# Patient Record
Sex: FEMALE | Race: WHITE | NOT HISPANIC OR LATINO | Employment: OTHER | URBAN - METROPOLITAN AREA
[De-identification: names, ages, dates, MRNs, and addresses within clinical notes are randomized per-mention and may not be internally consistent; named-entity substitution may affect disease eponyms.]

---

## 2017-01-06 ENCOUNTER — ANTI-COAG VISIT (OUTPATIENT)
Dept: CARDIOLOGY | Facility: CLINIC | Age: 82
End: 2017-01-06
Payer: MEDICARE

## 2017-01-06 DIAGNOSIS — Z79.01 LONG TERM CURRENT USE OF ANTICOAGULANT THERAPY: Primary | ICD-10-CM

## 2017-01-06 LAB
CTP QC/QA: NORMAL
INR PPP: 2.7 (ref 2–3)

## 2017-01-06 PROCEDURE — 85610 PROTHROMBIN TIME: CPT | Mod: QW,S$GLB,, | Performed by: PHARMACIST

## 2017-01-26 ENCOUNTER — LAB VISIT (OUTPATIENT)
Dept: LAB | Facility: HOSPITAL | Age: 82
End: 2017-01-26
Attending: INTERNAL MEDICINE
Payer: MEDICARE

## 2017-01-26 ENCOUNTER — OFFICE VISIT (OUTPATIENT)
Dept: INTERNAL MEDICINE | Facility: CLINIC | Age: 82
End: 2017-01-26
Payer: MEDICARE

## 2017-01-26 VITALS
BODY MASS INDEX: 20.72 KG/M2 | DIASTOLIC BLOOD PRESSURE: 78 MMHG | SYSTOLIC BLOOD PRESSURE: 130 MMHG | HEIGHT: 59 IN | WEIGHT: 102.75 LBS | HEART RATE: 73 BPM

## 2017-01-26 DIAGNOSIS — Z23 FLU VACCINE NEED: ICD-10-CM

## 2017-01-26 DIAGNOSIS — Z00.00 PREVENTATIVE HEALTH CARE: ICD-10-CM

## 2017-01-26 DIAGNOSIS — I48.91 ATRIAL FIBRILLATION, UNSPECIFIED TYPE: ICD-10-CM

## 2017-01-26 DIAGNOSIS — Z23 NEEDS FLU SHOT: ICD-10-CM

## 2017-01-26 DIAGNOSIS — R79.9 ABNORMAL FINDING OF BLOOD CHEMISTRY: ICD-10-CM

## 2017-01-26 DIAGNOSIS — E03.9 HYPOTHYROIDISM, UNSPECIFIED TYPE: ICD-10-CM

## 2017-01-26 DIAGNOSIS — I10 ESSENTIAL HYPERTENSION: Primary | ICD-10-CM

## 2017-01-26 DIAGNOSIS — I10 ESSENTIAL HYPERTENSION: ICD-10-CM

## 2017-01-26 LAB
ALBUMIN SERPL BCP-MCNC: 4.5 G/DL
ALP SERPL-CCNC: 92 U/L
ALT SERPL W/O P-5'-P-CCNC: 13 U/L
ANION GAP SERPL CALC-SCNC: 11 MMOL/L
AST SERPL-CCNC: 28 U/L
BASOPHILS # BLD AUTO: 0.02 K/UL
BASOPHILS NFR BLD: 0.2 %
BILIRUB SERPL-MCNC: 0.9 MG/DL
BUN SERPL-MCNC: 14 MG/DL
CALCIUM SERPL-MCNC: 10.2 MG/DL
CHLORIDE SERPL-SCNC: 99 MMOL/L
CHOLEST/HDLC SERPL: 2.8 {RATIO}
CO2 SERPL-SCNC: 28 MMOL/L
CREAT SERPL-MCNC: 0.9 MG/DL
DIFFERENTIAL METHOD: ABNORMAL
EOSINOPHIL # BLD AUTO: 0 K/UL
EOSINOPHIL NFR BLD: 0.5 %
ERYTHROCYTE [DISTWIDTH] IN BLOOD BY AUTOMATED COUNT: 13 %
EST. GFR  (AFRICAN AMERICAN): >60 ML/MIN/1.73 M^2
EST. GFR  (NON AFRICAN AMERICAN): 58.1 ML/MIN/1.73 M^2
GLUCOSE SERPL-MCNC: 117 MG/DL
HCT VFR BLD AUTO: 45.2 %
HDL/CHOLESTEROL RATIO: 35.9 %
HDLC SERPL-MCNC: 209 MG/DL
HDLC SERPL-MCNC: 75 MG/DL
HGB BLD-MCNC: 15.5 G/DL
LDLC SERPL CALC-MCNC: 116.8 MG/DL
LYMPHOCYTES # BLD AUTO: 1.5 K/UL
LYMPHOCYTES NFR BLD: 17.8 %
MCH RBC QN AUTO: 31.9 PG
MCHC RBC AUTO-ENTMCNC: 34.3 %
MCV RBC AUTO: 93 FL
MONOCYTES # BLD AUTO: 0.5 K/UL
MONOCYTES NFR BLD: 5.8 %
NEUTROPHILS # BLD AUTO: 6.5 K/UL
NEUTROPHILS NFR BLD: 75.6 %
NONHDLC SERPL-MCNC: 134 MG/DL
PLATELET # BLD AUTO: 234 K/UL
PMV BLD AUTO: 11.3 FL
POTASSIUM SERPL-SCNC: 3.9 MMOL/L
PROT SERPL-MCNC: 8.2 G/DL
RBC # BLD AUTO: 4.86 M/UL
SODIUM SERPL-SCNC: 138 MMOL/L
TRIGL SERPL-MCNC: 86 MG/DL
TSH SERPL DL<=0.005 MIU/L-ACNC: 1.2 UIU/ML
WBC # BLD AUTO: 8.56 K/UL

## 2017-01-26 PROCEDURE — 85025 COMPLETE CBC W/AUTO DIFF WBC: CPT

## 2017-01-26 PROCEDURE — 36415 COLL VENOUS BLD VENIPUNCTURE: CPT | Mod: PO

## 2017-01-26 PROCEDURE — G0008 ADMIN INFLUENZA VIRUS VAC: HCPCS | Mod: S$GLB,,, | Performed by: INTERNAL MEDICINE

## 2017-01-26 PROCEDURE — 84443 ASSAY THYROID STIM HORMONE: CPT

## 2017-01-26 PROCEDURE — 99999 PR PBB SHADOW E&M-EST. PATIENT-LVL III: CPT | Mod: PBBFAC,,, | Performed by: INTERNAL MEDICINE

## 2017-01-26 PROCEDURE — 80061 LIPID PANEL: CPT

## 2017-01-26 PROCEDURE — 99397 PER PM REEVAL EST PAT 65+ YR: CPT | Mod: 25,S$GLB,, | Performed by: INTERNAL MEDICINE

## 2017-01-26 PROCEDURE — 80053 COMPREHEN METABOLIC PANEL: CPT

## 2017-01-26 PROCEDURE — 90662 IIV NO PRSV INCREASED AG IM: CPT | Mod: S$GLB,,, | Performed by: INTERNAL MEDICINE

## 2017-01-26 PROCEDURE — 99499 UNLISTED E&M SERVICE: CPT | Mod: S$GLB,,, | Performed by: INTERNAL MEDICINE

## 2017-01-26 NOTE — PROGRESS NOTES
Subjective:       Patient ID: Monique Lew is a 86 y.o. female.    Chief Complaint: Follow-up (6 mths follow-up) and Flu Vaccine    HPI Pt. Here for f/u for HTN; Pt. Friend Opal is with the pt.; she is compliant with meds; she would like flu shot and states she had pneumonia vaccine; coumadin clinic is monitoring INR for AF; based on age she does not want mammo and colonoscopy  Review of Systems   Constitutional: Negative for chills, fatigue and fever.   HENT: Negative for congestion, rhinorrhea and sore throat.    Respiratory: Negative for cough, shortness of breath and wheezing.    Cardiovascular: Negative for chest pain.   Gastrointestinal: Negative for abdominal pain, nausea and vomiting.   Genitourinary: Negative for dysuria.   Musculoskeletal: Negative for arthralgias.   Skin: Negative for rash.   Neurological: Negative for dizziness and headaches.   Psychiatric/Behavioral: Negative for sleep disturbance. The patient is not nervous/anxious.        Objective:      Physical Exam   Constitutional: She is oriented to person, place, and time.   Frail     Eyes: EOM are normal.   Neck: Normal range of motion.   Cardiovascular: Normal rate, regular rhythm and normal heart sounds.    Pulmonary/Chest: Effort normal and breath sounds normal.   Abdominal: Soft. There is no tenderness. There is no rebound and no guarding.   Musculoskeletal: Normal range of motion.   Neurological: She is alert and oriented to person, place, and time.   Skin: No rash noted.       Assessment:       1. Essential hypertension Well controlled   2. Hypothyroidism, unspecified type Active   3. Atrial fibrillation, unspecified type Active   4. Needs flu shot Active   5. Preventative health care Active   6. Abnormal finding of blood chemistry  Active       Plan:         Essential hypertension  Comments:  continue current regimen and encouraged low Na diet  Orders:  -     CBC auto differential; Future; Expected date: 1/26/17  -      Comprehensive metabolic panel; Future; Expected date: 1/26/17  -     Urinalysis; Future; Expected date: 1/26/17    Hypothyroidism, unspecified type  Comments:  continue current regimen and repeat TSH  Orders:  -     TSH; Future; Expected date: 1/26/17    Atrial fibrillation, unspecified type  Comments:  continue current regimen and f/u cardiology; coumadin clinic to monitor INR    Needs flu shot  -     Influenza - High Dose (65+) (PF) (IM)    Preventative health care  -     CBC auto differential; Future; Expected date: 1/26/17  -     Comprehensive metabolic panel; Future; Expected date: 1/26/17  -     Lipid panel; Future; Expected date: 1/26/17  -     TSH; Future; Expected date: 1/26/17  -     Urinalysis; Future; Expected date: 1/26/17    Abnormal finding of blood chemistry   -     Lipid panel; Future; Expected date: 1/26/17

## 2017-01-26 NOTE — MR AVS SNAPSHOT
[facility] Internal Medicine   [facility] [place] [address]  Phone: [phone]  Fax: [phone]                  Monique Lew   2017 8:20 AM   Office Visit    Description:  Female : 1930   Provider:  Aguila Hsieh MD   Department:  [facility] Internal Medicine           Reason for Visit     Follow-up     Flu Vaccine           Diagnoses this Visit        Comments    Essential hypertension    -  Primary continue current regimen and encouraged low Na diet    Hypothyroidism, unspecified type     continue current regimen and repeat TSH    Atrial fibrillation, unspecified type     continue current regimen and f/u cardiology; coumadin clinic to monitor INR    Needs flu shot         Preventative health care         Abnormal finding of blood chemistry                To Do List           Future Appointments        Provider Department Dept Phone    2017 9:00 AM LAB, [place] [facility] [phone]    2017 9:30 AM SPECIMEN, [place] [facility] [phone]    2/3/2017 8:15 AM Toby RiveraD Jf [facility] Coumadin [phone]    2017 8:20 AM MD Jf [facility] - Dermatology [phone]    2017 8:45 AM PERIMETRYDANAE [facility] Ophthalmology [phone]      Goals (5 Years of Data)     None      [facility]     [facility] Care Line - 24/7 Assistance  Registered nurses in the [facility] Center provide clinical advisement, health education, appointment booking, and other advisory services.  Call for this free service at [phone].             Medications           Message regarding Medications     Verify the changes and/or additions to your medication regime listed below are the same as discussed with your clinician today.  If any of these changes or additions are incorrect, please notify your healthcare provider.             Verify that the below list of medications is an accurate  "representation of the medications you are currently taking.  If none reported, the list may be blank. If incorrect, please contact your healthcare provider. Carry this list with you in case of emergency.           Current Medications     amlodipine (NORVASC) 10 MG tablet Take 1 tablet (10 mg total) by mouth every morning.    aspirin (ECOTRIN) 81 MG EC tablet Take 81 mg by mouth. 1 Tablet, Delayed Release (E.C.) Oral Every day    metoprolol succinate (TOPROL-XL) 25 MG 24 hr tablet Take 1 tablet (25 mg total) by mouth 2 (two) times daily.    SYNTHROID 50 mcg tablet TAKE 1 TABLET BY MOUTH DAILY EVERY MORNING    timolol maleate 0.5% (TIMOPTIC) 0.5 % Drop Place 1 drop into both eyes 2 (two) times daily.    warfarin (COUMADIN) 1 MG tablet Take 2-3 tablets (2-3 mg total) by mouth Daily.           Clinical Reference Information           Vital Signs - Last Recorded  Most recent update: 1/26/2017  8:19 AM by Kaley Mathis MA    BP Pulse Ht Wt BMI    130/78 (BP Location: Right arm, Patient Position: Sitting, BP Method: Manual) 73 4' 11" (1.499 m) 46.6 kg (102 lb 11.8 oz) 20.75 kg/m2      Blood Pressure          Most Recent Value    BP  130/78      Allergies as of 1/26/2017     Cosopt [Dorzolamide-timolol]    Mevacor [Lovastatin]    Prednisone    Vasotec [Enalapril Maleate]    Zetia [Ezetimibe]    Furosemide      Immunizations Administered on Date of Encounter - 1/26/2017     Name Date Dose VIS Date Route    Influenza - High Dose  Incomplete 0.5 mL 8/7/2015 Intramuscular      Orders Placed During Today's Visit      Normal Orders This Visit    Influenza - High Dose (65+) (PF) (IM)     Future Labs/Procedures Expected by Expires    CBC auto differential  1/26/2017 9/26/2017    Comprehensive metabolic panel  1/26/2017 9/26/2017    Lipid panel  1/26/2017 9/26/2017    TSH  1/26/2017 9/26/2017    Urinalysis  1/26/2017 9/26/2017      [email or web address] Sign-Up     Activating your [facility] account is as easy as 1-2-3!     1) Visit " [email or web address], select Sign Up Now, enter this activation code and your date of birth, then select Next.  [email or web address]  Expires: 3/12/2017  8:46 AM      2) Create a username and password to use when you visit [facility] in the future and select a security question in case you lose your password and select Next.    3) Enter your e-mail address and click Sign Up!    Additional Information  If you have questions, please e-mail [email or web address] or call [phone] to talk to our [facility] staff. Remember, [facility] is NOT to be used for urgent needs. For medical emergencies, dial 911.

## 2017-02-03 ENCOUNTER — ANTI-COAG VISIT (OUTPATIENT)
Dept: CARDIOLOGY | Facility: CLINIC | Age: 82
End: 2017-02-03
Payer: MEDICARE

## 2017-02-03 DIAGNOSIS — Z79.01 LONG TERM CURRENT USE OF ANTICOAGULANT THERAPY: Primary | ICD-10-CM

## 2017-02-03 LAB — INR PPP: 3.4 (ref 2–3)

## 2017-02-03 PROCEDURE — 85610 PROTHROMBIN TIME: CPT | Mod: QW,S$GLB,, | Performed by: PHARMACIST

## 2017-02-03 PROCEDURE — 99211 OFF/OP EST MAY X REQ PHY/QHP: CPT | Mod: 25,S$GLB,, | Performed by: PHARMACIST

## 2017-02-03 NOTE — PROGRESS NOTES
Patient denies any changes in diet, medications, or health that would effect warfarin therapy.  Patient elderly with increased INR trend over the last few appointments. I will lower her weekly dose of coumadin

## 2017-02-16 ENCOUNTER — TELEPHONE (OUTPATIENT)
Dept: INTERNAL MEDICINE | Facility: CLINIC | Age: 82
End: 2017-02-16

## 2017-02-16 DIAGNOSIS — R73.9 HYPERGLYCEMIA: Primary | ICD-10-CM

## 2017-02-16 NOTE — TELEPHONE ENCOUNTER
Informed pt that she meets the criteria for diabetes screening. Get HgbA1c and 2 GTT. Lower sugar in diet. Pt would like to only have the HgbA1c and requested [facility] on tomorrow since she has an appt there already.

## 2017-02-17 ENCOUNTER — LAB VISIT (OUTPATIENT)
Dept: LAB | Facility: HOSPITAL | Age: 82
End: 2017-02-17
Attending: INTERNAL MEDICINE
Payer: MEDICARE

## 2017-02-17 ENCOUNTER — ANTI-COAG VISIT (OUTPATIENT)
Dept: CARDIOLOGY | Facility: CLINIC | Age: 82
End: 2017-02-17
Payer: MEDICARE

## 2017-02-17 DIAGNOSIS — R73.9 HYPERGLYCEMIA: ICD-10-CM

## 2017-02-17 DIAGNOSIS — Z79.01 LONG TERM CURRENT USE OF ANTICOAGULANT THERAPY: ICD-10-CM

## 2017-02-17 LAB
ESTIMATED AVG GLUCOSE: 114 MG/DL
HBA1C MFR BLD HPLC: 5.6 %
INR PPP: 2.8 (ref 2–3)

## 2017-02-17 PROCEDURE — 85610 PROTHROMBIN TIME: CPT | Mod: QW,S$GLB,,

## 2017-02-17 PROCEDURE — 83036 HEMOGLOBIN GLYCOSYLATED A1C: CPT

## 2017-02-17 PROCEDURE — 36415 COLL VENOUS BLD VENIPUNCTURE: CPT

## 2017-02-17 NOTE — PROGRESS NOTES
Pt confirms dose. She denies any extensive bruising at this time. No new medications. Will maintain dose. I have reviewed the student's initial findings and agree with their assessment. I have personally spoken with and assessed the patient in clinic to devise care plan.

## 2017-02-21 DIAGNOSIS — I48.91 ATRIAL FIBRILLATION: ICD-10-CM

## 2017-02-21 RX ORDER — AMLODIPINE BESYLATE 10 MG/1
TABLET ORAL
Qty: 90 TABLET | Refills: 3 | Status: SHIPPED | OUTPATIENT
Start: 2017-02-21 | End: 2017-05-31 | Stop reason: SDUPTHER

## 2017-02-21 RX ORDER — LEVOTHYROXINE SODIUM 50 UG/1
TABLET ORAL
Qty: 90 TABLET | Refills: 1 | Status: SHIPPED | OUTPATIENT
Start: 2017-02-21 | End: 2017-11-03 | Stop reason: SDUPTHER

## 2017-02-22 RX ORDER — WARFARIN 1 MG/1
TABLET ORAL
Qty: 192 TABLET | Refills: 7 | Status: ON HOLD | OUTPATIENT
Start: 2017-02-22 | End: 2017-11-29 | Stop reason: HOSPADM

## 2017-02-23 ENCOUNTER — OFFICE VISIT (OUTPATIENT)
Dept: DERMATOLOGY | Facility: CLINIC | Age: 82
End: 2017-02-23
Payer: MEDICARE

## 2017-02-23 DIAGNOSIS — L82.1 SK (SEBORRHEIC KERATOSIS): ICD-10-CM

## 2017-02-23 DIAGNOSIS — D22.9 BENIGN NEVUS: ICD-10-CM

## 2017-02-23 DIAGNOSIS — L57.0 AK (ACTINIC KERATOSIS): Primary | ICD-10-CM

## 2017-02-23 DIAGNOSIS — L81.4 LENTIGO: ICD-10-CM

## 2017-02-23 DIAGNOSIS — Z12.83 SCREENING EXAM FOR SKIN CANCER: ICD-10-CM

## 2017-02-23 DIAGNOSIS — L72.11 PILAR CYST: ICD-10-CM

## 2017-02-23 PROCEDURE — 99999 PR PBB SHADOW E&M-EST. PATIENT-LVL II: CPT | Mod: PBBFAC,,, | Performed by: DERMATOLOGY

## 2017-02-23 PROCEDURE — 17000 DESTRUCT PREMALG LESION: CPT | Mod: S$GLB,,, | Performed by: DERMATOLOGY

## 2017-02-23 PROCEDURE — 1157F ADVNC CARE PLAN IN RCRD: CPT | Mod: S$GLB,,, | Performed by: DERMATOLOGY

## 2017-02-23 PROCEDURE — 1159F MED LIST DOCD IN RCRD: CPT | Mod: S$GLB,,, | Performed by: DERMATOLOGY

## 2017-02-23 PROCEDURE — 99213 OFFICE O/P EST LOW 20 MIN: CPT | Mod: 25,S$GLB,, | Performed by: DERMATOLOGY

## 2017-02-23 PROCEDURE — 1160F RVW MEDS BY RX/DR IN RCRD: CPT | Mod: S$GLB,,, | Performed by: DERMATOLOGY

## 2017-02-23 NOTE — PROGRESS NOTES
Subjective:       Patient ID:  Monique Lew is a 86 y.o. female who presents for   Chief Complaint   Patient presents with    Spot     right cheek x few weeks, tender no prior tx     Skin Check     UBSE     HPI Comments: History of Present Illness: The patient presents for follow up of skin check.    The patient was last seen on: 2/8/16 for cryosurgery to actinic keratoses which have resolved.     Other skin complaints: tender lesion right cheek x 3 weeks. No prior treatment    No h/o nmsc      Spot         Review of Systems   Skin: Negative for daily sunscreen use, activity-related sunscreen use and recent sunburn.   Hematologic/Lymphatic: Bruises/bleeds easily (on coumaden and asa).        Objective:    Physical Exam   Constitutional: She appears well-developed and well-nourished. No distress.   Neurological: She is alert and oriented to person, place, and time. She is not disoriented.   Psychiatric: She has a normal mood and affect.   Skin:   Areas Examined (abnormalities noted in diagram):   Scalp / Hair Palpated and Inspected  Head / Face Inspection Performed  Neck Inspection Performed  Chest / Axilla Inspection Performed  Back Inspection Performed  RUE Inspected  LUE Inspection Performed  Nails and Digits Inspection Performed                   Diagram Legend     Erythematous scaling macule/papule c/w actinic keratosis       Vascular papule c/w angioma      Pigmented verrucoid papule/plaque c/w seborrheic keratosis      Yellow umbilicated papule c/w sebaceous hyperplasia      Irregularly shaped tan macule c/w lentigo     1-2 mm smooth white papules consistent with Milia      Movable subcutaneous cyst with punctum c/w epidermal inclusion cyst      Subcutaneous movable cyst c/w pilar cyst      Firm pink to brown papule c/w dermatofibroma      Pedunculated fleshy papule(s) c/w skin tag(s)      Evenly pigmented macule c/w junctional nevus     Mildly variegated pigmented, slightly irregular-bordered macule  c/w mildly atypical nevus      Flesh colored to evenly pigmented papule c/w intradermal nevus       Pink pearly papule/plaque c/w basal cell carcinoma      Erythematous hyperkeratotic cursted plaque c/w SCC      Surgical scar with no sign of skin cancer recurrence      Open and closed comedones      Inflammatory papules and pustules      Verrucoid papule consistent consistent with wart     Erythematous eczematous patches and plaques     Dystrophic onycholytic nail with subungual debris c/w onychomycosis     Umbilicated papule    Erythematous-base heme-crusted tan verrucoid plaque consistent with inflamed seborrheic keratosis     Erythematous Silvery Scaling Plaque c/w Psoriasis     See annotation      Assessment / Plan:        AK (actinic keratosis)  Cryosurgery Procedure Note    Verbal consent from the patient is obtained and the patient is aware of the precancerous quality and need for treatment of these lesions. Liquid nitrogen cryosurgery is applied to the 1 actinic keratoses, as detailed in the physical exam, to produce a freeze injury. The patient is aware that blisters may form and is instructed on wound care with gentle cleansing and use of vaseline ointment to keep moist until healed. The patient is supplied a handout on cryosurgery and is instructed to call if lesions do not completely resolve.      SK (seborrheic keratosis)   - stable and chronic      Benign nevus   - stable and chronic      Pilar cyst   - stable and chronic      Lentigo  This is a benign hyperpigmented sun induced lesion. Daily sun protection will reduce the number of new lesions. Treatment of these benign lesions are considered cosmetic.      Screening exam for skin cancer    Upper body skin examination performed today including at least 6 points as noted in physical examination. No lesions suspicious for malignancy noted.             Return in about 1 year (around 2/23/2018).

## 2017-02-23 NOTE — PATIENT INSTRUCTIONS

## 2017-03-10 ENCOUNTER — ANTI-COAG VISIT (OUTPATIENT)
Dept: CARDIOLOGY | Facility: CLINIC | Age: 82
End: 2017-03-10
Payer: MEDICARE

## 2017-03-10 DIAGNOSIS — Z79.01 LONG TERM CURRENT USE OF ANTICOAGULANT THERAPY: ICD-10-CM

## 2017-03-10 LAB — INR PPP: 2.4 (ref 2–3)

## 2017-03-10 PROCEDURE — 85610 PROTHROMBIN TIME: CPT | Mod: QW,S$GLB,,

## 2017-04-21 ENCOUNTER — CLINICAL SUPPORT (OUTPATIENT)
Dept: OPHTHALMOLOGY | Facility: CLINIC | Age: 82
End: 2017-04-21
Payer: MEDICARE

## 2017-04-21 ENCOUNTER — ANTI-COAG VISIT (OUTPATIENT)
Dept: CARDIOLOGY | Facility: CLINIC | Age: 82
End: 2017-04-21
Payer: MEDICARE

## 2017-04-21 ENCOUNTER — OFFICE VISIT (OUTPATIENT)
Dept: OPHTHALMOLOGY | Facility: CLINIC | Age: 82
End: 2017-04-21
Payer: MEDICARE

## 2017-04-21 DIAGNOSIS — H40.10X3 OPEN-ANGLE GLAUCOMA OF BOTH EYES, SEVERE STAGE, UNSPECIFIED OPEN-ANGLE GLAUCOMA TYPE: ICD-10-CM

## 2017-04-21 DIAGNOSIS — H44.522 PHTHISIS BULBI OF LEFT EYE: ICD-10-CM

## 2017-04-21 DIAGNOSIS — T85.22XA DISLOCATED IOL (INTRAOCULAR LENS), POSTERIOR, RIGHT: ICD-10-CM

## 2017-04-21 DIAGNOSIS — H40.1493 PSEUDOEXFOLIATION GLAUCOMA, SEVERE STAGE: ICD-10-CM

## 2017-04-21 DIAGNOSIS — H53.16 CHARLES BONNET SYNDROME: ICD-10-CM

## 2017-04-21 DIAGNOSIS — H35.341 MACULAR HOLE OF RIGHT EYE: ICD-10-CM

## 2017-04-21 DIAGNOSIS — H17.12 CENTRAL CORNEAL OPACITY OF LEFT EYE: ICD-10-CM

## 2017-04-21 DIAGNOSIS — Z96.1 PSEUDOPHAKIA OF BOTH EYES: ICD-10-CM

## 2017-04-21 DIAGNOSIS — Z79.01 LONG TERM CURRENT USE OF ANTICOAGULANT THERAPY: ICD-10-CM

## 2017-04-21 DIAGNOSIS — H40.1433 PSEUDOEXFOLIATIVE GLAUCOMA, BOTH EYES, SEVERE STAGE: Primary | ICD-10-CM

## 2017-04-21 DIAGNOSIS — H18.892 CORNEAL THINNING, LEFT: ICD-10-CM

## 2017-04-21 LAB — INR PPP: 2 (ref 2–3)

## 2017-04-21 PROCEDURE — 99999 PR PBB SHADOW E&M-EST. PATIENT-LVL II: CPT | Mod: PBBFAC,,, | Performed by: OPHTHALMOLOGY

## 2017-04-21 PROCEDURE — 92133 CPTRZD OPH DX IMG PST SGM ON: CPT | Mod: S$GLB,,, | Performed by: OPHTHALMOLOGY

## 2017-04-21 PROCEDURE — 85610 PROTHROMBIN TIME: CPT | Mod: QW,S$GLB,,

## 2017-04-21 PROCEDURE — 99499 UNLISTED E&M SERVICE: CPT | Mod: S$GLB,,, | Performed by: OPHTHALMOLOGY

## 2017-04-21 PROCEDURE — 92083 EXTENDED VISUAL FIELD XM: CPT | Mod: S$GLB,,, | Performed by: OPHTHALMOLOGY

## 2017-04-21 PROCEDURE — 92014 COMPRE OPH EXAM EST PT 1/>: CPT | Mod: S$GLB,,, | Performed by: OPHTHALMOLOGY

## 2017-04-21 NOTE — PROGRESS NOTES
"HPI     Glaucoma    Additional comments: HVF and OCT review today           Decreased Visual Acuity    Additional comments: Pt feels vision is getting worse and dimmer           Comments   DLS: 12/2/16    1) PSEx  2) Dislocated IOL OD  3) Mac Hole OD  4) Phthisis OS  5) PCIOL OU  6) Yag Cap OD  7) Floater OD    MEDS:   Timolol BID OU       Last edited by Diane Carlisle MA on 4/21/2017  9:27 AM. (History)            Assessment /Plan     For exam results, see Encounter Report.    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 Pseudoexfoliation Glaucoma OD severe stage OD /Absolute Glaucoma OS   S/p SLT OS (10/13/05)   S/p Trab OS (1/11/06)   S/p SLT OD (1/24/08)   Ant Vit w/ IOL exchange with sutured PCIOL (Deisi) 4/9/09 OS   DSAEK 4/30/09 OS   Conj flap for dellen OS (5/28/09)   S/p Phaco Trab with mini shunt OD 1/26/11 w/ hx of siobhan po coarse w/ hypotony s/p AC reformation and then elevated IOP with all sutures cut and then encapsulation   Dislocated PC IOL OD 10/2012 -Evaluated by Raya for possible combined. Pt not interested.    Family history none   Glaucoma meds T1/2 BID OU .  H/O adverse rxn to glaucoma drops    -  D500 intolerant     -  intolerant to cosopt / azopt - swollen eye lids;    Xalatan "blurrs vision", travatan Z cuellar;    did not tolerate Jose 2/2 photophobia?,    " "timoptic causes bradycardia - tolerating ok 3/2015 //   - brimonidine - " loss of color perception - could no longer see the colors of the caps  LASERS SLT OU (10/13/05 OD and 1/24/08 OS)   GLAUCOMA SURGERIES    -Phaco/Trab 1/26/11 OD     -S/p Trab OS (1/11/06)    - BV tube shunt OD 10/30/2013  OTHER EYE SURGERIES    -PCIOL OS dislocated (elsewhere)    -Ant Vit w/ IOL exchange with sutured PCIOL (Deisi) 4/9/09 OS    -DSEAK 4/30/09 OS    -Conj flap for dellen OS (5/28/09)   CDR 0.8/0.9   Tbase 32-40 OD pre Trab   Tmax 40/-   Ttarget 16/pain free   HVF 19 VF '96 - 04/2017 OD: SAD/IAD w/ split fix (stable) ; OS: NA  Gonio +4/-   /585   OCT 8 test 2004 to 04/2017 -RNFL - OD:dec. I // OS:xx  HRT 11 test 2005 to 2016 -MR -  Off-center // xx os  Disc photos 6812-3194 - mult slides //   2011, 2014, 2016 - OIS     Ttoday  14 / 17  Test done today -HVf/DFE/OCT     3. PCIOL OU - both have dislocated OS years ago, OD recently 10/1/2012    4. H/O PCO OD - S/P yag cap od - IOL dislocated shortly there after 2/2 weak zonules assoc with PsEx    5. Hx Mac Hole OD   - S/P sx (partial PPV) with Arend years ago, did extremely well    6. Pre phthisis OS,   -in past was blind hypertenssive but now with nl IOP off gtts ,   -absent AC, and vascularized opacified cornea,   -S/P DESEK, and corneal melt    7. Large floater  OD 1/2/2014 - saw Arend 1/16/2014 - stable    8. Ulices Bonnet Syndrome   Sees green foliage with roses   Sees groups of people out side    9. Bruise around Right eye  9/17/2012, and Hx of Hyphema OD (4/12) - resolved   -associated with coumadin use  -bruising resolved   -Pt is on coumadin - for atrial fib    10. H/O CVA - 2008// atrial fib // ? Mult mini strokes    On coumadin           Plan  Monocular PsEx glaucoma w/ dislocated IOL OD  - had increased IOP OD with cont progression of VF defect OD  - has had a trab with express mini shunt OD 2011 as well as Baerveldt 10/30/2013  Pt does not tolerate, travatahelen ragland, " "jazzmine, or diamox  Tolerates timolol - but ? If affecting her hert rate - bradycardia     - Pt happy w/ aphakic Glasses only. Does not wish to have PPV w/ Scleral Fixated IOL. Does not use CL.  - states her vision is clearer with her dislocated PC IOL and aphakic glasses then before the dislocation - most likely because now there is no PCO  she is looking through    Glaucoma - IOP better with addition of timolol ou   Cont  t 1/2 ou - tolerating ok - ? H/o bradycardia in past   Patient discontinued alphagan tid od b/c experiencing "color blindness" - lost ability to see the colors of the caps   Intolerant to ALL other gtts     HVF progression OD  Patient states she has been having multiple "ministrokes"    IOP doing well today on timolol  HVF appears stable compared to prior   Cont with vf loss 2/2 mult mini strokes - despite good IOP's - pt is on coumadin     Cont to F/U with arend q 6 months (DLS 08/2016)    Glasses - Miguel -happy with new glasses - 12/2016     rtc -- 4 months with IOP check -- in future arevalo 24-2 stim V od               "

## 2017-05-01 PROBLEM — Z00.00 PREVENTATIVE HEALTH CARE: Status: RESOLVED | Noted: 2017-01-26 | Resolved: 2017-05-01

## 2017-05-26 ENCOUNTER — TELEPHONE (OUTPATIENT)
Dept: ELECTROPHYSIOLOGY | Facility: CLINIC | Age: 82
End: 2017-05-26

## 2017-05-26 DIAGNOSIS — I48.91 ATRIAL FIBRILLATION, UNSPECIFIED TYPE: Primary | ICD-10-CM

## 2017-05-30 ENCOUNTER — TELEPHONE (OUTPATIENT)
Dept: ELECTROPHYSIOLOGY | Facility: CLINIC | Age: 82
End: 2017-05-30

## 2017-05-31 ENCOUNTER — HOSPITAL ENCOUNTER (OUTPATIENT)
Dept: CARDIOLOGY | Facility: CLINIC | Age: 82
Discharge: HOME | End: 2017-05-31
Payer: MEDICARE

## 2017-05-31 ENCOUNTER — OFFICE VISIT (OUTPATIENT)
Dept: ELECTROPHYSIOLOGY | Facility: CLINIC | Age: 82
End: 2017-05-31
Payer: MEDICARE

## 2017-05-31 ENCOUNTER — ANTI-COAG VISIT (OUTPATIENT)
Dept: CARDIOLOGY | Facility: CLINIC | Age: 82
End: 2017-05-31
Payer: MEDICARE

## 2017-05-31 VITALS
HEIGHT: 59 IN | DIASTOLIC BLOOD PRESSURE: 92 MMHG | BODY MASS INDEX: 21.16 KG/M2 | HEART RATE: 77 BPM | SYSTOLIC BLOOD PRESSURE: 138 MMHG | WEIGHT: 104.94 LBS

## 2017-05-31 DIAGNOSIS — I48.91 ATRIAL FIBRILLATION, UNSPECIFIED TYPE: ICD-10-CM

## 2017-05-31 DIAGNOSIS — Z86.73 HISTORY OF CVA (CEREBROVASCULAR ACCIDENT): ICD-10-CM

## 2017-05-31 DIAGNOSIS — I65.29 STENOSIS OF CAROTID ARTERY, UNSPECIFIED LATERALITY: ICD-10-CM

## 2017-05-31 DIAGNOSIS — Z79.01 CURRENT USE OF LONG TERM ANTICOAGULATION: ICD-10-CM

## 2017-05-31 DIAGNOSIS — Z79.01 CURRENT USE OF LONG TERM ANTICOAGULATION: Primary | ICD-10-CM

## 2017-05-31 DIAGNOSIS — I10 ESSENTIAL HYPERTENSION: ICD-10-CM

## 2017-05-31 DIAGNOSIS — I48.91 ATRIAL FIBRILLATION, UNSPECIFIED TYPE: Primary | ICD-10-CM

## 2017-05-31 DIAGNOSIS — E78.00 HYPERCHOLESTEREMIA: ICD-10-CM

## 2017-05-31 DIAGNOSIS — E03.9 HYPOTHYROIDISM, UNSPECIFIED TYPE: ICD-10-CM

## 2017-05-31 DIAGNOSIS — Z85.3 HISTORY OF BREAST CANCER: ICD-10-CM

## 2017-05-31 LAB
CTP QC/QA: YES
INR PPP: 2 (ref 2–3)
PROTHROMBIN TIME, POC: NORMAL (ref 2–3)

## 2017-05-31 PROCEDURE — 99499 UNLISTED E&M SERVICE: CPT | Mod: S$GLB,,, | Performed by: NURSE PRACTITIONER

## 2017-05-31 PROCEDURE — 93000 ELECTROCARDIOGRAM COMPLETE: CPT | Mod: S$GLB,,, | Performed by: INTERNAL MEDICINE

## 2017-05-31 PROCEDURE — 1159F MED LIST DOCD IN RCRD: CPT | Mod: S$GLB,,, | Performed by: NURSE PRACTITIONER

## 2017-05-31 PROCEDURE — 99999 PR PBB SHADOW E&M-EST. PATIENT-LVL III: CPT | Mod: PBBFAC,,, | Performed by: NURSE PRACTITIONER

## 2017-05-31 PROCEDURE — 1126F AMNT PAIN NOTED NONE PRSNT: CPT | Mod: S$GLB,,, | Performed by: NURSE PRACTITIONER

## 2017-05-31 PROCEDURE — 99214 OFFICE O/P EST MOD 30 MIN: CPT | Mod: S$GLB,,, | Performed by: NURSE PRACTITIONER

## 2017-05-31 PROCEDURE — 85610 PROTHROMBIN TIME: CPT | Mod: QW,S$GLB,,

## 2017-05-31 RX ORDER — AMLODIPINE BESYLATE 10 MG/1
10 TABLET ORAL EVERY MORNING
Qty: 90 TABLET | Refills: 3 | Status: SHIPPED | OUTPATIENT
Start: 2017-05-31 | End: 2017-07-26 | Stop reason: SDUPTHER

## 2017-05-31 RX ORDER — METOPROLOL SUCCINATE 25 MG/1
25 TABLET, EXTENDED RELEASE ORAL 2 TIMES DAILY
Qty: 180 TABLET | Refills: 1 | Status: SHIPPED | OUTPATIENT
Start: 2017-05-31 | End: 2018-01-23 | Stop reason: SDUPTHER

## 2017-05-31 NOTE — PROGRESS NOTES
"Subjective:    Patient ID:  Monique Lew is a 86 y.o. female who presents for follow-up of Atrial Fibrillation    Monique Lew is a patient of Dr. Mills.    HPI     Ms. Lew is a 86 y.o. female with AF, carotid artery stenosis, hypercholesteremia, HTN, hx of embolic CVA, hypothyroidism, and a hx of breast cancer. Ms. Lew has a hx of multiple DCCV's, has been on Flecainide in the past >> dizziness; last DCCV 04/2011.   At her last office visit, Ms. Lew reported doing well overall with occasional baseline fatigue and occasional "chest heaviness" with walking over the 6 months leading up to that office visit; she reported experiencing occasional associated SOB when lying flat.     Since her last office visit, Ms. Lew reports feeling well overall with occasional baseline fatigue; she denies chest pain, SOB/BARROS, dizziness, palpitations, or syncope.     Recent cardiac studies:  Echo (05/22/15) revealed an EF of 55-60%, mildly depressed right ventricular systolic function, severe biatrial enlargement (ANDREZ measuring 51.98 cc/m2), and moderate MR and TR.   NM Stress Test (05/31/16): The perfusion scan was free of evidence for myocardial ischemia or injury; study revealed normal LV function.     I reviewed today's ECG which demonstrated AF at 77   bpm; QRS 84, and QTc 423.    Review of Systems   Constitution: Positive for malaise/fatigue. Negative for diaphoresis.   HENT: Negative for headaches and nosebleeds.    Eyes: Negative for double vision.   Cardiovascular: Negative for chest pain, dyspnea on exertion, irregular heartbeat, near-syncope, palpitations and syncope.   Respiratory: Negative for shortness of breath.    Skin: Negative.    Musculoskeletal: Positive for stiffness.   Gastrointestinal: Negative for abdominal pain, hematemesis and hematochezia.   Genitourinary: Negative for hematuria.   Neurological: Negative for dizziness and light-headedness.   Psychiatric/Behavioral: Negative for " altered mental status.        Objective:    Physical Exam   Constitutional: She is oriented to person, place, and time. She appears well-developed and well-nourished.   HENT:   Head: Normocephalic and atraumatic.   Eyes: Pupils are equal, round, and reactive to light.   Neck: Normal range of motion.   Cardiovascular: Normal rate, regular rhythm, normal heart sounds and intact distal pulses.    Pulmonary/Chest: Effort normal and breath sounds normal.   Abdominal: Soft.   Neurological: She is alert and oriented to person, place, and time.   Skin: She is not diaphoretic.   Vitals reviewed.        Assessment:       1. Atrial fibrillation, unspecified type    2. Current use of long term anticoagulation    3. Stenosis of carotid artery, unspecified laterality    4. Hypercholesteremia    5. Essential hypertension    6. History of CVA (cerebrovascular accident)    7. Hypothyroidism, unspecified type    8. History of breast cancer         Plan:       In summary, Ms. Lew is a 86 y.o. female with AF, carotid artery stenosis, hypercholesteremia, HTN, hx of CVA, hypothyroidism, and a hx of breast cancer. Ms. Lew is doing well from a rhythm perspective with asymptomatic AF; rate-controlled on Toprol-XL and anticoagulated on warfarin.    AF and its basic pathophysiology was discussed in detail with Moniqueanabel Lew. Specifically,the need for CVA prophylaxis was addressed. We discussed the pros and cons of the anticoagulants, including warfarin, and NOACs (limitation is non reversibility with the exception of Pradaxa) and she chose to remain on warfarin.     Continue current medication regimen.   Follow up in EP clinic in 1 year, sooner as needed.     Jessica Sanders, MN, APRN, FNP-C

## 2017-05-31 NOTE — PROGRESS NOTES
Patient is stable on this dose.She denies changes to diet, medications, or health that would affect INR.  She will continue weekly warfarin regimen at this time.  Patient advised to contact clinic with any changes, questions, or concerns.

## 2017-06-30 ENCOUNTER — ANTI-COAG VISIT (OUTPATIENT)
Dept: CARDIOLOGY | Facility: CLINIC | Age: 82
End: 2017-06-30
Payer: MEDICARE

## 2017-06-30 DIAGNOSIS — Z79.01 CURRENT USE OF LONG TERM ANTICOAGULATION: ICD-10-CM

## 2017-06-30 LAB — INR PPP: 2.4 (ref 2–3)

## 2017-06-30 PROCEDURE — 85610 PROTHROMBIN TIME: CPT | Mod: QW,S$GLB,,

## 2017-06-30 NOTE — PROGRESS NOTES
Patient reports no changes to alter INR. Will maintain dose.  Advised to call Coumadin Clinic with any issues.

## 2017-07-11 NOTE — PROGRESS NOTES
Patient ([phone]) called 7/11/17 to in form that she will have (2) teeth extractions on 7/20/17. She want to know when to hold her coumadin. Please advise.

## 2017-07-11 NOTE — PROGRESS NOTES
The pt will be scheduling the extraction of two teeth in the near future and needs clearance for holding her coumadin.  She is CHADs = 4.0 (age, HTN, CVA) and we have recommended holding with Lovenox in the past.  As we do not always recommend holding coumadin for the extraction of 1-2 teeth and she will require lovenox, I am recommending that we only hold coumadin for the evening prior to her extractions, with prompt resumption the evening of the procedure.  (This should avoid the need for a lovenox bridge.)  I spoke with her dentist today and he is fine with this plan.  I e-mailed over a clearance to: [email or web address] and have asked to pt to notify us once a date for this procedure is set.

## 2017-07-25 ENCOUNTER — OFFICE VISIT (OUTPATIENT)
Dept: UROLOGY | Facility: CLINIC | Age: 82
End: 2017-07-25
Payer: MEDICARE

## 2017-07-25 VITALS
HEIGHT: 59 IN | BODY MASS INDEX: 20.66 KG/M2 | WEIGHT: 102.5 LBS | SYSTOLIC BLOOD PRESSURE: 190 MMHG | DIASTOLIC BLOOD PRESSURE: 124 MMHG | HEART RATE: 89 BPM

## 2017-07-25 DIAGNOSIS — Z86.73 HISTORY OF CVA (CEREBROVASCULAR ACCIDENT): ICD-10-CM

## 2017-07-25 DIAGNOSIS — I10 ESSENTIAL HYPERTENSION: ICD-10-CM

## 2017-07-25 DIAGNOSIS — R31.9 HEMATURIA: Primary | ICD-10-CM

## 2017-07-25 DIAGNOSIS — I48.91 ATRIAL FIBRILLATION, UNSPECIFIED TYPE: ICD-10-CM

## 2017-07-25 DIAGNOSIS — R31.29 HEMATURIA, MICROSCOPIC: ICD-10-CM

## 2017-07-25 DIAGNOSIS — C50.919 MALIGNANT NEOPLASM OF FEMALE BREAST, UNSPECIFIED ESTROGEN RECEPTOR STATUS, UNSPECIFIED LATERALITY, UNSPECIFIED SITE OF BREAST: ICD-10-CM

## 2017-07-25 LAB
BILIRUB SERPL-MCNC: NORMAL MG/DL
BLOOD URINE, POC: 50
COLOR, POC UA: NORMAL
GLUCOSE UR QL STRIP: NORMAL
KETONES UR QL STRIP: NORMAL
LEUKOCYTE ESTERASE URINE, POC: NORMAL
NITRITE, POC UA: NORMAL
PH, POC UA: 8
PROTEIN, POC: 30
SPECIFIC GRAVITY, POC UA: 1.01
UROBILINOGEN, POC UA: NORMAL

## 2017-07-25 PROCEDURE — 99499 UNLISTED E&M SERVICE: CPT | Mod: S$GLB,,, | Performed by: UROLOGY

## 2017-07-25 PROCEDURE — 1159F MED LIST DOCD IN RCRD: CPT | Mod: S$GLB,,, | Performed by: UROLOGY

## 2017-07-25 PROCEDURE — 99213 OFFICE O/P EST LOW 20 MIN: CPT | Mod: 25,S$GLB,, | Performed by: UROLOGY

## 2017-07-25 PROCEDURE — 81001 URINALYSIS AUTO W/SCOPE: CPT | Mod: S$GLB,,, | Performed by: UROLOGY

## 2017-07-25 PROCEDURE — 1126F AMNT PAIN NOTED NONE PRSNT: CPT | Mod: S$GLB,,, | Performed by: UROLOGY

## 2017-07-25 PROCEDURE — 99999 PR PBB SHADOW E&M-EST. PATIENT-LVL III: CPT | Mod: PBBFAC,,, | Performed by: UROLOGY

## 2017-07-25 NOTE — PROGRESS NOTES
Subjective:       Patient ID: Monique Lew is a 86 y.o. female.    Chief Complaint: Follow-up    Monique Lew is a 86 y.o. female here for follow up.   He has a 20 pack year history, quit 30 years ago.  History of UTI, but not recurrent.   9/14 E. Coli. No UTI since.  Cysto 10/14.  CT 9/14.  No LUTS. No gross hematuria (she can see enough to see this).   No dysuria.     Patient reports BP at home 130/80 today. She is a little nervous.     Doing ok. Some eye problems and heart issues. Having mini strokes.     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 hines)  No date: BREAST LUMPECTOMY  No date: BUNIONECTOMY  01/26/2011: CATARACT EXTRACTION W/  INTRAOCULAR LENS IMPLA* Right      Comment: WITH TRAB ()  n/a: CATARACT EXTRACTION W/  INTRAOCULAR LENS IMPLA* Left  01/26/2011: CATARACT EXTRACTION W/ INTRAOCULAR LENS IMPLAN* Right      Comment:   No date: COLON SURGERY      Comment: volvulus  05/28/2009: CONJUNCTIVAL FLAP  Left      Comment: DR. JARAMILLO  2009: conjuunctival flap       Comment: OS w/ dr. 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 emanuel  01/11/2006: TRABECULECTOMY  Left      Comment:   2010: transcleral cyclophotocoagulation       Comment: OS w/ dr. hines    Review of patient's family history indicates:    Emphysema                      Mother                    Hypertension                   Mother                    Hyperlipidemia                 Mother                    Hypertension                   Father                    Hyperlipidemia                 Father                    No Known Problems              Sister                    No Known Problems              Brother                   No Known Problems              Maternal Aunt             No Known Problems              Maternal Uncle            No Known Problems              Paternal Aunt             No Known Problems              Paternal Uncle            No Known Problems              Maternal Grandmother      No Known Problems              Maternal Grandfather      No Known Problems              Paternal Grandmother      No Known Problems              Paternal Grandfather      Melanoma                       Neg Hx                    Psoriasis                      Neg Hx                    Lupus                          Neg Hx                    Eczema                         Neg Hx                    Acne                           Neg Hx                    Amblyopia                      Neg Hx                    Blindness                      Neg Hx                    Cancer                         Neg Hx                    Cataracts                      Neg Hx                    Diabetes                       Neg Hx                    Glaucoma                       Neg Hx                    Macular degeneration           Neg Hx                    Retinal detachment             Neg Hx                    Strabismus                     Neg Hx                    Stroke                         Neg Hx                    Thyroid disease                Neg Hx                      Social History     Marital status: Single              Spouse name:                       Years of education:                 Number of children:               Occupational History  Occupation          Employer            Comment               Retired                                     Social History Main Topics    Smoking status: Former Smoker                                                                Packs/day: 0.00      Years: 0.00           Quit date: 9/25/1982       Smokeless tobacco: Not on file                       Alcohol use: No              Drug use: No              Sexual activity: No                   Other Topics            Concern  Are you pregnant or th* No  Breast-feeding          No    Social History Narrative    None on file        Allergies:  Cosopt (dorzolamide-timolol); Mevacor (lovastatin); Prednisone; Vasotec (enalapril maleate); Zetia (ezetimibe); and Furosemide    Medications:  Current Outpatient Prescriptions:   amlodipine (NORVASC) 10 MG tablet, Take 1 tablet (10 mg total) by mouth every morning., Disp: 90 tablet, Rfl: 3  aspirin (ECOTRIN) 81 MG EC tablet, Take 81 mg by mouth. 1 Tablet, Delayed Release (E.C.) Oral Every day, Disp: , Rfl:   metoprolol succinate (TOPROL-XL) 25 MG 24 hr tablet, Take 1 tablet (25 mg total) by mouth 2 (two) times daily., Disp: 180 tablet, Rfl: 1  SYNTHROID 50 mcg tablet, TAKE 1 TABLET BY MOUTH DAILY EVERY MORNING, Disp: 90 tablet, Rfl: 1  timolol maleate 0.5% (TIMOPTIC) 0.5 % Drop, Place 1 drop into both eyes 2 (two) times daily., Disp: 10 mL, Rfl: 12  warfarin (COUMADIN) 1 MG tablet, Take 2-3 tablets by mouth Daily., Disp: 192 tablet, Rfl: 7        Hematuria   Irritative symptoms do not include frequency or urgency. Pertinent negatives include no chills, dysuria or fever.     Review of Systems   Constitutional: Negative for chills, fever and unexpected weight change.   HENT: Negative for nosebleeds.    Eyes: Positive for visual disturbance.   Respiratory: Negative  for chest tightness.    Cardiovascular: Negative for chest pain.   Gastrointestinal: Negative for diarrhea.   Genitourinary: Negative for dysuria, frequency, hematuria and urgency.   Musculoskeletal: Negative for myalgias.   Skin: Negative for rash.   Neurological: Negative for seizures.        Having mini stroke.   Hematological: Bruises/bleeds easily.   Psychiatric/Behavioral: Negative for behavioral problems.       Objective:      Physical Exam   Constitutional: She is oriented to person, place, and time. She appears well-developed and well-nourished.   HENT:   Head: Normocephalic and atraumatic.   Eyes: No scleral icterus.   Cardiovascular: Normal rate and regular rhythm.    Pulmonary/Chest: Effort normal. No respiratory distress.   Abdominal: Soft. She exhibits no distension and no mass.   Musculoskeletal: She exhibits no edema or tenderness.   Lymphadenopathy:     She has no cervical adenopathy.   Neurological: She is alert and oriented to person, place, and time.   Skin: Skin is warm and dry. No rash noted.     Psychiatric: She has a normal mood and affect.     urine dip 30 protein, 30 blood, pH 8  ua 1/17 showed 5 RBC  Assessment:       1. Hematuria    2. Hematuria, microscopic    3. Malignant neoplasm of female breast, unspecified estrogen receptor status, unspecified laterality, unspecified site of breast    4. Atrial fibrillation, unspecified type    5. History of CVA (cerebrovascular accident)    6. Essential hypertension        Plan:   F/u 1 year urinalysis and access symptoms for microhematuria and strong smoking history.   Negative workup in 2014.

## 2017-07-26 ENCOUNTER — OFFICE VISIT (OUTPATIENT)
Dept: INTERNAL MEDICINE | Facility: CLINIC | Age: 82
End: 2017-07-26
Payer: MEDICARE

## 2017-07-26 ENCOUNTER — LAB VISIT (OUTPATIENT)
Dept: LAB | Facility: HOSPITAL | Age: 82
End: 2017-07-26
Attending: INTERNAL MEDICINE
Payer: MEDICARE

## 2017-07-26 VITALS
DIASTOLIC BLOOD PRESSURE: 96 MMHG | HEIGHT: 59 IN | HEART RATE: 66 BPM | WEIGHT: 101.63 LBS | SYSTOLIC BLOOD PRESSURE: 162 MMHG | BODY MASS INDEX: 20.49 KG/M2 | OXYGEN SATURATION: 99 %

## 2017-07-26 DIAGNOSIS — I10 ESSENTIAL HYPERTENSION: Primary | ICD-10-CM

## 2017-07-26 DIAGNOSIS — Z23 NEED FOR PROPHYLACTIC VACCINATION WITH STREPTOCOCCUS PNEUMONIAE (PNEUMOCOCCUS) AND INFLUENZA VACCINES: ICD-10-CM

## 2017-07-26 DIAGNOSIS — G47.00 INSOMNIA, UNSPECIFIED TYPE: ICD-10-CM

## 2017-07-26 DIAGNOSIS — E03.9 HYPOTHYROIDISM, UNSPECIFIED TYPE: ICD-10-CM

## 2017-07-26 DIAGNOSIS — Z00.00 PREVENTATIVE HEALTH CARE: ICD-10-CM

## 2017-07-26 DIAGNOSIS — R31.29 HEMATURIA, MICROSCOPIC: ICD-10-CM

## 2017-07-26 DIAGNOSIS — I10 ESSENTIAL HYPERTENSION: ICD-10-CM

## 2017-07-26 DIAGNOSIS — H40.9 GLAUCOMA, UNSPECIFIED GLAUCOMA TYPE, UNSPECIFIED LATERALITY: ICD-10-CM

## 2017-07-26 DIAGNOSIS — I48.91 ATRIAL FIBRILLATION, UNSPECIFIED TYPE: ICD-10-CM

## 2017-07-26 DIAGNOSIS — E78.00 HYPERCHOLESTEREMIA: ICD-10-CM

## 2017-07-26 LAB
ALBUMIN SERPL BCP-MCNC: 4.5 G/DL
ALP SERPL-CCNC: 92 U/L
ALT SERPL W/O P-5'-P-CCNC: 14 U/L
ANION GAP SERPL CALC-SCNC: 10 MMOL/L
AST SERPL-CCNC: 33 U/L
BASOPHILS # BLD AUTO: 0.02 K/UL
BASOPHILS NFR BLD: 0.3 %
BILIRUB SERPL-MCNC: 0.9 MG/DL
BUN SERPL-MCNC: 16 MG/DL
CALCIUM SERPL-MCNC: 10.2 MG/DL
CHLORIDE SERPL-SCNC: 97 MMOL/L
CO2 SERPL-SCNC: 27 MMOL/L
CREAT SERPL-MCNC: 1.1 MG/DL
DIFFERENTIAL METHOD: ABNORMAL
EOSINOPHIL # BLD AUTO: 0.1 K/UL
EOSINOPHIL NFR BLD: 1.2 %
ERYTHROCYTE [DISTWIDTH] IN BLOOD BY AUTOMATED COUNT: 13.1 %
EST. GFR  (AFRICAN AMERICAN): 52.5 ML/MIN/1.73 M^2
EST. GFR  (NON AFRICAN AMERICAN): 45.6 ML/MIN/1.73 M^2
GLUCOSE SERPL-MCNC: 103 MG/DL
HCT VFR BLD AUTO: 45.6 %
HGB BLD-MCNC: 15.1 G/DL
LYMPHOCYTES # BLD AUTO: 1.4 K/UL
LYMPHOCYTES NFR BLD: 18.5 %
MCH RBC QN AUTO: 31.7 PG
MCHC RBC AUTO-ENTMCNC: 33.1 G/DL
MCV RBC AUTO: 96 FL
MONOCYTES # BLD AUTO: 0.5 K/UL
MONOCYTES NFR BLD: 7.3 %
NEUTROPHILS # BLD AUTO: 5.3 K/UL
NEUTROPHILS NFR BLD: 72.6 %
PLATELET # BLD AUTO: 214 K/UL
PMV BLD AUTO: 11.2 FL
POTASSIUM SERPL-SCNC: 4.2 MMOL/L
PROT SERPL-MCNC: 8.2 G/DL
RBC # BLD AUTO: 4.76 M/UL
SODIUM SERPL-SCNC: 134 MMOL/L
TSH SERPL DL<=0.005 MIU/L-ACNC: 1.46 UIU/ML
WBC # BLD AUTO: 7.36 K/UL

## 2017-07-26 PROCEDURE — 1126F AMNT PAIN NOTED NONE PRSNT: CPT | Mod: S$GLB,,, | Performed by: INTERNAL MEDICINE

## 2017-07-26 PROCEDURE — 90670 PCV13 VACCINE IM: CPT | Mod: S$GLB,,, | Performed by: INTERNAL MEDICINE

## 2017-07-26 PROCEDURE — 80053 COMPREHEN METABOLIC PANEL: CPT

## 2017-07-26 PROCEDURE — 99999 PR PBB SHADOW E&M-EST. PATIENT-LVL III: CPT | Mod: PBBFAC,,, | Performed by: INTERNAL MEDICINE

## 2017-07-26 PROCEDURE — G0009 ADMIN PNEUMOCOCCAL VACCINE: HCPCS | Mod: S$GLB,,, | Performed by: INTERNAL MEDICINE

## 2017-07-26 PROCEDURE — 85025 COMPLETE CBC W/AUTO DIFF WBC: CPT

## 2017-07-26 PROCEDURE — 84443 ASSAY THYROID STIM HORMONE: CPT

## 2017-07-26 PROCEDURE — 36415 COLL VENOUS BLD VENIPUNCTURE: CPT | Mod: PO

## 2017-07-26 PROCEDURE — 99214 OFFICE O/P EST MOD 30 MIN: CPT | Mod: S$GLB,,, | Performed by: INTERNAL MEDICINE

## 2017-07-26 PROCEDURE — 99499 UNLISTED E&M SERVICE: CPT | Mod: S$GLB,,, | Performed by: INTERNAL MEDICINE

## 2017-07-26 PROCEDURE — 1159F MED LIST DOCD IN RCRD: CPT | Mod: S$GLB,,, | Performed by: INTERNAL MEDICINE

## 2017-07-26 RX ORDER — AMLODIPINE BESYLATE 5 MG/1
5 TABLET ORAL EVERY MORNING
Qty: 30 TABLET | Refills: 1 | Status: SHIPPED | OUTPATIENT
Start: 2017-07-26 | End: 2017-08-18

## 2017-07-26 NOTE — PROGRESS NOTES
Subjective:       Patient ID: Monique Lew is a 86 y.o. female.    Chief Complaint: Hypertension (follow up)    HPI Pt. Here for f/u for HTN and hypothyroidism; pt. Friend, Opal, is with the pt.; I reviewed labs dated 1/26/17; pt. Has trace blood in urine; she states she has had negative workup with urology in the past and f/u urology recently;  TSH is WNL; cholesterol is mildly elevated; HGBA1C was 5.6; of note, pt. Has f/u cardiology for A-fib and carotid stenosis; coumadin clinic is managing coumadin; pt. Was asked to continue current meds; she states she stopped norvasc because she felt it was affecting her eyesight; neither cardiology or opthalmology asked her to stop and I advised her not to adjust meds unless she discusses with M.D. prior; she states she will only restart 5 mg QD; she is taking toprol XL 25 mg BID; she will get tetanus at pharmacy at later date; pt. Reports insomnia and would like something for it; she is seeing opthalmology for glaucoma  Review of Systems   Constitutional: Negative for chills, fatigue and fever.   HENT: Negative for congestion, rhinorrhea and sore throat.    Respiratory: Negative for cough, shortness of breath and wheezing.    Cardiovascular: Negative for chest pain.   Gastrointestinal: Negative for abdominal pain, nausea and vomiting.   Genitourinary: Negative for dysuria.   Musculoskeletal: Negative for arthralgias.   Skin: Negative for rash.   Neurological: Negative for dizziness and headaches.   Psychiatric/Behavioral: Negative for sleep disturbance. The patient is not nervous/anxious.        Objective:      Physical Exam   Constitutional: She is oriented to person, place, and time.   frail   Eyes: EOM are normal.   Neck: Normal range of motion.   Cardiovascular: Normal rate, regular rhythm and normal heart sounds.    Pulmonary/Chest: Effort normal and breath sounds normal.   Abdominal: Soft. There is no tenderness. There is no rebound and no guarding.    Musculoskeletal: Normal range of motion.   Neurological: She is alert and oriented to person, place, and time.   Skin: No rash noted.       Assessment:       1. Essential hypertension Sub-optimally controlled   2. Hypercholesteremia Sub-optimally controlled   3. Hematuria, microscopic Well controlled   4. Hypothyroidism, unspecified type Well controlled   5. Atrial fibrillation, unspecified type Well controlled   6. Glaucoma, unspecified glaucoma type, unspecified laterality Active   7. Insomnia, unspecified type Active   8. Preventative health care Active   9. Need for prophylactic vaccination with Streptococcus pneumoniae (Pneumococcus) and Influenza vaccines Active       Plan:         Essential hypertension  Comments:  restart norvasc and pt. only agrees to restart norvasc at 5 mg QAM; encouraged low Na diet; repeat BP on f/u in 1 month   Orders:  -     CBC auto differential; Future; Expected date: 07/26/2017  -     Comprehensive metabolic panel; Future; Expected date: 07/26/2017  -     amlodipine (NORVASC) 5 MG Tab; Take 1 tablet (5 mg total) by mouth every morning.  Dispense: 30 tablet; Refill: 1    Hypercholesteremia  Comments:  encouraged low cholesterol diet; based on age; will maintain less strngent goals    Hematuria, microscopic  Comments:  f/u urology; workup has been negative in the past     Hypothyroidism, unspecified type  Comments:  continue current regimen and repeat TSH  Orders:  -     TSH; Future; Expected date: 07/26/2017    Atrial fibrillation, unspecified type  Comments:  continue current regimen and f/u cardiology who is managing; coumadin clinic to manage INR    Glaucoma, unspecified glaucoma type, unspecified laterality  Comments:  continue current regimen and f/u opthamology who is managing    Insomnia, unspecified type  Comments:  start OTC benadryl prn and asked pt. to get approval from opthamology prior to starting     Preventative health care  -     CBC auto differential; Future;  Expected date: 07/26/2017  -     Comprehensive metabolic panel; Future; Expected date: 07/26/2017  -     TSH; Future; Expected date: 07/26/2017    Need for prophylactic vaccination with Streptococcus pneumoniae (Pneumococcus) and Influenza vaccines  -     (In Office Administered) Pneumococcal Conjugate Vaccine (13 Valent) (IM)

## 2017-07-26 NOTE — PROGRESS NOTES
Immunization given as ordered to RD; Pt tolerated well. Bandaid applied to site. No other complaints voiced at present time

## 2017-08-01 ENCOUNTER — TELEPHONE (OUTPATIENT)
Dept: INTERNAL MEDICINE | Facility: CLINIC | Age: 82
End: 2017-08-01

## 2017-08-01 NOTE — TELEPHONE ENCOUNTER
----- Message from Paula Muhammad sent at 8/1/2017 10:07 AM CDT -----  Contact: [phone]/self  Pt would like to speak with the nurse concerning medication.  Please advise

## 2017-08-01 NOTE — TELEPHONE ENCOUNTER
Have pt. Continue to hold norvasc to see if photophobia recurs; if it recurs off the med than it is likely not related to norvasc; monitor BP; since pt. Has advanced visual impairment, she should contact her opthalmologist regarding the photophobia for further management instructions

## 2017-08-01 NOTE — TELEPHONE ENCOUNTER
Spoke with patient and she reports she had photophobia and could not go outside during the day. She reports her Amlodipine was decreased from 10 mg tablet to 5 mg tablets to see if this made a difference. Patient reports  she tried Amlodipine 5 mg tablet daily for 5 days  and stopped the med for 2 days now and her photophobia has completely improved. Informed patient will send information  to Dr. Hsieh and will call back with recommendations. Patient reports b/p today 110 /80. Patient voices understanding.

## 2017-08-18 ENCOUNTER — OFFICE VISIT (OUTPATIENT)
Dept: OPHTHALMOLOGY | Facility: CLINIC | Age: 82
End: 2017-08-18
Payer: MEDICARE

## 2017-08-18 ENCOUNTER — ANTI-COAG VISIT (OUTPATIENT)
Dept: CARDIOLOGY | Facility: CLINIC | Age: 82
End: 2017-08-18
Payer: MEDICARE

## 2017-08-18 DIAGNOSIS — Z79.01 CURRENT USE OF LONG TERM ANTICOAGULATION: ICD-10-CM

## 2017-08-18 DIAGNOSIS — Z96.1 PSEUDOPHAKIA OF BOTH EYES: ICD-10-CM

## 2017-08-18 DIAGNOSIS — H17.12 CENTRAL CORNEAL OPACITY OF LEFT EYE: ICD-10-CM

## 2017-08-18 DIAGNOSIS — T85.22XA DISLOCATED IOL (INTRAOCULAR LENS), POSTERIOR, RIGHT: ICD-10-CM

## 2017-08-18 DIAGNOSIS — H44.522 PHTHISIS BULBI OF LEFT EYE: ICD-10-CM

## 2017-08-18 DIAGNOSIS — H35.341 MACULAR HOLE OF RIGHT EYE: ICD-10-CM

## 2017-08-18 DIAGNOSIS — H40.1433 PSEUDOEXFOLIATIVE GLAUCOMA, BOTH EYES, SEVERE STAGE: Primary | ICD-10-CM

## 2017-08-18 DIAGNOSIS — H18.892 CORNEAL THINNING, LEFT: ICD-10-CM

## 2017-08-18 LAB — INR PPP: 1.7 (ref 2–3)

## 2017-08-18 PROCEDURE — 99999 PR PBB SHADOW E&M-EST. PATIENT-LVL II: CPT | Mod: PBBFAC,,, | Performed by: OPHTHALMOLOGY

## 2017-08-18 PROCEDURE — 85610 PROTHROMBIN TIME: CPT | Mod: QW,S$GLB,,

## 2017-08-18 PROCEDURE — 99211 OFF/OP EST MAY X REQ PHY/QHP: CPT | Mod: 25,S$GLB,,

## 2017-08-18 PROCEDURE — 92012 INTRM OPH EXAM EST PATIENT: CPT | Mod: S$GLB,,, | Performed by: OPHTHALMOLOGY

## 2017-08-18 PROCEDURE — 99499 UNLISTED E&M SERVICE: CPT | Mod: S$GLB,,, | Performed by: OPHTHALMOLOGY

## 2017-08-18 NOTE — PROGRESS NOTES
Patient confirms dose. Recently held for dental extraction. States taking 3mg the next day as advised. No further issues to alter INR reported. Will boost again and resume.

## 2017-08-18 NOTE — PROGRESS NOTES
"HPI     Glaucoma    Additional comments: IOP ck today           Decreased Visual Acuity    Additional comments: Pt states her vision is getting much darker            Comments   DLS: 4/21/17  **Pt states that her vision is getting much darker centrally and   peripherally.    1) PSEx  2) Dislocated IOL OD  3) Mac Hole OD  4) Phthisis OS  5) PCIOL OU  6) Yag Cap OD  7) Floater OD    MEDS:   Timolol BID OU       Last edited by Diane Carlisle MA on 8/18/2017  8:42 AM. (History)            Assessment /Plan     For exam results, see Encounter Report.    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 Pseudoexfoliation Glaucoma OD severe stage OD /Absolute Glaucoma OS   S/p SLT OS (10/13/05)   S/p Trab OS (1/11/06)   S/p SLT OD (1/24/08)   Ant Vit w/ IOL exchange with sutured PCIOL (Deisi) 4/9/09 OS   DSAEK 4/30/09 OS   Conj flap for dellen OS (5/28/09)   S/p Phaco Trab with mini shunt OD 1/26/11 w/ hx of siobhan po coarse w/ hypotony s/p AC reformation and then elevated IOP with all sutures cut and then encapsulation   Dislocated PC IOL OD 10/2012 -Evaluated by Raya for possible combined. Pt not interested.    Family history none   Glaucoma meds T1/2 BID OU .  H/O adverse rxn to glaucoma drops    -  D500 intolerant     -  intolerant to cosopt / azopt - swollen eye lids;    Xalatan "blurrs vision", travatan Z cuellar;    did not tolerate Jose 2/2 photophobia?,    timoptic causes bradycardia - tolerating ok 3/2015 //   - brimonidine - " loss of color perception " - could no longer see the colors of the caps  LASERS SLT OU (10/13/05 OD and 1/24/08 OS)   GLAUCOMA SURGERIES    -Phaco/Trab 1/26/11 OD     -S/p Trab OS (1/11/06)    - BV tube shunt OD 10/30/2013  OTHER EYE SURGERIES    -PCIOL OS dislocated (elsewhere)    -Ant Vit w/ IOL exchange with sutured PCIOL ([place]) 4/9/09 OS    -DSEAK 4/30/09 OS    -Conj flap for dellen OS (5/28/09)   CDR 0.8/0.9   Tbase 32-40 OD pre Trab   Tmax 40/-   Ttarget 16/pain free   HVF 19 VF '96 - 04/2017 OD: SAD/IAD w/ split fix (stable) ; OS: NA  Gonio +4/-   /585   OCT 8 test 2004 to 04/2017 -RNFL - OD:dec. I // OS:xx  HRT 11 test 2005 to 2016 -MR -  Off-center // xx os  Disc photos [id number] - mult slides //   2011, 2014, 2016 - OIS     Ttoday  12/14  Test done today - iop check    3. PCIOL OU - both have dislocated OS years ago, OD recently 10/1/2012    4. H/O PCO OD - S/P yag cap od - IOL dislocated shortly there after 2/2 weak zonules assoc with PsEx    5. Hx Mac Hole OD   - S/P sx (partial PPV) with Arend years ago, did extremely well    6. Pre phthisis OS,   -in past was blind hypertenssive but now with nl IOP off gtts ,   -absent AC, and vascularized opacified cornea,   -S/P DESEK, and corneal melt    7. Large floater  OD 1/2/2014 - saw Arend 1/16/2014 - stable    8. Ulices Bonnet Syndrome   Sees green foliage with roses   Sees groups of people out side    9. Bruise around Right eye  9/17/2012, and Hx of Hyphema OD (4/12) - resolved   -associated with coumadin use  -bruising resolved   -Pt is on coumadin - for atrial fib    10. H/O CVA - 2008// atrial fib // ? Mult mini strokes    On coumadin           Plan  Monocular PsEx glaucoma w/ dislocated IOL OD  - had increased IOP OD with cont progression of VF defect OD  - has had a trab with express mini shunt OD 2011 as well as Baerveldt 10/30/2013  Pt does not tolerate, travatan z, jazzmine, or diamox  Tolerates timolol - but ? If affecting her hert rate - bradycardia     - Pt happy w/  "aphakic Glasses only. Does not wish to have PPV w/ Scleral Fixated IOL. Does not use CL.  - states her vision is clearer with her dislocated PC IOL and aphakic glasses then before the dislocation - most likely because now there is no PCO  she is looking through    Glaucoma - IOP better with addition of timolol ou   Cont  t 1/2 ou - tolerating ok - ? H/o bradycardia in past   Patient discontinued alphagan tid od b/c experiencing "color blindness" - lost ability to see the colors of the caps   Intolerant to ALL other gtts     HVF progression OD  Patient states she has been having multiple "ministrokes"    IOP doing well today on timolol  HVF appears stable compared to prior   Cont with vf loss 2/2 mult mini strokes - despite good IOP's - pt is on coumadin     Cont follow up with retina    Glasses - Miguel -happy with new glasses - 12/2016     rtc -- 4 months w/ gonio  -- in future do a  24-2 stim V od                  "

## 2017-08-28 ENCOUNTER — OUTPATIENT CASE MANAGEMENT (OUTPATIENT)
Dept: ADMINISTRATIVE | Facility: OTHER | Age: 82
End: 2017-08-28

## 2017-08-28 ENCOUNTER — OFFICE VISIT (OUTPATIENT)
Dept: INTERNAL MEDICINE | Facility: CLINIC | Age: 82
End: 2017-08-28
Payer: MEDICARE

## 2017-08-28 VITALS
DIASTOLIC BLOOD PRESSURE: 116 MMHG | WEIGHT: 100.31 LBS | BODY MASS INDEX: 20.22 KG/M2 | HEART RATE: 74 BPM | SYSTOLIC BLOOD PRESSURE: 158 MMHG | HEIGHT: 59 IN | OXYGEN SATURATION: 95 %

## 2017-08-28 DIAGNOSIS — F41.9 ANXIETY: ICD-10-CM

## 2017-08-28 DIAGNOSIS — I10 ESSENTIAL HYPERTENSION: Primary | ICD-10-CM

## 2017-08-28 DIAGNOSIS — H54.7 BLINDNESS/LOW VISION: ICD-10-CM

## 2017-08-28 DIAGNOSIS — F32.A DEPRESSION, UNSPECIFIED DEPRESSION TYPE: ICD-10-CM

## 2017-08-28 DIAGNOSIS — I48.91 ATRIAL FIBRILLATION, UNSPECIFIED TYPE: ICD-10-CM

## 2017-08-28 DIAGNOSIS — H40.9 GLAUCOMA, UNSPECIFIED GLAUCOMA TYPE, UNSPECIFIED LATERALITY: ICD-10-CM

## 2017-08-28 PROCEDURE — 1159F MED LIST DOCD IN RCRD: CPT | Mod: S$GLB,,, | Performed by: INTERNAL MEDICINE

## 2017-08-28 PROCEDURE — 99999 PR PBB SHADOW E&M-EST. PATIENT-LVL III: CPT | Mod: PBBFAC,,, | Performed by: INTERNAL MEDICINE

## 2017-08-28 PROCEDURE — 1126F AMNT PAIN NOTED NONE PRSNT: CPT | Mod: S$GLB,,, | Performed by: INTERNAL MEDICINE

## 2017-08-28 PROCEDURE — 99214 OFFICE O/P EST MOD 30 MIN: CPT | Mod: S$GLB,,, | Performed by: INTERNAL MEDICINE

## 2017-08-28 PROCEDURE — 99499 UNLISTED E&M SERVICE: CPT | Mod: S$GLB,,, | Performed by: INTERNAL MEDICINE

## 2017-08-28 RX ORDER — SERTRALINE HYDROCHLORIDE 50 MG/1
50 TABLET, FILM COATED ORAL DAILY
Qty: 30 TABLET | Refills: 1 | Status: SHIPPED | OUTPATIENT
Start: 2017-08-28 | End: 2017-08-30 | Stop reason: ALTCHOICE

## 2017-08-28 NOTE — PROGRESS NOTES
Pt. ID: Monique Lew is a 87 y.o. female      Chief complaint:   Chief Complaint   Patient presents with    Hypertension     follow up       HPI: Pt. Here for f/u for HTN; Pt. Friend, Opal, is with the pt.; pt. Has held norvasc due to photophobia; she has f/u opthamology; glasses were changed and she states she was told she is nearing blindness; she does not want to restart norvasc; BP is elevated and she refuses any further adjustment of BP; I explained risks of non-compliance; she is compliant with meds; coumadin clinic is managing INR for A-fib; pt. States she would like assistance with her home activities due to her near blindness which limits her daily activities; she agrees to outpt. Case management; pt. Reports anxiety and depression without suicidal ideation and would like treatment      Review of Systems   Constitutional: Negative for chills and fever.   Eyes:        Near blindness   Respiratory: Negative for cough and shortness of breath.    Cardiovascular: Negative for chest pain.   Gastrointestinal: Negative for abdominal pain, nausea and vomiting.   Psychiatric/Behavioral: Positive for depression. The patient is nervous/anxious.          Objective:    Physical Exam   Constitutional: She is oriented to person, place, and time.   frail   Eyes: EOM are normal.   Neck: Normal range of motion.   Cardiovascular: Normal rate, regular rhythm and normal heart sounds.    Pulmonary/Chest: Effort normal and breath sounds normal.   Abdominal: Soft. There is no tenderness. There is no rebound and no guarding.   Musculoskeletal: Normal range of motion.   Neurological: She is alert and oriented to person, place, and time.   Skin: No rash noted.         Health Maintenance   Topic Date Due    Influenza Vaccine  08/01/2017    Pneumococcal (65+) (2 of 2 - PPSV23) 07/26/2018    Lipid Panel  01/26/2022    TETANUS VACCINE  07/26/2027    Zoster Vaccine  Addressed         Assessment:     1. Essential hypertension  Sub-optimally controlled   2. Blindness/low vision Active   3. Depression, unspecified depression type Active   4. Anxiety Active   5. Atrial fibrillation, unspecified type Well controlled   6. Glaucoma, unspecified glaucoma type, unspecified laterality Active         Plan: Monique was seen today for hypertension.    Diagnoses and all orders for this visit:    Essential hypertension  Comments:  continue current regimen and encouraged low Na diet; pt. does not want any further adjustment in BP med; I explained risks of non-compliance    Blindness/low vision  Comments:  f/u opthamology and refer to case managament for further assistance   Orders:  -     Ambulatory referral to Outpatient Case Management    Depression, unspecified depression type  Comments:  start zoloft and re-evaluate in 1 month   Orders:  -     sertraline (ZOLOFT) 50 MG tablet; Take 1 tablet (50 mg total) by mouth once daily.    Anxiety  Comments:  start zoloft and re-evaluate in 1 month   Orders:  -     sertraline (ZOLOFT) 50 MG tablet; Take 1 tablet (50 mg total) by mouth once daily.    Atrial fibrillation, unspecified type  Comments:  continue current regimen and f/u cardiology who is managing     Glaucoma, unspecified glaucoma type, unspecified laterality  Comments:  f/u opthamology who is managing         Problem List Items Addressed This Visit        Psychiatric    Depression    Relevant Medications    sertraline (ZOLOFT) 50 MG tablet    Anxiety    Relevant Medications    sertraline (ZOLOFT) 50 MG tablet       Ophtho    Glaucoma    Blindness/low vision    Relevant Orders    Ambulatory referral to Outpatient Case Management       Cardiac/Vascular    Atrial fibrillation    Hypertension - Primary      Other Visit Diagnoses    None.

## 2017-08-28 NOTE — PROGRESS NOTES
Thank you for the referral.  Patient has been assigned to [facility] Rollins  for low risk screening for Outpatient Case Management.     Reason for referral: Low risk    Blindness/low vision   f/u opthamology and refer to case managament for further assistance     Thank you,    Selina Cervantes, SSC

## 2017-08-29 ENCOUNTER — TELEPHONE (OUTPATIENT)
Dept: INTERNAL MEDICINE | Facility: CLINIC | Age: 82
End: 2017-08-29

## 2017-08-29 ENCOUNTER — TELEPHONE (OUTPATIENT)
Dept: FAMILY MEDICINE | Facility: CLINIC | Age: 82
End: 2017-08-29

## 2017-08-29 ENCOUNTER — OUTPATIENT CASE MANAGEMENT (OUTPATIENT)
Dept: ADMINISTRATIVE | Facility: OTHER | Age: 82
End: 2017-08-29

## 2017-08-29 DIAGNOSIS — F32.A DEPRESSION, UNSPECIFIED DEPRESSION TYPE: Primary | ICD-10-CM

## 2017-08-29 NOTE — PROGRESS NOTES
This CSW received a referral on the above patient.   Reason for referral:Community Resources (  Services)  Name of the community resource that was provided:Senior Resource Guide, [facility] on Aging [employer], Private Hired Caregivers   Resource given to: Patient via US Mail and Telephone    This CSW completed assessment with patient. Patient reports she lives alone. CSW provided information for emergency alert system. Patient sister, friends and neighbors checks on her daily. Patient is seeking additional service to assist with  services. CSW provided information on  services with the [place] on Aging , H[employer] Caregivers, Senior Resource guide to assist.CSW reports she is blind in one eye and has low vision in the other.  CSW also provided resources for the [facility] to assist with additional needs. Patient denied information to local senior centers. [place] reports she has anxiety and was prescribed medication, however meds is making her dizzy. CSW will notify PCP Patient reports  CSW provided her contact information for any additional needs.

## 2017-08-29 NOTE — TELEPHONE ENCOUNTER
Patient reports she took the Zoloft yesterday and about 2 hours later had c/o dizzy and nauseated. Patient reports she did not take the med today and feels much better. Informed patient will give information to Dr. Hsieh and will call back with recommendations. Patient voices understanding.

## 2017-08-29 NOTE — TELEPHONE ENCOUNTER
Spoke with patient and instructed to stop the Zoloft. Patient reports she will be willing to try another alternative. Informed patient will call back with recommendations from Dr. Hsieh. Patient voices understanding.

## 2017-08-29 NOTE — TELEPHONE ENCOUNTER
----- Message from KUN Hodges sent at 8/29/2017  9:47 AM CDT -----  This CSW received a referral on the above patient. This CSW provided patient with the following resource: Senior Resource Guide, [facility] on Aging [employer], Private Hired Caregivers . The resource can be located within [facility] under the Care category.     Dr. Hsieh Patient reports her anxiety medication is making her dizzy (zoloft). Patient reports she will take a half of pill today. Please contact patient to offer advice.     Thank you for the referral,    KUN Rollins

## 2017-08-30 RX ORDER — VENLAFAXINE HYDROCHLORIDE 37.5 MG/1
37.5 CAPSULE, EXTENDED RELEASE ORAL DAILY
Qty: 30 CAPSULE | Refills: 1 | Status: ON HOLD | OUTPATIENT
Start: 2017-08-30 | End: 2017-11-12

## 2017-09-03 ENCOUNTER — HOSPITAL ENCOUNTER (EMERGENCY)
Facility: HOSPITAL | Age: 82
Discharge: HOME | End: 2017-09-03
Attending: EMERGENCY MEDICINE
Payer: MEDICARE

## 2017-09-03 VITALS
BODY MASS INDEX: 20.16 KG/M2 | DIASTOLIC BLOOD PRESSURE: 80 MMHG | HEART RATE: 65 BPM | HEIGHT: 59 IN | WEIGHT: 100 LBS | OXYGEN SATURATION: 98 % | TEMPERATURE: 98 F | RESPIRATION RATE: 16 BRPM | SYSTOLIC BLOOD PRESSURE: 147 MMHG

## 2017-09-03 DIAGNOSIS — T85.22XA: Primary | ICD-10-CM

## 2017-09-03 LAB
ALBUMIN SERPL BCP-MCNC: 4.2 G/DL
ALP SERPL-CCNC: 82 U/L
ALT SERPL W/O P-5'-P-CCNC: 16 U/L
ANION GAP SERPL CALC-SCNC: 13 MMOL/L
APTT BLDCRRT: 29.2 SEC
AST SERPL-CCNC: 42 U/L
BASOPHILS # BLD AUTO: 0.01 K/UL
BASOPHILS NFR BLD: 0.1 %
BILIRUB SERPL-MCNC: 1.2 MG/DL
BUN SERPL-MCNC: 21 MG/DL
CALCIUM SERPL-MCNC: 9.7 MG/DL
CHLORIDE SERPL-SCNC: 93 MMOL/L
CO2 SERPL-SCNC: 23 MMOL/L
CREAT SERPL-MCNC: 1.1 MG/DL
DIFFERENTIAL METHOD: ABNORMAL
EOSINOPHIL # BLD AUTO: 0 K/UL
EOSINOPHIL NFR BLD: 0.3 %
ERYTHROCYTE [DISTWIDTH] IN BLOOD BY AUTOMATED COUNT: 12.9 %
EST. GFR  (AFRICAN AMERICAN): 52.2 ML/MIN/1.73 M^2
EST. GFR  (NON AFRICAN AMERICAN): 45.3 ML/MIN/1.73 M^2
GLUCOSE SERPL-MCNC: 110 MG/DL
HCT VFR BLD AUTO: 42 %
HGB BLD-MCNC: 14.6 G/DL
INR PPP: 2.2
LYMPHOCYTES # BLD AUTO: 0.9 K/UL
LYMPHOCYTES NFR BLD: 9.2 %
MCH RBC QN AUTO: 31.6 PG
MCHC RBC AUTO-ENTMCNC: 34.8 G/DL
MCV RBC AUTO: 91 FL
MONOCYTES # BLD AUTO: 1 K/UL
MONOCYTES NFR BLD: 9.9 %
NEUTROPHILS # BLD AUTO: 8.2 K/UL
NEUTROPHILS NFR BLD: 80.2 %
PLATELET # BLD AUTO: 188 K/UL
PMV BLD AUTO: 10.2 FL
POTASSIUM SERPL-SCNC: 4.5 MMOL/L
PROT SERPL-MCNC: 8 G/DL
PROTHROMBIN TIME: 22.5 SEC
RBC # BLD AUTO: 4.62 M/UL
SODIUM SERPL-SCNC: 129 MMOL/L
WBC # BLD AUTO: 10.16 K/UL

## 2017-09-03 PROCEDURE — 80053 COMPREHEN METABOLIC PANEL: CPT

## 2017-09-03 PROCEDURE — 99284 EMERGENCY DEPT VISIT MOD MDM: CPT

## 2017-09-03 PROCEDURE — 85730 THROMBOPLASTIN TIME PARTIAL: CPT

## 2017-09-03 PROCEDURE — 99283 EMERGENCY DEPT VISIT LOW MDM: CPT | Mod: ,,, | Performed by: EMERGENCY MEDICINE

## 2017-09-03 PROCEDURE — 85025 COMPLETE CBC W/AUTO DIFF WBC: CPT

## 2017-09-03 PROCEDURE — 85610 PROTHROMBIN TIME: CPT

## 2017-09-03 PROCEDURE — 25000003 PHARM REV CODE 250: Performed by: EMERGENCY MEDICINE

## 2017-09-03 RX ORDER — METOPROLOL TARTRATE 25 MG/1
25 TABLET, FILM COATED ORAL
Status: COMPLETED | OUTPATIENT
Start: 2017-09-03 | End: 2017-09-03

## 2017-09-03 RX ORDER — PROPARACAINE HYDROCHLORIDE 5 MG/ML
1 SOLUTION/ DROPS OPHTHALMIC
Status: COMPLETED | OUTPATIENT
Start: 2017-09-03 | End: 2017-09-03

## 2017-09-03 RX ORDER — PHENYLEPHRINE HYDROCHLORIDE 100 MG/ML
1 SOLUTION/ DROPS OPHTHALMIC ONCE
Status: COMPLETED | OUTPATIENT
Start: 2017-09-03 | End: 2017-09-03

## 2017-09-03 RX ADMIN — METOPROLOL TARTRATE 25 MG: 25 TABLET ORAL at 05:09

## 2017-09-03 RX ADMIN — PHENYLEPHRINE HYDROCHLORIDE 1 DROP: 100 SOLUTION/ DROPS OPHTHALMIC at 08:09

## 2017-09-03 RX ADMIN — PROPARACAINE HYDROCHLORIDE 1 DROP: 5 SOLUTION/ DROPS OPHTHALMIC at 03:09

## 2017-09-03 NOTE — ED NOTES
HEENT:  Patient is blind in left eye.  Possible retinal detachment to right eye.   LOC: The patient is awake, alert and aware of environment with an appropriate affect, the patient is oriented x 4 and speaking appropriately.  APPEARANCE: Patient resting comfortably and in no acute distress, patient is clean and well groomed, patient's clothing is properly fastened.  SKIN: The skin is warm and dry, color consistent with ethnicity, patient has normal skin turgor and moist mucus membranes, skin intact, no breakdown or bruising noted.  MUSCULOSKELETAL: Patient moving all extremities well, no obvious swelling or deformities noted.  RESPIRATORY: Airway is open and patent, respirations are spontaneous, patient has a normal effort and rate, no accessory muscle use noted.  CARDIAC: Patient has a normal rate and rhythm, no periphreal edema noted, capillary refill < 3 seconds.  ABDOMEN: Soft and non tender to palpation, no distention noted.  NEUROLOGIC: eyes open spontaneously, behavior appropriate to situation, follows commands, facial expression symmetrical, bilateral hand grasp equal and even, purposeful motor response noted, normal sensation in all extremities when touched with a finger.

## 2017-09-03 NOTE — CONSULTS
[facility]  Ophthalmology  Consult Note    Patient Name: Monique Lew  MRN: [id number]  Admission Date: 9/3/2017  Hospital Length of Stay: 0 days  Attending Provider: No att. providers found   Primary Care Physician: Aguila Hsieh MD  Principal Problem:<principal problem not specified>    Inpatient consult to Ophthalmology  Consult performed by: JOSE VIDALES  Consult ordered by: ARELI LADD        Subjective:     Chief Complaint: r/o RD, vision loss OD    HPI: 87 year old female transferred for evaluation of acute on chronic R sided visual loss after a fall last night. CT head at OSH concerning for retinal detachment. Here for ophtho evaluation.    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  Left eye phthisical, not painful at this time, no changes in LP vision.  Pt states that the vision in her right eye has gradually gotten dimmer over the past few weeks.  Even dimmer after the fall yesterday.  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  Pt also has hx of macular hole repaired in 2001.    Pt not experiencing any neuro symptoms and underwent imaging with CT scan at OSH with no intracranial abnormalities noted.    Current Facility-Administered Medications on File Prior to Encounter   Medication    [COMPLETED] neomycin-bacitracnZn-polymyxnB packet 1 each     Current Outpatient Prescriptions on File Prior to Encounter   Medication Sig    aspirin (ECOTRIN) 81 MG EC tablet Take 81 mg by mouth. 1 Tablet, Delayed Release (E.C.) Oral Every day    metoprolol succinate (TOPROL-XL) 25 MG 24 hr tablet Take 1 tablet (25 mg total) by mouth 2 (two) times daily.    SYNTHROID 50 mcg tablet TAKE 1 TABLET BY MOUTH DAILY EVERY MORNING    warfarin (COUMADIN) 1 MG tablet Take 2-3 tablets by mouth Daily.    timolol maleate 0.5% (TIMOPTIC) 0.5 % Drop Place 1 drop  into both eyes 2 (two) times daily.    venlafaxine (EFFEXOR-XR) 37.5 MG 24 hr capsule Take 1 capsule (37.5 mg total) by mouth once daily.       Past Medical History:   Diagnosis Date    *Atrial fibrillation     Adrenal adenoma     Atrial fibrillation     Blood clotting tendency     Breast cancer     CVA (cerebral infarction)     DVT (deep venous thrombosis)     Glaucoma     Pseudoexfoliation, sever    Goiter     Heart attack     Hypertension     Hypothyroidism     Long-term (current) use of anticoagulants     Macular hole of right eye     Multinodular goiter     Other hyperparathyroidism     Phthisis bulbi of left eye     Stroke        Past Surgical History:   Procedure Laterality Date    BAERVELDT GLAUCOMA SHUNT Right 10/30/2013    OD (dr. hines)    BREAST LUMPECTOMY      BUNIONECTOMY      CATARACT EXTRACTION W/  INTRAOCULAR LENS IMPLANT Right 01/26/2011    WITH TRAB ()    CATARACT EXTRACTION W/  INTRAOCULAR LENS IMPLANT Left 04/09/2009    ANT. VIITRECTOMY WITH SUTURED PCIOL (DR. RYAN)    CATARACT EXTRACTION W/ INTRAOCULAR LENS IMPLANTW/ TRABECULECTOMY Right 01/26/2011        COLON SURGERY      volvulus    CONJUNCTIVAL FLAP  Left 05/28/2009    DR. JARAMILLO    conjuunctival flap   2009    OS w/ dr. 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 emanuel    TRABECULECTOMY Left 01/11/2006        transcleral cyclophotocoagulation   2010    OS w/ dr. hines       Review of patient's allergies indicates:   Allergen Reactions    Cosopt [dorzolamide-timolol] Swelling    Mevacor [lovastatin] Nausea And Vomiting    Prednisone Nausea And Vomiting    Vasotec [enalapril maleate] Nausea And Vomiting    Zetia [ezetimibe] Nausea And Vomiting    Furosemide Palpitations     Pt states her heart skips  "beats when she takes this medication.       Family History     Problem Relation (Age of Onset)    Emphysema Mother    Hyperlipidemia Mother, Father    Hypertension Mother, Father    No Known Problems Sister, Brother, Maternal Aunt, Maternal Uncle, Paternal Aunt, Paternal Uncle, Maternal Grandmother, Maternal Grandfather, Paternal Grandmother, Paternal Grandfather        Social History Main Topics    Smoking status: Former Smoker     Quit date: 9/25/1982    Smokeless tobacco: Never Used    Alcohol use No    Drug use: No    Sexual activity: No     Review of Systems  Objective:     Vital Signs (Most Recent):  Temp: 97.7 °F (36.5 °C) (09/03/17 1330)  Pulse: 65 (09/03/17 1954)  Resp: 16 (09/03/17 1330)  BP: (!) 147/80 (09/03/17 2049)  SpO2: 98 % (09/03/17 1954)  O2 Device (Oxygen Therapy): room air (09/03/17 1330) Vital Signs (24h Range):  Temp:  [97.5 °F (36.4 °C)-97.7 °F (36.5 °C)] 97.7 °F (36.5 °C)  Pulse:  [62-79] 65  Resp:  [16] 16  SpO2:  [97 %-100 %] 98 %  BP: (147-209)/() 147/80     Weight: 45.4 kg (100 lb) (09/03/17 1330)  Body mass index is 20.2 kg/m².    Base Eye Exam     Visual Acuity (Snellen - Linear)       Right Left    Dist cc CF @ 3" LP          Tonometry (Applanation, 6:19 PM)       Right Left    Pressure 10 12          Max Pressure       Right Left    Max 40           Pupils       Dark Light React    Right 3 2 Brisk    Left             Extraocular Movement       Right Left     Full Full          Dilation     Both eyes:  2.5% Phenylephrine @ 10:33 PM    Pt poor dilator, unable to get full dilation            Slit Lamp and Fundus Exam     Slit Lamp Exam       Right Left    Lids/Lashes Blepharitis Blepharitis    Conjunctiva/Sclera Bleb Bleb, Trace Injection    Cornea mod-severe SPK, confluent centrally opaque, neovascularization    Anterior Chamber Deep and quiet, Mini Shunt and Baerveldt tubes in place no view    Iris round, reactive, iridodonesis no view    Lens Aphakia no view    Vitreous  " no view          Fundus Exam       Right Left    Disc Thin rim, Peripapillary atrophy no view    C/D Ratio 0.9 no view    Macula Macular hole repaired no view    Vessels Normal no view    Periphery displaced lens seen inferiorly no view    Pt unable to dilate fully for view with BIO and 20D.  PP and peripheral fundus examined at slit lamp with 90D.  Poor view in left eye                Significant Imaging:  CT OSH reviewed.  There is no acute intracranial abnormality.  The possible retinal detachment in right eye on report from  radiologist at OSH consistent with displaced intra-ocular lens rather than RD.    Assessment/Plan:     88 yo F with complex medical and ocular history transferred from OSH ([facility]) s/p fall from standing w/o LOC or intracranial injury.  There was concern for a possible retinal detachment as there was an intraocular irregularity noted on CT scan obtained today - This irregularity is the patient's displaced IOL.  No RD, holes, tears or breaks appreciated on exam today.      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  Recommended that patient use lubricating ointment OD QHS, continue ATs, and do WC/LS/LH.  - Pt may be having progression of vision loss from glaucoma despite well controlled IOP  - Pt's mini strokes may also contribute to decreasing VA.    Will have patient follow up in clinic earlier than previously scheduled for re-evaluation and assessment of VA for any improvement in corneal surface.    Pt also needs to resume follow-up with Retina service.  Will arrange.  Pt given return precautions for RD.  Fall precautions also discussed with patient.    Thank you for your consult. I will follow-up with patient. Please contact us if you have any additional questions.    Memo Nicole,  MD  Ophthalmology  [facility]    I agree with the above exam & plan.

## 2017-09-03 NOTE — ED TRIAGE NOTES
Patient is here for opthalmology consult.  Patient fell yesterday and hit her head and the CT showed possible retinal detachment.  Patient is blind in left eye.

## 2017-09-05 ENCOUNTER — TELEPHONE (OUTPATIENT)
Dept: FAMILY MEDICINE | Facility: CLINIC | Age: 82
End: 2017-09-05

## 2017-09-05 NOTE — TELEPHONE ENCOUNTER
----- Message from Lelo Luke sent at 9/5/2017  9:41 AM CDT -----  Contact: [phone]/self  Patient called in requesting to speak with you. Patient prefers to speak with a nurse. Please advise.

## 2017-09-05 NOTE — TELEPHONE ENCOUNTER
----- Message from Lelo Luke sent at 9/5/2017  9:41 AM CDT -----  Contact: [phone]/self  Patient called in requesting to speak with you. Patient prefers to speak with a nurse. Please advise.

## 2017-09-05 NOTE — TELEPHONE ENCOUNTER
Patient was calling to let us know that she went to ED Sunday Sept 3 states she fell tripping over her foot and was sent home same day, pt c/o left chest when taking a deep breath, she was discharge the same day and was told to f/u with her PCP. Informed patient that we all the information from ED. booked appt for f/u pt verbalized understanding.

## 2017-09-07 ENCOUNTER — OFFICE VISIT (OUTPATIENT)
Dept: INTERNAL MEDICINE | Facility: CLINIC | Age: 82
End: 2017-09-07
Payer: MEDICARE

## 2017-09-07 VITALS
WEIGHT: 101 LBS | OXYGEN SATURATION: 97 % | SYSTOLIC BLOOD PRESSURE: 156 MMHG | DIASTOLIC BLOOD PRESSURE: 104 MMHG | BODY MASS INDEX: 20.36 KG/M2 | HEIGHT: 59 IN | HEART RATE: 65 BPM

## 2017-09-07 DIAGNOSIS — M25.571 ACUTE RIGHT ANKLE PAIN: Primary | ICD-10-CM

## 2017-09-07 DIAGNOSIS — I10 ESSENTIAL HYPERTENSION: ICD-10-CM

## 2017-09-07 DIAGNOSIS — J34.9 SINUS DISEASE: ICD-10-CM

## 2017-09-07 DIAGNOSIS — F32.A DEPRESSION, UNSPECIFIED DEPRESSION TYPE: ICD-10-CM

## 2017-09-07 DIAGNOSIS — R07.81 RIB PAIN ON LEFT SIDE: ICD-10-CM

## 2017-09-07 PROCEDURE — 99214 OFFICE O/P EST MOD 30 MIN: CPT | Mod: S$GLB,,, | Performed by: INTERNAL MEDICINE

## 2017-09-07 PROCEDURE — 99499 UNLISTED E&M SERVICE: CPT | Mod: S$GLB,,, | Performed by: INTERNAL MEDICINE

## 2017-09-07 PROCEDURE — 3008F BODY MASS INDEX DOCD: CPT | Mod: S$GLB,,, | Performed by: INTERNAL MEDICINE

## 2017-09-07 PROCEDURE — 1125F AMNT PAIN NOTED PAIN PRSNT: CPT | Mod: S$GLB,,, | Performed by: INTERNAL MEDICINE

## 2017-09-07 PROCEDURE — 99999 PR PBB SHADOW E&M-EST. PATIENT-LVL III: CPT | Mod: PBBFAC,,, | Performed by: INTERNAL MEDICINE

## 2017-09-07 PROCEDURE — 1159F MED LIST DOCD IN RCRD: CPT | Mod: S$GLB,,, | Performed by: INTERNAL MEDICINE

## 2017-09-07 NOTE — PROGRESS NOTES
Pt. ID: Monique Lew is a 87 y.o. female      Chief complaint:   Chief Complaint   Patient presents with    Hospital Follow Up     fell       HPI: Pt. Here for f/u for fall, depression and f/u for HTN; pt. Friend, Opal, is with the pt.; pt. Had accidental fall and injured her R ankle and L chest wall; CXR showed indeterminate age fractures from 8 th to 10 th ribs; she denies R chest discomfort; R ankle xray showed soft tissue swelling and degenerative change; no fracture was noted; she only has L rib pain with deep breaths or cough; she takes tyelenol prn for both L rib pain and R ankle pain which helps; her R ankle swelling/pain is also improving; she does not want muscle relaxer; of note, she could not tolerate zoloft and was changed to effexor which helps; pt. BP is elevated and pt. Does not want me to make any further adjustments in BP med; CT head was also done; no hemmorhage was noted; chronic inflammatory changes were noted to para nasal/maxillary sinuses were noted; ENT referral was recommended and she would like referral; she denies headaches and is compliant with meds      Review of Systems   Constitutional: Negative for chills and fever.   Respiratory: Negative for cough and shortness of breath.    Cardiovascular: Negative for chest pain.   Gastrointestinal: Negative for abdominal pain, nausea and vomiting.   Musculoskeletal: Positive for joint pain and myalgias.        L upper lateral rib pain especially with deep breaths and coughing; mild R ankle pain with swelling which are improving   Neurological: Negative for dizziness and headaches.   Psychiatric/Behavioral: Positive for depression.         Objective:    Physical Exam   Constitutional: She is oriented to person, place, and time.   frail   Eyes: EOM are normal.   Neck: Normal range of motion.   Cardiovascular: Normal rate, regular rhythm and normal heart sounds.    Pulmonary/Chest: Effort normal and breath sounds normal.   Abdominal: Soft.  There is no tenderness. There is no rebound and no guarding.   Musculoskeletal: Normal range of motion. She exhibits tenderness.   L upper rib tenderness; R ankle swelling which has improved; minimal pain with ROM with good ROM; no R ankle tenderness noted   Neurological: She is alert and oriented to person, place, and time.   Skin: No rash noted.         Health Maintenance   Topic Date Due    Influenza Vaccine  08/01/2017    Pneumococcal (65+) (2 of 2 - PPSV23) 07/26/2018    Lipid Panel  01/26/2022    TETANUS VACCINE  07/26/2027    Zoster Vaccine  Addressed         Assessment:     1. Acute right ankle pain Active   2. Rib pain on left side Active   3. Essential hypertension Sub-optimally controlled   4. Depression, unspecified depression type Well controlled   5. Sinus disease Active         Plan: Acute right ankle pain  Comments:  improving; continue tyelenol prn     Rib pain on left side  Comments:  continue tyelenol prn and pt. does not want muscle relaxer; re-evaluate in 1 month     Essential hypertension  Comments:  pt. has requested that I not make adjusments in BP med and would prefer cardiology manage; encouraged low Na diet     Depression, unspecified depression type  Comments:  continue effexor    Sinus disease  Comments:  refer to ENT for further evaluation   Orders:  -     Ambulatory consult to ENT        Problem List Items Addressed This Visit        Psychiatric    Depression       ENT    Sinus disease    Relevant Orders    Ambulatory consult to ENT       Pulmonary    Rib pain on left side       Cardiac/Vascular    Essential hypertension       Orthopedic    Acute right ankle pain - Primary      Other Visit Diagnoses    None.

## 2017-09-11 ENCOUNTER — OFFICE VISIT (OUTPATIENT)
Dept: OPHTHALMOLOGY | Facility: CLINIC | Age: 82
End: 2017-09-11
Payer: MEDICARE

## 2017-09-11 DIAGNOSIS — T85.22XA DISLOCATED IOL (INTRAOCULAR LENS), POSTERIOR, RIGHT: ICD-10-CM

## 2017-09-11 DIAGNOSIS — H40.1413 CAPSULAR GLAUCOMA OF RIGHT EYE WITH PSEUDOEXFOLIATION OF LENS, SEVERE STAGE: ICD-10-CM

## 2017-09-11 DIAGNOSIS — H17.12 CENTRAL CORNEAL OPACITY OF LEFT EYE: ICD-10-CM

## 2017-09-11 DIAGNOSIS — H40.9 GLAUCOMA, UNSPECIFIED GLAUCOMA TYPE, UNSPECIFIED LATERALITY: ICD-10-CM

## 2017-09-11 DIAGNOSIS — H27.131 POSTERIOR DISLOCATION OF RIGHT LENS: ICD-10-CM

## 2017-09-11 DIAGNOSIS — Z94.7 HISTORY OF CORNEA TRANSPLANT: ICD-10-CM

## 2017-09-11 DIAGNOSIS — H40.1493 PSEUDOEXFOLIATION GLAUCOMA, SEVERE STAGE: Primary | ICD-10-CM

## 2017-09-11 DIAGNOSIS — H44.522 PHTHISIS BULBI OF LEFT EYE: ICD-10-CM

## 2017-09-11 DIAGNOSIS — H54.7 BLINDNESS/LOW VISION: ICD-10-CM

## 2017-09-11 PROCEDURE — 92012 INTRM OPH EXAM EST PATIENT: CPT | Mod: S$GLB,,, | Performed by: OPHTHALMOLOGY

## 2017-09-11 PROCEDURE — 99999 PR PBB SHADOW E&M-EST. PATIENT-LVL II: CPT | Mod: PBBFAC,,, | Performed by: OPHTHALMOLOGY

## 2017-09-11 PROCEDURE — 99499 UNLISTED E&M SERVICE: CPT | Mod: S$GLB,,, | Performed by: OPHTHALMOLOGY

## 2017-09-11 NOTE — PROGRESS NOTES
HPI     Eye Problem    Additional comments: ER follow up           Comments   Pt here today for ED follow up. Pt had a fall on 9/2/17 but denies hitting   her head at all. Pt states that it was mainly her chest that was hurting,   however, the doctors were concerned about her vision loss OD. Pt states   that she told them about her visual problems, but they insisted on getting   an ophthalmology consult.    MEDS:  Timolol BID OU       Last edited by Diane Carlisle MA on 9/11/2017  2:47 PM. (History)        Assessment /Plan     For exam results, see Encounter Report.    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 Pseudoexfoliation Glaucoma OD severe stage OD /Absolute Glaucoma OS   S/p SLT OS (10/13/05)   S/p Trab OS (1/11/06)   S/p SLT OD (1/24/08)   Ant Vit w/ IOL exchange with sutured PCIOL (Deisi) 4/9/09 OS   DSAEK 4/30/09 OS   Conj flap for dellen OS (5/28/09)   S/p Phaco Trab with mini shunt OD 1/26/11 w/ hx of siobhan po coarse w/ hypotony s/p AC reformation and then elevated IOP with all sutures cut and then encapsulation   Dislocated PC IOL OD 10/2012 -Evaluated by Deisi and Mal for possible combined. Pt not interested.    Family history none   Glaucoma meds T1/2 BID OU .  H/O adverse rxn to glaucoma drops    -  D500 intolerant     -  intolerant to cosopt / azopt -  "swollen eye lids;    Xalatan "blurrs vision", travatan Z cuellar;    did not tolerate Jose 2/2 photophobia?,    timoptic causes bradycardia - tolerating ok 3/2015 //   - brimonidine - " loss of color perception - could no longer see the colors of the caps  LASERS SLT OU (10/13/05 OD and 1/24/08 OS)   GLAUCOMA SURGERIES    -Phaco/Trab 1/26/11 OD     -S/p Trab OS (1/11/06)    - BV tube shunt OD 10/30/2013  OTHER EYE SURGERIES    -PCIOL OS dislocated (elsewhere)    -Ant Vit w/ IOL exchange with sutured PCIOL (Deisi) 4/9/09 OS    -DSEAK 4/30/09 OS    -Conj flap for dellen OS (5/28/09)   CDR 0.8/0.9   Tbase 32-40 OD pre Trab   Tmax 40/-   Ttarget 16/pain free   HVF 19 VF '96 - 04/2017 OD: SAD/IAD w/ split fix (stable) ; OS: NA  Gonio +4/-   /585   OCT 8 test 2004 to 04/2017 -RNFL - OD:dec. I // OS:xx  HRT 11 test 2005 to 2016 -MR -  Off-center // xx os  Disc photos [id number] - mult slides //   2011, 2014, 2016 - OIS     Ttoday  12/14  Test done today - iop check    3. PCIOL OU - both have dislocated OS years ago, OD recently 10/1/2012    4. H/O PCO OD - S/P yag cap od - IOL dislocated shortly there after 2/2 weak zonules assoc with PsEx    5. Hx Mac Hole OD   - S/P sx (partial PPV) with Arend years ago, did extremely well    6. Pre phthisis OS,   -in past was blind hypertenssive but now with nl IOP on timolol ,   -absent AC, and vascularized opacified cornea,   -S/P DESEK, and corneal melt    7. Large floater  OD 1/2/2014 - saw Arend 1/16/2014 - stable    8. Ulices Bonnet Syndrome   Sees green foliage with roses   Sees groups of people out side    9. Bruise around Right eye  9/17/2012, and Hx of Hyphema OD (4/12) - resolved   -associated with coumadin use  -bruising resolved   -Pt is on coumadin - for atrial fib    10. H/O CVA - 2008// atrial fib // ? Mult mini strokes    On coumadin           Plan  Monocular PsEx glaucoma w/ dislocated IOL OD  - had increased IOP OD with cont progression of VF defect OD  - has had " "a trab with express mini shunt OD 2011 as well as Baerveldt 10/30/2013  Pt does not tolerate, travatan z, jazzmine, or diamox  Tolerates timolol - but If affecting her hert rate - bradycardia     - Pt happy w/ aphakic Glasses only. Does not wish to have PPV w/ Scleral Fixated IOL. Does not use CL.  - states her vision is clearer with her dislocated PC IOL and aphakic glasses then before the dislocation - most likely because now there is no PCO she is looking through.  - Also states that she is seeing better now after getting off of her Norvasc.    Glaucoma - IOP better with addition of timolol ou. IOP higher OS today.   Cont  t 1/2 ou - tolerating ok - ? H/o bradycardia in past   Intolerant to ALL other gtts     HVF progression OD  Patient states she has been having multiple "ministrokes"    IOP doing well today on timolol  Cont with vf loss 2/2 mult mini strokes - despite good IOP's - pt is on coumadin     Cont follow up with retina    Glasses - Miguel -happy with new glasses - 12/2016     [facility] - 9/11/2017 -  F/U FALL - SEEN IN ED - DID NOT HURT HER EYES - C/O CHEST PAIN   Eye exam unchanged     rtc as scheduled   -- in future change to the HVF -  24-2 stim V od program                    "

## 2017-09-25 DIAGNOSIS — F41.9 ANXIETY: ICD-10-CM

## 2017-09-25 DIAGNOSIS — F32.A DEPRESSION, UNSPECIFIED DEPRESSION TYPE: ICD-10-CM

## 2017-09-25 RX ORDER — SERTRALINE HYDROCHLORIDE 50 MG/1
TABLET, FILM COATED ORAL
Qty: 30 TABLET | Refills: 2 | OUTPATIENT
Start: 2017-09-25

## 2017-09-26 ENCOUNTER — ANTI-COAG VISIT (OUTPATIENT)
Dept: CARDIOLOGY | Facility: CLINIC | Age: 82
End: 2017-09-26

## 2017-09-26 ENCOUNTER — LAB VISIT (OUTPATIENT)
Dept: LAB | Facility: HOSPITAL | Age: 82
End: 2017-09-26
Attending: INTERNAL MEDICINE
Payer: MEDICARE

## 2017-09-26 DIAGNOSIS — I48.91 ATRIAL FIBRILLATION, UNSPECIFIED TYPE: ICD-10-CM

## 2017-09-26 DIAGNOSIS — Z79.01 CURRENT USE OF LONG TERM ANTICOAGULATION: ICD-10-CM

## 2017-09-26 LAB
INR PPP: 1.9
PROTHROMBIN TIME: 18.6 SEC

## 2017-09-26 PROCEDURE — 36415 COLL VENOUS BLD VENIPUNCTURE: CPT | Mod: PO

## 2017-09-26 PROCEDURE — 85610 PROTHROMBIN TIME: CPT

## 2017-09-26 NOTE — TELEPHONE ENCOUNTER
----- Message from Dee Dee Charles sent at 9/26/2017  2:53 PM CDT -----  Contact: [phone]  Patient called in returning your call. Please advise

## 2017-09-26 NOTE — TELEPHONE ENCOUNTER
----- Message from Mona Consuelo sent at 9/26/2017  9:56 AM CDT -----  Contact: self, [phone]  Patient called in requesting to speak with you regarding her 9/28 appt.   Please advise.

## 2017-09-28 ENCOUNTER — ANTI-COAG VISIT (OUTPATIENT)
Dept: CARDIOLOGY | Facility: CLINIC | Age: 82
End: 2017-09-28

## 2017-09-28 DIAGNOSIS — Z79.01 CURRENT USE OF LONG TERM ANTICOAGULATION: ICD-10-CM

## 2017-10-09 ENCOUNTER — ANTI-COAG VISIT (OUTPATIENT)
Dept: CARDIOLOGY | Facility: CLINIC | Age: 82
End: 2017-10-09
Payer: MEDICARE

## 2017-10-09 DIAGNOSIS — Z79.01 CURRENT USE OF LONG TERM ANTICOAGULATION: ICD-10-CM

## 2017-10-09 LAB — INR PPP: 2.1 (ref 2–3)

## 2017-10-09 PROCEDURE — 85610 PROTHROMBIN TIME: CPT | Mod: QW,S$GLB,,

## 2017-10-25 DIAGNOSIS — F41.9 ANXIETY: ICD-10-CM

## 2017-10-25 DIAGNOSIS — F32.A DEPRESSION, UNSPECIFIED DEPRESSION TYPE: ICD-10-CM

## 2017-10-25 RX ORDER — SERTRALINE HYDROCHLORIDE 50 MG/1
TABLET, FILM COATED ORAL
Qty: 30 TABLET | Refills: 5 | OUTPATIENT
Start: 2017-10-25

## 2017-10-27 DIAGNOSIS — F32.A DEPRESSION, UNSPECIFIED DEPRESSION TYPE: ICD-10-CM

## 2017-10-27 DIAGNOSIS — F41.9 ANXIETY: ICD-10-CM

## 2017-10-27 RX ORDER — SERTRALINE HYDROCHLORIDE 50 MG/1
TABLET, FILM COATED ORAL
Qty: 30 TABLET | OUTPATIENT
Start: 2017-10-27

## 2017-11-01 ENCOUNTER — HOSPITAL ENCOUNTER (OUTPATIENT)
Dept: RADIATION THERAPY | Facility: HOSPITAL | Age: 82
Discharge: HOME | End: 2017-11-01
Attending: RADIOLOGY
Payer: MEDICARE

## 2017-11-03 RX ORDER — LEVOTHYROXINE SODIUM 50 UG/1
TABLET ORAL
Qty: 90 TABLET | Refills: 0 | Status: SHIPPED | OUTPATIENT
Start: 2017-11-03 | End: 2018-01-22 | Stop reason: SDUPTHER

## 2017-11-11 ENCOUNTER — HOSPITAL ENCOUNTER (INPATIENT)
Facility: HOSPITAL | Age: 82
LOS: 17 days | Discharge: HOME HEALTH | DRG: 669 | End: 2017-11-29
Attending: EMERGENCY MEDICINE | Admitting: INTERNAL MEDICINE
Payer: MEDICARE

## 2017-11-11 DIAGNOSIS — C67.9 MALIGNANT NEOPLASM OF URINARY BLADDER, UNSPECIFIED SITE: ICD-10-CM

## 2017-11-11 DIAGNOSIS — I48.20 CHRONIC ATRIAL FIBRILLATION: ICD-10-CM

## 2017-11-11 DIAGNOSIS — E03.9 ACQUIRED HYPOTHYROIDISM: ICD-10-CM

## 2017-11-11 DIAGNOSIS — E87.1 CHRONIC HYPONATREMIA: ICD-10-CM

## 2017-11-11 DIAGNOSIS — Z79.01 CURRENT USE OF LONG TERM ANTICOAGULATION: ICD-10-CM

## 2017-11-11 DIAGNOSIS — R31.0 GROSS HEMATURIA: ICD-10-CM

## 2017-11-11 DIAGNOSIS — I10 ESSENTIAL HYPERTENSION: ICD-10-CM

## 2017-11-11 DIAGNOSIS — R31.9 HEMATURIA, UNSPECIFIED TYPE: ICD-10-CM

## 2017-11-11 DIAGNOSIS — N13.30 HYDRONEPHROSIS OF LEFT KIDNEY: ICD-10-CM

## 2017-11-11 DIAGNOSIS — N32.89 BLADDER MASS: Primary | ICD-10-CM

## 2017-11-11 LAB
ABO + RH BLD: NORMAL
ALBUMIN SERPL BCP-MCNC: 3.8 G/DL
ALP SERPL-CCNC: 99 U/L
ALT SERPL W/O P-5'-P-CCNC: 10 U/L
ANION GAP SERPL CALC-SCNC: 11 MMOL/L
AST SERPL-CCNC: 27 U/L
BACTERIA #/AREA URNS AUTO: ABNORMAL /HPF
BASOPHILS # BLD AUTO: 0.04 K/UL
BASOPHILS NFR BLD: 0.3 %
BILIRUB SERPL-MCNC: 0.9 MG/DL
BILIRUB UR QL STRIP: NEGATIVE
BLD GP AB SCN CELLS X3 SERPL QL: NORMAL
BUN SERPL-MCNC: 21 MG/DL
CALCIUM SERPL-MCNC: 9.7 MG/DL
CHLORIDE SERPL-SCNC: 92 MMOL/L
CLARITY UR REFRACT.AUTO: ABNORMAL
CO2 SERPL-SCNC: 22 MMOL/L
COLOR UR AUTO: ABNORMAL
CREAT SERPL-MCNC: 0.9 MG/DL
DIFFERENTIAL METHOD: ABNORMAL
EOSINOPHIL # BLD AUTO: 0 K/UL
EOSINOPHIL NFR BLD: 0.2 %
ERYTHROCYTE [DISTWIDTH] IN BLOOD BY AUTOMATED COUNT: 12.6 %
EST. GFR  (AFRICAN AMERICAN): >60 ML/MIN/1.73 M^2
EST. GFR  (NON AFRICAN AMERICAN): 57.7 ML/MIN/1.73 M^2
GLUCOSE SERPL-MCNC: 133 MG/DL
GLUCOSE UR QL STRIP: ABNORMAL
HCT VFR BLD AUTO: 33.5 %
HGB BLD-MCNC: 11.5 G/DL
HGB UR QL STRIP: ABNORMAL
HYALINE CASTS UR QL AUTO: 0 /LPF
IMM GRANULOCYTES # BLD AUTO: 0.06 K/UL
IMM GRANULOCYTES NFR BLD AUTO: 0.5 %
INR PPP: 2.7
KETONES UR QL STRIP: NEGATIVE
LEUKOCYTE ESTERASE UR QL STRIP: NEGATIVE
LYMPHOCYTES # BLD AUTO: 1 K/UL
LYMPHOCYTES NFR BLD: 7.6 %
MCH RBC QN AUTO: 31 PG
MCHC RBC AUTO-ENTMCNC: 34.3 G/DL
MCV RBC AUTO: 90 FL
MICROSCOPIC COMMENT: ABNORMAL
MONOCYTES # BLD AUTO: 0.6 K/UL
MONOCYTES NFR BLD: 4.9 %
NEUTROPHILS # BLD AUTO: 10.8 K/UL
NEUTROPHILS NFR BLD: 86.5 %
NITRITE UR QL STRIP: NEGATIVE
NRBC BLD-RTO: 0 /100 WBC
PH UR STRIP: 8 [PH] (ref 5–8)
PLATELET # BLD AUTO: 261 K/UL
PMV BLD AUTO: 9.8 FL
POTASSIUM SERPL-SCNC: 4.2 MMOL/L
PROT SERPL-MCNC: 7.3 G/DL
PROT UR QL STRIP: ABNORMAL
PROTHROMBIN TIME: 27.2 SEC
RBC # BLD AUTO: 3.71 M/UL
RBC #/AREA URNS AUTO: >100 /HPF (ref 0–4)
SODIUM SERPL-SCNC: 125 MMOL/L
SP GR UR STRIP: 1.01 (ref 1–1.03)
URN SPEC COLLECT METH UR: ABNORMAL
UROBILINOGEN UR STRIP-ACNC: NEGATIVE EU/DL
WBC # BLD AUTO: 12.53 K/UL
WBC #/AREA URNS AUTO: 0 /HPF (ref 0–5)

## 2017-11-11 PROCEDURE — 99285 EMERGENCY DEPT VISIT HI MDM: CPT

## 2017-11-11 PROCEDURE — 81001 URINALYSIS AUTO W/SCOPE: CPT

## 2017-11-11 PROCEDURE — 93005 ELECTROCARDIOGRAM TRACING: CPT

## 2017-11-11 PROCEDURE — 86850 RBC ANTIBODY SCREEN: CPT

## 2017-11-11 PROCEDURE — 51702 INSERT TEMP BLADDER CATH: CPT

## 2017-11-11 PROCEDURE — 80053 COMPREHEN METABOLIC PANEL: CPT

## 2017-11-11 PROCEDURE — 20600001 HC STEP DOWN PRIVATE ROOM

## 2017-11-11 PROCEDURE — 86900 BLOOD TYPING SEROLOGIC ABO: CPT

## 2017-11-11 PROCEDURE — 85025 COMPLETE CBC W/AUTO DIFF WBC: CPT

## 2017-11-11 PROCEDURE — 96360 HYDRATION IV INFUSION INIT: CPT

## 2017-11-11 PROCEDURE — 25000003 PHARM REV CODE 250: Performed by: STUDENT IN AN ORGANIZED HEALTH CARE EDUCATION/TRAINING PROGRAM

## 2017-11-11 PROCEDURE — 85610 PROTHROMBIN TIME: CPT

## 2017-11-11 PROCEDURE — 87086 URINE CULTURE/COLONY COUNT: CPT

## 2017-11-11 PROCEDURE — 93010 ELECTROCARDIOGRAM REPORT: CPT | Mod: ,,, | Performed by: INTERNAL MEDICINE

## 2017-11-11 PROCEDURE — 99285 EMERGENCY DEPT VISIT HI MDM: CPT | Mod: ,,, | Performed by: EMERGENCY MEDICINE

## 2017-11-11 RX ADMIN — IOHEXOL 125 ML: 350 INJECTION, SOLUTION INTRAVENOUS at 11:11

## 2017-11-11 RX ADMIN — SODIUM CHLORIDE 1000 ML: 0.9 INJECTION, SOLUTION INTRAVENOUS at 07:11

## 2017-11-12 ENCOUNTER — ANESTHESIA EVENT (OUTPATIENT)
Dept: SURGERY | Facility: HOSPITAL | Age: 82
DRG: 669 | End: 2017-11-12
Payer: MEDICARE

## 2017-11-12 PROBLEM — Z23 NEED FOR PROPHYLACTIC VACCINATION WITH STREPTOCOCCUS PNEUMONIAE (PNEUMOCOCCUS) AND INFLUENZA VACCINES: Status: RESOLVED | Noted: 2017-07-26 | Resolved: 2017-11-12

## 2017-11-12 PROBLEM — F41.9 ANXIETY: Status: RESOLVED | Noted: 2017-08-28 | Resolved: 2017-11-12

## 2017-11-12 PROBLEM — R07.81 RIB PAIN ON LEFT SIDE: Status: RESOLVED | Noted: 2017-09-07 | Resolved: 2017-11-12

## 2017-11-12 PROBLEM — R31.0 GROSS HEMATURIA: Status: ACTIVE | Noted: 2017-11-12

## 2017-11-12 PROBLEM — M25.571 ACUTE RIGHT ANKLE PAIN: Status: RESOLVED | Noted: 2017-09-07 | Resolved: 2017-11-12

## 2017-11-12 PROBLEM — N13.30 HYDRONEPHROSIS OF LEFT KIDNEY: Status: ACTIVE | Noted: 2017-11-12

## 2017-11-12 PROBLEM — J34.9 SINUS DISEASE: Status: RESOLVED | Noted: 2017-09-07 | Resolved: 2017-11-12

## 2017-11-12 PROBLEM — Z23 NEEDS FLU SHOT: Status: RESOLVED | Noted: 2017-01-26 | Resolved: 2017-11-12

## 2017-11-12 PROBLEM — R31.9 HEMATURIA: Status: ACTIVE | Noted: 2017-11-12

## 2017-11-12 PROBLEM — N32.89 BLADDER MASS: Status: ACTIVE | Noted: 2017-11-12

## 2017-11-12 LAB
ANION GAP SERPL CALC-SCNC: 8 MMOL/L
BASOPHILS # BLD AUTO: 0.04 K/UL
BASOPHILS # BLD AUTO: 0.04 K/UL
BASOPHILS # BLD AUTO: 0.05 K/UL
BASOPHILS NFR BLD: 0.4 %
BUN SERPL-MCNC: 17 MG/DL
CALCIUM SERPL-MCNC: 9.6 MG/DL
CHLORIDE SERPL-SCNC: 94 MMOL/L
CO2 SERPL-SCNC: 27 MMOL/L
CREAT SERPL-MCNC: 1.1 MG/DL
DIFFERENTIAL METHOD: ABNORMAL
EOSINOPHIL # BLD AUTO: 0 K/UL
EOSINOPHIL # BLD AUTO: 0 K/UL
EOSINOPHIL # BLD AUTO: 0.1 K/UL
EOSINOPHIL NFR BLD: 0.2 %
EOSINOPHIL NFR BLD: 0.2 %
EOSINOPHIL NFR BLD: 0.6 %
ERYTHROCYTE [DISTWIDTH] IN BLOOD BY AUTOMATED COUNT: 12.6 %
ERYTHROCYTE [DISTWIDTH] IN BLOOD BY AUTOMATED COUNT: 12.7 %
ERYTHROCYTE [DISTWIDTH] IN BLOOD BY AUTOMATED COUNT: 12.8 %
EST. GFR  (AFRICAN AMERICAN): 52.2 ML/MIN/1.73 M^2
EST. GFR  (NON AFRICAN AMERICAN): 45.3 ML/MIN/1.73 M^2
GLUCOSE SERPL-MCNC: 104 MG/DL
HCT VFR BLD AUTO: 28 %
HCT VFR BLD AUTO: 30.3 %
HCT VFR BLD AUTO: 31.5 %
HGB BLD-MCNC: 10.4 G/DL
HGB BLD-MCNC: 10.8 G/DL
HGB BLD-MCNC: 9.5 G/DL
IMM GRANULOCYTES # BLD AUTO: 0.03 K/UL
IMM GRANULOCYTES # BLD AUTO: 0.04 K/UL
IMM GRANULOCYTES # BLD AUTO: 0.05 K/UL
IMM GRANULOCYTES NFR BLD AUTO: 0.3 %
IMM GRANULOCYTES NFR BLD AUTO: 0.4 %
IMM GRANULOCYTES NFR BLD AUTO: 0.4 %
INR PPP: 1.6
LYMPHOCYTES # BLD AUTO: 0.8 K/UL
LYMPHOCYTES # BLD AUTO: 0.9 K/UL
LYMPHOCYTES # BLD AUTO: 1.1 K/UL
LYMPHOCYTES NFR BLD: 11.2 %
LYMPHOCYTES NFR BLD: 7.2 %
LYMPHOCYTES NFR BLD: 7.4 %
MCH RBC QN AUTO: 30.5 PG
MCH RBC QN AUTO: 30.8 PG
MCH RBC QN AUTO: 31 PG
MCHC RBC AUTO-ENTMCNC: 33.9 G/DL
MCHC RBC AUTO-ENTMCNC: 34.3 G/DL
MCHC RBC AUTO-ENTMCNC: 34.3 G/DL
MCV RBC AUTO: 89 FL
MCV RBC AUTO: 90 FL
MCV RBC AUTO: 91 FL
MONOCYTES # BLD AUTO: 0.8 K/UL
MONOCYTES # BLD AUTO: 0.8 K/UL
MONOCYTES # BLD AUTO: 1.1 K/UL
MONOCYTES NFR BLD: 7.5 %
MONOCYTES NFR BLD: 8.7 %
MONOCYTES NFR BLD: 9.4 %
NEUTROPHILS # BLD AUTO: 7.5 K/UL
NEUTROPHILS # BLD AUTO: 8.6 K/UL
NEUTROPHILS # BLD AUTO: 9.8 K/UL
NEUTROPHILS NFR BLD: 79.2 %
NEUTROPHILS NFR BLD: 82 %
NEUTROPHILS NFR BLD: 84.1 %
NRBC BLD-RTO: 0 /100 WBC
OSMOLALITY SERPL: 274 MOSM/KG
PLATELET # BLD AUTO: 221 K/UL
PLATELET # BLD AUTO: 224 K/UL
PLATELET # BLD AUTO: 231 K/UL
PMV BLD AUTO: 10 FL
PMV BLD AUTO: 9.6 FL
PMV BLD AUTO: 9.7 FL
POTASSIUM SERPL-SCNC: 4.2 MMOL/L
PROTHROMBIN TIME: 16.4 SEC
RBC # BLD AUTO: 3.08 M/UL
RBC # BLD AUTO: 3.36 M/UL
RBC # BLD AUTO: 3.54 M/UL
SODIUM SERPL-SCNC: 129 MMOL/L
TSH SERPL DL<=0.005 MIU/L-ACNC: 2.79 UIU/ML
WBC # BLD AUTO: 10.22 K/UL
WBC # BLD AUTO: 11.94 K/UL
WBC # BLD AUTO: 9.46 K/UL

## 2017-11-12 PROCEDURE — 88112 CYTOPATH CELL ENHANCE TECH: CPT | Performed by: PATHOLOGY

## 2017-11-12 PROCEDURE — 85610 PROTHROMBIN TIME: CPT

## 2017-11-12 PROCEDURE — 20600001 HC STEP DOWN PRIVATE ROOM

## 2017-11-12 PROCEDURE — 80048 BASIC METABOLIC PNL TOTAL CA: CPT

## 2017-11-12 PROCEDURE — 85025 COMPLETE CBC W/AUTO DIFF WBC: CPT | Mod: 91

## 2017-11-12 PROCEDURE — 25000003 PHARM REV CODE 250

## 2017-11-12 PROCEDURE — 99223 1ST HOSP IP/OBS HIGH 75: CPT | Mod: AI,GC,, | Performed by: HOSPITALIST

## 2017-11-12 PROCEDURE — 84443 ASSAY THYROID STIM HORMONE: CPT

## 2017-11-12 PROCEDURE — 88112 CYTOPATH CELL ENHANCE TECH: CPT | Mod: 26,,, | Performed by: PATHOLOGY

## 2017-11-12 PROCEDURE — 25500020 PHARM REV CODE 255: Performed by: EMERGENCY MEDICINE

## 2017-11-12 PROCEDURE — 36415 COLL VENOUS BLD VENIPUNCTURE: CPT

## 2017-11-12 PROCEDURE — 83930 ASSAY OF BLOOD OSMOLALITY: CPT

## 2017-11-12 RX ORDER — SODIUM CHLORIDE 0.9 % (FLUSH) 0.9 %
3 SYRINGE (ML) INJECTION
Status: DISCONTINUED | OUTPATIENT
Start: 2017-11-12 | End: 2017-11-29 | Stop reason: HOSPADM

## 2017-11-12 RX ORDER — HEPARIN SODIUM 5000 [USP'U]/ML
5000 INJECTION, SOLUTION INTRAVENOUS; SUBCUTANEOUS EVERY 12 HOURS
Status: DISCONTINUED | OUTPATIENT
Start: 2017-11-12 | End: 2017-11-12

## 2017-11-12 RX ORDER — SODIUM CHLORIDE 9 MG/ML
INJECTION, SOLUTION INTRAVENOUS CONTINUOUS
Status: ACTIVE | OUTPATIENT
Start: 2017-11-12 | End: 2017-11-12

## 2017-11-12 RX ORDER — VENLAFAXINE HYDROCHLORIDE 37.5 MG/1
37.5 CAPSULE, EXTENDED RELEASE ORAL DAILY
Status: DISCONTINUED | OUTPATIENT
Start: 2017-11-12 | End: 2017-11-13

## 2017-11-12 RX ORDER — METOPROLOL SUCCINATE 25 MG/1
25 TABLET, EXTENDED RELEASE ORAL 2 TIMES DAILY
Status: DISCONTINUED | OUTPATIENT
Start: 2017-11-12 | End: 2017-11-14

## 2017-11-12 RX ORDER — LEVOTHYROXINE SODIUM 50 UG/1
50 TABLET ORAL
Status: DISCONTINUED | OUTPATIENT
Start: 2017-11-12 | End: 2017-11-29 | Stop reason: HOSPADM

## 2017-11-12 RX ORDER — TIMOLOL MALEATE 5 MG/ML
1 SOLUTION/ DROPS OPHTHALMIC 2 TIMES DAILY
Status: DISCONTINUED | OUTPATIENT
Start: 2017-11-12 | End: 2017-11-29 | Stop reason: HOSPADM

## 2017-11-12 RX ORDER — SERTRALINE HYDROCHLORIDE 50 MG/1
50 TABLET, FILM COATED ORAL DAILY
Status: ON HOLD | COMMUNITY
End: 2017-11-29 | Stop reason: HOSPADM

## 2017-11-12 RX ORDER — PHYTONADIONE 5 MG/1
5 TABLET ORAL DAILY
Status: DISCONTINUED | OUTPATIENT
Start: 2017-11-12 | End: 2017-11-13

## 2017-11-12 RX ADMIN — PHYTONADIONE 5 MG: 5 TABLET ORAL at 09:11

## 2017-11-12 RX ADMIN — LEVOTHYROXINE SODIUM 50 MCG: 50 TABLET ORAL at 05:11

## 2017-11-12 RX ADMIN — METOPROLOL SUCCINATE 25 MG: 25 TABLET, EXTENDED RELEASE ORAL at 09:11

## 2017-11-12 RX ADMIN — METOPROLOL SUCCINATE 25 MG: 25 TABLET, EXTENDED RELEASE ORAL at 08:11

## 2017-11-12 RX ADMIN — TIMOLOL MALEATE 1 DROP: 5 SOLUTION OPHTHALMIC at 08:11

## 2017-11-12 RX ADMIN — TIMOLOL MALEATE 1 DROP: 5 SOLUTION OPHTHALMIC at 09:11

## 2017-11-12 RX ADMIN — SODIUM CHLORIDE: 0.9 INJECTION, SOLUTION INTRAVENOUS at 10:11

## 2017-11-12 RX ADMIN — VENLAFAXINE HYDROCHLORIDE 37.5 MG: 37.5 CAPSULE, EXTENDED RELEASE ORAL at 09:11

## 2017-11-12 NOTE — ASSESSMENT & PLAN NOTE
CT urogram with evidence of large, obstructing mass and hemorrhage in the bladder.  - Home coumadin and ASA held, per urology recs subq heparin for DVT prophylaxis  - Hg baseline 14, 11 at presentation, trending downward  - Type and screen ordered  - CBC q6h, will transfuse for Hg <7  - Evalutated by urology, catheter irrigated with large amount of clot removed, possible procedure Monday, no CBI at this time

## 2017-11-12 NOTE — SUBJECTIVE & OBJECTIVE
Past Medical History:   Diagnosis Date    *Atrial fibrillation     Adrenal adenoma     Atrial fibrillation     Blood clotting tendency     Breast cancer     CVA (cerebral infarction)     DVT (deep venous thrombosis)     Glaucoma     Pseudoexfoliation, sever    Goiter     Heart attack     Hypertension     Hypothyroidism     Long-term (current) use of anticoagulants     Macular hole of right eye     Multinodular goiter     Other hyperparathyroidism     Phthisis bulbi of left eye     Stroke        Past Surgical History:   Procedure Laterality Date    BAERVELDT GLAUCOMA SHUNT Right 10/30/2013    OD (dr. hines)    BREAST LUMPECTOMY      BUNIONECTOMY      CATARACT EXTRACTION W/  INTRAOCULAR LENS IMPLANT Right 01/26/2011    WITH TRAB ()    CATARACT EXTRACTION W/  INTRAOCULAR LENS IMPLANT Left 04/09/2009    ANT. VIITRECTOMY WITH SUTURED PCIOL (DR. RYAN)    CATARACT EXTRACTION W/ INTRAOCULAR LENS IMPLANTW/ TRABECULECTOMY Right 01/26/2011        COLON SURGERY      volvulus    CONJUNCTIVAL FLAP  Left 05/28/2009    DR. JARAMILLO    conjuunctival flap   2009    OS w/ dr. 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 emanuel    TRABECULECTOMY Left 01/11/2006        transcleral cyclophotocoagulation   2010    OS w/ dr. hines       Review of patient's allergies indicates:   Allergen Reactions    Cosopt [dorzolamide-timolol] Swelling     Swelling of eyelids    Mevacor [lovastatin] Nausea And Vomiting    Prednisone Nausea And Vomiting    Vasotec [enalapril maleate] Nausea And Vomiting    Zetia [ezetimibe] Nausea And Vomiting    Furosemide Palpitations     Pt states her heart skips beats when she takes this medication.       No current facility-administered medications on file prior to encounter.       Current Outpatient Prescriptions on File Prior to Encounter   Medication Sig    aspirin (ECOTRIN) 81 MG EC tablet Take 81 mg by mouth. 1 Tablet, Delayed Release (E.C.) Oral Every day    metoprolol succinate (TOPROL-XL) 25 MG 24 hr tablet Take 1 tablet (25 mg total) by mouth 2 (two) times daily.    SYNTHROID 50 mcg tablet TAKE 1 TABLET BY MOUTH DAILY EVERY MORNING    timolol maleate 0.5% (TIMOPTIC) 0.5 % Drop Place 1 drop into both eyes 2 (two) times daily.    venlafaxine (EFFEXOR-XR) 37.5 MG 24 hr capsule Take 1 capsule (37.5 mg total) by mouth once daily.    warfarin (COUMADIN) 1 MG tablet Take 2-3 tablets by mouth Daily.     Family History     Problem Relation (Age of Onset)    Emphysema Mother    Hyperlipidemia Mother, Father    Hypertension Mother, Father    No Known Problems Sister, Brother, Maternal Aunt, Maternal Uncle, Paternal Aunt, Paternal Uncle, Maternal Grandmother, Maternal Grandfather, Paternal Grandmother, Paternal Grandfather        Social History Main Topics    Smoking status: Former Smoker     Quit date: 9/25/1982    Smokeless tobacco: Never Used    Alcohol use No    Drug use: No    Sexual activity: No     Review of Systems   Constitutional: Positive for activity change, appetite change and diaphoresis. Negative for chills, fatigue, fever and unexpected weight change.   HENT: Negative for sore throat and trouble swallowing.    Respiratory: Negative for cough and shortness of breath.    Cardiovascular: Negative for chest pain, palpitations and leg swelling.   Gastrointestinal: Negative for abdominal distention, abdominal pain, blood in stool, constipation, diarrhea, nausea and vomiting.   Genitourinary: Positive for frequency, hematuria and urgency. Negative for difficulty urinating, dysuria and flank pain.   Musculoskeletal: Negative for arthralgias and back pain.   Skin: Positive for pallor. Negative for color change.   Neurological: Negative for dizziness and light-headedness.    Psychiatric/Behavioral: Negative for agitation and confusion.     Objective:     Vital Signs (Most Recent):  Temp: 97.7 °F (36.5 °C) (11/11/17 1705)  Pulse: 72 (11/12/17 0116)  Resp: (!) 22 (11/12/17 0116)  BP: (!) 152/99 (11/12/17 0116)  SpO2: 100 % (11/12/17 0116) Vital Signs (24h Range):  Temp:  [97.7 °F (36.5 °C)] 97.7 °F (36.5 °C)  Pulse:  [] 72  Resp:  [13-22] 22  SpO2:  [88 %-100 %] 100 %  BP: (106-196)/() 152/99     Weight: 45.4 kg (100 lb)  Body mass index is 20.2 kg/m².    Physical Exam   Constitutional: She is oriented to person, place, and time.   Thin, frail appearing elderly lady.   HENT:   Head: Normocephalic and atraumatic.   Mouth/Throat: Oropharynx is clear and moist. No oropharyngeal exudate.   Eyes: No scleral icterus.   Pale conjunctivae.   Neck: Normal range of motion. Neck supple.   Cardiovascular: Normal rate, regular rhythm, normal heart sounds and intact distal pulses.    Pulmonary/Chest: Effort normal and breath sounds normal.   Abdominal: Soft. Bowel sounds are normal. She exhibits no distension and no mass. There is no tenderness. There is no guarding.   Neurological: She is alert and oriented to person, place, and time.   Skin: Skin is warm and dry. There is pallor.   Psychiatric: She has a normal mood and affect. Her behavior is normal.        Significant Labs:   CBC:   Recent Labs  Lab 11/11/17 1846 11/12/17  0006   WBC 12.53 9.46   HGB 11.5* 10.8*   HCT 33.5* 31.5*    221     CMP:   Recent Labs  Lab 11/11/17 1846   *   K 4.2   CL 92*   CO2 22*   *   BUN 21   CREATININE 0.9   CALCIUM 9.7   PROT 7.3   ALBUMIN 3.8   BILITOT 0.9   ALKPHOS 99   AST 27   ALT 10   ANIONGAP 11   EGFRNONAA 57.7*     Coagulation:   Recent Labs  Lab 11/11/17  1846   INR 2.7*       Significant Imaging:     Imaging Results          CT Urogram Abd Pelvis W WO (Final result)  Result time 11/12/17 00:11:51   Procedure changed from CT Abdomen Pelvis With Contrast     Final result  by Jaylon Vázquez MD (11/12/17 00:11:51)                 Impression:       6.3 x 4.3 x 4.0 cm left sided enhancing bladder mass.    Severe left hydroureteronephrosis. Delayed left nephrogram.    Contrast layering about hyperintense regions within the bladder similar to the noncontrast portion of the exam, likely hemorrhage within the bladder.    Bladder not included on arterial phase imaging, and active arterial extravasation cannot be definitively identified. Forrest catheter and small volume of air within the bladder.    Other findings including groundglass opacification the lungs, multiple hepatic hypodensities, surgical changes of bowel resection and anastomosis, osseous degenerative changes, and  moderate atherosclerosis    Preliminary report discussed with Dr Herrera by Dr. Cisneros on behalf of Dr. Vázquez at 23:54:02 on Saturday, November 11, 2017.    ______________________________________     Electronically signed by resident: REX CISNEROS  Date:     11/11/17  Time:    23:54            As the supervising and teaching physician, I personally reviewed the images and resident's interpretation and I agree with the findings.          Electronically signed by: JAYLON VÁZQUEZ MD  Date:     11/12/17  Time:    00:11              Narrative:    CTA abdomen and pelvis with contrast and without contrast.    5-mm axial images of the abdomen and pelvis were obtained prior to the administration of intravenous contrast material.  Subsequent axial images of the abdomen and pelvis were obtained following administration of 125 cc Omni 350 intravenous contrast material in the arterial and venous phase.  Axial, coronal and sagittal reformats were also reviewed.    HISTORY:  87 year old F with Gross hematuria    COMPARISON: CT urogram 9/26/2014     FINDINGS:  Heart: Normal in size. No pericardial effusion.There is severe coronary artery atherosclerosis    Lung Bases: Motion artifact appears parenchymal detail. There are groundglass  opacification changes in the lung bases.    Liver: Normal in size. There are multiple hepatic hypodensities smaller in comparison to the prior exam which appear consistent with cysts or biliary hamartomas.      Gallbladder: No calcified gallstones.    Bile Ducts: No evidence of dilated ducts.    Pancreas: No mass or peripancreatic fat stranding.     Spleen: Normal.    Adrenals: Normal.    GI Tract/Mesentery: There are surgical changes and any colonic anastomosis in the right lower quadrant. No evidence of bowel obstruction.      Kidneys/Ureters: There is severe left-sided hydronephrosis. There is delayed left nephrogram.No evidence of ureterolithiasis.There is severe left hydroureter.    Bladder: There is a large 6.3 x 4.3 x 4.0 cm left sided enhancing bladder mass.There is layering of contrast about hyperintense regions within the bladder similar to the noncontrast portion of the exam likely hemorrhage within the bladder. Active arterial extravasation cannot be definitively identified as a bladder was not included on arterial phase imaging. Forrest catheter and small volume of air within the bladder are also noted.    Reproductive organs: Multiple calcified and noncalcified uterine masses possibly fibroids.     Retroperitoneum:  No significant adenopathy.      Peritoneal Space: No ascites. No free air.    Abdominal wall:  Normal.     Vasculature: No significant aneurysm. Moderate atherosclerosis.    Bones: No acute fracture. Multilevel listhesis most prominent at L5 on S1 with grade 2 anterolisthesis. Multilevel disc loss and degenerative change.

## 2017-11-12 NOTE — H&P
[facility] Hospital Medicine  History & Physical    Patient Name: Monique Lew  MRN: [id number]  Admission Date: 11/11/2017  Attending Physician:  Sundeep Gibbs MD  Primary Care Provider: Aguila Hiseh MD    [facility] Medicine Team: Networked reference to record PCT  Matt Cabrera MD     Patient information was obtained from patient, caregiver / friend, past medical records and ER records.     Subjective:     Principal Problem:Hematuria    Chief Complaint:   Chief Complaint   Patient presents with    Hematuria     pressure in pelvic region since yesterday am blood since yesterday        HPI: Ms. 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 States she first noticed blood in her urine 2 days before presentation. This temporarily resolved, with hematuria occuring again the day before presentation. Hematuria is associated with increased urge to urinate and incontinence. Prior to this episode, patient states last episode of gross hematuria was in 2012, which was found to be 2/2 cystitis. Patient has had multiple UTIs and microhematuria on urinalysis since that time. She follows with a urologist, Dr. Enriquez at [facility], and has had 2 cystoscopies - most recently in 2014 which no evidence of tumor. Denies fever/chills/nausea/vomiting. Does states she has had decreased appetite, increased fatigue, and night sweats which cause her to soak through her clothes.     In the ED, was found to have Hg of 11 which trended down to 10 in 6 hours (baseline 14). Type and screen obtained. CT urogram performed showed 6.3x4.3x4.0 cm mass in bladder causing severe left hydronephrosis and hydroureter with evidence of hemorrhage in bladder. Also evaluated by urology in ED, who began CBI and recommended she be NPO for possible procedure.     Past Medical History:   Diagnosis Date    *Atrial fibrillation      Adrenal adenoma     Atrial fibrillation     Blood clotting tendency     Breast cancer     CVA (cerebral infarction)     DVT (deep venous thrombosis)     Glaucoma     Pseudoexfoliation, sever    Goiter     Heart attack     Hypertension     Hypothyroidism     Long-term (current) use of anticoagulants     Macular hole of right eye     Multinodular goiter     Other hyperparathyroidism     Phthisis bulbi of left eye     Stroke        Past Surgical History:   Procedure Laterality Date    BAERVELDT GLAUCOMA SHUNT Right 10/30/2013    OD (dr. hines)    BREAST LUMPECTOMY      BUNIONECTOMY      CATARACT EXTRACTION W/  INTRAOCULAR LENS IMPLANT Right 01/26/2011    WITH TRAB ()    CATARACT EXTRACTION W/  INTRAOCULAR LENS IMPLANT Left 04/09/2009    ANT. VIITRECTOMY WITH SUTURED PCIOL (DR. RYAN)    CATARACT EXTRACTION W/ INTRAOCULAR LENS IMPLANTW/ TRABECULECTOMY Right 01/26/2011        COLON SURGERY      volvulus    CONJUNCTIVAL FLAP  Left 05/28/2009    DR. JARAMILLO    conjuunctival flap   2009    OS w/ dr. 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 emanuel    TRABECULECTOMY Left 01/11/2006        transcleral cyclophotocoagulation   2010    OS w/ dr. hines       Review of patient's allergies indicates:   Allergen Reactions    Cosopt [dorzolamide-timolol] Swelling     Swelling of eyelids    Mevacor [lovastatin] Nausea And Vomiting    Prednisone Nausea And Vomiting    Vasotec [enalapril maleate] Nausea And Vomiting    Zetia [ezetimibe] Nausea And Vomiting    Furosemide Palpitations     Pt states her heart skips beats when she takes this medication.       No current facility-administered medications on file prior to encounter.      Current Outpatient Prescriptions on File Prior to Encounter    Medication Sig    aspirin (ECOTRIN) 81 MG EC tablet Take 81 mg by mouth. 1 Tablet, Delayed Release (E.C.) Oral Every day    metoprolol succinate (TOPROL-XL) 25 MG 24 hr tablet Take 1 tablet (25 mg total) by mouth 2 (two) times daily.    SYNTHROID 50 mcg tablet TAKE 1 TABLET BY MOUTH DAILY EVERY MORNING    timolol maleate 0.5% (TIMOPTIC) 0.5 % Drop Place 1 drop into both eyes 2 (two) times daily.    venlafaxine (EFFEXOR-XR) 37.5 MG 24 hr capsule Take 1 capsule (37.5 mg total) by mouth once daily.    warfarin (COUMADIN) 1 MG tablet Take 2-3 tablets by mouth Daily.     Family History     Problem Relation (Age of Onset)    Emphysema Mother    Hyperlipidemia Mother, Father    Hypertension Mother, Father    No Known Problems Sister, Brother, Maternal Aunt, Maternal Uncle, Paternal Aunt, Paternal Uncle, Maternal Grandmother, Maternal Grandfather, Paternal Grandmother, Paternal Grandfather        Social History Main Topics    Smoking status: Former Smoker     Quit date: 9/25/1982    Smokeless tobacco: Never Used    Alcohol use No    Drug use: No    Sexual activity: No     Review of Systems   Constitutional: Positive for activity change, appetite change and diaphoresis. Negative for chills, fatigue, fever and unexpected weight change.   HENT: Negative for sore throat and trouble swallowing.    Respiratory: Negative for cough and shortness of breath.    Cardiovascular: Negative for chest pain, palpitations and leg swelling.   Gastrointestinal: Negative for abdominal distention, abdominal pain, blood in stool, constipation, diarrhea, nausea and vomiting.   Genitourinary: Positive for frequency, hematuria and urgency. Negative for difficulty urinating, dysuria and flank pain.   Musculoskeletal: Negative for arthralgias and back pain.   Skin: Positive for pallor. Negative for color change.   Neurological: Negative for dizziness and light-headedness.   Psychiatric/Behavioral: Negative for agitation and confusion.      Objective:     Vital Signs (Most Recent):  Temp: 97.7 °F (36.5 °C) (11/11/17 1705)  Pulse: 72 (11/12/17 0116)  Resp: (!) 22 (11/12/17 0116)  BP: (!) 152/99 (11/12/17 0116)  SpO2: 100 % (11/12/17 0116) Vital Signs (24h Range):  Temp:  [97.7 °F (36.5 °C)] 97.7 °F (36.5 °C)  Pulse:  [] 72  Resp:  [13-22] 22  SpO2:  [88 %-100 %] 100 %  BP: (106-196)/() 152/99     Weight: 45.4 kg (100 lb)  Body mass index is 20.2 kg/m².    Physical Exam   Constitutional: She is oriented to person, place, and time.   Thin, frail appearing elderly lady.   HENT:   Head: Normocephalic and atraumatic.   Mouth/Throat: Oropharynx is clear and moist. No oropharyngeal exudate.   Eyes: No scleral icterus.   Pale conjunctivae.   Neck: Normal range of motion. Neck supple.   Cardiovascular: Normal rate, regular rhythm, normal heart sounds and intact distal pulses.    Pulmonary/Chest: Effort normal and breath sounds normal.   Abdominal: Soft. Bowel sounds are normal. She exhibits no distension and no mass. There is no tenderness. There is no guarding.   Neurological: She is alert and oriented to person, place, and time.   Skin: Skin is warm and dry. There is pallor.   Psychiatric: She has a normal mood and affect. Her behavior is normal.        Significant Labs:   CBC:   Recent Labs  Lab 11/11/17 1846 11/12/17  0006   WBC 12.53 9.46   HGB 11.5* 10.8*   HCT 33.5* 31.5*    221     CMP:   Recent Labs  Lab 11/11/17 1846   *   K 4.2   CL 92*   CO2 22*   *   BUN 21   CREATININE 0.9   CALCIUM 9.7   PROT 7.3   ALBUMIN 3.8   BILITOT 0.9   ALKPHOS 99   AST 27   ALT 10   ANIONGAP 11   EGFRNONAA 57.7*     Coagulation:   Recent Labs  Lab 11/11/17 1846   INR 2.7*       Significant Imaging:     Imaging Results          CT Urogram Abd Pelvis W WO (Final result)  Result time 11/12/17 00:11:51   Procedure changed from CT Abdomen Pelvis With Contrast     Final result by Jaylon Valdez MD (11/12/17 00:11:51)                  Impression:       6.3 x 4.3 x 4.0 cm left sided enhancing bladder mass.    Severe left hydroureteronephrosis. Delayed left nephrogram.    Contrast layering about hyperintense regions within the bladder similar to the noncontrast portion of the exam, likely hemorrhage within the bladder.    Bladder not included on arterial phase imaging, and active arterial extravasation cannot be definitively identified. Forrest catheter and small volume of air within the bladder.    Other findings including groundglass opacification the lungs, multiple hepatic hypodensities, surgical changes of bowel resection and anastomosis, osseous degenerative changes, and  moderate atherosclerosis    Preliminary report discussed with Dr Herrera by Dr. Cisneros on behalf of Dr. Vázquez at 23:54:02 on Saturday, November 11, 2017.    ______________________________________     Electronically signed by resident: REX CISNEROS  Date:     11/11/17  Time:    23:54            As the supervising and teaching physician, I personally reviewed the images and resident's interpretation and I agree with the findings.          Electronically signed by: BERRY VÁZUQEZ MD  Date:     11/12/17  Time:    00:11              Narrative:    CTA abdomen and pelvis with contrast and without contrast.    5-mm axial images of the abdomen and pelvis were obtained prior to the administration of intravenous contrast material.  Subsequent axial images of the abdomen and pelvis were obtained following administration of 125 cc Omni 350 intravenous contrast material in the arterial and venous phase.  Axial, coronal and sagittal reformats were also reviewed.    HISTORY:  87 year old F with Gross hematuria    COMPARISON: CT urogram 9/26/2014     FINDINGS:  Heart: Normal in size. No pericardial effusion.There is severe coronary artery atherosclerosis    Lung Bases: Motion artifact appears parenchymal detail. There are groundglass opacification changes in the lung bases.    Liver: Normal in  size. There are multiple hepatic hypodensities smaller in comparison to the prior exam which appear consistent with cysts or biliary hamartomas.      Gallbladder: No calcified gallstones.    Bile Ducts: No evidence of dilated ducts.    Pancreas: No mass or peripancreatic fat stranding.     Spleen: Normal.    Adrenals: Normal.    GI Tract/Mesentery: There are surgical changes and any colonic anastomosis in the right lower quadrant. No evidence of bowel obstruction.      Kidneys/Ureters: There is severe left-sided hydronephrosis. There is delayed left nephrogram.No evidence of ureterolithiasis.There is severe left hydroureter.    Bladder: There is a large 6.3 x 4.3 x 4.0 cm left sided enhancing bladder mass.There is layering of contrast about hyperintense regions within the bladder similar to the noncontrast portion of the exam likely hemorrhage within the bladder. Active arterial extravasation cannot be definitively identified as a bladder was not included on arterial phase imaging. Forrest catheter and small volume of air within the bladder are also noted.    Reproductive organs: Multiple calcified and noncalcified uterine masses possibly fibroids.     Retroperitoneum:  No significant adenopathy.      Peritoneal Space: No ascites. No free air.    Abdominal wall:  Normal.     Vasculature: No significant aneurysm. Moderate atherosclerosis.    Bones: No acute fracture. Multilevel listhesis most prominent at L5 on S1 with grade 2 anterolisthesis. Multilevel disc loss and degenerative change.                                Assessment/Plan:     * Hematuria    CT urogram with evidence of large, obstructing mass and hemorrhage in the bladder.  - Home coumadin and ASA held, per urology recs subq heparin for DVT prophylaxis  - Hg baseline 14, 11 at presentation, trending downward  - Type and screen ordered  - CBC q6h, will transfuse for Hg <7  - Evalutated by urology, catheter irrigated with large amount of clot removed,  possible procedure Monday, no CBI at this time        Current use of long term anticoagulation    - INR 2.7 at presentation  - Coumadin held in the setting of hemorrhage          Depression    - Continue home venlafaxine          Hyponatremia    Sodium 125 at admission, had been normal in January 2017  - F/u serum osm          Hypothyroidism    - Continue synthroid 50 mcg  - TSH ordered for am           History of CVA (cerebrovascular accident)    Follows with neurology. 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  - Coumadin held          Pseudoexfoliation glaucoma, severe stage - Both Eyes    - Continue home timolol          Atrial fibrillation    - Regular rate and rhythm on exam at presentation  - Coumadin held in setting of hemorrhage            VTE Risk Mitigation         Ordered     heparin (porcine) injection 5,000 Units  Every 12 hours     Route:  Subcutaneous        11/12/17 0149           Matt Cabrera MD   Internal Medicine, PGY-1    Department of Hospital Medicine   [facility]

## 2017-11-12 NOTE — PROGRESS NOTES
STAFF PHYSICIAN NOTE                                   Attending Attestation for Rounds with Resident  I have reviewed and concur with the resident's history, physical, assessment, and plan.  I have personally interviewed and examined the patient at bedside.  See below for my evaluation and additional findings.                                   ________________________________________                                                   Admitted earlier with hematuria due to bladder mass.  Will undergo surgery Monday.  Reverse warfarin.    Current Problems List:  Active Hospital Problems    Diagnosis  POA    *Bladder mass [N32.89]  Yes     Priority: 2      6.3 x 4.3 x 4.0 cm left sided enhancing bladder mass.      Gross hematuria [R31.0]  Yes     Priority: 1 - High    Hydronephrosis of left kidney [N13.30]  Yes     Priority: 2     Chronic hyponatremia [E87.1]  Yes     Priority: 3     Current use of long term anticoagulation [Z79.01]  Not Applicable     Priority: 3     Chronic atrial fibrillation [I48.2]  Yes     Priority: 3     Depression (emotion) [F32.9]  Yes     Priority: 10     Essential hypertension [I10]  Yes     Priority: 12     Acquired hypothyroidism [E03.9]  Yes     Priority: 16       Resolved Hospital Problems    Diagnosis Date Resolved POA    Pseudoexfoliation glaucoma, severe stage - Both Eyes [H40.9983] 11/12/2017 Yes     Priority: 7        Signing Physician:  Sundeep Gibbs MD

## 2017-11-12 NOTE — ASSESSMENT & PLAN NOTE
- Regular rate and rhythm on exam at presentation  - Coumadin held in setting of hemorrhage and surgery today.

## 2017-11-12 NOTE — PROGRESS NOTES
[facility]  Urology  Progress Note    Patient Name: Monique Lew  MRN: [id number]  Admission Date: 11/11/2017  Hospital Length of Stay: 0 days  Code Status: Full Code   Attending Provider: Tiffanie Enriquez MD  Primary Care Physician: Aguila Hsieh MD    Subjective:     HPI:  Monique Lew is a 87 y.o. female with hx of HTN, HLD, breast cancer, stoke, DVT, and afib who presented to the ED with complaints of gross hematuria x 1 day. She has a hx of microscopic hematuria and is s/p negative workup in 2014 for an episode of gross hematuria.    She states along with the hematuria she has had frequency, urgency, and urge incontinence. Denies straining or clots. She has had weight loss recently associated with a decreased appetite. Denies flank pain or bone pain.     CT Urogram obtained shows a 6 cm enhancing left sided bladder mass with severe left hydronephrosis and no contrast excretion from left kidney. No lymphadenopathy. Creatinine is normal. H/H is slightly lower than baseline. Vitals have been stable.    She does have a 20 pack/year smoking history, quit 30 years ago. Hx of breast cancer in 1992 treated with radiation.     Interval History:   No acute events overnight  Vitals remain stable  No issues with catheter following arrival to floor  Denies pain or nausea    Review of Systems  Objective:     Temp:  [97.7 °F (36.5 °C)-97.9 °F (36.6 °C)] 97.9 °F (36.6 °C)  Pulse:  [] 72  Resp:  [13-22] 18  SpO2:  [88 %-100 %] 96 %  BP: (106-196)/() 144/88     Body mass index is 19.53 kg/m².            Drains          No matching active lines, drains, or airways          Physical Exam   Constitutional: She is oriented to person, place, and time. She appears cachectic. She appears ill. No distress.   HENT:   Head: Normocephalic and atraumatic.   Eyes: No scleral icterus.   Neck: No JVD present.   Cardiovascular: Normal rate.  An irregularly irregular rhythm present.   Pulmonary/Chest:  Effort normal. No respiratory distress.   Abdominal: Soft. She exhibits no distension. There is no tenderness. There is no rebound and no guarding.   Well healed midline scar   Genitourinary:   Genitourinary Comments: 24 Fr wilson draining light pink.   Bimanual exam reveals a firm fixed mass on the left side of the bladder.    Neurological: She is alert and oriented to person, place, and time.   Skin: She is not diaphoretic. There is pallor.     Psychiatric: She has a normal mood and affect. Her behavior is normal.       Significant Labs:    BMP:    Recent Labs  Lab 11/11/17 1846 11/12/17  0538   * 129*   K 4.2 4.2   CL 92* 94*   CO2 22* 27   BUN 21 17   CREATININE 0.9 1.1   CALCIUM 9.7 9.6       CBC:     Recent Labs  Lab 11/11/17 1846 11/12/17  0006   WBC 12.53 9.46   HGB 11.5* 10.8*   HCT 33.5* 31.5*    221       All pertinent labs results from the past 24 hours have been reviewed.    Significant Imaging:                    Assessment/Plan:     Gross hematuria    Monique Lew is a 87 y.o. female with gross hematuria and left sided bladder mass with severe left hydronephrosis    - Wilson draining well, irrigate PRN  - No need for CBI at this time  - Hold coumadin   - Trend INR  - NPO at midnight  - Will plan for cystoscopy, TURBT, left retrograde pyelogram and stent placement on Monday  - Continue to monitor H/H, no need for transfusion at this time            VTE Risk Mitigation     None          Candy Jay MD  Urology  [facility]

## 2017-11-12 NOTE — SUBJECTIVE & OBJECTIVE
Past Medical History:   Diagnosis Date    *Atrial fibrillation     Adrenal adenoma     Atrial fibrillation     Blood clotting tendency     Breast cancer     CVA (cerebral infarction)     DVT (deep venous thrombosis)     Glaucoma     Pseudoexfoliation, sever    Goiter     Heart attack     Hypertension     Hypothyroidism     Long-term (current) use of anticoagulants     Macular hole of right eye     Multinodular goiter     Other hyperparathyroidism     Phthisis bulbi of left eye     Stroke        Past Surgical History:   Procedure Laterality Date    BAERVELDT GLAUCOMA SHUNT Right 10/30/2013    OD (dr. hines)    BREAST LUMPECTOMY      BUNIONECTOMY      CATARACT EXTRACTION W/  INTRAOCULAR LENS IMPLANT Right 01/26/2011    WITH TRAB ()    CATARACT EXTRACTION W/  INTRAOCULAR LENS IMPLANT Left 04/09/2009    ANT. VIITRECTOMY WITH SUTURED PCIOL (DR. RYAN)    CATARACT EXTRACTION W/ INTRAOCULAR LENS IMPLANTW/ TRABECULECTOMY Right 01/26/2011        COLON SURGERY      volvulus    CONJUNCTIVAL FLAP  Left 05/28/2009    DR. JARAMILLO    conjuunctival flap   2009    OS w/ dr. 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 emanuel    TRABECULECTOMY Left 01/11/2006        transcleral cyclophotocoagulation   2010    OS w/ dr. hines       Review of patient's allergies indicates:   Allergen Reactions    Cosopt [dorzolamide-timolol] Swelling     Swelling of eyelids    Mevacor [lovastatin] Nausea And Vomiting    Prednisone Nausea And Vomiting    Vasotec [enalapril maleate] Nausea And Vomiting    Zetia [ezetimibe] Nausea And Vomiting    Furosemide Palpitations     Pt states her heart skips beats when she takes this medication.       Family History     Problem Relation (Age of Onset)    Emphysema Mother     Hyperlipidemia Mother, Father    Hypertension Mother, Father    No Known Problems Sister, Brother, Maternal Aunt, Maternal Uncle, Paternal Aunt, Paternal Uncle, Maternal Grandmother, Maternal Grandfather, Paternal Grandmother, Paternal Grandfather          Social History Main Topics    Smoking status: Former Smoker     Quit date: 9/25/1982    Smokeless tobacco: Never Used    Alcohol use No    Drug use: No    Sexual activity: No       Review of Systems   Constitutional: Positive for appetite change. Negative for chills and fever.   HENT: Negative for trouble swallowing.    Eyes: Positive for visual disturbance.   Respiratory: Negative for cough and shortness of breath.    Cardiovascular: Negative for chest pain and palpitations.   Gastrointestinal: Negative for constipation, nausea and vomiting.   Genitourinary: Positive for difficulty urinating, frequency, hematuria and urgency. Negative for flank pain.   Neurological: Positive for weakness.       Objective:     Temp:  [97.7 °F (36.5 °C)] 97.7 °F (36.5 °C)  Pulse:  [] 72  Resp:  [13-22] 22  SpO2:  [88 %-100 %] 100 %  BP: (106-196)/() 152/99     Body mass index is 20.2 kg/m².            Drains          No matching active lines, drains, or airways          Physical Exam   Constitutional: She is oriented to person, place, and time. She appears cachectic. She appears ill. No distress.   HENT:   Head: Normocephalic and atraumatic.   Eyes: No scleral icterus.   Neck: No JVD present.   Cardiovascular: Normal rate.  An irregularly irregular rhythm present.   Pulmonary/Chest: Effort normal. No respiratory distress.   Abdominal: Soft. She exhibits no distension. There is no tenderness. There is no rebound and no guarding.   Well healed midline scar   Genitourinary:   Genitourinary Comments: 16 Fr wilson draining dark red.   Bimanual exam reveals a firm fixed mass on the left side of the bladder.    Neurological: She is alert and oriented to person, place, and  time.   Skin: She is not diaphoretic. There is pallor.     Psychiatric: She has a normal mood and affect. Her behavior is normal.       Significant Labs:    BMP:    Recent Labs  Lab 11/11/17 1846   *   K 4.2   CL 92*   CO2 22*   BUN 21   CREATININE 0.9   CALCIUM 9.7       CBC:    Recent Labs  Lab 11/11/17 1846 11/12/17  0006   WBC 12.53 9.46   HGB 11.5* 10.8*   HCT 33.5* 31.5*    221       Urine Studies:   Recent Labs  Lab 11/11/17 1848   COLORU Uma   APPEARANCEUA Cloudy*   PHUR 8.0   SPECGRAV 1.010   PROTEINUA 2+*   GLUCUA 1+*   KETONESU Negative   BILIRUBINUA Negative   OCCULTUA 3+*   NITRITE Negative   UROBILINOGEN Negative   LEUKOCYTESUR Negative   RBCUA >100*   WBCUA 0   BACTERIA Occasional   HYALINECASTS 0       Significant Imaging:    CT Urogram 11/11/17:  Kidneys/Ureters: There is severe left-sided hydronephrosis. There is delayed left nephrogram.No evidence of ureterolithiasis.There is severe left hydroureter.    Bladder: There is a large 6.3 x 4.3 x 4.0 cm left sided enhancing bladder mass.There is layering of contrast about hyperintense regions within the bladder similar to the noncontrast portion of the exam likely hemorrhage within the bladder. Forrest catheter and small volume of air within the bladder are also noted.    Retroperitoneum:  No significant adenopathy.

## 2017-11-12 NOTE — ANESTHESIA PREPROCEDURE EVALUATION
11/12/2017  Monique Lew is a 87 y.o., female with hx of HTN, HLD, breast cancer, ckd 3, stoke, DVT, and afib on coumadin admitted for hematuria.    INR 1.6,   Pre-operative evaluation for EXCISION-BLADDER TUMOR-TRANSURETHRAL (TURBT) (N/A), CYSTOSCOPY/ RETROGRADE PYELOGRAM/ STENT PLACEMENT/STENT EXCHANGE (Left)      LDA: none    Previous Airway: none on file    Drips:      Past Surgical History:   Procedure Laterality Date    BAERVELDT GLAUCOMA SHUNT Right 10/30/2013    OD (dr. hines)    BREAST LUMPECTOMY      BUNIONECTOMY      CATARACT EXTRACTION W/  INTRAOCULAR LENS IMPLANT Right 01/26/2011    WITH TRAB ()    CATARACT EXTRACTION W/  INTRAOCULAR LENS IMPLANT Left 04/09/2009    ANT. VIITRECTOMY WITH SUTURED PCIOL (DR. RYAN)    CATARACT EXTRACTION W/ INTRAOCULAR LENS IMPLANTW/ TRABECULECTOMY Right 01/26/2011        COLON SURGERY      volvulus    CONJUNCTIVAL FLAP  Left 05/28/2009    DR. JARAMILLO    conjuunctival flap   2009    OS w/ dr. 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 emanuel    TRABECULECTOMY Left 01/11/2006        transcleral cyclophotocoagulation   2010    OS w/ dr. hines         Vital Signs Range (Last 24H):  Temp:  [36.5 °C (97.7 °F)-36.6 °C (97.9 °F)]   Pulse:  []   Resp:  [13-22]   BP: (106-196)/()   SpO2:  [88 %-100 %]       CBC:     Recent Labs  Lab 11/11/17  1846 11/12/17  0006 11/12/17  0826   WBC 12.53 9.46 10.22   RBC 3.71* 3.54* 3.36*   HGB 11.5* 10.8* 10.4*   HCT 33.5* 31.5* 30.3*    221 224   MCV 90 89 90   MCH 31.0 30.5 31.0   MCHC 34.3 34.3 34.3       CMP:   Recent Labs  Lab 11/11/17  1846 11/12/17  0538   * 129*   K 4.2 4.2   CL 92* 94*    CO2 22* 27   BUN 21 17   CREATININE 0.9 1.1   * 104   CALCIUM 9.7 9.6   ALBUMIN 3.8  --    PROT 7.3  --    ALKPHOS 99  --    ALT 10  --    AST 27  --    BILITOT 0.9  --        INR:    Recent Labs  Lab 11/11/17  1846 11/12/17  0538   INR 2.7* 1.6*         Diagnostic Studies:  Comparison 2011.  Heart size is mildly enlarged.  No increase in pulmonary vascularity is noted.  There is no pleural effusion.  Osseous structures demonstrate demineralization.  There are fractures of the right eighth through 10th ribs, indeterminate age.  Scoliosis is noted.  There are surgical clips in the left axilla.  Lung fields are clear.  Calcification is present along the wall of the aorta.   Impression      Lung fields are clear.    Indeterminate age fractures of the eighth through 10th right ribs.         EKG:  Vent. rate 94 BPM  IA interval * ms  QRS duration 80 ms  QT/QTc 348/435 ms  P-R-T axes * 48 -49  /100 mmHg    Atrial fibrillation  Septal infarct ,age undetermined  Abnormal ECG  When compared with ECG of 31-MAY-2017 07:24,  Septal infarct is now Present    2D Echo 2015:  CONCLUSIONS     1 - Concentric remodeling.     2 - Normal left ventricular systolic function (EF 55-60%).     3 - Mildly depressed right ventricular systolic function .     4 - Severe biatrial enlargement.     5 - Moderate mitral regurgitation.     6 - Moderate tricuspid regurgitation.     Pharm Stress Test 2016:  EKG Conclusions:  1. The EKG portion of this study is abnormal but non diagnostic for ischemia at a peak heart rate of 83 bpm (63% of predicted).   2. Specificity is reduced secondary to LVH.   3. Blood pressure remained stable throughout the protocol  (Presenting BP: 141/93 Peak BP: 152/99).   4. The following arrhythmias were present: rare PVCs.   5. The patient reported chest pain during the protocol which resolved in recovery.     Nuclear Quantitative Functional Analysis:   Quantitative measurements of LV function are over  estimated secondary to small ventricular volumes.   Visually estimated LV function is normal.     Impression: NORMAL MYOCARDIAL PERFUSION  1. The perfusion scan is free of evidence for myocardial ischemia or injury.   2. Resting wall motion is physiologic.   3. Visually estimated LV function is normal.   4. The ventricular volumes are normal at rest and stress.   5. The extracardiac distribution of radioactivity is normal.   6. There was no previous study available to compare.            Anesthesia Evaluation    I have reviewed the Patient Summary Reports.    I have reviewed the Nursing Notes.   I have reviewed the Medications.     Review of Systems  Anesthesia Hx:  History of prior surgery of interest to airway management or planning: Denies Family Hx of Anesthesia complications.   Denies Personal Hx of Anesthesia complications.   Social:  Former Smoker    Hematology/Oncology:         -- Cancer in past history: Breast   Cardiovascular:   Hypertension Past MI Dysrhythmias atrial fibrillation    Pulmonary:   Denies Asthma.    Renal/:   Chronic Renal Disease, CRI    Neurological:   CVA    Endocrine:   Hypothyroidism    Psych:   Psychiatric History depression          Physical Exam  General:  Well nourished    Airway/Jaw/Neck:  Airway Findings: Mouth Opening: Normal General Airway Assessment: Adult  Mallampati: II  TM Distance: Normal, at least 6 cm  Jaw/Neck Findings:  Neck ROM: Normal ROM      Dental:  Dental Findings: upper partial dentures, lower partial dentures   Chest/Lungs:  Chest/Lungs Findings: Normal Respiratory Rate     Heart/Vascular:  Heart Findings: Rate: Normal        Mental Status:  Mental Status Findings:  Cooperative, Alert and Oriented         Anesthesia Plan  Type of Anesthesia, risks & benefits discussed:  Anesthesia Type:  general  Patient's Preference:   Intra-op Monitoring Plan: standard ASA monitors  Intra-op Monitoring Plan Comments:   Post Op Pain Control Plan:   Post Op Pain Control Plan  Comments:   Induction:   IV  Beta Blocker:  Patient is on a Beta-Blocker and has received one dose within the past 24 hours (No further documentation required).       Informed Consent: Patient understands risks and agrees with Anesthesia plan.  Questions answered. Anesthesia consent signed with patient.  ASA Score: 3     Day of Surgery Review of History & Physical:    H&P update referred to the surgeon.     Anesthesia Plan Notes:   87F HTN, HLD, breast cancer, ckd 3, stoke, DVT, Afib, hematuria for TURBT under GA LMA        Ready For Surgery From Anesthesia Perspective.

## 2017-11-12 NOTE — HPI
Ms. 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 States she first noticed blood in her urine 2 days before presentation. This temporarily resolved, with hematuria occuring again the day before presentation. Hematuria is associated with increased urge to urinate and incontinence. Prior to this episode, patient states last episode of gross hematuria was in 2012, which was found to be 2/2 cystitis. Patient has had multiple UTIs and microhematuria on urinalysis since that time. She follows with a urologist, Dr. Enriquez at [facility], and has had 2 cystoscopies - most recently in 2014 which no evidence of tumor. Denies fever/chills/nausea/vomiting. Does states she has had decreased appetite, increased fatigue, and night sweats which cause her to soak through her clothes.     In the ED, was found to have Hg of 11 which trended down to 10 in 6 hours (baseline 14). Type and screen obtained. CT urogram performed showed 6.3x4.3x4.0 cm mass in bladder causing severe left hydronephrosis and hydroureter with evidence of hemorrhage in bladder. Also evaluated by urology in ED, who began CBI and recommended she be NPO for possible procedure.

## 2017-11-12 NOTE — ASSESSMENT & PLAN NOTE
Monique Lew is a 87 y.o. female with gross hematuria and left sided bladder mass with severe left hydronephrosis    - Forrest draining well, irrigate PRN  - No need for CBI at this time  - Hold coumadin   - Trend INR  - NPO at midnight  - Will plan for cystoscopy, TURBT, left retrograde pyelogram and stent placement on Monday  - Continue to monitor H/H, no need for transfusion at this time

## 2017-11-12 NOTE — ASSESSMENT & PLAN NOTE
Follows with neurology. 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  - Coumadin held

## 2017-11-12 NOTE — ED PROVIDER NOTES
Encounter Date: 11/11/2017    SCRIBE #1 NOTE: I, Desirae Jamison, am scribing for, and in the presence of,  Dr. Herrera. I have scribed the following portions of the note - the Resident attestation.       History     Chief Complaint   Patient presents with    Hematuria     pressure in pelvic region since yesterday am blood since yesterday     87 year old woman with atrial fibrillation, history of CVA, history of DVT, history of breast cancer, and history of glaucoma presenting for 1 day of gross hematuria. The patient and her friend reported that this developed suddenly yesterday. The patient stated that this has happened to her once before, but not to such a degree. On arrival to the hospital bed, a wilson catheter was placed and drained over 1 liter of grossly blood urine. The patient denied dizziness, lightheadedness, and shortness of breath while sitting upright. She reported mild suprapubic abdominal pain. She denied recent fevers/chills.          Review of patient's allergies indicates:   Allergen Reactions    Cosopt [dorzolamide-timolol] Swelling     Swelling of eyelids    Mevacor [lovastatin] Nausea And Vomiting    Prednisone Nausea And Vomiting    Vasotec [enalapril maleate] Nausea And Vomiting    Zetia [ezetimibe] Nausea And Vomiting    Furosemide Palpitations     Pt states her heart skips beats when she takes this medication.     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    CVA (cerebral infarction)     Dislocated IOL (intraocular lens), posterior - Right Eye 10/19/2012    DVT (deep venous thrombosis)     Glaucoma     Pseudoexfoliation, sever    Goiter     Heart attack     Hypertension     Hypothyroidism     Long-term  (current) use of anticoagulants     Macular hole of right eye     Multinodular goiter     Other hyperparathyroidism     Phthisis bulbi of left eye     Posterior dislocation of right lens 8/5/2015    Pseudoexfoliation glaucoma, 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 hines)    BREAST LUMPECTOMY      BUNIONECTOMY      CATARACT EXTRACTION W/  INTRAOCULAR LENS IMPLANT Right 01/26/2011    WITH TRAB ()    CATARACT EXTRACTION W/  INTRAOCULAR LENS IMPLANT Left 04/09/2009    ANT. VIITRECTOMY WITH SUTURED PCIOL (DR. RYAN)    CATARACT EXTRACTION W/ INTRAOCULAR LENS IMPLANTW/ TRABECULECTOMY Right 01/26/2011        COLON SURGERY      volvulus    CONJUNCTIVAL FLAP  Left 05/28/2009    DR. JARAMILLO    conjuunctival flap   2009    OS w/ dr. 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 emanuel    TRABECULECTOMY Left 01/11/2006        transcleral cyclophotocoagulation   2010    OS w/ dr. hines     Family History   Problem Relation Age of Onset    Emphysema Mother     Hypertension Mother     Hyperlipidemia Mother     Hypertension Father     Hyperlipidemia Father     No Known Problems Sister     No Known Problems Brother     No Known Problems Maternal Aunt     No Known Problems Maternal Uncle     No Known Problems Paternal Aunt     No Known Problems Paternal Uncle     No Known Problems Maternal Grandmother     No Known Problems Maternal Grandfather     No Known Problems Paternal Grandmother     No Known Problems Paternal Grandfather     Melanoma Neg Hx     Psoriasis Neg Hx     Lupus Neg Hx     Eczema Neg Hx     Acne  Neg Hx     Amblyopia Neg Hx     Blindness Neg Hx     Cancer Neg Hx     Cataracts Neg Hx     Diabetes Neg Hx     Glaucoma Neg Hx     Macular degeneration Neg Hx     Retinal detachment Neg Hx     Strabismus Neg Hx     Stroke Neg Hx     Thyroid disease Neg Hx      Social History   Substance Use Topics    Smoking status: Former Smoker     Quit date: 9/25/1982    Smokeless tobacco: Never Used    Alcohol use No     Review of Systems   Constitutional: Negative for chills and fever.   HENT: Negative for congestion.    Eyes: Negative for pain, discharge and itching.   Respiratory: Negative for chest tightness and shortness of breath.    Cardiovascular: Negative for chest pain and leg swelling.   Gastrointestinal: Negative for abdominal distention and abdominal pain.   Endocrine: Negative for polyuria.   Genitourinary: Positive for hematuria. Negative for difficulty urinating, dysuria, flank pain, frequency, urgency and vaginal discharge.   Musculoskeletal: Negative for arthralgias and back pain.   Skin: Negative for color change.   Neurological: Negative for dizziness, weakness and light-headedness.   Psychiatric/Behavioral: Negative for agitation and behavioral problems.       Physical Exam     Initial Vitals [11/11/17 1705]   BP Pulse Resp Temp SpO2   (!) 196/110 108 20 97.7 °F (36.5 °C) 100 %      MAP       138.67         Physical Exam    Nursing note and vitals reviewed.  Constitutional: She appears well-developed and well-nourished.   HENT:   Head: Normocephalic and atraumatic.   Eyes: Conjunctivae and EOM are normal. Pupils are equal, round, and reactive to light. Right eye exhibits no discharge. Left eye exhibits no discharge.   Neck: Normal range of motion. Neck supple.   Cardiovascular: Normal rate, regular rhythm, normal heart sounds and intact distal pulses.   Pulmonary/Chest: Breath sounds normal. No respiratory distress. She has no wheezes.   Abdominal: Soft. Bowel sounds are normal.   Mild  tenderness to palpation over suprapubic region.   Musculoskeletal: Normal range of motion. She exhibits no edema.   Neurological: She is alert and oriented to person, place, and time. No cranial nerve deficit.   Skin: Skin is warm and dry. No rash noted. No erythema. There is pallor.   Psychiatric: She has a normal mood and affect. Thought content normal.         ED Course   Procedures  Labs Reviewed   URINALYSIS, REFLEX TO URINE CULTURE - Abnormal; Notable for the following:        Result Value    Appearance, UA Cloudy (*)     Protein, UA 2+ (*)     Glucose, UA 1+ (*)     Occult Blood UA 3+ (*)     All other components within normal limits    Narrative:     Preferred Collection Type->Urine, Clean Catch   PROTIME-INR - Abnormal; Notable for the following:     Prothrombin Time 27.2 (*)     INR 2.7 (*)     All other components within normal limits   CBC W/ AUTO DIFFERENTIAL - Abnormal; Notable for the following:     RBC 3.71 (*)     Hemoglobin 11.5 (*)     Hematocrit 33.5 (*)     Gran # 10.8 (*)     Immature Grans (Abs) 0.06 (*)     Gran% 86.5 (*)     Lymph% 7.6 (*)     All other components within normal limits   COMPREHENSIVE METABOLIC PANEL - Abnormal; Notable for the following:     Sodium 125 (*)     Chloride 92 (*)     CO2 22 (*)     Glucose 133 (*)     eGFR if non  57.7 (*)     All other components within normal limits   URINALYSIS MICROSCOPIC - Abnormal; Notable for the following:     RBC, UA >100 (*)     All other components within normal limits    Narrative:     Preferred Collection Type->Urine, Clean Catch   CBC W/ AUTO DIFFERENTIAL - Abnormal; Notable for the following:     RBC 3.54 (*)     Hemoglobin 10.8 (*)     Hematocrit 31.5 (*)     Gran% 79.2 (*)     Lymph% 11.2 (*)     All other components within normal limits   CULTURE, URINE   CULTURE, URINE   TYPE & SCREEN             Medical Decision Making:   History:   Old Medical Records: I decided to obtain old medical records.  Initial  Assessment:   87 year old woman with multiple medical comorbidities presenting with gross hematuria x 1 day. Over 1 liter of grossly bloody urine removed via wilson catheter on arrival. Patient with decreased H/H relative to her baseline and pale-appearing. She is not symptomatic from blood loss at this time. Type and screen ordered. PT-INR and CMP pending. CT urogram pending.  Presentation concerning for bladder cancer vs. Acute kidney injury vs. Pyelonephritis vs. Other  mass. Vitals stable and within normal limits at this time.     Andrea Ramirez MD  [facility]  11/11/2017; 1915  Clinical Tests:   Lab Tests: Ordered and Reviewed  Radiological Study: Ordered and Reviewed  Medical Tests: Ordered and Reviewed  ED Management:  On further questioning by ED Staff, patient reported that she fell on her side 1 week ago and developed a short episode of hematuria at that time. FAST exam accordingly performed. RUQ normal appearing. LUQ showed multiple kidney cysts. Suprapubic view showed thickened bladder wall and heterogeneous material in bladder to the right of the wilson catheter. Liver window showed enlarged heart, but no effusion and adequate LV squeeze. Awaiting CT urogram. CMP demonstrated mild hyponatremia relative to patient's baseline. 1 liter bolus of IVF ordered. Awaiting CT results.    Andrea Ramirez MD  [facility]  11/11/2017; 1947    Still awaiting CT scan at this time. Patient is in stable condition. Patient handed off to night shift. Disposition pending result of CT scan. However, patient is likely to be admitted for drop in H/H, which is being attributed to new onset gross hematuria at this time.     Andrea Ramriez MD  [facility]  11/11/2017; 8755              Scribe Attestation:   Scribe #1: I performed the above scribed service and the documentation accurately describes the services I performed. I attest to the accuracy of the note.    Attending Attestation:   Physician Attestation Statement for  Resident:  As the supervising MD   Physician Attestation Statement: I have personally seen and examined this patient.   I agree with the above history. -: Patient is on coumadin for Afib. on exam having ese blood. Forrest catheter placed and is still draining blood, but not having any abdominal tenderness or distension. I will check labs and blood count. Will do ct imaging to evaluate for poss etiology of bleeding.   As the supervising MD I agree with the above PE.    As the supervising MD I agree with the above treatment, course, plan, and disposition.  I have reviewed and agree with the residents interpretation of the following: lab data, CT scans and EKG.  I have reviewed the following: old records at this facility.            Attending ED Notes:   CT concerning for mass with Left sided obstructive hydro. Forrest did stop draining briefly but resumed draining after irrigation. Remained hemodynamically stable without significant drop in H&H. Urology consulted. Patient admitted to medicine.           ED Course      Clinical Impression:   The encounter diagnosis was Hematuria, unspecified type.    Disposition:   Disposition: Admitted  Condition: Fair                        Andrea Ramirez MD  Resident  11/12/17 1100       Andrea Ramirez MD  Resident  11/12/17 1103       Hernan Herrera MD  11/12/17 1801

## 2017-11-12 NOTE — PHARMACY MED REC
"Admission Medication Reconciliation - Pharmacy Consult Note    The home medication history was taken by Carole Kelley, Pharmacy Technician. Based on information gathered and subsequent review by the clinical pharmacist, the items below may need attention.     You may go to "Admission" then "Reconcile Home Medications" tabs to review and/or act upon these items. Based on information gathered and subsequent review by the clinical pharmacist, the items below may need attention.    Potentially problematic discrepancies with current MAR  o Patient IS taking the following which was not ordered upon admit  o Propylene glycol//PF (SYSTANE) instill 1 drop in both eyes daily as needed  o Patient IS NOT taking the following which was ordered upon admit  o Venlafaxine (effexor-Xr) 37.5 mg PO daily   o Patient is taking a DIFFERENT DRUG than that ordered upon admit  o Sertraline (zoloft) 50 mg PO daily   - Venlafaxine (effexor-Xr) 37.5 mg PO daily was ordered     Please address this information as you see fit.  Feel free to contact us if you have any questions or require assistance.    Arnoldo Davis  [id number]     Patient's prior to admission medication regimen was as follows:    Prescriptions Prior to Admission   Medication Sig Dispense Refill Last Dose    aspirin (ECOTRIN) 81 MG EC tablet Take 81 mg by mouth once daily.    11/12/2017 at Unknown time    metoprolol succinate (TOPROL-XL) 25 MG 24 hr tablet Take 1 tablet (25 mg total) by mouth 2 (two) times daily. 180 tablet 1 11/12/2017 at Unknown time    PROPYLENE GLYCOL//PF (SYSTANE, PF, OPHT) Place 1 drop into both eyes daily as needed. for dry eyes       sertraline (ZOLOFT) 50 MG tablet Take 50 mg by mouth once daily.       SYNTHROID 50 mcg tablet TAKE 1 TABLET BY MOUTH DAILY EVERY MORNING 90 tablet 0 11/12/2017 at Unknown time    timolol maleate 0.5% (TIMOPTIC) 0.5 % Drop Place 1 drop into both eyes 2 (two) times daily. 10 mL 12 11/12/2017 at Unknown " time    warfarin (COUMADIN) 1 MG tablet Take 2-3 tablets by mouth Daily. 192 tablet 7 11/12/2017 at Unknown time     Please add appropriate    SmartPhrase below:

## 2017-11-12 NOTE — CONSULTS
[facility]  Urology  Consult Note    Patient Name: Monique Lew  MRN: [id number]  Admission Date: 11/11/2017  Hospital Length of Stay: 0   Code Status: Full Code   Attending Provider: Tiffanie Enriquez MD  Consulting Provider: Luisana Hebert MD  Primary Care Physician: Aguila Hsieh MD  Principal Problem:Hematuria    Inpatient consult to Urology  Consult performed by: LUISANA HEBERT  Consult ordered by: YUNG KNOTT          Subjective:     HPI:  Monique Lew is a 87 y.o. female with hx of HTN, HLD, breast cancer, stoke, DVT, and afib who presented to the ED with complaints of gross hematuria x 1 day. She has a hx of microscopic hematuria and is s/p negative workup in 2014 for an episode of gross hematuria.    She states along with the hematuria she has had frequency, urgency, and urge incontinence. Denies straining or clots. She has had weight loss recently associated with a decreased appetite. Denies flank pain or bone pain.     CT Urogram obtained shows a 6 cm enhancing left sided bladder mass with severe left hydronephrosis and no contrast excretion from left kidney. No lymphadenopathy. Creatinine is normal. H/H is slightly lower than baseline. Vitals have been stable.    She does have a 20 pack/year smoking history, quit 30 years ago. Hx of breast cancer in 1992 treated with radiation.     Past Medical History:   Diagnosis Date    *Atrial fibrillation     Adrenal adenoma     Atrial fibrillation     Blood clotting tendency     Breast cancer     CVA (cerebral infarction)     DVT (deep venous thrombosis)     Glaucoma     Pseudoexfoliation, sever    Goiter     Heart attack     Hypertension     Hypothyroidism     Long-term (current) use of anticoagulants     Macular hole of right eye     Multinodular goiter     Other hyperparathyroidism     Phthisis bulbi of left eye     Stroke        Past Surgical History:   Procedure Laterality Date    BAERVELDT GLAUCOMA  SHUNT Right 10/30/2013    OD (dr. hines)    BREAST LUMPECTOMY      BUNIONECTOMY      CATARACT EXTRACTION W/  INTRAOCULAR LENS IMPLANT Right 01/26/2011    WITH TRAB ()    CATARACT EXTRACTION W/  INTRAOCULAR LENS IMPLANT Left 04/09/2009    ANT. VIITRECTOMY WITH SUTURED PCIOL (DR. RYAN)    CATARACT EXTRACTION W/ INTRAOCULAR LENS IMPLANTW/ TRABECULECTOMY Right 01/26/2011        COLON SURGERY      volvulus    CONJUNCTIVAL FLAP  Left 05/28/2009    DR. JARAMILLO    conjuunctival flap   2009    OS w/ dr. 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 emanuel    TRABECULECTOMY Left 01/11/2006        transcleral cyclophotocoagulation   2010    OS w/ dr. hines       Review of patient's allergies indicates:   Allergen Reactions    Cosopt [dorzolamide-timolol] Swelling     Swelling of eyelids    Mevacor [lovastatin] Nausea And Vomiting    Prednisone Nausea And Vomiting    Vasotec [enalapril maleate] Nausea And Vomiting    Zetia [ezetimibe] Nausea And Vomiting    Furosemide Palpitations     Pt states her heart skips beats when she takes this medication.       Family History     Problem Relation (Age of Onset)    Emphysema Mother    Hyperlipidemia Mother, Father    Hypertension Mother, Father    No Known Problems Sister, Brother, Maternal Aunt, Maternal Uncle, Paternal Aunt, Paternal Uncle, Maternal Grandmother, Maternal Grandfather, Paternal Grandmother, Paternal Grandfather          Social History Main Topics    Smoking status: Former Smoker     Quit date: 9/25/1982    Smokeless tobacco: Never Used    Alcohol use No    Drug use: No    Sexual activity: No       Review of Systems   Constitutional: Positive for appetite change. Negative for chills and fever.   HENT: Negative for trouble swallowing.    Eyes: Positive  for visual disturbance.   Respiratory: Negative for cough and shortness of breath.    Cardiovascular: Negative for chest pain and palpitations.   Gastrointestinal: Negative for constipation, nausea and vomiting.   Genitourinary: Positive for difficulty urinating, frequency, hematuria and urgency. Negative for flank pain.   Neurological: Positive for weakness.       Objective:     Temp:  [97.7 °F (36.5 °C)] 97.7 °F (36.5 °C)  Pulse:  [] 72  Resp:  [13-22] 22  SpO2:  [88 %-100 %] 100 %  BP: (106-196)/() 152/99     Body mass index is 20.2 kg/m².            Drains          No matching active lines, drains, or airways          Physical Exam   Constitutional: She is oriented to person, place, and time. She appears cachectic. She appears ill. No distress.   HENT:   Head: Normocephalic and atraumatic.   Eyes: No scleral icterus.   Neck: No JVD present.   Cardiovascular: Normal rate.  An irregularly irregular rhythm present.   Pulmonary/Chest: Effort normal. No respiratory distress.   Abdominal: Soft. She exhibits no distension. There is no tenderness. There is no rebound and no guarding.   Well healed midline scar   Genitourinary:   Genitourinary Comments: 16 Fr wilson draining dark red.   Bimanual exam reveals a firm fixed mass on the left side of the bladder.    Neurological: She is alert and oriented to person, place, and time.   Skin: She is not diaphoretic. There is pallor.     Psychiatric: She has a normal mood and affect. Her behavior is normal.       Significant Labs:    BMP:    Recent Labs  Lab 11/11/17 1846   *   K 4.2   CL 92*   CO2 22*   BUN 21   CREATININE 0.9   CALCIUM 9.7       CBC:    Recent Labs  Lab 11/11/17 1846 11/12/17  0006   WBC 12.53 9.46   HGB 11.5* 10.8*   HCT 33.5* 31.5*    221       Urine Studies:   Recent Labs  Lab 11/11/17 1848   COLORU Uma   APPEARANCEUA Cloudy*   PHUR 8.0   SPECGRAV 1.010   PROTEINUA 2+*   GLUCUA 1+*   KETONESU Negative   BILIRUBINUA Negative    OCCULTUA 3+*   NITRITE Negative   UROBILINOGEN Negative   LEUKOCYTESUR Negative   RBCUA >100*   WBCUA 0   BACTERIA Occasional   HYALINECASTS 0       Significant Imaging:    CT Urogram 11/11/17:  Kidneys/Ureters: There is severe left-sided hydronephrosis. There is delayed left nephrogram.No evidence of ureterolithiasis.There is severe left hydroureter.    Bladder: There is a large 6.3 x 4.3 x 4.0 cm left sided enhancing bladder mass.There is layering of contrast about hyperintense regions within the bladder similar to the noncontrast portion of the exam likely hemorrhage within the bladder. Forrest catheter and small volume of air within the bladder are also noted.    Retroperitoneum:  No significant adenopathy.              Assessment and Plan:     * Hematuria    Monique Lew is a 87 y.o. female with gross hematuria and left sided bladder mass with severe left hydronephrosis    - Catheter exchanged to 24 3-way and irrigated to clear easily with 500 cc, large amount of clot removed  - No need for CBI at this time  - Hold coumadin and start subq heparin in preparation for procedure on Monday as INR is currently elevated at 2.7  - Ok for diet at this time  - Will plan for cystoscopy, TURBT, left retrograde pyelogram and stent placement on Monday  - Continue to monitor H/H, no need for transfusion at this time  - Urine sent for culture            VTE Risk Mitigation     None          Thank you for your consult. I will follow-up with patient. Please contact us if you have any additional questions.    Candy Jay MD  Urology  [facility]

## 2017-11-12 NOTE — ASSESSMENT & PLAN NOTE
CT urogram with evidence of large, obstructing mass and hemorrhage in the bladder.  - Home coumadin and ASA held, per urology recs subq heparin for DVT prophylaxis  - Hg baseline 14, 11 at presentation. H/H has been stable  - Type and screen ordered  - CBC q12h, will transfuse for Hg <7  - To OR today for cystoscopy, TURBT, and pyelogram with stent placement.   - Pending results of TURBT, patient will likely need Oncology consult. Bilateral nodules found on CT Chest have high likelihood of being metastases.

## 2017-11-12 NOTE — PROGRESS NOTES
Pt brought up from ED and placed in 854 without any complications. AAOx4. Oriented to nurse, room, and call light. Assessment performed. No skin breakdown noted. VSS. All questions answered at this time. Forrest draining red tinged urine. Will cont to coleen pt.

## 2017-11-12 NOTE — ASSESSMENT & PLAN NOTE
- INR 2.7 at presentation  - Coumadin held in the setting of hemorrhage and in anticipation of surgery today.

## 2017-11-12 NOTE — HPI
Monique Lew is a 87 y.o. female with hx of HTN, HLD, breast cancer, stoke, DVT, and afib who presented to the ED with complaints of gross hematuria x 1 day. She has a hx of microscopic hematuria and is s/p negative workup in 2014 for an episode of gross hematuria.    She states along with the hematuria she has had frequency, urgency, and urge incontinence. Denies straining or clots. She has had weight loss recently associated with a decreased appetite. Denies flank pain or bone pain.     CT Urogram obtained shows a 6 cm enhancing left sided bladder mass with severe left hydronephrosis and no contrast excretion from left kidney. No lymphadenopathy. Creatinine is normal. H/H is slightly lower than baseline. Vitals have been stable.    She does have a 20 pack/year smoking history, quit 30 years ago. Hx of breast cancer in 1992 treated with radiation.     S/p cystoscopy with TURBT on 11/13/2017-- unable to identify left ureteral orifice at that time, and thus unable to place left ureteral stent.  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

## 2017-11-12 NOTE — SUBJECTIVE & OBJECTIVE
Interval History:   No acute events overnight  Vitals remain stable  No issues with catheter following arrival to floor  Denies pain or nausea    Review of Systems  Objective:     Temp:  [97.7 °F (36.5 °C)-97.9 °F (36.6 °C)] 97.9 °F (36.6 °C)  Pulse:  [] 72  Resp:  [13-22] 18  SpO2:  [88 %-100 %] 96 %  BP: (106-196)/() 144/88     Body mass index is 19.53 kg/m².            Drains          No matching active lines, drains, or airways          Physical Exam   Constitutional: She is oriented to person, place, and time. She appears cachectic. She appears ill. No distress.   HENT:   Head: Normocephalic and atraumatic.   Eyes: No scleral icterus.   Neck: No JVD present.   Cardiovascular: Normal rate.  An irregularly irregular rhythm present.   Pulmonary/Chest: Effort normal. No respiratory distress.   Abdominal: Soft. She exhibits no distension. There is no tenderness. There is no rebound and no guarding.   Well healed midline scar   Genitourinary:   Genitourinary Comments: 24 Fr wilson draining light pink.   Bimanual exam reveals a firm fixed mass on the left side of the bladder.    Neurological: She is alert and oriented to person, place, and time.   Skin: She is not diaphoretic. There is pallor.     Psychiatric: She has a normal mood and affect. Her behavior is normal.       Significant Labs:    BMP:    Recent Labs  Lab 11/11/17 1846 11/12/17  0538   * 129*   K 4.2 4.2   CL 92* 94*   CO2 22* 27   BUN 21 17   CREATININE 0.9 1.1   CALCIUM 9.7 9.6       CBC:     Recent Labs  Lab 11/11/17 1846 11/12/17  0006   WBC 12.53 9.46   HGB 11.5* 10.8*   HCT 33.5* 31.5*    221       All pertinent labs results from the past 24 hours have been reviewed.    Significant Imaging:

## 2017-11-12 NOTE — ASSESSMENT & PLAN NOTE
Monique Lew is a 87 y.o. female with gross hematuria and left sided bladder mass with severe left hydronephrosis    - Catheter exchanged to 24 3-way and irrigated to clear easily with 500 cc, large amount of clot removed  - No need for CBI at this time  - Hold coumadin and start subq heparin in preparation for procedure on Monday as INR is currently elevated at 2.7  - Ok for diet at this time  - Will plan for cystoscopy, TURBT, left retrograde pyelogram and stent placement on Monday  - Continue to monitor H/H, no need for transfusion at this time

## 2017-11-12 NOTE — ED NOTES
Report received from cesar whitley rn . Pt care assumed. Pt resting comfortably and independently repositioned in stretcher with bed locked in lowest position for safety. NAD noted at this time. Respirations even and unlabored and visible chest rise noted.  Patient offered bathroom assistance and denies need at this time. Pt instructed to call if assistance is needed. Pt on continuous cardiac, BP, and O2 monitoring. Call light within reach.  No needs at this time. Will continue to monitor. - wilson catheter  Present with bright red bloody drainage noted at aprox 1 L

## 2017-11-12 NOTE — ASSESSMENT & PLAN NOTE
Sodium 125 at admission, had been normal in January 2017  - Trending up. Will hold SSRI if Na falls.

## 2017-11-13 ENCOUNTER — ANESTHESIA (OUTPATIENT)
Dept: SURGERY | Facility: HOSPITAL | Age: 82
DRG: 669 | End: 2017-11-13
Payer: MEDICARE

## 2017-11-13 LAB
ANION GAP SERPL CALC-SCNC: 7 MMOL/L
BACTERIA UR CULT: NO GROWTH
BASOPHILS # BLD AUTO: 0.03 K/UL
BASOPHILS NFR BLD: 0.3 %
BUN SERPL-MCNC: 27 MG/DL
CALCIUM SERPL-MCNC: 8.8 MG/DL
CHLORIDE SERPL-SCNC: 100 MMOL/L
CO2 SERPL-SCNC: 22 MMOL/L
CREAT SERPL-MCNC: 1.1 MG/DL
DIFFERENTIAL METHOD: ABNORMAL
EOSINOPHIL # BLD AUTO: 0.1 K/UL
EOSINOPHIL NFR BLD: 1.3 %
ERYTHROCYTE [DISTWIDTH] IN BLOOD BY AUTOMATED COUNT: 12.8 %
EST. GFR  (AFRICAN AMERICAN): 52.2 ML/MIN/1.73 M^2
EST. GFR  (NON AFRICAN AMERICAN): 45.3 ML/MIN/1.73 M^2
GLUCOSE SERPL-MCNC: 97 MG/DL
HCT VFR BLD AUTO: 27.2 %
HGB BLD-MCNC: 9.2 G/DL
IMM GRANULOCYTES # BLD AUTO: 0.03 K/UL
IMM GRANULOCYTES NFR BLD AUTO: 0.3 %
INR PPP: 1.1
LYMPHOCYTES # BLD AUTO: 0.8 K/UL
LYMPHOCYTES NFR BLD: 8.4 %
MCH RBC QN AUTO: 30.6 PG
MCHC RBC AUTO-ENTMCNC: 33.8 G/DL
MCV RBC AUTO: 90 FL
MONOCYTES # BLD AUTO: 0.8 K/UL
MONOCYTES NFR BLD: 8.9 %
NEUTROPHILS # BLD AUTO: 7.5 K/UL
NEUTROPHILS NFR BLD: 80.8 %
NRBC BLD-RTO: 0 /100 WBC
PLATELET # BLD AUTO: 195 K/UL
PMV BLD AUTO: 10 FL
POTASSIUM SERPL-SCNC: 3.9 MMOL/L
PROTHROMBIN TIME: 12 SEC
RBC # BLD AUTO: 3.01 M/UL
SODIUM SERPL-SCNC: 129 MMOL/L
WBC # BLD AUTO: 9.3 K/UL

## 2017-11-13 PROCEDURE — 37000008 HC ANESTHESIA 1ST 15 MINUTES: Performed by: UROLOGY

## 2017-11-13 PROCEDURE — 52235 CYSTOSCOPY AND TREATMENT: CPT | Mod: ,,, | Performed by: UROLOGY

## 2017-11-13 PROCEDURE — 36000707: Performed by: UROLOGY

## 2017-11-13 PROCEDURE — 76942 ECHO GUIDE FOR BIOPSY: CPT | Mod: 26,,, | Performed by: ANESTHESIOLOGY

## 2017-11-13 PROCEDURE — 85025 COMPLETE CBC W/AUTO DIFF WBC: CPT

## 2017-11-13 PROCEDURE — 88305 TISSUE EXAM BY PATHOLOGIST: CPT | Performed by: PATHOLOGY

## 2017-11-13 PROCEDURE — 11000001 HC ACUTE MED/SURG PRIVATE ROOM

## 2017-11-13 PROCEDURE — 25000003 PHARM REV CODE 250

## 2017-11-13 PROCEDURE — 0UJH8ZZ INSPECTION OF VAGINA AND CUL-DE-SAC, VIA NATURAL OR ARTIFICIAL OPENING ENDOSCOPIC: ICD-10-PCS | Performed by: UROLOGY

## 2017-11-13 PROCEDURE — 64450 NJX AA&/STRD OTHER PN/BRANCH: CPT | Performed by: ANESTHESIOLOGY

## 2017-11-13 PROCEDURE — 27201423 OPTIME MED/SURG SUP & DEVICES STERILE SUPPLY: Performed by: UROLOGY

## 2017-11-13 PROCEDURE — 71000044 HC DOSC ROUTINE RECOVERY FIRST HOUR: Performed by: UROLOGY

## 2017-11-13 PROCEDURE — 0T9130Z DRAINAGE OF LEFT KIDNEY WITH DRAINAGE DEVICE, PERCUTANEOUS APPROACH: ICD-10-PCS | Performed by: RADIOLOGY

## 2017-11-13 PROCEDURE — 37000009 HC ANESTHESIA EA ADD 15 MINS: Performed by: UROLOGY

## 2017-11-13 PROCEDURE — D9220A PRA ANESTHESIA: Mod: CRNA,,, | Performed by: NURSE ANESTHETIST, CERTIFIED REGISTERED

## 2017-11-13 PROCEDURE — 85610 PROTHROMBIN TIME: CPT

## 2017-11-13 PROCEDURE — 0TBB8ZX EXCISION OF BLADDER, VIA NATURAL OR ARTIFICIAL OPENING ENDOSCOPIC, DIAGNOSTIC: ICD-10-PCS | Performed by: UROLOGY

## 2017-11-13 PROCEDURE — 88305 TISSUE EXAM BY PATHOLOGIST: CPT | Mod: 26,,, | Performed by: PATHOLOGY

## 2017-11-13 PROCEDURE — 36415 COLL VENOUS BLD VENIPUNCTURE: CPT

## 2017-11-13 PROCEDURE — 25000003 PHARM REV CODE 250: Performed by: NURSE ANESTHETIST, CERTIFIED REGISTERED

## 2017-11-13 PROCEDURE — D9220A PRA ANESTHESIA: Mod: ANES,,, | Performed by: ANESTHESIOLOGY

## 2017-11-13 PROCEDURE — 71000015 HC POSTOP RECOV 1ST HR: Performed by: UROLOGY

## 2017-11-13 PROCEDURE — 36000706: Performed by: UROLOGY

## 2017-11-13 PROCEDURE — C1769 GUIDE WIRE: HCPCS | Performed by: UROLOGY

## 2017-11-13 PROCEDURE — C1758 CATHETER, URETERAL: HCPCS | Performed by: UROLOGY

## 2017-11-13 PROCEDURE — 63600175 PHARM REV CODE 636 W HCPCS: Performed by: NURSE ANESTHETIST, CERTIFIED REGISTERED

## 2017-11-13 PROCEDURE — 99233 SBSQ HOSP IP/OBS HIGH 50: CPT | Mod: GC,,, | Performed by: INTERNAL MEDICINE

## 2017-11-13 PROCEDURE — 80048 BASIC METABOLIC PNL TOTAL CA: CPT

## 2017-11-13 RX ORDER — ROCURONIUM BROMIDE 10 MG/ML
INJECTION, SOLUTION INTRAVENOUS
Status: DISCONTINUED | OUTPATIENT
Start: 2017-11-13 | End: 2017-11-13

## 2017-11-13 RX ORDER — CEFAZOLIN SODIUM 1 G/3ML
INJECTION, POWDER, FOR SOLUTION INTRAMUSCULAR; INTRAVENOUS
Status: DISCONTINUED | OUTPATIENT
Start: 2017-11-13 | End: 2017-11-13

## 2017-11-13 RX ORDER — PROPOFOL 10 MG/ML
VIAL (ML) INTRAVENOUS
Status: DISCONTINUED | OUTPATIENT
Start: 2017-11-13 | End: 2017-11-13

## 2017-11-13 RX ORDER — SODIUM CHLORIDE 0.9 % (FLUSH) 0.9 %
3 SYRINGE (ML) INJECTION
Status: DISCONTINUED | OUTPATIENT
Start: 2017-11-13 | End: 2017-11-29 | Stop reason: HOSPADM

## 2017-11-13 RX ORDER — FENTANYL CITRATE 50 UG/ML
25 INJECTION, SOLUTION INTRAMUSCULAR; INTRAVENOUS EVERY 5 MIN PRN
Status: DISCONTINUED | OUTPATIENT
Start: 2017-11-13 | End: 2017-11-13 | Stop reason: HOSPADM

## 2017-11-13 RX ORDER — LIDOCAINE HCL/PF 100 MG/5ML
SYRINGE (ML) INTRAVENOUS
Status: DISCONTINUED | OUTPATIENT
Start: 2017-11-13 | End: 2017-11-13

## 2017-11-13 RX ORDER — NEOSTIGMINE METHYLSULFATE 1 MG/ML
INJECTION, SOLUTION INTRAVENOUS
Status: DISCONTINUED | OUTPATIENT
Start: 2017-11-13 | End: 2017-11-13

## 2017-11-13 RX ORDER — SODIUM CHLORIDE 9 MG/ML
INJECTION, SOLUTION INTRAVENOUS CONTINUOUS PRN
Status: DISCONTINUED | OUTPATIENT
Start: 2017-11-13 | End: 2017-11-13

## 2017-11-13 RX ORDER — FENTANYL CITRATE 50 UG/ML
INJECTION, SOLUTION INTRAMUSCULAR; INTRAVENOUS
Status: DISCONTINUED | OUTPATIENT
Start: 2017-11-13 | End: 2017-11-13

## 2017-11-13 RX ORDER — GLYCOPYRROLATE 0.2 MG/ML
INJECTION INTRAMUSCULAR; INTRAVENOUS
Status: DISCONTINUED | OUTPATIENT
Start: 2017-11-13 | End: 2017-11-13

## 2017-11-13 RX ADMIN — GLYCOPYRROLATE 0.4 MG: 0.2 INJECTION, SOLUTION INTRAMUSCULAR; INTRAVENOUS at 04:11

## 2017-11-13 RX ADMIN — EPHEDRINE SULFATE 10 MG: 50 INJECTION, SOLUTION INTRAMUSCULAR; INTRAVENOUS; SUBCUTANEOUS at 03:11

## 2017-11-13 RX ADMIN — EPHEDRINE SULFATE 5 MG: 50 INJECTION, SOLUTION INTRAMUSCULAR; INTRAVENOUS; SUBCUTANEOUS at 03:11

## 2017-11-13 RX ADMIN — TIMOLOL MALEATE 1 DROP: 5 SOLUTION OPHTHALMIC at 08:11

## 2017-11-13 RX ADMIN — VENLAFAXINE HYDROCHLORIDE 37.5 MG: 37.5 CAPSULE, EXTENDED RELEASE ORAL at 08:11

## 2017-11-13 RX ADMIN — METOPROLOL SUCCINATE 25 MG: 25 TABLET, EXTENDED RELEASE ORAL at 08:11

## 2017-11-13 RX ADMIN — CEFAZOLIN 2 G: 1 INJECTION, POWDER, FOR SOLUTION INTRAVENOUS at 03:11

## 2017-11-13 RX ADMIN — NEOSTIGMINE METHYLSULFATE 5 MG: 1 INJECTION INTRAVENOUS at 04:11

## 2017-11-13 RX ADMIN — PROPOFOL 60 MG: 10 INJECTION, EMULSION INTRAVENOUS at 03:11

## 2017-11-13 RX ADMIN — EPHEDRINE SULFATE 25 MG: 50 INJECTION, SOLUTION INTRAMUSCULAR; INTRAVENOUS; SUBCUTANEOUS at 03:11

## 2017-11-13 RX ADMIN — TIMOLOL MALEATE 1 DROP: 5 SOLUTION OPHTHALMIC at 09:11

## 2017-11-13 RX ADMIN — LIDOCAINE HYDROCHLORIDE 75 MG: 20 INJECTION, SOLUTION INTRAVENOUS at 03:11

## 2017-11-13 RX ADMIN — SODIUM CHLORIDE: 0.9 INJECTION, SOLUTION INTRAVENOUS at 02:11

## 2017-11-13 RX ADMIN — FENTANYL CITRATE 25 MCG: 50 INJECTION, SOLUTION INTRAMUSCULAR; INTRAVENOUS at 03:11

## 2017-11-13 RX ADMIN — Medication 30 MG: at 04:11

## 2017-11-13 RX ADMIN — METOPROLOL SUCCINATE 25 MG: 25 TABLET, EXTENDED RELEASE ORAL at 09:11

## 2017-11-13 RX ADMIN — ROCURONIUM BROMIDE 35 MG: 10 INJECTION, SOLUTION INTRAVENOUS at 03:11

## 2017-11-13 NOTE — SUBJECTIVE & OBJECTIVE
Interval History:   No acute events overnight  Patient with no complaints this am  No issues with wislon    Review of Systems  Objective:     Temp:  [97.5 °F (36.4 °C)-98.1 °F (36.7 °C)] 97.5 °F (36.4 °C)  Pulse:  [63-78] 78  Resp:  [16-20] 20  SpO2:  [95 %-99 %] 99 %  BP: (101-143)/(59-85) 143/74     Body mass index is 19.53 kg/m².            Drains     Drain                 Urethral Catheter 11/11/17 2200 Straight-tip 1 day                Physical Exam   Constitutional: She is oriented to person, place, and time. She appears cachectic. She appears ill. No distress.   HENT:   Head: Normocephalic and atraumatic.   Eyes: No scleral icterus.   Neck: No JVD present.   Cardiovascular: Normal rate.  An irregularly irregular rhythm present.   Pulmonary/Chest: Effort normal. No respiratory distress.   Abdominal: Soft. She exhibits no distension. There is no tenderness. There is no rebound and no guarding.   Well healed midline scar   Genitourinary:   Genitourinary Comments: 24 Fr wilson draining light pink.   Bimanual exam reveals a firm fixed mass on the left side of the bladder.    Neurological: She is alert and oriented to person, place, and time.   Skin: She is not diaphoretic. There is pallor.     Psychiatric: She has a normal mood and affect. Her behavior is normal.       Significant Labs:    BMP:    Recent Labs  Lab 11/11/17  1846 11/12/17  0538 11/13/17  0524   * 129* 129*   K 4.2 4.2 3.9   CL 92* 94* 100   CO2 22* 27 22*   BUN 21 17 27*   CREATININE 0.9 1.1 1.1   CALCIUM 9.7 9.6 8.8       CBC:     Recent Labs  Lab 11/12/17  0006 11/12/17  0826 11/12/17  1941   WBC 9.46 10.22 11.94   HGB 10.8* 10.4* 9.5*   HCT 31.5* 30.3* 28.0*    224 231       Urine Culture:   Recent Labs  Lab 11/11/17  1848   LABURIN No growth     All pertinent labs results from the past 24 hours have been reviewed.    Significant Imaging:  All pertinent imaging results/findings from the past 24 hours have been reviewed.

## 2017-11-13 NOTE — PROGRESS NOTES
[facility] Hospital Medicine  Progress Note    Patient Name: Monique Lew  MRN: [id number]  Patient Class: IP- Inpatient   Admission Date: 11/11/2017  Length of Stay: 1 days  Attending Physician: Sundeep Gibbs MD  Primary Care Provider: Aguila Hsieh MD    [facility] Medicine Team: [facility] 5 Jay Juan MD    Subjective:     Principal Problem:Bladder mass    HPI:  Ms. 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 States she first noticed blood in her urine 2 days before presentation. This temporarily resolved, with hematuria occuring again the day before presentation. Hematuria is associated with increased urge to urinate and incontinence. Prior to this episode, patient states last episode of gross hematuria was in 2012, which was found to be 2/2 cystitis. Patient has had multiple UTIs and microhematuria on urinalysis since that time. She follows with a urologist, Dr. Enriquez at [facility], and has had 2 cystoscopies - most recently in 2014 which no evidence of tumor. Denies fever/chills/nausea/vomiting. Does states she has had decreased appetite, increased fatigue, and night sweats which cause her to soak through her clothes.     In the ED, was found to have Hg of 11 which trended down to 10 in 6 hours (baseline 14). Type and screen obtained. CT urogram performed showed 6.3x4.3x4.0 cm mass in bladder causing severe left hydronephrosis and hydroureter with evidence of hemorrhage in bladder. Also evaluated by urology in ED, who began CBI and recommended she be NPO for possible procedure.     Hospital Course:  Patient admitted to hospital medicine. Anticoagulation held in setting hemorrhage. CT Urogram on 11/11 demonstrated a large bladder mass. Urology consulted. 11/12 CT Chest also demonstrates bilateral pulmonary nodules that could be due to metastases. H/H stabilized.  Patient taken to OR 11/13 for cystoscopy, TURBT, pyelogram with stent placement.      Interval History: NAEON. Patient states that she feels well this morning and has no complaints. Continues to have blood tinged urine. To OR today for cystoscopy, TURBT, and pyelogram with stent placement.     Review of Systems   Constitutional: Positive for appetite change. Negative for chills, diaphoresis, fatigue and fever.   HENT: Negative for sore throat and trouble swallowing.    Respiratory: Negative for cough and shortness of breath.    Cardiovascular: Negative for chest pain, palpitations and leg swelling.   Gastrointestinal: Negative for abdominal distention, abdominal pain, constipation, diarrhea, nausea and vomiting.   Genitourinary: Positive for hematuria. Negative for difficulty urinating, dysuria, flank pain, frequency and urgency.   Musculoskeletal: Negative for arthralgias and back pain.   Skin: Positive for pallor. Negative for color change.   Neurological: Negative for dizziness and light-headedness.   Psychiatric/Behavioral: Negative for confusion.     Objective:     Vital Signs (Most Recent):  Temp: 97.9 °F (36.6 °C) (11/13/17 0707)  Pulse: 65 (11/13/17 0735)  Resp: 16 (11/13/17 0707)  BP: 109/68 (11/13/17 0707)  SpO2: 97 % (11/13/17 0707) Vital Signs (24h Range):  Temp:  [97.5 °F (36.4 °C)-98.1 °F (36.7 °C)] 97.9 °F (36.6 °C)  Pulse:  [63-78] 65  Resp:  [16-20] 16  SpO2:  [96 %-99 %] 97 %  BP: (101-143)/(59-82) 109/68     Weight: 45.4 kg (100 lb)  Body mass index is 19.53 kg/m².    Intake/Output Summary (Last 24 hours) at 11/13/17 0920  Last data filed at 11/13/17 0410   Gross per 24 hour   Intake          1148.33 ml   Output              750 ml   Net           398.33 ml      Physical Exam   Constitutional: She is oriented to person, place, and time. No distress.   Thin, frail appearing elderly lady.   HENT:   Head: Normocephalic and atraumatic.   Mouth/Throat: Oropharynx is clear and moist.   Eyes: No scleral  icterus.   Pale conjunctivae.   Neck: Normal range of motion. Neck supple.   Cardiovascular: Normal rate, regular rhythm, normal heart sounds and intact distal pulses.    Pulmonary/Chest: Effort normal and breath sounds normal.   Abdominal: Soft. Bowel sounds are normal. She exhibits no distension. There is no tenderness. There is no guarding.   Neurological: She is alert and oriented to person, place, and time.   Skin: Skin is warm and dry. She is not diaphoretic. There is pallor.   Psychiatric: She has a normal mood and affect. Her behavior is normal.   Vitals reviewed.      Significant Labs:   Recent Results (from the past 24 hour(s))   CBC auto differential    Collection Time: 11/12/17  7:41 PM   Result Value Ref Range    WBC 11.94 3.90 - 12.70 K/uL    RBC 3.08 (L) 4.00 - 5.40 M/uL    Hemoglobin 9.5 (L) 12.0 - 16.0 g/dL    Hematocrit 28.0 (L) 37.0 - 48.5 %    MCV 91 82 - 98 fL    MCH 30.8 27.0 - 31.0 pg    MCHC 33.9 32.0 - 36.0 g/dL    RDW 12.8 11.5 - 14.5 %    Platelets 231 150 - 350 K/uL    MPV 10.0 9.2 - 12.9 fL    Immature Granulocytes 0.4 0.0 - 0.5 %    Gran # 9.8 (H) 1.8 - 7.7 K/uL    Immature Grans (Abs) 0.05 (H) 0.00 - 0.04 K/uL    Lymph # 0.9 (L) 1.0 - 4.8 K/uL    Mono # 1.1 (H) 0.3 - 1.0 K/uL    Eos # 0.1 0.0 - 0.5 K/uL    Baso # 0.05 0.00 - 0.20 K/uL    nRBC 0 0 /100 WBC    Gran% 82.0 (H) 38.0 - 73.0 %    Lymph% 7.2 (L) 18.0 - 48.0 %    Mono% 9.4 4.0 - 15.0 %    Eosinophil% 0.6 0.0 - 8.0 %    Basophil% 0.4 0.0 - 1.9 %    Differential Method Automated    Protime-INR    Collection Time: 11/13/17  5:24 AM   Result Value Ref Range    Prothrombin Time 12.0 9.0 - 12.5 sec    INR 1.1 0.8 - 1.2   Basic metabolic panel    Collection Time: 11/13/17  5:24 AM   Result Value Ref Range    Sodium 129 (L) 136 - 145 mmol/L    Potassium 3.9 3.5 - 5.1 mmol/L    Chloride 100 95 - 110 mmol/L    CO2 22 (L) 23 - 29 mmol/L    Glucose 97 70 - 110 mg/dL    BUN, Bld 27 (H) 8 - 23 mg/dL    Creatinine 1.1 0.5 - 1.4 mg/dL     Calcium 8.8 8.7 - 10.5 mg/dL    Anion Gap 7 (L) 8 - 16 mmol/L    eGFR if African American 52.2 (A) >60 mL/min/1.73 m^2    eGFR if non  45.3 (A) >60 mL/min/1.73 m^2   CBC auto differential    Collection Time: 11/13/17  8:06 AM   Result Value Ref Range    WBC 9.30 3.90 - 12.70 K/uL    RBC 3.01 (L) 4.00 - 5.40 M/uL    Hemoglobin 9.2 (L) 12.0 - 16.0 g/dL    Hematocrit 27.2 (L) 37.0 - 48.5 %    MCV 90 82 - 98 fL    MCH 30.6 27.0 - 31.0 pg    MCHC 33.8 32.0 - 36.0 g/dL    RDW 12.8 11.5 - 14.5 %    Platelets 195 150 - 350 K/uL    MPV 10.0 9.2 - 12.9 fL    Immature Granulocytes 0.3 0.0 - 0.5 %    Gran # 7.5 1.8 - 7.7 K/uL    Immature Grans (Abs) 0.03 0.00 - 0.04 K/uL    Lymph # 0.8 (L) 1.0 - 4.8 K/uL    Mono # 0.8 0.3 - 1.0 K/uL    Eos # 0.1 0.0 - 0.5 K/uL    Baso # 0.03 0.00 - 0.20 K/uL    nRBC 0 0 /100 WBC    Gran% 80.8 (H) 38.0 - 73.0 %    Lymph% 8.4 (L) 18.0 - 48.0 %    Mono% 8.9 4.0 - 15.0 %    Eosinophil% 1.3 0.0 - 8.0 %    Basophil% 0.3 0.0 - 1.9 %    Differential Method Automated          Significant Imaging:       Assessment/Plan:      * Bladder mass    CT urogram with evidence of large, obstructing mass and hemorrhage in the bladder.  - Home coumadin and ASA held, per urology recs subq heparin for DVT prophylaxis  - Hg baseline 14, 11 at presentation. H/H has been stable  - Type and screen ordered  - CBC q12h, will transfuse for Hg <7  - To OR today for cystoscopy, TURBT, and pyelogram with stent placement.   - Pending results of TURBT, patient will likely need Oncology consult. Bilateral nodules found on CT Chest have high likelihood of being metastases.           Hydronephrosis of left kidney    -See bladder mass          Gross hematuria    -See Bladder Mass        Depression (emotion)    - Continue home venlafaxine\  - Will discontinue if sodium falls.          Chronic hyponatremia    Sodium 125 at admission, had been normal in January 2017  - Trending up. Will hold SSRI if Na falls.            Acquired hypothyroidism    - Continue synthroid 50 mcg  - TSH WNL          Essential hypertension              History of CVA (cerebrovascular accident)    Follows with neurology. 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  - Coumadin held          Chronic atrial fibrillation    - Regular rate and rhythm on exam at presentation  - Coumadin held in setting of hemorrhage and surgery today.          Current use of long term anticoagulation    - INR 2.7 at presentation  - Coumadin held in the setting of hemorrhage and in anticipation of surgery today.             VTE Risk Mitigation     None        Dispo: To OR today for TURBT.     Jay Juan MD  Department of Hospital Medicine   [facility]

## 2017-11-13 NOTE — HOSPITAL COURSE
Patient admitted to hospital medicine. Anticoagulation held in setting hemorrhage. CT Urogram on 11/11 demonstrated a large bladder mass. Urology consulted. 11/12 CT Chest also demonstrates bilateral pulmonary nodules that could be due to metastases. H/H stabilized. Patient taken to OR 11/13 for cystoscopy, TURBT, pyelogram; stent unable to be placed as urethral orifice could not be visualized. Path pending. 11/14: started hep gtt. Pt still thinking if she wants nephrostomy tube or not. Urology to keep wilson in until Friday 11/17. Patient began to produce significant hematuria requiring 1u pRBC on 11/16 and the decision was made to stop heparin ggt due to risk outweighing benefit. 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 Results discussed with family and the poor prognosis associated given nodules found in lungs on CT previously. Patient decided that she would like to speak with oncology over her treatment options before deciding whether to pursue cancer treatment versus palliative care. Onc and urology following. No surgeries planned by urology. Wilson was removed on 11/20 by urology. Patient had to be straight cathed once overnight after wilson was removed due to retention but was able to void spontaneously thereafter. Per oncology, patient is not a chemo candidate with active bleeding - also mentioned that patient will need a nephrostomy tube prior to hormonal/chemo therapy. Radiation oncology was consulted for palliative radiation to help with hematuria. Radiation was started on 11/24 which improved hematuria. Total of 14 doses of radiation were planned, of which patient received 5 doses while inpatient. Trial of anti-coaluation was attempted on 11/27 which caused hematuria to return, so all anticoagulation was stopped. Arrangements were initially made for patient to go to SNF (per PT recs), but patient would not be able to receive radiation therapy. PT re-evaluated  patient and cleared her to go home with home health with 24-hr supervision (patient's sister). She was discharged on 11/29 with follow-up with radiation oncology, urology, and hem-onc. She will follow-up with PCP or cardiologist in future about when to re-start anti-coagulation.

## 2017-11-13 NOTE — PLAN OF CARE
Problem: Patient Care Overview  Goal: Plan of Care Review  Outcome: Ongoing (interventions implemented as appropriate)  Afebrile. Free from falls or injury. No complaints of pain. NPO since midnight for surgery this morning. Ofrrest draining red urine. Chest CT done this shift. Bed locked in lowest position, non skid socks on, call light within reach. Pt instructed to call if any assistance is needed. Vitals stable.  Will cont to coleen pt.

## 2017-11-13 NOTE — ASSESSMENT & PLAN NOTE
Monique Lew is a 87 y.o. female with gross hematuria and left sided bladder mass with severe left hydronephrosis    - Forrest draining well, irrigate PRN  - No need for CBI at this time  - Hold coumadin   - Trend INR  - NPO  - To OR today for cystoscopy, TURBT, left retrograde pyelogram and stent placement  - Consent obtained  - Continue to monitor H/H, no need for transfusion at this time

## 2017-11-13 NOTE — CONSULTS
Radiology Consult    Monique Lew is a 87 y.o. female with a history of bladder mass and left sided hydroureteronephrosis.    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    CVA (cerebral infarction)     Dislocated IOL (intraocular lens), posterior - Right Eye 10/19/2012    DVT (deep venous thrombosis)     Glaucoma     Pseudoexfoliation, sever    Goiter     Heart attack     Hypertension     Hypothyroidism     Long-term (current) use of anticoagulants     Macular hole of right eye     Multinodular goiter     Other hyperparathyroidism     Phthisis bulbi of left eye     Posterior dislocation of right lens 8/5/2015    Pseudoexfoliation glaucoma, 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 hines)    BREAST LUMPECTOMY      BUNIONECTOMY      CATARACT EXTRACTION W/  INTRAOCULAR LENS IMPLANT Right 01/26/2011    WITH TRAB ()    CATARACT EXTRACTION W/  INTRAOCULAR LENS IMPLANT Left 04/09/2009    ANT. VIITRECTOMY WITH SUTURED PCIOL (DR. RYAN)    CATARACT EXTRACTION W/ INTRAOCULAR LENS IMPLANTW/ TRABECULECTOMY Right 01/26/2011        COLON SURGERY      volvulus    CONJUNCTIVAL FLAP  Left 05/28/2009    DR. JARAMILLO    conjuunctival flap   2009    OS w/ dr. 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 emanuel    TRABECULECTOMY Left 01/11/2006         transcleral cyclophotocoagulation   2010    OS w/ dr. hines       Discussed with primary team, Dr. Bella.    Imaging reviewed with Radiology staff, Dr. Petit.     Procedure: left nephrostomy tube placement     Scheduled Meds:    levothyroxine  50 mcg Oral Before breakfast    metoprolol succinate  25 mg Oral BID    timolol maleate 0.5%  1 drop Both Eyes BID    venlafaxine  37.5 mg Oral Daily     Continuous Infusions:    PRN Meds:sodium chloride 0.9%    Allergies:   Review of patient's allergies indicates:   Allergen Reactions    Cosopt [dorzolamide-timolol] Swelling     Swelling of eyelids    Mevacor [lovastatin] Nausea And Vomiting    Prednisone Nausea And Vomiting    Vasotec [enalapril maleate] Nausea And Vomiting    Zetia [ezetimibe] Nausea And Vomiting    Furosemide Palpitations     Pt states her heart skips beats when she takes this medication.       Labs:    Recent Labs  Lab 11/13/17 0524   INR 1.1       Recent Labs  Lab 11/13/17 0806   WBC 9.30   HGB 9.2*   HCT 27.2*   MCV 90         Recent Labs  Lab 11/11/17  1846  11/13/17 0524   *  < > 97   *  < > 129*   K 4.2  < > 3.9   CL 92*  < > 100   CO2 22*  < > 22*   BUN 21  < > 27*   CREATININE 0.9  < > 1.1   CALCIUM 9.7  < > 8.8   ALT 10  --   --    AST 27  --   --    ALBUMIN 3.8  --   --    BILITOT 0.9  --   --    < > = values in this interval not displayed.      Vitals (Most Recent):  Temp: 97.9 °F (36.6 °C) (11/13/17 0707)  Pulse: 65 (11/13/17 0735)  Resp: 16 (11/13/17 0707)  BP: 109/68 (11/13/17 0707)  SpO2: 97 % (11/13/17 0707)    Plan:   Urology plans to attempt stent placement today. If they are unable to place stent they will let IR know that nephrostomy tube is needed. As of now urology states the patient is stable, not septic.     Toni Haywood MD  Department of Radiology   PGY II  [phone]

## 2017-11-13 NOTE — NURSING TRANSFER
Nursing Transfer Note      11/13/2017     Transfer To:  [id number] from: [facility] 28    Transfer via bed    Transfer with cardiac monitoring    Transported by aman FATIMA    Medicines sent: no    Chart send with patient: Yes    Notified: sister    Patient reassessed at: 1211 11/13/17    Upon arrival to floor: Candis FATIMA 9th floor nurse

## 2017-11-13 NOTE — TRANSFER OF CARE
Anesthesia Transfer of Care Note    Patient: Monique Lew    Procedure(s) Performed: Procedure(s) (LRB):  EXCISION-BLADDER TUMOR-TRANSURETHRAL (TURBT) (N/A)  CYSTOSCOPY (N/A)  VAGINOSCOPY (N/A)    Patient location: [facility]    Anesthesia Type: general    Transport from OR: Transported from OR on 6-10 L/min O2 by face mask with adequate spontaneous ventilation    Post pain: adequate analgesia    Post assessment: no apparent anesthetic complications    Post vital signs: stable    Level of consciousness: awake    Nausea/Vomiting: no nausea/vomiting    Complications: none    Transfer of care protocol was followed      Last vitals:   Visit Vitals  BP (!) 167/99 (BP Location: Left arm, Patient Position: Lying)   Pulse 79   Temp 36.4 °C (97.5 °F) (Temporal)   Resp 16   Ht 5' (1.524 m)   Wt 45.4 kg (100 lb)   SpO2 97%   Breastfeeding? No   BMI 19.53 kg/m²

## 2017-11-13 NOTE — SUBJECTIVE & OBJECTIVE
Interval History: NAEON. Patient states that she feels well this morning and has no complaints. Continues to have blood tinged urine. To OR today for cystoscopy, TURBT, and pyelogram with stent placement.     Review of Systems   Constitutional: Positive for appetite change. Negative for chills, diaphoresis, fatigue and fever.   HENT: Negative for sore throat and trouble swallowing.    Respiratory: Negative for cough and shortness of breath.    Cardiovascular: Negative for chest pain, palpitations and leg swelling.   Gastrointestinal: Negative for abdominal distention, abdominal pain, constipation, diarrhea, nausea and vomiting.   Genitourinary: Positive for hematuria. Negative for difficulty urinating, dysuria, flank pain, frequency and urgency.   Musculoskeletal: Negative for arthralgias and back pain.   Skin: Positive for pallor. Negative for color change.   Neurological: Negative for dizziness and light-headedness.   Psychiatric/Behavioral: Negative for confusion.     Objective:     Vital Signs (Most Recent):  Temp: 97.9 °F (36.6 °C) (11/13/17 0707)  Pulse: 65 (11/13/17 0735)  Resp: 16 (11/13/17 0707)  BP: 109/68 (11/13/17 0707)  SpO2: 97 % (11/13/17 0707) Vital Signs (24h Range):  Temp:  [97.5 °F (36.4 °C)-98.1 °F (36.7 °C)] 97.9 °F (36.6 °C)  Pulse:  [63-78] 65  Resp:  [16-20] 16  SpO2:  [96 %-99 %] 97 %  BP: (101-143)/(59-82) 109/68     Weight: 45.4 kg (100 lb)  Body mass index is 19.53 kg/m².    Intake/Output Summary (Last 24 hours) at 11/13/17 0920  Last data filed at 11/13/17 0410   Gross per 24 hour   Intake          1148.33 ml   Output              750 ml   Net           398.33 ml      Physical Exam   Constitutional: She is oriented to person, place, and time. No distress.   Thin, frail appearing elderly lady.   HENT:   Head: Normocephalic and atraumatic.   Mouth/Throat: Oropharynx is clear and moist.   Eyes: No scleral icterus.   Pale conjunctivae.   Neck: Normal range of motion. Neck supple.    Cardiovascular: Normal rate, regular rhythm, normal heart sounds and intact distal pulses.    Pulmonary/Chest: Effort normal and breath sounds normal.   Abdominal: Soft. Bowel sounds are normal. She exhibits no distension. There is no tenderness. There is no guarding.   Neurological: She is alert and oriented to person, place, and time.   Skin: Skin is warm and dry. She is not diaphoretic. There is pallor.   Psychiatric: She has a normal mood and affect. Her behavior is normal.   Vitals reviewed.      Significant Labs:   Recent Results (from the past 24 hour(s))   CBC auto differential    Collection Time: 11/12/17  7:41 PM   Result Value Ref Range    WBC 11.94 3.90 - 12.70 K/uL    RBC 3.08 (L) 4.00 - 5.40 M/uL    Hemoglobin 9.5 (L) 12.0 - 16.0 g/dL    Hematocrit 28.0 (L) 37.0 - 48.5 %    MCV 91 82 - 98 fL    MCH 30.8 27.0 - 31.0 pg    MCHC 33.9 32.0 - 36.0 g/dL    RDW 12.8 11.5 - 14.5 %    Platelets 231 150 - 350 K/uL    MPV 10.0 9.2 - 12.9 fL    Immature Granulocytes 0.4 0.0 - 0.5 %    Gran # 9.8 (H) 1.8 - 7.7 K/uL    Immature Grans (Abs) 0.05 (H) 0.00 - 0.04 K/uL    Lymph # 0.9 (L) 1.0 - 4.8 K/uL    Mono # 1.1 (H) 0.3 - 1.0 K/uL    Eos # 0.1 0.0 - 0.5 K/uL    Baso # 0.05 0.00 - 0.20 K/uL    nRBC 0 0 /100 WBC    Gran% 82.0 (H) 38.0 - 73.0 %    Lymph% 7.2 (L) 18.0 - 48.0 %    Mono% 9.4 4.0 - 15.0 %    Eosinophil% 0.6 0.0 - 8.0 %    Basophil% 0.4 0.0 - 1.9 %    Differential Method Automated    Protime-INR    Collection Time: 11/13/17  5:24 AM   Result Value Ref Range    Prothrombin Time 12.0 9.0 - 12.5 sec    INR 1.1 0.8 - 1.2   Basic metabolic panel    Collection Time: 11/13/17  5:24 AM   Result Value Ref Range    Sodium 129 (L) 136 - 145 mmol/L    Potassium 3.9 3.5 - 5.1 mmol/L    Chloride 100 95 - 110 mmol/L    CO2 22 (L) 23 - 29 mmol/L    Glucose 97 70 - 110 mg/dL    BUN, Bld 27 (H) 8 - 23 mg/dL    Creatinine 1.1 0.5 - 1.4 mg/dL    Calcium 8.8 8.7 - 10.5 mg/dL    Anion Gap 7 (L) 8 - 16 mmol/L    eGFR if African  American 52.2 (A) >60 mL/min/1.73 m^2    eGFR if non  45.3 (A) >60 mL/min/1.73 m^2   CBC auto differential    Collection Time: 11/13/17  8:06 AM   Result Value Ref Range    WBC 9.30 3.90 - 12.70 K/uL    RBC 3.01 (L) 4.00 - 5.40 M/uL    Hemoglobin 9.2 (L) 12.0 - 16.0 g/dL    Hematocrit 27.2 (L) 37.0 - 48.5 %    MCV 90 82 - 98 fL    MCH 30.6 27.0 - 31.0 pg    MCHC 33.8 32.0 - 36.0 g/dL    RDW 12.8 11.5 - 14.5 %    Platelets 195 150 - 350 K/uL    MPV 10.0 9.2 - 12.9 fL    Immature Granulocytes 0.3 0.0 - 0.5 %    Gran # 7.5 1.8 - 7.7 K/uL    Immature Grans (Abs) 0.03 0.00 - 0.04 K/uL    Lymph # 0.8 (L) 1.0 - 4.8 K/uL    Mono # 0.8 0.3 - 1.0 K/uL    Eos # 0.1 0.0 - 0.5 K/uL    Baso # 0.03 0.00 - 0.20 K/uL    nRBC 0 0 /100 WBC    Gran% 80.8 (H) 38.0 - 73.0 %    Lymph% 8.4 (L) 18.0 - 48.0 %    Mono% 8.9 4.0 - 15.0 %    Eosinophil% 1.3 0.0 - 8.0 %    Basophil% 0.3 0.0 - 1.9 %    Differential Method Automated          Significant Imaging:

## 2017-11-13 NOTE — PROGRESS NOTES
[facility]  Urology  Progress Note    Patient Name: Monique Lew  MRN: [id number]  Admission Date: 11/11/2017  Hospital Length of Stay: 1 days  Code Status: Full Code   Attending Provider: Tiffanie Enriquez MD  Primary Care Physician: Aguila Hsieh MD    Subjective:     HPI:  Monique Lew is a 87 y.o. female with hx of HTN, HLD, breast cancer, stoke, DVT, and afib who presented to the ED with complaints of gross hematuria x 1 day. She has a hx of microscopic hematuria and is s/p negative workup in 2014 for an episode of gross hematuria.    She states along with the hematuria she has had frequency, urgency, and urge incontinence. Denies straining or clots. She has had weight loss recently associated with a decreased appetite. Denies flank pain or bone pain.     CT Urogram obtained shows a 6 cm enhancing left sided bladder mass with severe left hydronephrosis and no contrast excretion from left kidney. No lymphadenopathy. Creatinine is normal. H/H is slightly lower than baseline. Vitals have been stable.    She does have a 20 pack/year smoking history, quit 30 years ago. Hx of breast cancer in 1992 treated with radiation.     Interval History:   No acute events overnight  Patient with no complaints this am  No issues with wilson    Review of Systems  Objective:     Temp:  [97.5 °F (36.4 °C)-98.1 °F (36.7 °C)] 97.5 °F (36.4 °C)  Pulse:  [63-78] 78  Resp:  [16-20] 20  SpO2:  [95 %-99 %] 99 %  BP: (101-143)/(59-85) 143/74     Body mass index is 19.53 kg/m².            Drains     Drain                 Urethral Catheter 11/11/17 2200 Straight-tip 1 day                Physical Exam   Constitutional: She is oriented to person, place, and time. She appears cachectic. She appears ill. No distress.   HENT:   Head: Normocephalic and atraumatic.   Eyes: No scleral icterus.   Neck: No JVD present.   Cardiovascular: Normal rate.  An irregularly irregular rhythm present.   Pulmonary/Chest: Effort normal. No  respiratory distress.   Abdominal: Soft. She exhibits no distension. There is no tenderness. There is no rebound and no guarding.   Well healed midline scar   Genitourinary:   Genitourinary Comments: 24 Fr wilson draining light pink.   Bimanual exam reveals a firm fixed mass on the left side of the bladder.    Neurological: She is alert and oriented to person, place, and time.   Skin: She is not diaphoretic. There is pallor.     Psychiatric: She has a normal mood and affect. Her behavior is normal.       Significant Labs:    BMP:    Recent Labs  Lab 11/11/17  1846 11/12/17  0538 11/13/17  0524   * 129* 129*   K 4.2 4.2 3.9   CL 92* 94* 100   CO2 22* 27 22*   BUN 21 17 27*   CREATININE 0.9 1.1 1.1   CALCIUM 9.7 9.6 8.8       CBC:     Recent Labs  Lab 11/12/17  0006 11/12/17  0826 11/12/17  1941   WBC 9.46 10.22 11.94   HGB 10.8* 10.4* 9.5*   HCT 31.5* 30.3* 28.0*    224 231       Urine Culture:   Recent Labs  Lab 11/11/17  1848   LABURIN No growth     All pertinent labs results from the past 24 hours have been reviewed.    Significant Imaging:  All pertinent imaging results/findings from the past 24 hours have been reviewed.      Assessment/Plan:     Gross hematuria    Monique Lew is a 87 y.o. female with gross hematuria and left sided bladder mass with severe left hydronephrosis    - Wilson draining well, irrigate PRN  - No need for CBI at this time  - Hold coumadin   - Trend INR  - NPO  - To OR today for cystoscopy, TURBT, left retrograde pyelogram and stent placement  - Consent obtained  - Continue to monitor H/H, no need for transfusion at this time            VTE Risk Mitigation     None          Candy Jay MD  Urology  [facility]

## 2017-11-13 NOTE — PLAN OF CARE
Aguila Hsieh MD  [address] / [place] [address]      [facility] [place] - [address]  [address]  [place] [address]  Phone: [phone] Fax: [phone]    [facility] [place] - [address] [place]  [address] [place]  [place] [address]  Phone: [phone] Fax: [phone]      Payor: [employer] MANAGED MEDICARE / Plan: [employer] MEDICARE HMO / Product Type: Capitation /        11/13/17 0925   Discharge Assessment   Assessment Type Discharge Planning Assessment   Confirmed/corrected address and phone number on facesheet? Yes   Assessment information obtained from? Patient   Expected Length of Stay (days) 2   Communicated expected length of stay with patient/caregiver yes   Prior to hospitilization cognitive status: Alert/Oriented   Prior to hospitalization functional status: Independent   Current cognitive status: Alert/Oriented   Current Functional Status: Independent   Facility Arrived From: (home/selfcare)   Lives With alone   Able to Return to Prior Arrangements unable to determine at this time (comments)   Is patient able to care for self after discharge? Unable to determine at this time (comments)   Who are your caregiver(s) and their phone number(s)? (Opal Kwon, friend, [phone]; Aylin Aguilera, sister, [phone])   Patient's perception of discharge disposition home or selfcare   Readmission Within The Last 30 Days no previous admission in last 30 days   Patient currently being followed by outpatient case management? No   Patient currently receives any other outside agency services? No   Equipment Currently Used at Home none   Do you have any problems affording any of your prescribed medications? No   Is the patient taking medications as prescribed? yes   Does the patient have transportation home? Yes   Transportation Available family or friend will provide   Does the patient receive services at the  [facility]? Yes   Discharge Plan A Home;Home Health   Discharge Plan B Skilled Nursing Facility   Patient/Family In Agreement With Plan yes

## 2017-11-13 NOTE — OP NOTE
"[facility]  Operative Note    Date: 11/13/2017    Pre-Op Diagnosis: bladder tumor- cT4    Patient Active Problem List   Diagnosis    Current use of long term anticoagulation    Chronic atrial fibrillation    Macular hole of right eye - Right Eye    Pseudophakia of both eyes - Both Eyes    Corneal thinning - Left Eye    History of cornea transplant - Left Eye    Phthisis bulbi of left eye    Central corneal opacity of left eye    Thyroid nodule    Dislocated IOL (intraocular lens), posterior - Right Eye    History of CVA (cerebrovascular accident)    Vitreous floater - Right Eye    Essential hypertension    Acquired hypothyroidism    History of breast cancer    Chronic hyponatremia    Glaucoma    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  TURBT of bladder tumor greater than 3 cm  2.  Examination under anesthesia  3.  Vaginoscopy    Specimen(s):   Bladder tumor    Staff Surgeon: Tiffanie Enriquez MD    Assistant Surgeon: Shayne Fried MD, Raymon Tran    Anesthesia: General endotracheal anesthesia    Indications: Monique Lew is a 87 y.o. female with an extensive pmh including breast cancer, multiple comorbdities including a-fib on coumadin, history of "mini" strokes, that presented to the hospital with hematuria. She was previously found to have microscopic hematuria with a negative work-up in 2014. She was seen this year with stable microscopic hematuria, and no gross hematuria in 7/2017. She was admitted over the weekend with hematuria and CT c/a/p was significant for a large bladder mass that appeared to be invading lateral side wall, possibly the uterus. She also has concerning lesions on her lung for mets vs. Primary. We had a " "long discussion about her options in regards to this procedure and moving forward long term. The risks, benefits, alternatives to TURBT for diagnosis and hemostasis as well as attempt at stent placement were discussed. She consented to proceed with this portion of her care.     Findings:   1.  Very large left lateral bladder wall tumor, did not appear typical of a urothelial carcinoma. Multiple good resections were taken and sent for pathology. Most tumor was not resected. Good hemostasis was achieved.  2.  Right UO visualized, unable to visualize left UO. We attempted to cannulate what we thought was the UO but were unsuccessful.   3.  SHAYY- Fixed left sided mass that could be palpated on vaginal and rectal exam.   4. Vaginoscopy did not reveal any obvious lesions or ingrowth of the lesion.     Estimated Blood Loss: min    Drains: 24 Fr wilson catheter- three-way with 30 cc in the balloon.    Procedure in detail:  After the risks, benefits and possible complications of the procedure were explained, consents were obtained. The patient was taken to the operating room and placed under anesthesia. Pre-operative antibiotics were administered 30 minutes prior to expected start time. The patient was placed in the dorsal lithotomy position and prepped and draped in the normal and sterile fashion. Time out was performed.      A rigid cystoscope in a 22 Fr sheath was introduced into the bladder per urethra. This passed easily.  The entire urethra was visualized which showed no masses or strictures.  Cystoscopy was performed with a 30 degree lens. See "findings" above for details. We attempted placement of an angled glidewire into what we thought may have been the left UO that was bunched up with the rest of the mass, but were unsuccessful due to the angle of the UO as well as bleeding.    The resectoscope was then assembled with the visual obturator. This was placed into the bladder via the urethra and the visual obturator was " exchanged for the resecting mechanism.  The tumor was then resected, until adequate specimen was obtained. Hemostasis was achieved. All specimens were removed from the bladder and sent for pathology.   The bladder was drained and hemostasis was achieved.  The resectoscope was removed.  A 24 Fr three-way catheter was placed with 30 cc in the balloon.  The bladder was then irrigated to clear very easily.    Post-resection bimanual exam revealed fixed left sided mass that could be palpated on vaginal and rectal exam. Vaginoscopy was performed showing no obvious involvement.     The patient tolerated the procedure well and was transferred to recovery in stable condition.     Disposition:  We will keep the patient on CBI overnight. Please make sure the patient is again made NPO at midnight incase she decides to proceed with nephrostomy tube and/or lung nodule biopsy tomorrow.      MD Dr. Mina Dykes was present for the entire procedure. Patient does not want a nephrostomy tube. Patient did want to proceed with obtaining a diagnosis today.

## 2017-11-13 NOTE — ANESTHESIA PROCEDURE NOTES
Obturator Nerve Single Injection Block    Patient location during procedure: pre-op   Block not for primary anesthetic.  Reason for block: at surgeon's request and post-op pain management   Post-op Pain Location: left bladder tumor  Start time: 11/13/2017 3:00 PM  Timeout: 11/13/2017 2:59 PM   End time: 11/13/2017 3:09 PM  Staffing  Anesthesiologist: KOBI ENGLISH  Performed: anesthesiologist   Preanesthetic Checklist  Completed: patient identified, site marked, surgical consent, pre-op evaluation, timeout performed, IV checked, risks and benefits discussed and monitors and equipment checked  Peripheral Block  Patient position: supine  Prep: ChloraPrep  Patient monitoring: heart rate, cardiac monitor, continuous pulse ox, continuous capnometry and frequent blood pressure checks  Block type: obturator  Laterality: left  Injection technique: single shot  Needle  Needle type: Stimuplex   Needle gauge: 22 G  Needle length: 2 in  Needle localization: anatomical landmarks and ultrasound guidance   -ultrasound image captured on disc.  Assessment  Injection assessment: negative aspiration, negative parasthesia and local visualized surrounding nerve  Paresthesia pain: none  Heart rate change: no  Slow fractionated injection: yes  Medications:  Bolus administered: 25 mL of 1.5 mepivacaine  Epinephrine added: 3.75 mcg/mL (1/300,000)  Additional Notes  VSS.  D[facility] RN monitoring vitals throughout procedure.  Patient tolerated procedure well.

## 2017-11-13 NOTE — PLAN OF CARE
Problem: Patient Care Overview  Goal: Plan of Care Review  Outcome: Ongoing (interventions implemented as appropriate)  Plan of care reviewed with patient . Patient rested all shift , adminstered her own eye drops . NA Cl started at 100ml/hr to PIV  without issues. Patient aware that she will be NPO after midnight, Surgery tomorrow planned , consents signed .no questions  Patient is [place] and needs some assistance with meal ordering. Cytology urine sent. Forrest still draining hematuria , no large clots noted. VSS stable .

## 2017-11-14 LAB
ANION GAP SERPL CALC-SCNC: 10 MMOL/L
BASOPHILS # BLD AUTO: 0.03 K/UL
BASOPHILS # BLD AUTO: 0.05 K/UL
BASOPHILS NFR BLD: 0.3 %
BASOPHILS NFR BLD: 0.4 %
BUN SERPL-MCNC: 19 MG/DL
CALCIUM SERPL-MCNC: 8.7 MG/DL
CHLORIDE SERPL-SCNC: 99 MMOL/L
CO2 SERPL-SCNC: 22 MMOL/L
CREAT SERPL-MCNC: 0.9 MG/DL
DIFFERENTIAL METHOD: ABNORMAL
DIFFERENTIAL METHOD: ABNORMAL
EOSINOPHIL # BLD AUTO: 0 K/UL
EOSINOPHIL # BLD AUTO: 0.2 K/UL
EOSINOPHIL NFR BLD: 0.4 %
EOSINOPHIL NFR BLD: 1.5 %
ERYTHROCYTE [DISTWIDTH] IN BLOOD BY AUTOMATED COUNT: 12.8 %
ERYTHROCYTE [DISTWIDTH] IN BLOOD BY AUTOMATED COUNT: 13.1 %
EST. GFR  (AFRICAN AMERICAN): >60 ML/MIN/1.73 M^2
EST. GFR  (NON AFRICAN AMERICAN): 57.7 ML/MIN/1.73 M^2
FACT X PPP CHRO-ACNC: 0.29 IU/ML
FACT X PPP CHRO-ACNC: <0.1 IU/ML
GLUCOSE SERPL-MCNC: 86 MG/DL
HCT VFR BLD AUTO: 26.2 %
HCT VFR BLD AUTO: 28.6 %
HGB BLD-MCNC: 8.9 G/DL
HGB BLD-MCNC: 9.6 G/DL
IMM GRANULOCYTES # BLD AUTO: 0.04 K/UL
IMM GRANULOCYTES # BLD AUTO: 0.05 K/UL
IMM GRANULOCYTES NFR BLD AUTO: 0.4 %
IMM GRANULOCYTES NFR BLD AUTO: 0.4 %
LYMPHOCYTES # BLD AUTO: 0.9 K/UL
LYMPHOCYTES # BLD AUTO: 1 K/UL
LYMPHOCYTES NFR BLD: 7.8 %
LYMPHOCYTES NFR BLD: 8.5 %
MCH RBC QN AUTO: 30.5 PG
MCH RBC QN AUTO: 30.8 PG
MCHC RBC AUTO-ENTMCNC: 33.6 G/DL
MCHC RBC AUTO-ENTMCNC: 34 G/DL
MCV RBC AUTO: 91 FL
MCV RBC AUTO: 91 FL
MONOCYTES # BLD AUTO: 0.6 K/UL
MONOCYTES # BLD AUTO: 1.1 K/UL
MONOCYTES NFR BLD: 5.8 %
MONOCYTES NFR BLD: 9.4 %
NEUTROPHILS # BLD AUTO: 9.3 K/UL
NEUTROPHILS # BLD AUTO: 9.3 K/UL
NEUTROPHILS NFR BLD: 79.8 %
NEUTROPHILS NFR BLD: 85.3 %
NRBC BLD-RTO: 0 /100 WBC
NRBC BLD-RTO: 0 /100 WBC
PLATELET # BLD AUTO: 188 K/UL
PLATELET # BLD AUTO: 250 K/UL
PMV BLD AUTO: 10.3 FL
PMV BLD AUTO: 10.4 FL
POTASSIUM SERPL-SCNC: 3.8 MMOL/L
RBC # BLD AUTO: 2.89 M/UL
RBC # BLD AUTO: 3.15 M/UL
SODIUM SERPL-SCNC: 131 MMOL/L
WBC # BLD AUTO: 10.84 K/UL
WBC # BLD AUTO: 11.71 K/UL

## 2017-11-14 PROCEDURE — 25000003 PHARM REV CODE 250

## 2017-11-14 PROCEDURE — 99223 1ST HOSP IP/OBS HIGH 75: CPT | Mod: 25,,, | Performed by: UROLOGY

## 2017-11-14 PROCEDURE — 85520 HEPARIN ASSAY: CPT

## 2017-11-14 PROCEDURE — 80048 BASIC METABOLIC PNL TOTAL CA: CPT

## 2017-11-14 PROCEDURE — 99232 SBSQ HOSP IP/OBS MODERATE 35: CPT | Mod: GC,,, | Performed by: HOSPITALIST

## 2017-11-14 PROCEDURE — 11000001 HC ACUTE MED/SURG PRIVATE ROOM

## 2017-11-14 PROCEDURE — 36415 COLL VENOUS BLD VENIPUNCTURE: CPT

## 2017-11-14 PROCEDURE — 85520 HEPARIN ASSAY: CPT | Mod: 91

## 2017-11-14 PROCEDURE — 85025 COMPLETE CBC W/AUTO DIFF WBC: CPT | Mod: 91

## 2017-11-14 PROCEDURE — 63600175 PHARM REV CODE 636 W HCPCS: Performed by: STUDENT IN AN ORGANIZED HEALTH CARE EDUCATION/TRAINING PROGRAM

## 2017-11-14 RX ORDER — HEPARIN SODIUM,PORCINE/D5W 25000/250
17 INTRAVENOUS SOLUTION INTRAVENOUS CONTINUOUS
Status: DISCONTINUED | OUTPATIENT
Start: 2017-11-14 | End: 2017-11-16

## 2017-11-14 RX ORDER — METOPROLOL SUCCINATE 25 MG/1
25 TABLET, EXTENDED RELEASE ORAL DAILY
Status: DISCONTINUED | OUTPATIENT
Start: 2017-11-15 | End: 2017-11-29 | Stop reason: HOSPADM

## 2017-11-14 RX ADMIN — LEVOTHYROXINE SODIUM 50 MCG: 50 TABLET ORAL at 05:11

## 2017-11-14 RX ADMIN — TIMOLOL MALEATE 1 DROP: 5 SOLUTION OPHTHALMIC at 09:11

## 2017-11-14 RX ADMIN — TIMOLOL MALEATE 1 DROP: 5 SOLUTION OPHTHALMIC at 08:11

## 2017-11-14 RX ADMIN — METOPROLOL SUCCINATE 25 MG: 25 TABLET, EXTENDED RELEASE ORAL at 08:11

## 2017-11-14 RX ADMIN — HEPARIN SODIUM AND DEXTROSE 17 UNITS/KG/HR: 10000; 5 INJECTION INTRAVENOUS at 03:11

## 2017-11-14 NOTE — SUBJECTIVE & OBJECTIVE
Interval History:     SHAHZAD  Pain controlled  No issues with wilson catheter  Tolerating liquids, no N/V  Patient would like to await bladder pathology prior to deciding whether to proceed with left nephrostomy tube insertion    Review of Systems  Objective:     Temp:  [96.3 °F (35.7 °C)-98.2 °F (36.8 °C)] 97.6 °F (36.4 °C)  Pulse:  [55-94] 66  Resp:  [16-20] 16  SpO2:  [95 %-100 %] 96 %  BP: (109-169)/(68-99) 140/76     Body mass index is 19.53 kg/m².            Drains     Drain                 Urethral Catheter 11/13/17 1557 Triple-lumen 24 Fr. less than 1 day                Physical Exam   Constitutional: She is oriented to person, place, and time. She appears cachectic. She appears ill. No distress.   HENT:   Head: Normocephalic and atraumatic.   Eyes: No scleral icterus.   Neck: No JVD present.   Cardiovascular: Normal rate.  An irregularly irregular rhythm present.   Pulmonary/Chest: Effort normal. No respiratory distress.   Abdominal: Soft. She exhibits no distension. There is no tenderness. There is no rebound and no guarding.   Well healed midline scar   Genitourinary:   Genitourinary Comments: 24 Fr wilson draining clear very light pink on very slow drip CBI     Neurological: She is alert and oriented to person, place, and time.   Skin: She is not diaphoretic. There is pallor.     Psychiatric: She has a normal mood and affect. Her behavior is normal.       Significant Labs:    BMP:    Recent Labs  Lab 11/12/17  0538 11/13/17  0524 11/14/17  0331   * 129* 131*   K 4.2 3.9 3.8   CL 94* 100 99   CO2 27 22* 22*   BUN 17 27* 19   CREATININE 1.1 1.1 0.9   CALCIUM 9.6 8.8 8.7       CBC:     Recent Labs  Lab 11/12/17  0826 11/12/17  1941 11/13/17  0806   WBC 10.22 11.94 9.30   HGB 10.4* 9.5* 9.2*   HCT 30.3* 28.0* 27.2*    231 195       All pertinent labs results from the past 24 hours have been reviewed.    Significant Imaging:  All pertinent imaging results/findings from the past 24 hours have been  reviewed.

## 2017-11-14 NOTE — NURSING
Contacted IM-5.  Pt c/o throat discomfort, potentially r/t ET tube placement today.  Requesting throat spray for relief. Per charge RN Asa, throat spray OK to order per nursing.

## 2017-11-14 NOTE — ASSESSMENT & PLAN NOTE
Monique Lew is a 87 y.o. female who presented with gross hematuria secondary to left sided bladder mass with severe left hydronephrosis    S/p TURBT 11/13/2017, unable to place left ureteral stent as UO could not be identified     - CBI clamped on rounds   - maintain wilson until Friday 11/17  - okay for diet   - f/u bladder pathology

## 2017-11-14 NOTE — ANESTHESIA POSTPROCEDURE EVALUATION
Anesthesia Post Evaluation    Patient: Monique Lew    Procedure(s) Performed: Procedure(s) (LRB):  EXCISION-BLADDER TUMOR-TRANSURETHRAL (TURBT) (N/A)  CYSTOSCOPY (N/A)  VAGINOSCOPY (N/A)    Final Anesthesia Type: general  Patient location during evaluation: PACU  Patient participation: Yes- Able to Participate  Level of consciousness: awake and alert  Pain management: adequate  Airway patency: patent  PONV status at discharge: No PONV  Anesthetic complications: no      Cardiovascular status: blood pressure returned to baseline  Respiratory status: unassisted, spontaneous ventilation and room air  Hydration status: euvolemic  Follow-up not needed.        Visit Vitals  BP (!) 140/76 (BP Location: Right arm, Patient Position: Lying)   Pulse 66   Temp 36.4 °C (97.6 °F) (Oral)   Resp 16   Ht 5' (1.524 m)   Wt 45.4 kg (100 lb)   SpO2 96%   Breastfeeding? No   BMI 19.53 kg/m²       Pain/Felecia Score: Pain Assessment Performed: Yes (11/14/2017  6:28 AM)  Presence of Pain: complains of pain/discomfort (11/14/2017  6:28 AM)  Felecia Score: 10 (11/13/2017  5:35 PM)

## 2017-11-14 NOTE — PROGRESS NOTES
Spoke with IM 5 Dr. Benitez and notified her that pt's urine color is darker than this morning and pt has only 200 cc urine since the beginning of the shift . MD said it is ok just keep watching the pt , no more orders at this time . Will keep monitoring .

## 2017-11-14 NOTE — ASSESSMENT & PLAN NOTE
- Regular rate and rhythm on exam at presentation  - Coumadin held in setting of hemorrhage and surgery .  - 11/14 hep gtt started, will resume coumadin depending on if urology does any more interventions/nephrostomy tube

## 2017-11-14 NOTE — PLAN OF CARE
"Problem: Patient Care Overview  Goal: Plan of Care Review  Outcome: Ongoing (interventions implemented as appropriate)  Pt disoriented to situation & time.  VSS stable on room air.      CBI continued throughout shift.  Titrated to light pink urine.  No clots noted.  No bladder spasms.  Pt reported "full" feeling in bladder.  Bladder not firm on palpation.  Ins and outs in flowsheet.    Telemetry a-fib.  Maintained NPO since 02:00.  Neuro checks stable.  PRN throat spray at bedside for throat discomfort.  Will continue to monitor pt.      "

## 2017-11-14 NOTE — PROGRESS NOTES
Pt arrived to the floor from recovery room  On hospital bed with 2 RN nurses . Pt admitted to room 943 . Pt is awake ,alert and oriented x 3 . stable v/s. No C/O pain . Pt on tele . Pt on continuous bladder irrigation . Will keep monitoring .

## 2017-11-14 NOTE — SUBJECTIVE & OBJECTIVE
Interval History: NAEON. Patient states that she feels well this morning and has no complaints. Continues to have blood tinged urine. OR 11/13 for cystoscopy, TURBT, and pyelogram; stent could not be placed. Forrest to be kept in until Fri 11/17. Hep gtt started today. Can resume warfarin if pt decides no more intervention.      Review of Systems   Constitutional: Positive for appetite change. Negative for chills, diaphoresis, fatigue and fever.   HENT: Negative for sore throat and trouble swallowing.    Respiratory: Negative for cough and shortness of breath.    Cardiovascular: Negative for chest pain, palpitations and leg swelling.   Gastrointestinal: Negative for abdominal distention, abdominal pain, constipation, diarrhea, nausea and vomiting.   Genitourinary: Positive for hematuria. Negative for difficulty urinating, dysuria, flank pain, frequency and urgency.   Musculoskeletal: Negative for arthralgias and back pain.   Skin: Positive for pallor. Negative for color change.   Neurological: Negative for dizziness and light-headedness.   Psychiatric/Behavioral: Negative for confusion.     Objective:     Vital Signs (Most Recent):  Temp: 98.2 °F (36.8 °C) (11/14/17 1126)  Pulse: 77 (11/14/17 1456)  Resp: 20 (11/14/17 1126)  BP: (!) 97/57 (11/14/17 1126)  SpO2: (!) 94 % (11/14/17 1126) Vital Signs (24h Range):  Temp:  [96.3 °F (35.7 °C)-98.2 °F (36.8 °C)] 98.2 °F (36.8 °C)  Pulse:  [55-94] 77  Resp:  [16-20] 20  SpO2:  [94 %-100 %] 94 %  BP: ()/(57-99) 97/57     Weight: 45.4 kg (100 lb)  Body mass index is 19.53 kg/m².    Intake/Output Summary (Last 24 hours) at 11/14/17 1510  Last data filed at 11/14/17 1000   Gross per 24 hour   Intake             1420 ml   Output             2075 ml   Net             -655 ml      Physical Exam   Constitutional: She is oriented to person, place, and time. No distress.   Thin, frail appearing elderly lady.   HENT:   Head: Normocephalic and atraumatic.   Mouth/Throat: Oropharynx  is clear and moist.   Eyes: No scleral icterus.   Pale conjunctivae.   Neck: Normal range of motion. Neck supple.   Cardiovascular: Normal rate, regular rhythm, normal heart sounds and intact distal pulses.    Pulmonary/Chest: Effort normal and breath sounds normal.   Abdominal: Soft. Bowel sounds are normal. She exhibits no distension. There is no tenderness. There is no guarding.   Neurological: She is alert and oriented to person, place, and time.   Skin: Skin is warm and dry. She is not diaphoretic. There is pallor.   Psychiatric: She has a normal mood and affect. Her behavior is normal.   Vitals reviewed.      Significant Labs:   Recent Results (from the past 24 hour(s))   Basic metabolic panel    Collection Time: 11/14/17  3:31 AM   Result Value Ref Range    Sodium 131 (L) 136 - 145 mmol/L    Potassium 3.8 3.5 - 5.1 mmol/L    Chloride 99 95 - 110 mmol/L    CO2 22 (L) 23 - 29 mmol/L    Glucose 86 70 - 110 mg/dL    BUN, Bld 19 8 - 23 mg/dL    Creatinine 0.9 0.5 - 1.4 mg/dL    Calcium 8.7 8.7 - 10.5 mg/dL    Anion Gap 10 8 - 16 mmol/L    eGFR if African American >60.0 >60 mL/min/1.73 m^2    eGFR if non  57.7 (A) >60 mL/min/1.73 m^2   CBC auto differential    Collection Time: 11/14/17  7:45 AM   Result Value Ref Range    WBC 10.84 3.90 - 12.70 K/uL    RBC 2.89 (L) 4.00 - 5.40 M/uL    Hemoglobin 8.9 (L) 12.0 - 16.0 g/dL    Hematocrit 26.2 (L) 37.0 - 48.5 %    MCV 91 82 - 98 fL    MCH 30.8 27.0 - 31.0 pg    MCHC 34.0 32.0 - 36.0 g/dL    RDW 12.8 11.5 - 14.5 %    Platelets 188 150 - 350 K/uL    MPV 10.3 9.2 - 12.9 fL    Immature Granulocytes 0.4 0.0 - 0.5 %    Gran # 9.3 (H) 1.8 - 7.7 K/uL    Immature Grans (Abs) 0.04 0.00 - 0.04 K/uL    Lymph # 0.9 (L) 1.0 - 4.8 K/uL    Mono # 0.6 0.3 - 1.0 K/uL    Eos # 0.0 0.0 - 0.5 K/uL    Baso # 0.03 0.00 - 0.20 K/uL    nRBC 0 0 /100 WBC    Gran% 85.3 (H) 38.0 - 73.0 %    Lymph% 7.8 (L) 18.0 - 48.0 %    Mono% 5.8 4.0 - 15.0 %    Eosinophil% 0.4 0.0 - 8.0 %     Basophil% 0.3 0.0 - 1.9 %    Differential Method Automated    Anti-Xa Heparin Monitoring    Collection Time: 11/14/17  1:34 PM   Result Value Ref Range    Heparin Anti-Xa <0.10 (L) 0.30 - 0.70 IU/mL         Significant Imaging:

## 2017-11-14 NOTE — PLAN OF CARE
Problem: Patient Care Overview  Goal: Plan of Care Review  Outcome: Ongoing (interventions implemented as appropriate)  Plan of care reviewed with the pt and her daughter . Pt is awake ,alert and oriented x 3 . Stable v/s. Pt has low BP 90s/50s during the day  Pt is asymptomatic , MD notified and no new orders for that . Pt slept between care , pt has a good appetite during the day . Pt turn in bed independently , pt refused TEDs and SCDs . Pt is free of falls and injuries during the day , fall precautions maintained and family at bedside .

## 2017-11-14 NOTE — NURSING
Contacted [facility]. Pt currently NPO.  Bladder surgery performed today.  Requesting diet to maintain oral hydration.  Provider will order reg diet.    @0200 - Contacted IM-5.  Per note from urologist Dr. Fried requested moving diet back to NPO for potential procedures today.  Pt refused dinner, though drank about 480 mL water.  Will continue to monitor pt and maintain NPO status.

## 2017-11-14 NOTE — ASSESSMENT & PLAN NOTE
CT urogram with evidence of large, obstructing mass and hemorrhage in the bladder.  - Home coumadin and ASA held, per urology recs subq heparin for DVT prophylaxis  - Hg baseline 14, 11 at presentation. H/H has been stable  - Type and screen ordered  - CBC q12h, will transfuse for Hg <7  - 11/13 OR for cystoscopy, TURBT, and pyelogram; stent could not be placed as urethral orifice could not be visualized.   - Pending results of TURBT/path results, patient may need Oncology consult if she wants to purse treatment. Bilateral nodules found on CT Chest have high likelihood of being metastases. If pt does not want to pursue intervention/tx, will consult palliative care. Pt still deciding if she wants nephrostomy tube or not, depending on path

## 2017-11-14 NOTE — PROGRESS NOTES
Pt's BP is 97/57 , HR is 71. Pt is asymptomatic . MD at the bedside and she said that is fine just keep watching the pt , no more orders at this time . Will keep monitoring .

## 2017-11-14 NOTE — PROGRESS NOTES
[facility] Hospital Medicine  Progress Note    Patient Name: Monique Lew  MRN: [id number]  Patient Class: IP- Inpatient   Admission Date: 11/11/2017  Length of Stay: 2 days  Attending Physician: Summer Grewal*  Primary Care Provider: Aguila Hsieh MD    [facility] Medicine Team: [facility] 5 Natividad Mota MD    Subjective:     Principal Problem:Bladder mass    HPI:  Ms. 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 States she first noticed blood in her urine 2 days before presentation. This temporarily resolved, with hematuria occuring again the day before presentation. Hematuria is associated with increased urge to urinate and incontinence. Prior to this episode, patient states last episode of gross hematuria was in 2012, which was found to be 2/2 cystitis. Patient has had multiple UTIs and microhematuria on urinalysis since that time. She follows with a urologist, Dr. Enriquez at [facility], and has had 2 cystoscopies - most recently in 2014 which no evidence of tumor. Denies fever/chills/nausea/vomiting. Does states she has had decreased appetite, increased fatigue, and night sweats which cause her to soak through her clothes.     In the ED, was found to have Hg of 11 which trended down to 10 in 6 hours (baseline 14). Type and screen obtained. CT urogram performed showed 6.3x4.3x4.0 cm mass in bladder causing severe left hydronephrosis and hydroureter with evidence of hemorrhage in bladder. Also evaluated by urology in ED, who began CBI and recommended she be NPO for possible procedure.     Hospital Course:  Patient admitted to hospital medicine. Anticoagulation held in setting hemorrhage. CT Urogram on 11/11 demonstrated a large bladder mass. Urology consulted. 11/12 CT Chest also demonstrates bilateral pulmonary nodules that could be due to metastases. H/H  stabilized. Patient taken to OR 11/13 for cystoscopy, TURBT, pyelogram; stent unable to be placed as urethral orifice could not be visualized. Path pending. 11/14: started hep gtt. Pt still thinking if she wants nephrostomy tube or not. Urology to keep wilson in until Friday 11/17. If pt decides on no procedures, then will resume warfarin.     Interval History: NAEON. Patient states that she feels well this morning and has no complaints. Continues to have blood tinged urine. OR 11/13 for cystoscopy, TURBT, and pyelogram; stent could not be placed. Wilson to be kept in until Fri 11/17. Hep gtt started today. Can resume warfarin if pt decides no more intervention.      Review of Systems   Constitutional: Positive for appetite change. Negative for chills, diaphoresis, fatigue and fever.   HENT: Negative for sore throat and trouble swallowing.    Respiratory: Negative for cough and shortness of breath.    Cardiovascular: Negative for chest pain, palpitations and leg swelling.   Gastrointestinal: Negative for abdominal distention, abdominal pain, constipation, diarrhea, nausea and vomiting.   Genitourinary: Positive for hematuria. Negative for difficulty urinating, dysuria, flank pain, frequency and urgency.   Musculoskeletal: Negative for arthralgias and back pain.   Skin: Positive for pallor. Negative for color change.   Neurological: Negative for dizziness and light-headedness.   Psychiatric/Behavioral: Negative for confusion.     Objective:     Vital Signs (Most Recent):  Temp: 98.2 °F (36.8 °C) (11/14/17 1126)  Pulse: 77 (11/14/17 1456)  Resp: 20 (11/14/17 1126)  BP: (!) 97/57 (11/14/17 1126)  SpO2: (!) 94 % (11/14/17 1126) Vital Signs (24h Range):  Temp:  [96.3 °F (35.7 °C)-98.2 °F (36.8 °C)] 98.2 °F (36.8 °C)  Pulse:  [55-94] 77  Resp:  [16-20] 20  SpO2:  [94 %-100 %] 94 %  BP: ()/(57-99) 97/57     Weight: 45.4 kg (100 lb)  Body mass index is 19.53 kg/m².    Intake/Output Summary (Last 24 hours) at 11/14/17  1510  Last data filed at 11/14/17 1000   Gross per 24 hour   Intake             1420 ml   Output             2075 ml   Net             -655 ml      Physical Exam   Constitutional: She is oriented to person, place, and time. No distress.   Thin, frail appearing elderly lady.   HENT:   Head: Normocephalic and atraumatic.   Mouth/Throat: Oropharynx is clear and moist.   Eyes: No scleral icterus.   Pale conjunctivae.   Neck: Normal range of motion. Neck supple.   Cardiovascular: Normal rate, regular rhythm, normal heart sounds and intact distal pulses.    Pulmonary/Chest: Effort normal and breath sounds normal.   Abdominal: Soft. Bowel sounds are normal. She exhibits no distension. There is no tenderness. There is no guarding.   Neurological: She is alert and oriented to person, place, and time.   Skin: Skin is warm and dry. She is not diaphoretic. There is pallor.   Psychiatric: She has a normal mood and affect. Her behavior is normal.   Vitals reviewed.      Significant Labs:   Recent Results (from the past 24 hour(s))   Basic metabolic panel    Collection Time: 11/14/17  3:31 AM   Result Value Ref Range    Sodium 131 (L) 136 - 145 mmol/L    Potassium 3.8 3.5 - 5.1 mmol/L    Chloride 99 95 - 110 mmol/L    CO2 22 (L) 23 - 29 mmol/L    Glucose 86 70 - 110 mg/dL    BUN, Bld 19 8 - 23 mg/dL    Creatinine 0.9 0.5 - 1.4 mg/dL    Calcium 8.7 8.7 - 10.5 mg/dL    Anion Gap 10 8 - 16 mmol/L    eGFR if African American >60.0 >60 mL/min/1.73 m^2    eGFR if non  57.7 (A) >60 mL/min/1.73 m^2   CBC auto differential    Collection Time: 11/14/17  7:45 AM   Result Value Ref Range    WBC 10.84 3.90 - 12.70 K/uL    RBC 2.89 (L) 4.00 - 5.40 M/uL    Hemoglobin 8.9 (L) 12.0 - 16.0 g/dL    Hematocrit 26.2 (L) 37.0 - 48.5 %    MCV 91 82 - 98 fL    MCH 30.8 27.0 - 31.0 pg    MCHC 34.0 32.0 - 36.0 g/dL    RDW 12.8 11.5 - 14.5 %    Platelets 188 150 - 350 K/uL    MPV 10.3 9.2 - 12.9 fL    Immature Granulocytes 0.4 0.0 - 0.5 %     Gran # 9.3 (H) 1.8 - 7.7 K/uL    Immature Grans (Abs) 0.04 0.00 - 0.04 K/uL    Lymph # 0.9 (L) 1.0 - 4.8 K/uL    Mono # 0.6 0.3 - 1.0 K/uL    Eos # 0.0 0.0 - 0.5 K/uL    Baso # 0.03 0.00 - 0.20 K/uL    nRBC 0 0 /100 WBC    Gran% 85.3 (H) 38.0 - 73.0 %    Lymph% 7.8 (L) 18.0 - 48.0 %    Mono% 5.8 4.0 - 15.0 %    Eosinophil% 0.4 0.0 - 8.0 %    Basophil% 0.3 0.0 - 1.9 %    Differential Method Automated    Anti-Xa Heparin Monitoring    Collection Time: 11/14/17  1:34 PM   Result Value Ref Range    Heparin Anti-Xa <0.10 (L) 0.30 - 0.70 IU/mL         Significant Imaging:       Assessment/Plan:      * Bladder mass    CT urogram with evidence of large, obstructing mass and hemorrhage in the bladder.  - Home coumadin and ASA held, per urology recs subq heparin for DVT prophylaxis  - Hg baseline 14, 11 at presentation. H/H has been stable  - Type and screen ordered  - CBC q12h, will transfuse for Hg <7  - 11/13 OR for cystoscopy, TURBT, and pyelogram; stent could not be placed as urethral orifice could not be visualized.   - Pending results of TURBT/path results, patient may need Oncology consult if she wants to purse treatment. Bilateral nodules found on CT Chest have high likelihood of being metastases. If pt does not want to pursue intervention/tx, will consult palliative care. Pt still deciding if she wants nephrostomy tube or not, depending on path          Hydronephrosis of left kidney    -See bladder mass          Gross hematuria    -See Bladder Mass        Depression (emotion)    - d/c'ed venlafaxine as pt was not taking it at home            Chronic hyponatremia    Sodium 125 at admission, had been normal in January 2017  - Trending up. D/c'ed venlafaxine as she was not taking this at home          Acquired hypothyroidism    - Continue synthroid 50 mcg  - TSH WNL          Essential hypertension              History of CVA (cerebrovascular accident)    Follows with neurology. 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  - Coumadin held          Chronic atrial fibrillation    - Regular rate and rhythm on exam at presentation  - Coumadin held in setting of hemorrhage and surgery .  - 11/14 hep gtt started, will resume coumadin depending on if urology does any more interventions/nephrostomy tube          Current use of long term anticoagulation    - INR 2.7 at presentation  - Coumadin held in the setting of hemorrhage and in anticipation of surgery today.             VTE Risk Mitigation         Ordered     heparin 25,000 units in dextrose 5% 250 mL (100 units/mL) bolus from bag; INITIAL BOLUS DOSE  Once     Route:  Intravenous        11/14/17 1314     heparin 25,000 units in dextrose 5% 250 mL (100 units/mL) bolus from bag; ADDITIONAL PRN BOLUS  As needed (PRN)     Route:  Intravenous        11/14/17 1314     heparin 25,000 units in dextrose 5% 250 mL (100 units/mL) bolus from bag; ADDITIONAL PRN BOLUS  As needed (PRN)     Route:  Intravenous        11/14/17 1313     heparin 25,000 units in dextrose 5% 250 mL (100 units/mL) infusion  Continuous     Route:  Intravenous        11/14/17 1314              Natividad Mota MD  Department of Hospital Medicine   [facility]

## 2017-11-14 NOTE — PROGRESS NOTES
[facility]  Urology  Progress Note    Patient Name: Monique Lew  MRN: [id number]  Admission Date: 11/11/2017  Hospital Length of Stay: 2 days  Code Status: Full Code   Attending Provider: Tiffanie Enriquez MD  Primary Care Physician: Aguila Hsieh MD    Subjective:     HPI:  Monique Lew is a 87 y.o. female with hx of HTN, HLD, breast cancer, stoke, DVT, and afib who presented to the ED with complaints of gross hematuria x 1 day. She has a hx of microscopic hematuria and is s/p negative workup in 2014 for an episode of gross hematuria.    She states along with the hematuria she has had frequency, urgency, and urge incontinence. Denies straining or clots. She has had weight loss recently associated with a decreased appetite. Denies flank pain or bone pain.     CT Urogram obtained shows a 6 cm enhancing left sided bladder mass with severe left hydronephrosis and no contrast excretion from left kidney. No lymphadenopathy. Creatinine is normal. H/H is slightly lower than baseline. Vitals have been stable.    She does have a 20 pack/year smoking history, quit 30 years ago. Hx of breast cancer in 1992 treated with radiation.     S/p cystoscopy with TURBT on 11/13/2017-- unable to identify left ureteral orifice at that time, and thus unable to place left ureteral stent.     Interval History:     SHAHZAD  Pain controlled  No issues with wilson catheter  Tolerating liquids, no N/V  Patient would like to await bladder pathology prior to deciding whether to proceed with left nephrostomy tube insertion    Review of Systems  Objective:     Temp:  [96.3 °F (35.7 °C)-98.2 °F (36.8 °C)] 97.6 °F (36.4 °C)  Pulse:  [55-94] 66  Resp:  [16-20] 16  SpO2:  [95 %-100 %] 96 %  BP: (109-169)/(68-99) 140/76     Body mass index is 19.53 kg/m².            Drains     Drain                 Urethral Catheter 11/13/17 1557 Triple-lumen 24 Fr. less than 1 day                Physical Exam   Constitutional: She is oriented to  person, place, and time. She appears cachectic. She appears ill. No distress.   HENT:   Head: Normocephalic and atraumatic.   Eyes: No scleral icterus.   Neck: No JVD present.   Cardiovascular: Normal rate.  An irregularly irregular rhythm present.   Pulmonary/Chest: Effort normal. No respiratory distress.   Abdominal: Soft. She exhibits no distension. There is no tenderness. There is no rebound and no guarding.   Well healed midline scar   Genitourinary:   Genitourinary Comments: 24 Fr wilson draining clear very light pink on very slow drip CBI     Neurological: She is alert and oriented to person, place, and time.   Skin: She is not diaphoretic. There is pallor.     Psychiatric: She has a normal mood and affect. Her behavior is normal.       Significant Labs:    BMP:    Recent Labs  Lab 11/12/17  0538 11/13/17  0524 11/14/17  0331   * 129* 131*   K 4.2 3.9 3.8   CL 94* 100 99   CO2 27 22* 22*   BUN 17 27* 19   CREATININE 1.1 1.1 0.9   CALCIUM 9.6 8.8 8.7       CBC:     Recent Labs  Lab 11/12/17  0826 11/12/17  1941 11/13/17  0806   WBC 10.22 11.94 9.30   HGB 10.4* 9.5* 9.2*   HCT 30.3* 28.0* 27.2*    231 195       All pertinent labs results from the past 24 hours have been reviewed.    Significant Imaging:  All pertinent imaging results/findings from the past 24 hours have been reviewed.                  Assessment/Plan:               Gross hematuria    Monique Lew is a 87 y.o. female who presented with gross hematuria secondary to left sided bladder mass with severe left hydronephrosis    S/p TURBT 11/13/2017, unable to place left ureteral stent as UO could not be identified     - CBI clamped on rounds   - maintain wilson until Friday 11/17  - okay for diet   - f/u bladder pathology             Hydronephrosis of left kidney    - patient would like to wait until pathology returns from TURBT specimen prior to deciding on nephrostomy tube insertion-- no urgent indication for decompression of left  renal collecting system at this time          Raymon Tran MD  Urology  [facility]

## 2017-11-14 NOTE — ASSESSMENT & PLAN NOTE
- patient would like to wait until pathology returns from TURBT specimen prior to deciding on nephrostomy tube insertion-- no urgent indication for decompression of left renal collecting system at this time

## 2017-11-14 NOTE — ASSESSMENT & PLAN NOTE
Sodium 125 at admission, had been normal in January 2017  - Trending up. D/c'ed venlafaxine as she was not taking this at home

## 2017-11-15 LAB
ANION GAP SERPL CALC-SCNC: 10 MMOL/L
BASOPHILS # BLD AUTO: 0.03 K/UL
BASOPHILS # BLD AUTO: 0.03 K/UL
BASOPHILS # BLD AUTO: 0.04 K/UL
BASOPHILS NFR BLD: 0.3 %
BASOPHILS NFR BLD: 0.3 %
BASOPHILS NFR BLD: 0.4 %
BUN SERPL-MCNC: 21 MG/DL
CALCIUM SERPL-MCNC: 8.4 MG/DL
CHLORIDE SERPL-SCNC: 100 MMOL/L
CO2 SERPL-SCNC: 21 MMOL/L
CREAT SERPL-MCNC: 0.9 MG/DL
DIFFERENTIAL METHOD: ABNORMAL
EOSINOPHIL # BLD AUTO: 0.1 K/UL
EOSINOPHIL # BLD AUTO: 0.2 K/UL
EOSINOPHIL # BLD AUTO: 0.3 K/UL
EOSINOPHIL NFR BLD: 1.1 %
EOSINOPHIL NFR BLD: 1.6 %
EOSINOPHIL NFR BLD: 2.3 %
ERYTHROCYTE [DISTWIDTH] IN BLOOD BY AUTOMATED COUNT: 12.9 %
ERYTHROCYTE [DISTWIDTH] IN BLOOD BY AUTOMATED COUNT: 13 %
ERYTHROCYTE [DISTWIDTH] IN BLOOD BY AUTOMATED COUNT: 13.1 %
EST. GFR  (AFRICAN AMERICAN): >60 ML/MIN/1.73 M^2
EST. GFR  (NON AFRICAN AMERICAN): 57.7 ML/MIN/1.73 M^2
FACT X PPP CHRO-ACNC: 0.17 IU/ML
FACT X PPP CHRO-ACNC: 0.32 IU/ML
GLUCOSE SERPL-MCNC: 106 MG/DL
HCT VFR BLD AUTO: 22.2 %
HCT VFR BLD AUTO: 25 %
HCT VFR BLD AUTO: 25.3 %
HGB BLD-MCNC: 7.6 G/DL
HGB BLD-MCNC: 8.5 G/DL
HGB BLD-MCNC: 8.7 G/DL
IMM GRANULOCYTES # BLD AUTO: 0.04 K/UL
IMM GRANULOCYTES # BLD AUTO: 0.05 K/UL
IMM GRANULOCYTES # BLD AUTO: 0.06 K/UL
IMM GRANULOCYTES NFR BLD AUTO: 0.4 %
IMM GRANULOCYTES NFR BLD AUTO: 0.5 %
IMM GRANULOCYTES NFR BLD AUTO: 0.6 %
LYMPHOCYTES # BLD AUTO: 0.7 K/UL
LYMPHOCYTES # BLD AUTO: 1 K/UL
LYMPHOCYTES # BLD AUTO: 1.1 K/UL
LYMPHOCYTES NFR BLD: 10.6 %
LYMPHOCYTES NFR BLD: 6.6 %
LYMPHOCYTES NFR BLD: 8.7 %
MAGNESIUM SERPL-MCNC: 1.7 MG/DL
MCH RBC QN AUTO: 30.9 PG
MCH RBC QN AUTO: 31.2 PG
MCH RBC QN AUTO: 31.7 PG
MCHC RBC AUTO-ENTMCNC: 34 G/DL
MCHC RBC AUTO-ENTMCNC: 34.2 G/DL
MCHC RBC AUTO-ENTMCNC: 34.4 G/DL
MCV RBC AUTO: 91 FL
MCV RBC AUTO: 91 FL
MCV RBC AUTO: 93 FL
MONOCYTES # BLD AUTO: 1.2 K/UL
MONOCYTES # BLD AUTO: 1.2 K/UL
MONOCYTES # BLD AUTO: 1.3 K/UL
MONOCYTES NFR BLD: 10.5 %
MONOCYTES NFR BLD: 10.9 %
MONOCYTES NFR BLD: 11.7 %
NEUTROPHILS # BLD AUTO: 8.1 K/UL
NEUTROPHILS # BLD AUTO: 8.5 K/UL
NEUTROPHILS # BLD AUTO: 9 K/UL
NEUTROPHILS NFR BLD: 74.5 %
NEUTROPHILS NFR BLD: 78 %
NEUTROPHILS NFR BLD: 81 %
NRBC BLD-RTO: 0 /100 WBC
PLATELET # BLD AUTO: 199 K/UL
PLATELET # BLD AUTO: 222 K/UL
PLATELET # BLD AUTO: 226 K/UL
PMV BLD AUTO: 10.3 FL
PMV BLD AUTO: 10.5 FL
PMV BLD AUTO: 10.6 FL
POTASSIUM SERPL-SCNC: 3.5 MMOL/L
RBC # BLD AUTO: 2.4 M/UL
RBC # BLD AUTO: 2.75 M/UL
RBC # BLD AUTO: 2.79 M/UL
SODIUM SERPL-SCNC: 131 MMOL/L
VIT B12 SERPL-MCNC: 928 PG/ML
WBC # BLD AUTO: 10.8 K/UL
WBC # BLD AUTO: 10.86 K/UL
WBC # BLD AUTO: 11.06 K/UL

## 2017-11-15 PROCEDURE — 25000003 PHARM REV CODE 250: Performed by: STUDENT IN AN ORGANIZED HEALTH CARE EDUCATION/TRAINING PROGRAM

## 2017-11-15 PROCEDURE — 11000001 HC ACUTE MED/SURG PRIVATE ROOM

## 2017-11-15 PROCEDURE — 80048 BASIC METABOLIC PNL TOTAL CA: CPT

## 2017-11-15 PROCEDURE — 36415 COLL VENOUS BLD VENIPUNCTURE: CPT

## 2017-11-15 PROCEDURE — 83735 ASSAY OF MAGNESIUM: CPT

## 2017-11-15 PROCEDURE — 25000003 PHARM REV CODE 250: Performed by: HOSPITALIST

## 2017-11-15 PROCEDURE — 97530 THERAPEUTIC ACTIVITIES: CPT

## 2017-11-15 PROCEDURE — 85025 COMPLETE CBC W/AUTO DIFF WBC: CPT | Mod: 91

## 2017-11-15 PROCEDURE — 85520 HEPARIN ASSAY: CPT | Mod: 91

## 2017-11-15 PROCEDURE — 97165 OT EVAL LOW COMPLEX 30 MIN: CPT

## 2017-11-15 PROCEDURE — 63600175 PHARM REV CODE 636 W HCPCS: Performed by: STUDENT IN AN ORGANIZED HEALTH CARE EDUCATION/TRAINING PROGRAM

## 2017-11-15 PROCEDURE — 85520 HEPARIN ASSAY: CPT

## 2017-11-15 PROCEDURE — 99232 SBSQ HOSP IP/OBS MODERATE 35: CPT | Mod: GC,,, | Performed by: HOSPITALIST

## 2017-11-15 PROCEDURE — 97162 PT EVAL MOD COMPLEX 30 MIN: CPT

## 2017-11-15 PROCEDURE — 82607 VITAMIN B-12: CPT

## 2017-11-15 PROCEDURE — 25000003 PHARM REV CODE 250

## 2017-11-15 RX ORDER — ACETAMINOPHEN 325 MG/1
650 TABLET ORAL EVERY 6 HOURS PRN
Status: DISCONTINUED | OUTPATIENT
Start: 2017-11-15 | End: 2017-11-27

## 2017-11-15 RX ORDER — TRAMADOL HYDROCHLORIDE 50 MG/1
50 TABLET ORAL EVERY 6 HOURS PRN
Status: DISCONTINUED | OUTPATIENT
Start: 2017-11-15 | End: 2017-11-29 | Stop reason: HOSPADM

## 2017-11-15 RX ADMIN — HEPARIN SODIUM AND DEXTROSE 23 UNITS/KG/HR: 10000; 5 INJECTION INTRAVENOUS at 08:11

## 2017-11-15 RX ADMIN — TIMOLOL MALEATE 1 DROP: 5 SOLUTION OPHTHALMIC at 09:11

## 2017-11-15 RX ADMIN — LEVOTHYROXINE SODIUM 50 MCG: 50 TABLET ORAL at 05:11

## 2017-11-15 RX ADMIN — TIMOLOL MALEATE 1 DROP: 5 SOLUTION OPHTHALMIC at 08:11

## 2017-11-15 RX ADMIN — HEPARIN SODIUM AND DEXTROSE 23 UNITS/KG/HR: 10000; 5 INJECTION INTRAVENOUS at 01:11

## 2017-11-15 RX ADMIN — METOPROLOL SUCCINATE 25 MG: 25 TABLET, EXTENDED RELEASE ORAL at 09:11

## 2017-11-15 RX ADMIN — TRAMADOL HYDROCHLORIDE 50 MG: 50 TABLET, FILM COATED ORAL at 04:11

## 2017-11-15 RX ADMIN — ACETAMINOPHEN 650 MG: 325 TABLET ORAL at 12:11

## 2017-11-15 NOTE — PROGRESS NOTES
[facility] Hospital Medicine  Progress Note    Patient Name: Monique Lew  MRN: [id number]  Patient Class: IP- Inpatient   Admission Date: 11/11/2017  Length of Stay: 3 days  Attending Physician: Summer Grewal*  Primary Care Provider: Aguila Hsieh MD    Hospital Medicine Team: Networked reference to record PCT  Jay Juan MD    Subjective:     Principal Problem:Bladder mass    HPI:  Ms. 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 States she first noticed blood in her urine 2 days before presentation. This temporarily resolved, with hematuria occuring again the day before presentation. Hematuria is associated with increased urge to urinate and incontinence. Prior to this episode, patient states last episode of gross hematuria was in 2012, which was found to be 2/2 cystitis. Patient has had multiple UTIs and microhematuria on urinalysis since that time. She follows with a urologist, Dr. Enriquez at [facility], and has had 2 cystoscopies - most recently in 2014 which no evidence of tumor. Denies fever/chills/nausea/vomiting. Does states she has had decreased appetite, increased fatigue, and night sweats which cause her to soak through her clothes.     In the ED, was found to have Hg of 11 which trended down to 10 in 6 hours (baseline 14). Type and screen obtained. CT urogram performed showed 6.3x4.3x4.0 cm mass in bladder causing severe left hydronephrosis and hydroureter with evidence of hemorrhage in bladder. Also evaluated by urology in ED, who began CBI and recommended she be NPO for possible procedure.     Hospital Course:  Patient admitted to hospital medicine. Anticoagulation held in setting hemorrhage. CT Urogram on 11/11 demonstrated a large bladder mass. Urology consulted. 11/12 CT Chest also demonstrates bilateral pulmonary nodules that could be due to  metastases. H/H stabilized. Patient taken to OR 11/13 for cystoscopy, TURBT, pyelogram; stent unable to be placed as urethral orifice could not be visualized. Path pending. 11/14: started hep gtt. Pt still thinking if she wants nephrostomy tube or not. Urology to keep wilson in until Friday 11/17. If pt decides on no procedures, then will resume warfarin.     Interval History: NAEON. Patient continues to have gross hematuria s/p TURBT. H/H above transfusion threshold. Patient states that she had some pain from wilson site when she was moved in bed for bathing, and that the pain was controlled with Tylenol. No other complaints at this time.    Review of Systems   Constitutional: Positive for appetite change. Negative for chills, diaphoresis, fatigue and fever.   HENT: Negative for sore throat and trouble swallowing.    Respiratory: Negative for cough and shortness of breath.    Cardiovascular: Negative for chest pain, palpitations and leg swelling.   Gastrointestinal: Negative for abdominal distention, abdominal pain, constipation, diarrhea, nausea and vomiting.   Genitourinary: Positive for hematuria. Negative for difficulty urinating, dysuria, flank pain, frequency and urgency.   Musculoskeletal: Negative for arthralgias and back pain.   Skin: Positive for pallor. Negative for color change.   Neurological: Negative for dizziness and light-headedness.   Psychiatric/Behavioral: Negative for confusion.     Objective:     Vital Signs (Most Recent):  Temp: 96.7 °F (35.9 °C) (11/15/17 0818)  Pulse: 71 (11/15/17 0820)  Resp: 20 (11/15/17 0818)  BP: (!) 145/91 (11/15/17 0818)  SpO2: 98 % (11/15/17 0818) Vital Signs (24h Range):  Temp:  [96.7 °F (35.9 °C)-99.2 °F (37.3 °C)] 96.7 °F (35.9 °C)  Pulse:  [66-84] 71  Resp:  [16-20] 20  SpO2:  [94 %-98 %] 98 %  BP: ()/(57-91) 145/91     Weight: 45.4 kg (100 lb)  Body mass index is 19.53 kg/m².    Intake/Output Summary (Last 24 hours) at 11/15/17 8993  Last data filed at  11/15/17 0600   Gross per 24 hour   Intake          1012.65 ml   Output              900 ml   Net           112.65 ml      Physical Exam   Constitutional: She is oriented to person, place, and time. No distress.   Thin, frail appearing elderly lady.   HENT:   Head: Normocephalic and atraumatic.   Mouth/Throat: Oropharynx is clear and moist.   Eyes: No scleral icterus.   Pale conjunctivae.   Neck: Normal range of motion. Neck supple.   Cardiovascular: Normal rate, regular rhythm, normal heart sounds and intact distal pulses.    Pulmonary/Chest: Effort normal and breath sounds normal.   Abdominal: Soft. Bowel sounds are normal. She exhibits no distension. There is no tenderness. There is no guarding.   Genitourinary:   Genitourinary Comments: Forrest bag draining sanguinous urine.    Neurological: She is alert and oriented to person, place, and time.   Skin: Skin is warm and dry. She is not diaphoretic. There is pallor.   Psychiatric: She has a normal mood and affect. Her behavior is normal.   Vitals reviewed.      Significant Labs:   Recent Results (from the past 24 hour(s))   Anti-Xa Heparin Monitoring    Collection Time: 11/14/17  1:34 PM   Result Value Ref Range    Heparin Anti-Xa <0.10 (L) 0.30 - 0.70 IU/mL   CBC auto differential    Collection Time: 11/14/17  7:49 PM   Result Value Ref Range    WBC 11.71 3.90 - 12.70 K/uL    RBC 3.15 (L) 4.00 - 5.40 M/uL    Hemoglobin 9.6 (L) 12.0 - 16.0 g/dL    Hematocrit 28.6 (L) 37.0 - 48.5 %    MCV 91 82 - 98 fL    MCH 30.5 27.0 - 31.0 pg    MCHC 33.6 32.0 - 36.0 g/dL    RDW 13.1 11.5 - 14.5 %    Platelets 250 150 - 350 K/uL    MPV 10.4 9.2 - 12.9 fL    Immature Granulocytes 0.4 0.0 - 0.5 %    Gran # 9.3 (H) 1.8 - 7.7 K/uL    Immature Grans (Abs) 0.05 (H) 0.00 - 0.04 K/uL    Lymph # 1.0 1.0 - 4.8 K/uL    Mono # 1.1 (H) 0.3 - 1.0 K/uL    Eos # 0.2 0.0 - 0.5 K/uL    Baso # 0.05 0.00 - 0.20 K/uL    nRBC 0 0 /100 WBC    Gran% 79.8 (H) 38.0 - 73.0 %    Lymph% 8.5 (L) 18.0 - 48.0 %     Mono% 9.4 4.0 - 15.0 %    Eosinophil% 1.5 0.0 - 8.0 %    Basophil% 0.4 0.0 - 1.9 %    Differential Method Automated    Anti-Xa Heparin Monitoring    Collection Time: 11/14/17  7:49 PM   Result Value Ref Range    Heparin Anti-Xa 0.29 (L) 0.30 - 0.70 IU/mL   Basic metabolic panel    Collection Time: 11/15/17  5:52 AM   Result Value Ref Range    Sodium 131 (L) 136 - 145 mmol/L    Potassium 3.5 3.5 - 5.1 mmol/L    Chloride 100 95 - 110 mmol/L    CO2 21 (L) 23 - 29 mmol/L    Glucose 106 70 - 110 mg/dL    BUN, Bld 21 8 - 23 mg/dL    Creatinine 0.9 0.5 - 1.4 mg/dL    Calcium 8.4 (L) 8.7 - 10.5 mg/dL    Anion Gap 10 8 - 16 mmol/L    eGFR if African American >60.0 >60 mL/min/1.73 m^2    eGFR if non  57.7 (A) >60 mL/min/1.73 m^2   Anti-Xa Heparin Monitoring    Collection Time: 11/15/17  5:52 AM   Result Value Ref Range    Heparin Anti-Xa 0.17 (L) 0.30 - 0.70 IU/mL   CBC auto differential    Collection Time: 11/15/17  5:52 AM   Result Value Ref Range    WBC 10.86 3.90 - 12.70 K/uL    RBC 2.75 (L) 4.00 - 5.40 M/uL    Hemoglobin 8.5 (L) 12.0 - 16.0 g/dL    Hematocrit 25.0 (L) 37.0 - 48.5 %    MCV 91 82 - 98 fL    MCH 30.9 27.0 - 31.0 pg    MCHC 34.0 32.0 - 36.0 g/dL    RDW 12.9 11.5 - 14.5 %    Platelets 199 150 - 350 K/uL    MPV 10.6 9.2 - 12.9 fL    Immature Granulocytes 0.4 0.0 - 0.5 %    Gran # 8.5 (H) 1.8 - 7.7 K/uL    Immature Grans (Abs) 0.04 0.00 - 0.04 K/uL    Lymph # 1.0 1.0 - 4.8 K/uL    Mono # 1.2 (H) 0.3 - 1.0 K/uL    Eos # 0.2 0.0 - 0.5 K/uL    Baso # 0.04 0.00 - 0.20 K/uL    nRBC 0 0 /100 WBC    Gran% 78.0 (H) 38.0 - 73.0 %    Lymph% 8.7 (L) 18.0 - 48.0 %    Mono% 10.9 4.0 - 15.0 %    Eosinophil% 1.6 0.0 - 8.0 %    Basophil% 0.4 0.0 - 1.9 %    Differential Method Automated          Assessment/Plan:      * Bladder mass    CT urogram with evidence of large, obstructing mass and hemorrhage in the bladder.  - Home coumadin and ASA held, per urology recs subq heparin for DVT prophylaxis  - Hg  baseline 14, 11 at presentation. H/H has been stable  - Type and screen ordered  - CBC q12h, will transfuse for Hg <7  - 11/13 OR for cystoscopy, TURBT, and pyelogram; stent could not be placed as urethral orifice could not be visualized.   - Pending results of TURBT/path results, patient may need Oncology consult if she wants to purse treatment. Bilateral nodules found on CT Chest have high likelihood of being metastases. If pt does not want to pursue intervention/tx, will consult palliative care. Pt still deciding if she wants nephrostomy tube or not, depending on path.          Hydronephrosis of left kidney    -See bladder mass          Gross hematuria    -See Bladder Mass        Depression (emotion)    - d/c'ed venlafaxine as pt was not taking it at home            Chronic hyponatremia    Sodium 125 at admission, had been normal in January 2017  - Trending up. D/c'ed venlafaxine as she was not taking this at home          Acquired hypothyroidism    - Continue synthroid 50 mcg  - TSH WNL          Essential hypertension              History of CVA (cerebrovascular accident)    Follows with neurology. 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  - Coumadin held          Chronic atrial fibrillation    - Regular rate and rhythm on exam at presentation  - Coumadin held in setting of hemorrhage and surgery .  - 11/14 hep gtt started, will resume coumadin depending on if urology does any more interventions/nephrostomy tube          Current use of long term anticoagulation    - INR 2.7 at presentation  - Currently on heparin drip.             VTE Risk Mitigation         Ordered     heparin 25,000 units in dextrose 5% 250 mL (100 units/mL) bolus from bag; ADDITIONAL PRN BOLUS  As needed (PRN)     Route:  Intravenous        11/14/17 1314     heparin 25,000 units in dextrose 5% 250 mL (100 units/mL) bolus from bag; ADDITIONAL PRN BOLUS  As needed (PRN)     Route:  Intravenous         11/14/17 1313     heparin 25,000 units in dextrose 5% 250 mL (100 units/mL) infusion  Continuous     Route:  Intravenous        11/14/17 1314        Dispo: Pending pathology report.     Jay Juan MD  Department of Hospital Medicine   [facility]

## 2017-11-15 NOTE — PT/OT/SLP EVAL
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  Lives alone  Bed/bathroom on 2nd floor  Minimal assist for bed mobility  Minimal assist for transfers and gait  Weakness B LE    Diagnosis: Bladder mass  S/p sx 11/13/17 for TURBT of bladder tumor and vaginoscopy    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    CVA (cerebral infarction)     Dislocated IOL (intraocular lens), posterior - Right Eye 10/19/2012    DVT (deep venous thrombosis)     Glaucoma     Pseudoexfoliation, sever    Goiter     Heart attack     Hypertension     Hypothyroidism     Long-term (current) use of anticoagulants     Macular hole of right eye     Multinodular goiter     Other hyperparathyroidism     Phthisis bulbi of left eye     Posterior dislocation of right lens 8/5/2015    Pseudoexfoliation glaucoma, 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 hines)    BREAST LUMPECTOMY      BUNIONECTOMY      CATARACT EXTRACTION W/  INTRAOCULAR LENS IMPLANT Right 01/26/2011    WITH TRAB ()    CATARACT EXTRACTION W/  INTRAOCULAR LENS IMPLANT Left 04/09/2009    ANT. VIITRECTOMY WITH SUTURED PCIOL (DR. RYAN)    CATARACT EXTRACTION W/ INTRAOCULAR LENS IMPLANTW/ TRABECULECTOMY Right 01/26/2011         COLON SURGERY      volvulus    CONJUNCTIVAL FLAP  Left 05/28/2009    DR. JARAMILLO    conjuunctival flap   2009    OS w/ dr. 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 emanuel    TRABECULECTOMY Left 01/11/2006        transcleral cyclophotocoagulation   2010    OS w/ dr. hines   Referring physician: Svetlana  Date referred to PT: 11/14/17  General Precautions: Standard, fall, vision impaired (blind in L eye per pt and limited vision in R eye)  Orthopedic Precautions:     Braces: N/A       Do you have any cultural, spiritual, Moravian conflicts, given your current situation?: none noted    Patient History:  Lives With: alone  Living Arrangements: house  Home Accessibility: stairs within home  Home Layout: Bathroom on 2nd floor, Bedroom on 2nd floor  Number of Stairs Within Home: 14  Stair Railings at Home: inside, present on right side  Equipment Currently Used at Home: none    Previous Level of Function:  Ambulation Skills: independent  Transfer Skills: independent  ADL Skills: independent    Subjective:  Communicated with none prior to session.  Pt states she is waiting for her lunch and she is tired    Pain/Comfort  Pain Rating 1: 0/10 (pt states she is just tired)  Pain Rating Post-Intervention 1: 0/10    Objective:   Patient found with: peripheral IV, wilson catheter     Cognitive Exam:  Oriented to: Person, Place, Time and Situation    Follows Commands/attention: Follows two-step commands  Communication: clear/fluent  Safety awareness/insight to disability: intact    Physical Exam:  Postural examination/scapula alignment: Rounded shoulder, Head forward and Abnormal trunk flexion    Skin integrity: Thin and Dry    Sensation:   Intact  light/touch B LE    Lower Extremity Range of Motion:  Right Lower Extremity:  WFL  Left Lower Extremity: WFL    Lower Extremity Strength:  Right Lower Extremity: Deficits: hip flex 3-/5; knee flex/ext 4-/5; ankle DF 3+/5  Left Lower Extremity: Deficits: hip flex 3-/5; knee flex/ext 4-/5; ankle DF 3+/5     Functional Mobility:  Bed Mobility:  Supine to Sit: Contact Guard Assistance  Sit to Supine: Minimum Assistance, With leg lift  Pt sat on the EOB ~ 10 min with CGA prior to transfer due to c/o dizziness upon sitting  Transfers:  Sit <> Stand Assistance: Minimum Assistance (2 trials from bed)  Sit <> Stand Assistive Device: Rolling Walker    Gait:   Gait Distance: 30ft with RW with flexed trunk, posterior instability, decreased step length, and required manual cues to direct the walker. Pt with difficulty directing the walker due to weakness and limited vision  Assistance 1: Minimum assistance  Gait Assistive Device: Rolling walker  Gait Deviation(s): decreased ijeoma, decreased step length, backward lean, decreased toe-to-floor clearance    Balance:   Static Sit: FAIR-: Maintains without assist but inconsistent   Dynamic Sit: FAIR: Cannot move trunk without losing balance  Static Stand: POOR+: Needs MINIMAL assist to maintain  AM-PAC 6 CLICK MOBILITY  How much help from another person does this patient currently need?   1 = Unable, Total/Dependent Assistance  2 = A lot, Maximum/Moderate Assistance  3 = A little, Minimum/Contact Guard/Supervision  4 = None, Modified [place]/Independent    Turning over in bed (including adjusting bedclothes, sheets and blankets)?: 3  Sitting down on and standing up from a chair with arms (e.g., wheelchair, bedside commode, etc.): 3  Moving from lying on back to sitting on the side of the bed?: 3  Moving to and from a bed to a chair (including a wheelchair)?: 3  Need to walk in hospital room?: 3  Climbing 3-5 steps with a railing?: 3  Total Score: 18     AM-PAC Raw Score CMS G-Code Modifier Level of Impairment Assistance   6 % Total / Unable   7 -  9 CM 80 - 100% Maximal Assist   10 - 14 CL 60 - 80% Moderate Assist   15 - 19 CK 40 - 60% Moderate Assist   20 - 22 CJ 20 - 40% Minimal Assist   23 CI 1-20% SBA / CGA   24 CH 0% Independent/ Mod I     Patient left HOB elevated with all lines intact, call button in reach and nurse notified. Pt set up to eat her lunch , PCT notified to check on pt due to vision impairment.  Assessment:   Monique Lew is a 87 y.o. female with a medical diagnosis of Bladder mass and presents with difficulty with functional mobility due to weakness, instability, fatigue, and vision impairment. Pt can benefit from continued therapy to work towards improved mobility.    Rehab identified problem list/impairments: Rehab identified problem list/impairments: weakness, impaired endurance, gait instability, impaired functional mobilty, impaired balance, visual deficits, decreased safety awareness    Rehab potential is good.    Activity tolerance: Good    Discharge recommendations: Discharge Facility/Level Of Care Needs: nursing facility, skilled (short stay)     Barriers to discharge: Barriers to Discharge: Inaccessible home environment, Decreased caregiver support (lives alone with 14 steps to her bed/bathroom)    Equipment recommendations: Equipment Needed After Discharge: walker, rolling     GOALS:    Physical Therapy Goals        Problem: Physical Therapy Goal    Goal Priority Disciplines Outcome Goal Variances Interventions   Physical Therapy Goal     PT/OT, PT Ongoing (interventions implemented as appropriate)     Description:  PT goals until 11/22/17    1. Pt supine to sit with supervision-not met  2. Pt sit to supine with supervision-not met  3. Pt sit to stand with or without RW as needed for safety with SBA-not met  4. Pt to perform gait 150ft with or without RW as needed for safety with SBA.-not met  5. Pt to up/down 10 steps with R UE rail with CGA.-not met  6. Pt to perform B LE exs in sitting x 15 reps to increase B LE  strength to improve functional mobility.-not met                    PLAN:    Patient to be seen 4 x/week to address the above listed problems via gait training, therapeutic activities, therapeutic exercises, neuromuscular re-education  Plan of Care expires: 12/15/17  Plan of Care reviewed with: patient    Deana G Gilmer, PT  11/15/2017

## 2017-11-15 NOTE — PROGRESS NOTES
[facility]  Urology  Progress Note    Patient Name: Monique Lew  MRN: [id number]  Admission Date: 11/11/2017  Hospital Length of Stay: 3 days  Code Status: Full Code   Attending Provider: Tiffanie Enriquez MD   Primary Care Physician: Aguila Hsieh MD    Subjective:     HPI:  Monique Lew is a 87 y.o. female with hx of HTN, HLD, breast cancer, stoke, DVT, and afib who presented to the ED with complaints of gross hematuria x 1 day. She has a hx of microscopic hematuria and is s/p negative workup in 2014 for an episode of gross hematuria.    She states along with the hematuria she has had frequency, urgency, and urge incontinence. Denies straining or clots. She has had weight loss recently associated with a decreased appetite. Denies flank pain or bone pain.     CT Urogram obtained shows a 6 cm enhancing left sided bladder mass with severe left hydronephrosis and no contrast excretion from left kidney. No lymphadenopathy. Creatinine is normal. H/H is slightly lower than baseline. Vitals have been stable.    She does have a 20 pack/year smoking history, quit 30 years ago. Hx of breast cancer in 1992 treated with radiation.     S/p cystoscopy with TURBT on 11/13/2017-- unable to identify left ureteral orifice at that time, and thus unable to place left ureteral stent.     Interval History:     Pain controlled  Tolerating liquids  Forrest irrigated last night     Review of Systems  Objective:     Temp:  [96.7 °F (35.9 °C)-99.2 °F (37.3 °C)] 96.7 °F (35.9 °C)  Pulse:  [66-81] 71  Resp:  [16-20] 20  SpO2:  [94 %-98 %] 98 %  BP: ()/(57-91) 145/91     Body mass index is 19.53 kg/m².      Date 11/15/17 0700 - 11/16/17 0659   Shift 4876-9932 5706-1155 2590-9503 24 Hour Total   I  N  T  A  K  E   Shift Total  (mL/kg)       O  U  T  P  U  T   Urine  (mL/kg/hr) 950   950    Shift Total  (mL/kg) 950  (20.9)   950  (20.9)   Weight (kg) 45.4 45.4 45.4 45.4          Drains     Drain                  Urethral Catheter 11/13/17 1557 Triple-lumen 24 Fr. 1 day                Physical Exam   Constitutional: She is oriented to person, place, and time. She appears cachectic. She appears ill. No distress.   HENT:   Head: Normocephalic and atraumatic.   Eyes: No scleral icterus.   Neck: No JVD present.   Cardiovascular: Normal rate.  An irregularly irregular rhythm present.   Pulmonary/Chest: Effort normal. No respiratory distress.   Abdominal: Soft. She exhibits no distension. There is no tenderness. There is no rebound and no guarding.   Well healed midline scar   Genitourinary:   Genitourinary Comments: 24 Fr wilson draining red, irrigated with retrieval of large clots      Neurological: She is alert and oriented to person, place, and time.   Skin: She is not diaphoretic. There is pallor.     Psychiatric: She has a normal mood and affect. Her behavior is normal.       Significant Labs:    BMP:    Recent Labs  Lab 11/13/17  0524 11/14/17  0331 11/15/17  0552   * 131* 131*   K 3.9 3.8 3.5    99 100   CO2 22* 22* 21*   BUN 27* 19 21   CREATININE 1.1 0.9 0.9   CALCIUM 8.8 8.7 8.4*       CBC:     Recent Labs  Lab 11/14/17  1949 11/15/17  0552 11/15/17  0820   WBC 11.71 10.86 11.06   HGB 9.6* 8.5* 8.7*   HCT 28.6* 25.0* 25.3*    199 226       All pertinent labs results from the past 24 hours have been reviewed.    Significant Imaging:  All pertinent imaging results/findings from the past 24 hours have been reviewed.                  Assessment/Plan:     Hydronephrosis of left kidney    - patient would like to wait until pathology returns from TURBT specimen prior to deciding on nephrostomy tube insertion-- no urgent indication for decompression of left renal collecting system at this time         Gross hematuria    Monique Lew is a 87 y.o. female who presented with gross hematuria secondary to left sided bladder mass with severe left hydronephrosis    S/p TURBT 11/13/2017, unable to place left  ureteral stent as UO could not be identified     - CBI restarted after irrigation on rounds   - maintain wilson  - NPO for possible return to OR, will determine after rechecking urine   - f/u bladder pathology               VTE Risk Mitigation         Ordered     heparin 25,000 units in dextrose 5% 250 mL (100 units/mL) bolus from bag; ADDITIONAL PRN BOLUS  As needed (PRN)     Route:  Intravenous        11/14/17 1314     heparin 25,000 units in dextrose 5% 250 mL (100 units/mL) bolus from bag; ADDITIONAL PRN BOLUS  As needed (PRN)     Route:  Intravenous        11/14/17 1313     heparin 25,000 units in dextrose 5% 250 mL (100 units/mL) infusion  Continuous     Route:  Intravenous        11/14/17 1314          Raymon Tran MD  Urology  [facility]

## 2017-11-15 NOTE — PROGRESS NOTES
Patient complaining of numbness and loss of sensation to fingers. Patient's fingers are pale. Notified Dr. Jarrell. No new orders at this time. Will continue to monitor.

## 2017-11-15 NOTE — PLAN OF CARE
Problem: Occupational Therapy Goal  Goal: Occupational Therapy Goal  Goals to be met by: 11/30/17     Patient will increase functional independence with ADLs by performing:    UE Dressing with Set-up Assistance and Supervision.  LE Dressing with Set-up Assistance and Stand-by Assistance.  Grooming while standing at sink with Stand-by Assistance.  Toileting from toilet with Set-up Assistance and Supervision for hygiene and clothing management.   Supine to sit with Modified [place].  Stand pivot transfers with Stand-by Assistance.  Toilet transfer to toilet with Stand-by Assistance.    Outcome: Ongoing (interventions implemented as appropriate)  OT eval completed. The above goals are established to improve functional (I) and mobility.    ERASMO Castillo  11/15/2017  Pager: [phone]

## 2017-11-15 NOTE — PT/OT/SLP EVAL
Occupational Therapy  Evaluation/ Treatment    Monique Lew   MRN: [id number]   Admitting Diagnosis: Bladder mass    OT Date of Treatment: 11/15/17   OT Start Time: 1000  OT Stop Time: 1035  OT Total Time (min): 35 min    Billable Minutes:  Evaluation 15 minutes  Therapeutic Activity 8 minutes    Diagnosis: Bladder mass   Decreased strength, endurance, balance    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    CVA (cerebral infarction)     Dislocated IOL (intraocular lens), posterior - Right Eye 10/19/2012    DVT (deep venous thrombosis)     Glaucoma     Pseudoexfoliation, sever    Goiter     Heart attack     Hypertension     Hypothyroidism     Long-term (current) use of anticoagulants     Macular hole of right eye     Multinodular goiter     Other hyperparathyroidism     Phthisis bulbi of left eye     Posterior dislocation of right lens 8/5/2015    Pseudoexfoliation glaucoma, 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 hines)    BREAST LUMPECTOMY      BUNIONECTOMY      CATARACT EXTRACTION W/  INTRAOCULAR LENS IMPLANT Right 01/26/2011    WITH TRAB ()    CATARACT EXTRACTION W/  INTRAOCULAR LENS IMPLANT Left 04/09/2009    ANT. VIITRECTOMY WITH SUTURED PCIOL (DR. RYAN)    CATARACT EXTRACTION W/ INTRAOCULAR LENS IMPLANTW/ TRABECULECTOMY Right 01/26/2011        COLON SURGERY      volvulus    CONJUNCTIVAL FLAP  Left 05/28/2009    DR. JARAMILLO    conjuunctival flap   2009    OS w/ dr. 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 emanuel    TRABECULECTOMY Left 01/11/2006        transcleral cyclophotocoagulation   2010    OS w/ dr. hines       Referring physician: MASSIEL Grewal  Date referred to OT: 11/15/2017    General Precautions: Standard, fall    Do you have any cultural, spiritual, Methodist conflicts, given your current situation?: none stated     Patient History:  Living Environment  Lives With: alone  Living Arrangements: house  Home Accessibility: stairs within home  Home Layout: Bathroom on 2nd floor, Bedroom on 2nd floor  Stair Railings at Home: inside, present on right side  Transportation Available: family or friend will provide  Living Environment Comment: Pt lives alone in a 2SH townhouse with no SUSU, with bedroom/bathroom upstairs via 14 stairs and R rail. pt has no DME and was (I) PTA. pt has a tub/shower combo.  Equipment Currently Used at Home: none    Prior level of function:   Bed Mobility/Transfers: independent  Grooming: independent  Bathing: independent  Upper Body Dressing: independent  Lower Body Dressing: independent  Toileting: independent  Home Management Skills: independent  Homemaking Responsibilities: Yes  Mode of Transportation: Family     Dominant hand: right    Subjective:  Communicated with RN prior to session.  "I just want to keep my independence."  Chief Complaint: being stuck in bed  Patient/Family stated goals: get better    Pain/Comfort  Pain Rating 1: 0/10  Pain Rating Post-Intervention 1: 0/10    Objective:  Patient found with: peripheral IV, wilson catheter, pt found supine in bed and agreeable to therapy.    Cognitive Exam:  Oriented to: Person, Place, Time and Situation  Follows Commands/attention: Follows multistep commands  Communication: clear/fluent  Memory:  No Deficits noted  Safety awareness/insight to " disability: intact  Coping skills/emotional control: Appropriate to situation    Visual/perceptual:  Intact    Physical Exam:  Postural examination/scapula alignment: No postural abnormalities identified, posterior lean in stance  Skin integrity: Visible skin intact  Edema: None noted     Sensation:   Intact    Upper Extremity Range of Motion:  Right Upper Extremity: WNL  Left Upper Extremity: WNL    Upper Extremity Strength:  Right Upper Extremity: WFL  Left Upper Extremity: WFL   Strength: Good    Fine motor coordination:   Intact - but pt reports having difficulty with very fine motor tasks such as picking up small pieces of paper; she reports this has become increasingly difficult as her fingertips are starting to become more numb and her vision causes deficits as well.    Gross motor coordination: WFL    Functional Mobility:  Bed Mobility:  Supine to Sit: Stand by Assistance    Transfers:  Sit <> Stand Assistance: Contact Guard Assistance  Sit <> Stand Assistive Device: No Assistive Device  Bed <> Chair Technique: Stand Pivot  Bed <> Chair Transfer Assistance: Contact Guard Assistance, Minimum Assistance (CGA for most of the transfer; Min A for LOB posteriorly)  Bed <> Chair Assistive Device: No Assistive Device    Functional Ambulation: CGA/Min A with no AD, stand pivot t/f with no AD    Activities of Daily Living:    UE Dressing Level of Assistance: Minimum assistance (gown as robe 2/2 PIV; good ROM and UE strength to don without lines)  LE Dressing Level of Assistance: Stand by assistance (donned socks EOB; slight LOB posteriorly, but able to self-correct)    Balance:   Static Sit: NORMAL: No deviations seen in posture held statically  Dynamic Sit: GOOD: Maintains balance through MODERATE excursions of active trunk movement  Static Stand: FAIR+: Needs contact guard assist to maintain  Dynamic stand: POOR: N/A    Therapeutic Activities and Exercises:  Pt ed re OT role and POC. Pt performed supine to sit  "with SBA. Pt performed strength and ROM testing. Pt donned socks with SBA. Pt donned gown as robe with Min A. Pt performed sit to stand t/f with CGA and required Min A to correct posterior LOB and CGA/Min A to t/f to the chair. Pt performed sit to stand t/f 3x with CGA.     AM-PAC 6 CLICK ADL  How much help from another person does this patient currently need?  1 = Unable, Total/Dependent Assistance  2 = A lot, Maximum/Moderate Assistance  3 = A little, Minimum/Contact Guard/Supervision  4 = None, Modified [place]/Independent    Putting on and taking off regular lower body clothing? : 3  Bathing (including washing, rinsing, drying)?: 3  Toileting, which includes using toilet, bedpan, or urinal? : 3  Putting on and taking off regular upper body clothing?: 3  Taking care of personal grooming such as brushing teeth?: 4  Eating meals?: 4  Total Score: 20    AM-PAC Raw Score CMS "G-Code Modifier Level of Impairment Assistance   6 % Total / Unable   7 - 9 CM 80 - 100% Maximal Assist   10-14 CL 60 - 80% Moderate Assist   15 - 19 CK 40 - 60% Moderate Assist   20 - 22 CJ 20 - 40% Minimal Assist   23 CI 1-20% SBA / CGA   24 CH 0% Independent/ Mod I       Patient left up in chair with all lines intact, call button in reach and sister present    Assessment:  Monique Lew is a 87 y.o. female with a medical diagnosis of Bladder mass and presents with the impairments listed below. Pt is pleasant and participates well. Pt is motivated to keep her independence and would benefit from cont OT to improve transfer skills and safety with self care tasks.    Pt evaluation falls under low complexity for evaluation coding due to performance deficits noted in 1-3 areas as stated above and 0 co-morbities affecting current functional status. Data obtained from problem focused assessments. No modifications or assistance was required for completion of evaluation. Only brief occupational profile and history review completed. "       Rehab identified problem list/impairments: Rehab identified problem list/impairments: weakness, impaired endurance, impaired balance, visual deficits, impaired self care skills, impaired functional mobilty    Rehab potential is good.    Activity tolerance: Fair    Discharge recommendations: Discharge Facility/Level Of Care Needs: nursing facility, skilled (short stay)     Barriers to discharge: Barriers to Discharge: None    Equipment recommendations: walker, rolling     GOALS:    Occupational Therapy Goals        Problem: Occupational Therapy Goal    Goal Priority Disciplines Outcome Interventions   Occupational Therapy Goal     OT, PT/OT Ongoing (interventions implemented as appropriate)    Description:  Goals to be met by: 11/30/17     Patient will increase functional independence with ADLs by performing:    UE Dressing with Set-up Assistance and Supervision.  LE Dressing with Set-up Assistance and Stand-by Assistance.  Grooming while standing at sink with Stand-by Assistance.  Toileting from toilet with Set-up Assistance and Supervision for hygiene and clothing management.   Supine to sit with Modified [place].  Stand pivot transfers with Stand-by Assistance.  Toilet transfer to toilet with Stand-by Assistance.                      PLAN:  Patient to be seen 3 x/week to address the above listed problems via self-care/home management, therapeutic activities, therapeutic exercises, neuromuscular re-education  Plan of Care expires: 12/15/17  Plan of Care reviewed with: patient    ERASMO Castillo  11/15/2017  Pager: [phone]

## 2017-11-15 NOTE — SUBJECTIVE & OBJECTIVE
Interval History:     Pain controlled  Tolerating liquids  Wilson irrigated last night     Review of Systems  Objective:     Temp:  [96.7 °F (35.9 °C)-99.2 °F (37.3 °C)] 96.7 °F (35.9 °C)  Pulse:  [66-81] 71  Resp:  [16-20] 20  SpO2:  [94 %-98 %] 98 %  BP: ()/(57-91) 145/91     Body mass index is 19.53 kg/m².      Date 11/15/17 0700 - 11/16/17 0659   Shift 3163-1109 4260-6283 9736-6519 24 Hour Total   I  N  T  A  K  E   Shift Total  (mL/kg)       O  U  T  P  U  T   Urine  (mL/kg/hr) 950   950    Shift Total  (mL/kg) 950  (20.9)   950  (20.9)   Weight (kg) 45.4 45.4 45.4 45.4          Drains     Drain                 Urethral Catheter 11/13/17 1557 Triple-lumen 24 Fr. 1 day                Physical Exam   Constitutional: She is oriented to person, place, and time. She appears cachectic. She appears ill. No distress.   HENT:   Head: Normocephalic and atraumatic.   Eyes: No scleral icterus.   Neck: No JVD present.   Cardiovascular: Normal rate.  An irregularly irregular rhythm present.   Pulmonary/Chest: Effort normal. No respiratory distress.   Abdominal: Soft. She exhibits no distension. There is no tenderness. There is no rebound and no guarding.   Well healed midline scar   Genitourinary:   Genitourinary Comments: 24 Fr wilson draining red, irrigated with retrieval of large clots      Neurological: She is alert and oriented to person, place, and time.   Skin: She is not diaphoretic. There is pallor.     Psychiatric: She has a normal mood and affect. Her behavior is normal.       Significant Labs:    BMP:    Recent Labs  Lab 11/13/17  0524 11/14/17  0331 11/15/17  0552   * 131* 131*   K 3.9 3.8 3.5    99 100   CO2 22* 22* 21*   BUN 27* 19 21   CREATININE 1.1 0.9 0.9   CALCIUM 8.8 8.7 8.4*       CBC:     Recent Labs  Lab 11/14/17  1949 11/15/17  0552 11/15/17  0820   WBC 11.71 10.86 11.06   HGB 9.6* 8.5* 8.7*   HCT 28.6* 25.0* 25.3*    199 226       All pertinent labs results from the past 24  hours have been reviewed.    Significant Imaging:  All pertinent imaging results/findings from the past 24 hours have been reviewed.

## 2017-11-15 NOTE — PLAN OF CARE
CM in to see pt sitting in chair at bedside with family in room.  Pt AAO with wilson intact with continuous bladder irrigation in process blood tinged urine noted in wilson bag.  CM discussed with pt and family of wilson staying in until Friday per Urology request and possible nephrostomy tube placement.  Pt continued on heparin drip for anticoagulation with bridging needed s/p decision with procedure at the end of the week.  Pt and family verbalizes understanding and agreement.  CM will continue to follow pt with any needs.

## 2017-11-15 NOTE — SUBJECTIVE & OBJECTIVE
Interval History: NAEON. Patient continues to have gross hematuria s/p TURBT. H/H above transfusion threshold. Patient states that she had some pain from wilson site when she was moved in bed for bathing, and that the pain was controlled with Tylenol. No other complaints at this time.    Review of Systems   Constitutional: Positive for appetite change. Negative for chills, diaphoresis, fatigue and fever.   HENT: Negative for sore throat and trouble swallowing.    Respiratory: Negative for cough and shortness of breath.    Cardiovascular: Negative for chest pain, palpitations and leg swelling.   Gastrointestinal: Negative for abdominal distention, abdominal pain, constipation, diarrhea, nausea and vomiting.   Genitourinary: Positive for hematuria. Negative for difficulty urinating, dysuria, flank pain, frequency and urgency.   Musculoskeletal: Negative for arthralgias and back pain.   Skin: Positive for pallor. Negative for color change.   Neurological: Negative for dizziness and light-headedness.   Psychiatric/Behavioral: Negative for confusion.     Objective:     Vital Signs (Most Recent):  Temp: 96.7 °F (35.9 °C) (11/15/17 0818)  Pulse: 71 (11/15/17 0820)  Resp: 20 (11/15/17 0818)  BP: (!) 145/91 (11/15/17 0818)  SpO2: 98 % (11/15/17 0818) Vital Signs (24h Range):  Temp:  [96.7 °F (35.9 °C)-99.2 °F (37.3 °C)] 96.7 °F (35.9 °C)  Pulse:  [66-84] 71  Resp:  [16-20] 20  SpO2:  [94 %-98 %] 98 %  BP: ()/(57-91) 145/91     Weight: 45.4 kg (100 lb)  Body mass index is 19.53 kg/m².    Intake/Output Summary (Last 24 hours) at 11/15/17 0827  Last data filed at 11/15/17 0600   Gross per 24 hour   Intake          1012.65 ml   Output              900 ml   Net           112.65 ml      Physical Exam   Constitutional: She is oriented to person, place, and time. No distress.   Thin, frail appearing elderly lady.   HENT:   Head: Normocephalic and atraumatic.   Mouth/Throat: Oropharynx is clear and moist.   Eyes: No scleral  icterus.   Pale conjunctivae.   Neck: Normal range of motion. Neck supple.   Cardiovascular: Normal rate, regular rhythm, normal heart sounds and intact distal pulses.    Pulmonary/Chest: Effort normal and breath sounds normal.   Abdominal: Soft. Bowel sounds are normal. She exhibits no distension. There is no tenderness. There is no guarding.   Genitourinary:   Genitourinary Comments: Forrest bag draining sanguinous urine.    Neurological: She is alert and oriented to person, place, and time.   Skin: Skin is warm and dry. She is not diaphoretic. There is pallor.   Psychiatric: She has a normal mood and affect. Her behavior is normal.   Vitals reviewed.      Significant Labs:   Recent Results (from the past 24 hour(s))   Anti-Xa Heparin Monitoring    Collection Time: 11/14/17  1:34 PM   Result Value Ref Range    Heparin Anti-Xa <0.10 (L) 0.30 - 0.70 IU/mL   CBC auto differential    Collection Time: 11/14/17  7:49 PM   Result Value Ref Range    WBC 11.71 3.90 - 12.70 K/uL    RBC 3.15 (L) 4.00 - 5.40 M/uL    Hemoglobin 9.6 (L) 12.0 - 16.0 g/dL    Hematocrit 28.6 (L) 37.0 - 48.5 %    MCV 91 82 - 98 fL    MCH 30.5 27.0 - 31.0 pg    MCHC 33.6 32.0 - 36.0 g/dL    RDW 13.1 11.5 - 14.5 %    Platelets 250 150 - 350 K/uL    MPV 10.4 9.2 - 12.9 fL    Immature Granulocytes 0.4 0.0 - 0.5 %    Gran # 9.3 (H) 1.8 - 7.7 K/uL    Immature Grans (Abs) 0.05 (H) 0.00 - 0.04 K/uL    Lymph # 1.0 1.0 - 4.8 K/uL    Mono # 1.1 (H) 0.3 - 1.0 K/uL    Eos # 0.2 0.0 - 0.5 K/uL    Baso # 0.05 0.00 - 0.20 K/uL    nRBC 0 0 /100 WBC    Gran% 79.8 (H) 38.0 - 73.0 %    Lymph% 8.5 (L) 18.0 - 48.0 %    Mono% 9.4 4.0 - 15.0 %    Eosinophil% 1.5 0.0 - 8.0 %    Basophil% 0.4 0.0 - 1.9 %    Differential Method Automated    Anti-Xa Heparin Monitoring    Collection Time: 11/14/17  7:49 PM   Result Value Ref Range    Heparin Anti-Xa 0.29 (L) 0.30 - 0.70 IU/mL   Basic metabolic panel    Collection Time: 11/15/17  5:52 AM   Result Value Ref Range    Sodium 131 (L)  136 - 145 mmol/L    Potassium 3.5 3.5 - 5.1 mmol/L    Chloride 100 95 - 110 mmol/L    CO2 21 (L) 23 - 29 mmol/L    Glucose 106 70 - 110 mg/dL    BUN, Bld 21 8 - 23 mg/dL    Creatinine 0.9 0.5 - 1.4 mg/dL    Calcium 8.4 (L) 8.7 - 10.5 mg/dL    Anion Gap 10 8 - 16 mmol/L    eGFR if African American >60.0 >60 mL/min/1.73 m^2    eGFR if non  57.7 (A) >60 mL/min/1.73 m^2   Anti-Xa Heparin Monitoring    Collection Time: 11/15/17  5:52 AM   Result Value Ref Range    Heparin Anti-Xa 0.17 (L) 0.30 - 0.70 IU/mL   CBC auto differential    Collection Time: 11/15/17  5:52 AM   Result Value Ref Range    WBC 10.86 3.90 - 12.70 K/uL    RBC 2.75 (L) 4.00 - 5.40 M/uL    Hemoglobin 8.5 (L) 12.0 - 16.0 g/dL    Hematocrit 25.0 (L) 37.0 - 48.5 %    MCV 91 82 - 98 fL    MCH 30.9 27.0 - 31.0 pg    MCHC 34.0 32.0 - 36.0 g/dL    RDW 12.9 11.5 - 14.5 %    Platelets 199 150 - 350 K/uL    MPV 10.6 9.2 - 12.9 fL    Immature Granulocytes 0.4 0.0 - 0.5 %    Gran # 8.5 (H) 1.8 - 7.7 K/uL    Immature Grans (Abs) 0.04 0.00 - 0.04 K/uL    Lymph # 1.0 1.0 - 4.8 K/uL    Mono # 1.2 (H) 0.3 - 1.0 K/uL    Eos # 0.2 0.0 - 0.5 K/uL    Baso # 0.04 0.00 - 0.20 K/uL    nRBC 0 0 /100 WBC    Gran% 78.0 (H) 38.0 - 73.0 %    Lymph% 8.7 (L) 18.0 - 48.0 %    Mono% 10.9 4.0 - 15.0 %    Eosinophil% 1.6 0.0 - 8.0 %    Basophil% 0.4 0.0 - 1.9 %    Differential Method Automated

## 2017-11-15 NOTE — PLAN OF CARE
Problem: Patient Care Overview  Goal: Plan of Care Review  Outcome: Ongoing (interventions implemented as appropriate)  Pt disoriented to situation & time.  VSS stable on room air.  No complaints of pain.     Pt with dark, sanguinous urine output throughout shift, 500 mL.  No complaints of bladder pain, bladder soft, flat on palpation.  Urology restarted CBI.  Supplies in place, titrated to light pink urine.  No clots noted. Bedside shift report of CBI with oncoming nurse.    Heparin adjusted per nomogram, receiving continuously.     Telemetry a-fib.  Neuro checks stable.  Will continue to monitor pt.

## 2017-11-15 NOTE — NURSING
Contacted IM-5.   mL dark, sanguinous.  Pt denies pain to bladder, bladder flat from palpation, no firmness.  Alerting provider, requesting possible PRN flush order.  Did not receive call back.    @[id number] - Contacted IM-5.  Per provider, sanguinous output expected per urology.  Ordered CBC at 0400 with telephone read back to monitor CBC.

## 2017-11-15 NOTE — ASSESSMENT & PLAN NOTE
Monique Lew is a 87 y.o. female who presented with gross hematuria secondary to left sided bladder mass with severe left hydronephrosis    S/p TURBT 11/13/2017, unable to place left ureteral stent as UO could not be identified     - CBI restarted after irrigation on rounds   - maintain wilson  - NPO for possible return to OR, will determine after rechecking urine   - f/u bladder pathology

## 2017-11-15 NOTE — PLAN OF CARE
Problem: Physical Therapy Goal  Goal: Physical Therapy Goal  PT goals until 11/22/17    1. Pt supine to sit with supervision-not met  2. Pt sit to supine with supervision-not met  3. Pt sit to stand with or without RW as needed for safety with SBA-not met  4. Pt to perform gait 150ft with or without RW as needed for safety with SBA.-not met  5. Pt to up/down 10 steps with R UE rail with CGA.-not met  6. Pt to perform B LE exs in sitting x 15 reps to increase B LE strength to improve functional mobility.-not met    Outcome: Ongoing (interventions implemented as appropriate)  Pt's goals set and pt will benefit from skilled PT services to work towards improved functional mobility including: bed mobility, transfers, up/down steps, and gait.   Deana Scherer, PT  11/15/2017

## 2017-11-15 NOTE — ASSESSMENT & PLAN NOTE
CT urogram with evidence of large, obstructing mass and hemorrhage in the bladder.  - Home coumadin and ASA held, per urology recs subq heparin for DVT prophylaxis  - Hg baseline 14, 11 at presentation. H/H has been stable  - Type and screen ordered  - CBC q12h, will transfuse for Hg <7  - 11/13 OR for cystoscopy, TURBT, and pyelogram; stent could not be placed as urethral orifice could not be visualized.   - Pending results of TURBT/path results, patient may need Oncology consult if she wants to purse treatment. Bilateral nodules found on CT Chest have high likelihood of being metastases. If pt does not want to pursue intervention/tx, will consult palliative care. Pt still deciding if she wants nephrostomy tube or not, depending on path.

## 2017-11-16 LAB
ABO + RH BLD: NORMAL
ANION GAP SERPL CALC-SCNC: 7 MMOL/L
BASOPHILS # BLD AUTO: 0.02 K/UL
BASOPHILS # BLD AUTO: 0.03 K/UL
BASOPHILS NFR BLD: 0.2 %
BASOPHILS NFR BLD: 0.3 %
BLD GP AB SCN CELLS X3 SERPL QL: NORMAL
BLD PROD TYP BPU: NORMAL
BLOOD UNIT EXPIRATION DATE: NORMAL
BLOOD UNIT TYPE CODE: 2800
BLOOD UNIT TYPE: NORMAL
BUN SERPL-MCNC: 28 MG/DL
CALCIUM SERPL-MCNC: 8.3 MG/DL
CHLORIDE SERPL-SCNC: 100 MMOL/L
CO2 SERPL-SCNC: 22 MMOL/L
CODING SYSTEM: NORMAL
CREAT SERPL-MCNC: 1 MG/DL
DIFFERENTIAL METHOD: ABNORMAL
DIFFERENTIAL METHOD: ABNORMAL
DISPENSE STATUS: NORMAL
EOSINOPHIL # BLD AUTO: 0.2 K/UL
EOSINOPHIL # BLD AUTO: 0.3 K/UL
EOSINOPHIL NFR BLD: 2 %
EOSINOPHIL NFR BLD: 2.8 %
ERYTHROCYTE [DISTWIDTH] IN BLOOD BY AUTOMATED COUNT: 13.3 %
ERYTHROCYTE [DISTWIDTH] IN BLOOD BY AUTOMATED COUNT: 13.4 %
EST. GFR  (AFRICAN AMERICAN): 58.5 ML/MIN/1.73 M^2
EST. GFR  (NON AFRICAN AMERICAN): 50.8 ML/MIN/1.73 M^2
FACT X PPP CHRO-ACNC: 0.26 IU/ML
GLUCOSE SERPL-MCNC: 115 MG/DL
HCT VFR BLD AUTO: 19.2 %
HCT VFR BLD AUTO: 20.9 %
HGB BLD-MCNC: 6.8 G/DL
HGB BLD-MCNC: 7.1 G/DL
IMM GRANULOCYTES # BLD AUTO: 0.03 K/UL
IMM GRANULOCYTES # BLD AUTO: 0.06 K/UL
IMM GRANULOCYTES NFR BLD AUTO: 0.3 %
IMM GRANULOCYTES NFR BLD AUTO: 0.6 %
LYMPHOCYTES # BLD AUTO: 1.1 K/UL
LYMPHOCYTES # BLD AUTO: 1.2 K/UL
LYMPHOCYTES NFR BLD: 10.5 %
LYMPHOCYTES NFR BLD: 12.1 %
MCH RBC QN AUTO: 31.4 PG
MCH RBC QN AUTO: 32.7 PG
MCHC RBC AUTO-ENTMCNC: 34 G/DL
MCHC RBC AUTO-ENTMCNC: 35.4 G/DL
MCV RBC AUTO: 92 FL
MCV RBC AUTO: 93 FL
MONOCYTES # BLD AUTO: 0.6 K/UL
MONOCYTES # BLD AUTO: 1.1 K/UL
MONOCYTES NFR BLD: 10.5 %
MONOCYTES NFR BLD: 5.9 %
NEUTROPHILS # BLD AUTO: 7.4 K/UL
NEUTROPHILS # BLD AUTO: 8.3 K/UL
NEUTROPHILS NFR BLD: 74 %
NEUTROPHILS NFR BLD: 80.8 %
NRBC BLD-RTO: 0 /100 WBC
NRBC BLD-RTO: 0 /100 WBC
PLATELET # BLD AUTO: 203 K/UL
PLATELET # BLD AUTO: 229 K/UL
PMV BLD AUTO: 10.3 FL
PMV BLD AUTO: 10.3 FL
POTASSIUM SERPL-SCNC: 3.6 MMOL/L
RBC # BLD AUTO: 2.08 M/UL
RBC # BLD AUTO: 2.26 M/UL
SODIUM SERPL-SCNC: 129 MMOL/L
TRANS ERYTHROCYTES VOL PATIENT: NORMAL ML
WBC # BLD AUTO: 10.03 K/UL
WBC # BLD AUTO: 10.27 K/UL

## 2017-11-16 PROCEDURE — 11000001 HC ACUTE MED/SURG PRIVATE ROOM

## 2017-11-16 PROCEDURE — 85025 COMPLETE CBC W/AUTO DIFF WBC: CPT

## 2017-11-16 PROCEDURE — 25000003 PHARM REV CODE 250

## 2017-11-16 PROCEDURE — P9021 RED BLOOD CELLS UNIT: HCPCS

## 2017-11-16 PROCEDURE — 25000003 PHARM REV CODE 250: Performed by: HOSPITALIST

## 2017-11-16 PROCEDURE — 36415 COLL VENOUS BLD VENIPUNCTURE: CPT

## 2017-11-16 PROCEDURE — 36430 TRANSFUSION BLD/BLD COMPNT: CPT

## 2017-11-16 PROCEDURE — 25000003 PHARM REV CODE 250: Performed by: STUDENT IN AN ORGANIZED HEALTH CARE EDUCATION/TRAINING PROGRAM

## 2017-11-16 PROCEDURE — 86901 BLOOD TYPING SEROLOGIC RH(D): CPT

## 2017-11-16 PROCEDURE — 85520 HEPARIN ASSAY: CPT

## 2017-11-16 PROCEDURE — 86900 BLOOD TYPING SEROLOGIC ABO: CPT

## 2017-11-16 PROCEDURE — 86920 COMPATIBILITY TEST SPIN: CPT

## 2017-11-16 PROCEDURE — 80048 BASIC METABOLIC PNL TOTAL CA: CPT

## 2017-11-16 PROCEDURE — 97116 GAIT TRAINING THERAPY: CPT

## 2017-11-16 PROCEDURE — 97530 THERAPEUTIC ACTIVITIES: CPT

## 2017-11-16 PROCEDURE — 99232 SBSQ HOSP IP/OBS MODERATE 35: CPT | Mod: GC,,, | Performed by: HOSPITALIST

## 2017-11-16 RX ORDER — ADHESIVE BANDAGE
30 BANDAGE TOPICAL DAILY PRN
Status: DISCONTINUED | OUTPATIENT
Start: 2017-11-16 | End: 2017-11-29 | Stop reason: HOSPADM

## 2017-11-16 RX ORDER — HYDROCODONE BITARTRATE AND ACETAMINOPHEN 500; 5 MG/1; MG/1
TABLET ORAL
Status: DISCONTINUED | OUTPATIENT
Start: 2017-11-16 | End: 2017-11-20

## 2017-11-16 RX ADMIN — MAGNESIUM HYDROXIDE 2400 MG: 400 SUSPENSION ORAL at 04:11

## 2017-11-16 RX ADMIN — TIMOLOL MALEATE 1 DROP: 5 SOLUTION OPHTHALMIC at 08:11

## 2017-11-16 RX ADMIN — METOPROLOL SUCCINATE 25 MG: 25 TABLET, EXTENDED RELEASE ORAL at 09:11

## 2017-11-16 RX ADMIN — TIMOLOL MALEATE 1 DROP: 5 SOLUTION OPHTHALMIC at 09:11

## 2017-11-16 RX ADMIN — LEVOTHYROXINE SODIUM 50 MCG: 50 TABLET ORAL at 06:11

## 2017-11-16 RX ADMIN — TRAMADOL HYDROCHLORIDE 50 MG: 50 TABLET, FILM COATED ORAL at 01:11

## 2017-11-16 NOTE — PHYSICIAN QUERY
PT Name: Monique Lew  MR #: [id number]    Physician Query Form - Nutrition Clarification     CDS: Yue Byrnes RN     Contact information:  [email or web address]    Phone: [phone]    This form is a permanent document in the medical record.     Query Date: November 16, 2017    By submitting this query, we are merely seeking further clarification of documentation.. Please utilize your independent clinical judgment when addressing the question(s) below.    The Medical record contains the following:   Indicators  Supporting Clinical Findings Location in Medical Record    % of Estimated Energy Intake over a time frame from p.o., TF, or TPN      X Weight Status over a time frame She has had weight loss recently associated with a decreased appetite   PN 11/14/17    X Subcutaneous Fat and/or Muscle Loss  Thin, frail appearing elderly lady.    She appears cachectic. She appears ill  H&P 11/12/17   Urology Consult 11/12/17    Fluid Accumulation or Edema      Reduced  Strength      X Wt / BMI / Usual Body Weight  Wt= 45.4 kg (100lb)    BMI= 19.6  Anthropometric flow sheet    Delayed Wound Healing / Failure to Thrive      X Acute or Chronic Illness found to have bladder mass on CT urogram as well as severe L hydronephrosis and now s/p cystoscopy and TURBT. Concerning for malignancy     bilateral pulmonary nodules that could be due to metastases.   PN  11/14/17           PN 11/13/17    Medication      Treatment      X Other Does states she has had decreased appetite, increased fatigue, and night sweats  H&P 11/12/17     AND / ASPEN Clinical Characteristics (October 2011)  A minimum of two characteristics is recommended for diagnosing either moderate or severe malnutrition   Mild Malnutrition Moderate Malnutrition Severe Malnutrition   Energy Intake from p.o., TF or TPN. < 75% intake of estimated energy needs for less than 7 days < 75% intake of estimated energy needs for greater than 7 days < 50% intake of  estimated energy needs for > 5 days   Weight Loss 1-2% in 1 month  5% in 3 months  7.5% in 6 months  10% in 1 year 1-2 % in 1 week  5% in 1 month  7.5% in 3 months  10% in 6 months  20% in 1 year > 2% in 1 week  > 5% in 1 month  > 7.5% in 3 months  > 10% in 6 months  > 20% in 1 year   Physical Findings     None *Mild subcutaneous fat and/or muscle loss  *Mild fluid accumulation  *Stage II decubitus  *Surgical wound or non-healing wound *Mod/severe subcutaneous fat and/or muscle loss  *Mod/severe fluid accumulation  *Stage III or IV decubitus  *Non-healing surgical wound     Provider, please specify diagnosis or diagnoses associated with above clinical findings.    [ ] Mild Protein-Calorie Malnutrition  [ ] Moderate Protein-Calorie Malnutrition  [ ] Severe Protein-Calorie Malnutrition  x ] Cachexia  [ ] Anorexia  [ ] Abnormal Weight Loss  [ ] Underweight  [ ] Other Nutritional Diagnosis (please specify): ____________________________________  [ ] Other: ________________________________  [ ] Clinically Undetermined    Please document in your progress notes daily for the duration of treatment until resolved and include in your discharge summary.

## 2017-11-16 NOTE — PT/OT/SLP PROGRESS
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  Pt agreeable to PT session    Pain/Comfort  Pain Rating 1: 0/10  Pain Rating Post-Intervention 1: 0/10    Objective:   Patient found with: wilson catheter, peripheral IV, telemetry (bladder irrigation.  Pt found sup in bed with friend present in the room)      FUNCTIONAL MOBILITY    Bed Mobility (with vc's for sequencing and safe technique of functional mobility):        Sup > sit at the EOB with SBA exiting on the R side        Sit > sup with SBA       Scooting hips to the EOB upon sitting with SBA x2 scoots           Transfers  (vc's for hand placement and safety of task)       Sit > stand from EOB with RW requiring min A for safety       Stand > sit to EOB requiring min A for controlled descent         BS chair > EOB SPT to the L with R HHA requiring mod/min A     Gait        Distance: 95ft        Assistive Device: initially with min/CGA with RW then with mod/min A with R HHA       Assistance Required: Assistance for balance       Verbal Cues required: for directional guidance, appropriate heel strike,                                       B step length, ijeoma and safety       Gait Deviations: Pt demonstrates increased ijeoma, decreased B step length,                               decreased stride length, narrow base of support,                  "             decreased toe-to-floor clearance, decreased weight-shifting ability and                              Some lateral instability      THERAPEUTIC ACTIVITIES     SITTING        Pt tolerates sitting at the EOB with SBA for balance and B UE support     STANDING       Pt tolerates standing with RW/R HHA requiring CGA/min A for balance with vc's.        ELEVATIONS    Stairs       NP 2* pt with limited endurance      Education:  Education provided to pt regarding: importance and benefits of performing OOB mobility.    Whiteboard updated with correct mobility information. RN/PCT notified.  Pt safe to transfer OOB with RN/PCT: Use no AD & 1 person assist.      AM-PAC 6 CLICK MOBILITY  How much help from another person does this patient currently need?   1 = Unable, Total/Dependent Assistance  2 = A lot, Maximum/Moderate Assistance  3 = A little, Minimum/Contact Guard/Supervision  4 = None, Modified [place]/Independent    Turning over in bed (including adjusting bedclothes, sheets and blankets)?: 3  Sitting down on and standing up from a chair with arms (e.g., wheelchair, bedside commode, etc.): 3  Moving from lying on back to sitting on the side of the bed?: 3  Moving to and from a bed to a chair (including a wheelchair)?: 3  Need to walk in hospital room?: 3  Climbing 3-5 steps with a railing?: 3  Total Score: 18    AM-PAC Raw Score CMS G-Code Modifier Level of Impairment Assistance   6 % Total / Unable   7 - 9 CM 80 - 100% Maximal Assist   10 - 14 CL 60 - 80% Moderate Assist   15 - 19 CK 40 - 60% Moderate Assist   20 - 22 CJ 20 - 40% Minimal Assist   23 CI 1-20% SBA / CGA   24 CH 0% Independent/ Mod I     Patient left supine with all lines intact, call button in reach, bed alarm on, RN notified and friend present.    Assessment:  Monique Lew is a 87 y.o. female with a medical diagnosis of Bladder mass and presents with impaired balance and generalized weakness requiring assistance to prevent  falls during OOB mobility. Pt remains motivated to participate in PT session and will cont to benefit from skilled PT intervention.      Rehab identified problem list/impairments: Rehab identified problem list/impairments: weakness, impaired endurance, impaired self care skills, impaired functional mobilty, gait instability, impaired balance, visual deficits, decreased safety awareness    Rehab potential is good.    Activity tolerance: Good    Discharge recommendations: Discharge Facility/Level Of Care Needs: nursing facility, skilled (short stay)     Barriers to discharge: Barriers to Discharge: Inaccessible home environment, Decreased caregiver support (14 steps to her bedroom and lives alone)    Equipment recommendations: Equipment Needed After Discharge: walker, rolling     GOALS:    Physical Therapy Goals        Problem: Physical Therapy Goal    Goal Priority Disciplines Outcome Goal Variances Interventions   Physical Therapy Goal     PT/OT, PT Ongoing (interventions implemented as appropriate)     Description:  PT goals until 11/22/17    1. Pt supine to sit with supervision-not met  2. Pt sit to supine with supervision-not met  3. Pt sit to stand with or without RW as needed for safety with SBA-not met  4. Pt to perform gait 150ft with or without RW as needed for safety with SBA.-not met  5. Pt to up/down 10 steps with R UE rail with CGA.-not met  6. Pt to perform B LE exs in sitting x 15 reps to increase B LE strength to improve functional mobility.-not met                      PLAN:    Patient to be seen 4 x/week  to address the above listed problems via gait training, therapeutic activities, therapeutic exercises, neuromuscular re-education  Plan of Care expires: 12/15/17  Plan of Care reviewed with: patient         Bonny Kearns, PTA  11/16/2017

## 2017-11-16 NOTE — PLAN OF CARE
Problem: Patient Care Overview  Goal: Plan of Care Review  Outcome: Ongoing (interventions implemented as appropriate)  A/o v/s stable see flow sheet ,heparin gtt stopped per MD telemetry monitor remains afib ranges 60-84 denies any discomfort ,sob CBI irrigated manually x 2 c/o bladder full obtained with few string blood clots urine from dark cranberry colored to light pinkish ,urologist slow down the rate ,no injury free of falls mom given due c/o constipation encouraged fluid intake plan of care reviewed with pt.

## 2017-11-16 NOTE — ASSESSMENT & PLAN NOTE
- Regular rate and rhythm on exam at presentation  - Coumadin held in setting of hemorrhage and surgery .  - 11/14 hep gtt started, will resume coumadin depending on if urology does any more interventions/nephrostomy tube  - 11/15 hep ggt stopped per urology due to excessive hematuria. Although patient is at risk for stroke, we agree that heparin can be stopped temporarily due to excessive blood loss through hematuria. Patient will be continued on CBI today. If hematuria fails to improve off anticoagulation, Urology may take patient to OR again for fulguration. Will resume anticoagulation when benefit outweighs risk.

## 2017-11-16 NOTE — PLAN OF CARE
11/16/17 1130   Right Care Assessment   Can the patient answer the patient profile reliably? Yes, cognitively intact   How often would a person be available to care for the patient? Often   Describe the patient's ability to walk at the present time. Minor restrictions or changes   How does the patient rate their overall health at the present time? Poor   Number of comorbid conditions (as recorded on the chart) Four   During the past month, has the patient often been bothered by feeling down, depressed or hopeless? No   During the past month, has the patient often been bothered by little interest or pleasure in doing things? No     Plan A: Home with   Plan B: SNF

## 2017-11-16 NOTE — PLAN OF CARE
Problem: Physical Therapy Goal  Goal: Physical Therapy Goal  PT goals until 11/22/17    1. Pt supine to sit with supervision-not met  2. Pt sit to supine with supervision-not met  3. Pt sit to stand with or without RW as needed for safety with SBA-not met  4. Pt to perform gait 150ft with or without RW as needed for safety with SBA.-not met  5. Pt to up/down 10 steps with R UE rail with CGA.-not met  6. Pt to perform B LE exs in sitting x 15 reps to increase B LE strength to improve functional mobility.-not met           Goals remain appropriate.     Bonny Kearns, PTA.  11/16/2017

## 2017-11-16 NOTE — SUBJECTIVE & OBJECTIVE
Interval History:     SHAHZAD  CBI overnight, was not irrigated     Review of Systems  Objective:     Temp:  [96.7 °F (35.9 °C)-98.3 °F (36.8 °C)] 98.2 °F (36.8 °C)  Pulse:  [58-80] 74  Resp:  [16-20] 18  SpO2:  [85 %-99 %] 97 %  BP: ()/(48-91) 112/64     Body mass index is 19.53 kg/m².            Drains     Drain                 Urethral Catheter 11/13/17 1557 Triple-lumen 24 Fr. 2 days                Physical Exam   Constitutional: She is oriented to person, place, and time. She appears cachectic. She appears ill. No distress.   HENT:   Head: Normocephalic and atraumatic.   Eyes: No scleral icterus.   Neck: No JVD present.   Cardiovascular: Normal rate.  An irregularly irregular rhythm present.   Pulmonary/Chest: Effort normal. No respiratory distress.   Abdominal: Soft. She exhibits no distension. There is no tenderness. There is no rebound and no guarding.   Well healed midline scar   Genitourinary:   Genitourinary Comments: 24 Fr wilson draining clear red on slow drip CBI, irrigated with retrieval of large clots, CBI restarted      Neurological: She is alert and oriented to person, place, and time.   Skin: She is not diaphoretic. There is pallor.     Psychiatric: She has a normal mood and affect. Her behavior is normal.       Significant Labs:    BMP:    Recent Labs  Lab 11/13/17  0524 11/14/17  0331 11/15/17  0552   * 131* 131*   K 3.9 3.8 3.5    99 100   CO2 22* 22* 21*   BUN 27* 19 21   CREATININE 1.1 0.9 0.9   CALCIUM 8.8 8.7 8.4*       CBC:     Recent Labs  Lab 11/15/17  0552 11/15/17  0820 11/15/17  1952   WBC 10.86 11.06 10.80   HGB 8.5* 8.7* 7.6*   HCT 25.0* 25.3* 22.2*    226 222       All pertinent labs results from the past 24 hours have been reviewed.    Significant Imaging:  All pertinent imaging results/findings from the past 24 hours have been reviewed.

## 2017-11-16 NOTE — SUBJECTIVE & OBJECTIVE
Interval History: NAEON. Patient complains of a non-productive cough that has occurred for past few weeks, worse with lying down or after eating spicy foods. No other complaints at this time including dizziness, SOB, chest pain, palpitations, headache.     Review of Systems   Constitutional: Positive for appetite change. Negative for chills, diaphoresis, fatigue and fever.   HENT: Negative for sore throat and trouble swallowing.    Respiratory: Negative for cough and shortness of breath.    Cardiovascular: Negative for chest pain, palpitations and leg swelling.   Gastrointestinal: Negative for abdominal distention, abdominal pain, constipation, diarrhea, nausea and vomiting.   Genitourinary: Positive for hematuria. Negative for difficulty urinating, dysuria, flank pain, frequency and urgency.   Musculoskeletal: Negative for arthralgias and back pain.   Skin: Positive for pallor. Negative for color change.   Neurological: Negative for dizziness and light-headedness.   Psychiatric/Behavioral: Negative for confusion.     Objective:     Vital Signs (Most Recent):  Temp: 98.2 °F (36.8 °C) (11/16/17 0428)  Pulse: 74 (11/16/17 0428)  Resp: 18 (11/16/17 0428)  BP: 112/64 (11/16/17 0428)  SpO2: 97 % (11/16/17 0433) Vital Signs (24h Range):  Temp:  [96.7 °F (35.9 °C)-98.3 °F (36.8 °C)] 98.2 °F (36.8 °C)  Pulse:  [58-80] 74  Resp:  [16-20] 18  SpO2:  [85 %-99 %] 97 %  BP: ()/(48-91) 112/64     Weight: 45.4 kg (100 lb)  Body mass index is 19.53 kg/m².    Intake/Output Summary (Last 24 hours) at 11/16/17 0729  Last data filed at 11/16/17 0600   Gross per 24 hour   Intake              424 ml   Output             2200 ml   Net            -1776 ml      Physical Exam   Constitutional: She is oriented to person, place, and time. No distress.   Thin, frail appearing elderly lady.   HENT:   Head: Normocephalic and atraumatic.   Mouth/Throat: Oropharynx is clear and moist.   Eyes: No scleral icterus.   Pale conjunctivae.   Neck:  Normal range of motion. Neck supple.   Cardiovascular: Normal rate, regular rhythm, normal heart sounds and intact distal pulses.    Pulmonary/Chest: Effort normal and breath sounds normal.   Abdominal: Soft. Bowel sounds are normal. She exhibits no distension. There is no tenderness. There is no guarding.   Genitourinary:   Genitourinary Comments: Forrest bag draining sanguinous urine.    Neurological: She is alert and oriented to person, place, and time.   Skin: Skin is warm and dry. She is not diaphoretic. There is pallor.   Psychiatric: She has a normal mood and affect. Her behavior is normal.   Vitals reviewed.      Significant Labs:   Recent Results (from the past 24 hour(s))   CBC auto differential    Collection Time: 11/15/17  8:20 AM   Result Value Ref Range    WBC 11.06 3.90 - 12.70 K/uL    RBC 2.79 (L) 4.00 - 5.40 M/uL    Hemoglobin 8.7 (L) 12.0 - 16.0 g/dL    Hematocrit 25.3 (L) 37.0 - 48.5 %    MCV 91 82 - 98 fL    MCH 31.2 (H) 27.0 - 31.0 pg    MCHC 34.4 32.0 - 36.0 g/dL    RDW 13.0 11.5 - 14.5 %    Platelets 226 150 - 350 K/uL    MPV 10.3 9.2 - 12.9 fL    Immature Granulocytes 0.5 0.0 - 0.5 %    Gran # 9.0 (H) 1.8 - 7.7 K/uL    Immature Grans (Abs) 0.05 (H) 0.00 - 0.04 K/uL    Lymph # 0.7 (L) 1.0 - 4.8 K/uL    Mono # 1.2 (H) 0.3 - 1.0 K/uL    Eos # 0.1 0.0 - 0.5 K/uL    Baso # 0.03 0.00 - 0.20 K/uL    nRBC 0 0 /100 WBC    Gran% 81.0 (H) 38.0 - 73.0 %    Lymph% 6.6 (L) 18.0 - 48.0 %    Mono% 10.5 4.0 - 15.0 %    Eosinophil% 1.1 0.0 - 8.0 %    Basophil% 0.3 0.0 - 1.9 %    Differential Method Automated    Magnesium    Collection Time: 11/15/17  8:20 AM   Result Value Ref Range    Magnesium 1.7 1.6 - 2.6 mg/dL   Vitamin B12    Collection Time: 11/15/17  1:42 PM   Result Value Ref Range    Vitamin B-12 928 210 - 950 pg/mL   Anti-Xa Heparin Monitoring    Collection Time: 11/15/17  3:36 PM   Result Value Ref Range    Heparin Anti-Xa 0.32 0.30 - 0.70 IU/mL   CBC auto differential    Collection Time: 11/15/17   7:52 PM   Result Value Ref Range    WBC 10.80 3.90 - 12.70 K/uL    RBC 2.40 (L) 4.00 - 5.40 M/uL    Hemoglobin 7.6 (L) 12.0 - 16.0 g/dL    Hematocrit 22.2 (L) 37.0 - 48.5 %    MCV 93 82 - 98 fL    MCH 31.7 (H) 27.0 - 31.0 pg    MCHC 34.2 32.0 - 36.0 g/dL    RDW 13.1 11.5 - 14.5 %    Platelets 222 150 - 350 K/uL    MPV 10.5 9.2 - 12.9 fL    Immature Granulocytes 0.6 (H) 0.0 - 0.5 %    Gran # 8.1 (H) 1.8 - 7.7 K/uL    Immature Grans (Abs) 0.06 (H) 0.00 - 0.04 K/uL    Lymph # 1.1 1.0 - 4.8 K/uL    Mono # 1.3 (H) 0.3 - 1.0 K/uL    Eos # 0.3 0.0 - 0.5 K/uL    Baso # 0.03 0.00 - 0.20 K/uL    nRBC 0 0 /100 WBC    Gran% 74.5 (H) 38.0 - 73.0 %    Lymph% 10.6 (L) 18.0 - 48.0 %    Mono% 11.7 4.0 - 15.0 %    Eosinophil% 2.3 0.0 - 8.0 %    Basophil% 0.3 0.0 - 1.9 %    Differential Method Automated

## 2017-11-16 NOTE — PROGRESS NOTES
[facility]  Urology  Progress Note    Patient Name: Monique Lew  MRN: [id number]  Admission Date: 11/11/2017  Hospital Length of Stay: 4 days  Code Status: Full Code   Attending Provider: Tiffanie Enriquez MD   Primary Care Physician: Aguila Hsieh MD    Subjective:     HPI:  Monique Lew is a 87 y.o. female with hx of HTN, HLD, breast cancer, stoke, DVT, and afib who presented to the ED with complaints of gross hematuria x 1 day. She has a hx of microscopic hematuria and is s/p negative workup in 2014 for an episode of gross hematuria.    She states along with the hematuria she has had frequency, urgency, and urge incontinence. Denies straining or clots. She has had weight loss recently associated with a decreased appetite. Denies flank pain or bone pain.     CT Urogram obtained shows a 6 cm enhancing left sided bladder mass with severe left hydronephrosis and no contrast excretion from left kidney. No lymphadenopathy. Creatinine is normal. H/H is slightly lower than baseline. Vitals have been stable.    She does have a 20 pack/year smoking history, quit 30 years ago. Hx of breast cancer in 1992 treated with radiation.     S/p cystoscopy with TURBT on 11/13/2017-- unable to identify left ureteral orifice at that time, and thus unable to place left ureteral stent.     Interval History:     SHAHZAD  CBI overnight, was not irrigated     Review of Systems  Objective:     Temp:  [96.7 °F (35.9 °C)-98.3 °F (36.8 °C)] 98.2 °F (36.8 °C)  Pulse:  [58-80] 74  Resp:  [16-20] 18  SpO2:  [85 %-99 %] 97 %  BP: ()/(48-91) 112/64     Body mass index is 19.53 kg/m².            Drains     Drain                 Urethral Catheter 11/13/17 1557 Triple-lumen 24 Fr. 2 days                Physical Exam   Constitutional: She is oriented to person, place, and time. She appears cachectic. She appears ill. No distress.   HENT:   Head: Normocephalic and atraumatic.   Eyes: No scleral icterus.   Neck: No JVD  present.   Cardiovascular: Normal rate.  An irregularly irregular rhythm present.   Pulmonary/Chest: Effort normal. No respiratory distress.   Abdominal: Soft. She exhibits no distension. There is no tenderness. There is no rebound and no guarding.   Well healed midline scar   Genitourinary:   Genitourinary Comments: 24 Fr wilson draining clear red on slow drip CBI, irrigated with retrieval of large clots, CBI restarted      Neurological: She is alert and oriented to person, place, and time.   Skin: She is not diaphoretic. There is pallor.     Psychiatric: She has a normal mood and affect. Her behavior is normal.       Significant Labs:    BMP:    Recent Labs  Lab 11/13/17  0524 11/14/17  0331 11/15/17  0552   * 131* 131*   K 3.9 3.8 3.5    99 100   CO2 22* 22* 21*   BUN 27* 19 21   CREATININE 1.1 0.9 0.9   CALCIUM 8.8 8.7 8.4*       CBC:     Recent Labs  Lab 11/15/17  0552 11/15/17  0820 11/15/17  1952   WBC 10.86 11.06 10.80   HGB 8.5* 8.7* 7.6*   HCT 25.0* 25.3* 22.2*    226 222       All pertinent labs results from the past 24 hours have been reviewed.    Significant Imaging:  All pertinent imaging results/findings from the past 24 hours have been reviewed.                  Assessment/Plan:     Gross hematuria   Monique Lew is a 87 y.o. female who presented with gross hematuria secondary to left sided bladder mass with severe left hydronephrosis    S/p TURBT 11/13/2017, unable to place left ureteral stent as UO could not be identified     - CBI restarted after irrigation on rounds   - maintain wilson  - please hold heparin for now-- hematuria worsened overnight and expect that it will clear again off anticoagulation   - will observe on CBI today, if no improvement off anticoagulation, will plan for return to OR for fulguration   - f/u bladder pathology, still in process          Hydronephrosis of left kidney    - patient would like to wait until pathology returns from TURBT specimen prior  to deciding on nephrostomy tube insertion-- no urgent indication for decompression of left renal collecting system at this time            VTE Risk Mitigation         Ordered     heparin 25,000 units in dextrose 5% 250 mL (100 units/mL) bolus from bag; ADDITIONAL PRN BOLUS  As needed (PRN)     Route:  Intravenous        11/14/17 1314     heparin 25,000 units in dextrose 5% 250 mL (100 units/mL) bolus from bag; ADDITIONAL PRN BOLUS  As needed (PRN)     Route:  Intravenous        11/14/17 1313     heparin 25,000 units in dextrose 5% 250 mL (100 units/mL) infusion  Continuous     Route:  Intravenous        11/14/17 1314          Raymon Tran MD  Urology  [facility]

## 2017-11-16 NOTE — PROGRESS NOTES
[facility] Hospital Medicine  Progress Note    Patient Name: Monique Lew  MRN: [id number]  Patient Class: IP- Inpatient   Admission Date: 11/11/2017  Length of Stay: 4 days  Attending Physician: Summer Grewal*  Primary Care Provider: Aguila Hsieh MD    Hospital Medicine Team: [facility] HOSP MED 5 Jay Juan MD    Subjective:     Principal Problem:Bladder mass    HPI:  Ms. 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 States she first noticed blood in her urine 2 days before presentation. This temporarily resolved, with hematuria occuring again the day before presentation. Hematuria is associated with increased urge to urinate and incontinence. Prior to this episode, patient states last episode of gross hematuria was in 2012, which was found to be 2/2 cystitis. Patient has had multiple UTIs and microhematuria on urinalysis since that time. She follows with a urologist, Dr. Enriquez at [facility], and has had 2 cystoscopies - most recently in 2014 which no evidence of tumor. Denies fever/chills/nausea/vomiting. Does states she has had decreased appetite, increased fatigue, and night sweats which cause her to soak through her clothes.     In the ED, was found to have Hg of 11 which trended down to 10 in 6 hours (baseline 14). Type and screen obtained. CT urogram performed showed 6.3x4.3x4.0 cm mass in bladder causing severe left hydronephrosis and hydroureter with evidence of hemorrhage in bladder. Also evaluated by urology in ED, who began CBI and recommended she be NPO for possible procedure.     Hospital Course:  Patient admitted to hospital medicine. Anticoagulation held in setting hemorrhage. CT Urogram on 11/11 demonstrated a large bladder mass. Urology consulted. 11/12 CT Chest also demonstrates bilateral pulmonary nodules that could be due to metastases. H/H  stabilized. Patient taken to OR 11/13 for cystoscopy, TURBT, pyelogram; stent unable to be placed as urethral orifice could not be visualized. Path pending. 11/14: started hep gtt. Pt still thinking if she wants nephrostomy tube or not. Urology to keep wilson in until Friday 11/17. If pt decides on no procedures, then will resume warfarin.     Interval History: NAEON. Patient complains of a non-productive cough that has occurred for past few weeks, worse with lying down or after eating spicy foods. No other complaints at this time including dizziness, SOB, chest pain, palpitations, headache.     Review of Systems   Constitutional: Positive for appetite change. Negative for chills, diaphoresis, fatigue and fever.   HENT: Negative for sore throat and trouble swallowing.    Respiratory: Negative for cough and shortness of breath.    Cardiovascular: Negative for chest pain, palpitations and leg swelling.   Gastrointestinal: Negative for abdominal distention, abdominal pain, constipation, diarrhea, nausea and vomiting.   Genitourinary: Positive for hematuria. Negative for difficulty urinating, dysuria, flank pain, frequency and urgency.   Musculoskeletal: Negative for arthralgias and back pain.   Skin: Positive for pallor. Negative for color change.   Neurological: Negative for dizziness and light-headedness.   Psychiatric/Behavioral: Negative for confusion.     Objective:     Vital Signs (Most Recent):  Temp: 98.2 °F (36.8 °C) (11/16/17 0428)  Pulse: 74 (11/16/17 0428)  Resp: 18 (11/16/17 0428)  BP: 112/64 (11/16/17 0428)  SpO2: 97 % (11/16/17 0433) Vital Signs (24h Range):  Temp:  [96.7 °F (35.9 °C)-98.3 °F (36.8 °C)] 98.2 °F (36.8 °C)  Pulse:  [58-80] 74  Resp:  [16-20] 18  SpO2:  [85 %-99 %] 97 %  BP: ()/(48-91) 112/64     Weight: 45.4 kg (100 lb)  Body mass index is 19.53 kg/m².    Intake/Output Summary (Last 24 hours) at 11/16/17 0729  Last data filed at 11/16/17 0600   Gross per 24 hour   Intake               424 ml   Output             2200 ml   Net            -1776 ml      Physical Exam   Constitutional: She is oriented to person, place, and time. No distress.   Thin, frail appearing elderly lady.   HENT:   Head: Normocephalic and atraumatic.   Mouth/Throat: Oropharynx is clear and moist.   Eyes: No scleral icterus.   Pale conjunctivae.   Neck: Normal range of motion. Neck supple.   Cardiovascular: Normal rate, regular rhythm, normal heart sounds and intact distal pulses.    Pulmonary/Chest: Effort normal and breath sounds normal.   Abdominal: Soft. Bowel sounds are normal. She exhibits no distension. There is no tenderness. There is no guarding.   Genitourinary:   Genitourinary Comments: Forrest bag draining sanguinous urine.    Neurological: She is alert and oriented to person, place, and time.   Skin: Skin is warm and dry. She is not diaphoretic. There is pallor.   Psychiatric: She has a normal mood and affect. Her behavior is normal.   Vitals reviewed.      Significant Labs:   Recent Results (from the past 24 hour(s))   CBC auto differential    Collection Time: 11/15/17  8:20 AM   Result Value Ref Range    WBC 11.06 3.90 - 12.70 K/uL    RBC 2.79 (L) 4.00 - 5.40 M/uL    Hemoglobin 8.7 (L) 12.0 - 16.0 g/dL    Hematocrit 25.3 (L) 37.0 - 48.5 %    MCV 91 82 - 98 fL    MCH 31.2 (H) 27.0 - 31.0 pg    MCHC 34.4 32.0 - 36.0 g/dL    RDW 13.0 11.5 - 14.5 %    Platelets 226 150 - 350 K/uL    MPV 10.3 9.2 - 12.9 fL    Immature Granulocytes 0.5 0.0 - 0.5 %    Gran # 9.0 (H) 1.8 - 7.7 K/uL    Immature Grans (Abs) 0.05 (H) 0.00 - 0.04 K/uL    Lymph # 0.7 (L) 1.0 - 4.8 K/uL    Mono # 1.2 (H) 0.3 - 1.0 K/uL    Eos # 0.1 0.0 - 0.5 K/uL    Baso # 0.03 0.00 - 0.20 K/uL    nRBC 0 0 /100 WBC    Gran% 81.0 (H) 38.0 - 73.0 %    Lymph% 6.6 (L) 18.0 - 48.0 %    Mono% 10.5 4.0 - 15.0 %    Eosinophil% 1.1 0.0 - 8.0 %    Basophil% 0.3 0.0 - 1.9 %    Differential Method Automated    Magnesium    Collection Time: 11/15/17  8:20 AM   Result Value  Ref Range    Magnesium 1.7 1.6 - 2.6 mg/dL   Vitamin B12    Collection Time: 11/15/17  1:42 PM   Result Value Ref Range    Vitamin B-12 928 210 - 950 pg/mL   Anti-Xa Heparin Monitoring    Collection Time: 11/15/17  3:36 PM   Result Value Ref Range    Heparin Anti-Xa 0.32 0.30 - 0.70 IU/mL   CBC auto differential    Collection Time: 11/15/17  7:52 PM   Result Value Ref Range    WBC 10.80 3.90 - 12.70 K/uL    RBC 2.40 (L) 4.00 - 5.40 M/uL    Hemoglobin 7.6 (L) 12.0 - 16.0 g/dL    Hematocrit 22.2 (L) 37.0 - 48.5 %    MCV 93 82 - 98 fL    MCH 31.7 (H) 27.0 - 31.0 pg    MCHC 34.2 32.0 - 36.0 g/dL    RDW 13.1 11.5 - 14.5 %    Platelets 222 150 - 350 K/uL    MPV 10.5 9.2 - 12.9 fL    Immature Granulocytes 0.6 (H) 0.0 - 0.5 %    Gran # 8.1 (H) 1.8 - 7.7 K/uL    Immature Grans (Abs) 0.06 (H) 0.00 - 0.04 K/uL    Lymph # 1.1 1.0 - 4.8 K/uL    Mono # 1.3 (H) 0.3 - 1.0 K/uL    Eos # 0.3 0.0 - 0.5 K/uL    Baso # 0.03 0.00 - 0.20 K/uL    nRBC 0 0 /100 WBC    Gran% 74.5 (H) 38.0 - 73.0 %    Lymph% 10.6 (L) 18.0 - 48.0 %    Mono% 11.7 4.0 - 15.0 %    Eosinophil% 2.3 0.0 - 8.0 %    Basophil% 0.3 0.0 - 1.9 %    Differential Method Automated          Assessment/Plan:      * Bladder mass    CT urogram with evidence of large, obstructing mass and hemorrhage in the bladder.  - Home coumadin and ASA held, per urology recs subq heparin for DVT prophylaxis  - Hg baseline 14, 11 at presentation. H/H has been stable  - Type and screen ordered  - CBC q12h, will transfuse for Hg <7  - 11/13 OR for cystoscopy, TURBT, and pyelogram; stent could not be placed as urethral orifice could not be visualized.   - Pending results of TURBT/path results, patient may need Oncology consult if she wants to purse treatment. Bilateral nodules found on CT Chest have high likelihood of being metastases. If pt does not want to pursue intervention/tx, will consult palliative care. Pt still deciding if she wants nephrostomy tube or not, depending on path.           Hydronephrosis of left kidney    -See bladder mass          Gross hematuria    -See Bladder Mass        Depression (emotion)    - d/c'ed venlafaxine as pt was not taking it at home            Chronic hyponatremia    Sodium 125 at admission, had been normal in January 2017  - Trending up. D/c'ed venlafaxine as she was not taking this at home          Acquired hypothyroidism    - Continue synthroid 50 mcg  - TSH WNL          Essential hypertension              History of CVA (cerebrovascular accident)    Follows with neurology. 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  - Coumadin held          Chronic atrial fibrillation    - Regular rate and rhythm on exam at presentation  - Coumadin held in setting of hemorrhage and surgery .  - 11/14 hep gtt started, will resume coumadin depending on if urology does any more interventions/nephrostomy tube  - 11/15 hep ggt stopped per urology due to excessive hematuria. Although patient is at risk for stroke, we agree that heparin can be stopped temporarily due to excessive blood loss through hematuria. Patient will be continued on CBI today. If hematuria fails to improve off anticoagulation, Urology may take patient to OR again for fulguration. Will resume anticoagulation when benefit outweighs risk.           Current use of long term anticoagulation    - INR 2.7 at presentation  - Currently no anticoagulation per urology, will restart when able.             VTE Risk Mitigation     None        Dispo: Pending pathology report. Off anticoagulation today due to excessive hematuria. Will resume when able. If no improvement in hematuria off anticoagulation, urology will likely take patient back to OR.     Jay Juan MD  Department of Hospital Medicine   [facility]

## 2017-11-16 NOTE — ASSESSMENT & PLAN NOTE
Monique Lew is a 87 y.o. female who presented with gross hematuria secondary to left sided bladder mass with severe left hydronephrosis    S/p TURBT 11/13/2017, unable to place left ureteral stent as UO could not be identified     - CBI restarted after irrigation on rounds   - maintain wilson  - please hold heparin for now-- hematuria worsened overnight and expect that it will clear again off anticoagulation   - will observe on CBI today, if no improvement off anticoagulation, will plan for return to OR for fulguration   - f/u bladder pathology, still in process

## 2017-11-17 PROBLEM — C67.9 BLADDER CANCER: Status: ACTIVE | Noted: 2017-11-17

## 2017-11-17 LAB
ANION GAP SERPL CALC-SCNC: 6 MMOL/L
ANISOCYTOSIS BLD QL SMEAR: SLIGHT
BASOPHILS # BLD AUTO: 0.03 K/UL
BASOPHILS # BLD AUTO: 0.04 K/UL
BASOPHILS NFR BLD: 0.3 %
BASOPHILS NFR BLD: 0.4 %
BUN SERPL-MCNC: 25 MG/DL
BURR CELLS BLD QL SMEAR: ABNORMAL
CALCIUM SERPL-MCNC: 8.7 MG/DL
CHLORIDE SERPL-SCNC: 102 MMOL/L
CO2 SERPL-SCNC: 24 MMOL/L
CREAT SERPL-MCNC: 0.9 MG/DL
DIFFERENTIAL METHOD: ABNORMAL
DIFFERENTIAL METHOD: ABNORMAL
EOSINOPHIL # BLD AUTO: 0.2 K/UL
EOSINOPHIL # BLD AUTO: 0.3 K/UL
EOSINOPHIL NFR BLD: 2.4 %
EOSINOPHIL NFR BLD: 2.5 %
ERYTHROCYTE [DISTWIDTH] IN BLOOD BY AUTOMATED COUNT: 13.9 %
ERYTHROCYTE [DISTWIDTH] IN BLOOD BY AUTOMATED COUNT: 14.4 %
EST. GFR  (AFRICAN AMERICAN): >60 ML/MIN/1.73 M^2
EST. GFR  (NON AFRICAN AMERICAN): 57.7 ML/MIN/1.73 M^2
GLUCOSE SERPL-MCNC: 101 MG/DL
HCT VFR BLD AUTO: 25.1 %
HCT VFR BLD AUTO: 26.4 %
HGB BLD-MCNC: 8.5 G/DL
HGB BLD-MCNC: 9.2 G/DL
IMM GRANULOCYTES # BLD AUTO: 0.05 K/UL
IMM GRANULOCYTES # BLD AUTO: 0.24 K/UL
IMM GRANULOCYTES NFR BLD AUTO: 0.5 %
IMM GRANULOCYTES NFR BLD AUTO: 2.4 %
LYMPHOCYTES # BLD AUTO: 0.8 K/UL
LYMPHOCYTES # BLD AUTO: 0.9 K/UL
LYMPHOCYTES NFR BLD: 9.1 %
LYMPHOCYTES NFR BLD: 9.4 %
MCH RBC QN AUTO: 30 PG
MCH RBC QN AUTO: 31.2 PG
MCHC RBC AUTO-ENTMCNC: 33.9 G/DL
MCHC RBC AUTO-ENTMCNC: 34.8 G/DL
MCV RBC AUTO: 89 FL
MCV RBC AUTO: 90 FL
MONOCYTES # BLD AUTO: 0.8 K/UL
MONOCYTES # BLD AUTO: 0.9 K/UL
MONOCYTES NFR BLD: 8.2 %
MONOCYTES NFR BLD: 9.2 %
NEUTROPHILS # BLD AUTO: 7.4 K/UL
NEUTROPHILS # BLD AUTO: 7.6 K/UL
NEUTROPHILS NFR BLD: 76.1 %
NEUTROPHILS NFR BLD: 79.5 %
NRBC BLD-RTO: 0 /100 WBC
NRBC BLD-RTO: 0 /100 WBC
OVALOCYTES BLD QL SMEAR: ABNORMAL
PLATELET # BLD AUTO: 227 K/UL
PLATELET # BLD AUTO: 254 K/UL
PLATELET # BLD AUTO: ABNORMAL K/UL
PMV BLD AUTO: 10.1 FL
PMV BLD AUTO: 10.7 FL
PMV BLD AUTO: ABNORMAL FL
POIKILOCYTOSIS BLD QL SMEAR: SLIGHT
POLYCHROMASIA BLD QL SMEAR: ABNORMAL
POTASSIUM SERPL-SCNC: 4.4 MMOL/L
RBC # BLD AUTO: 2.83 M/UL
RBC # BLD AUTO: 2.95 M/UL
SODIUM SERPL-SCNC: 132 MMOL/L
WBC # BLD AUTO: 9.26 K/UL
WBC # BLD AUTO: 9.99 K/UL

## 2017-11-17 PROCEDURE — 25000003 PHARM REV CODE 250: Performed by: HOSPITALIST

## 2017-11-17 PROCEDURE — 99223 1ST HOSP IP/OBS HIGH 75: CPT | Mod: GC,,, | Performed by: INTERNAL MEDICINE

## 2017-11-17 PROCEDURE — 25000003 PHARM REV CODE 250: Performed by: STUDENT IN AN ORGANIZED HEALTH CARE EDUCATION/TRAINING PROGRAM

## 2017-11-17 PROCEDURE — 85025 COMPLETE CBC W/AUTO DIFF WBC: CPT

## 2017-11-17 PROCEDURE — 80048 BASIC METABOLIC PNL TOTAL CA: CPT

## 2017-11-17 PROCEDURE — 36415 COLL VENOUS BLD VENIPUNCTURE: CPT

## 2017-11-17 PROCEDURE — 11000001 HC ACUTE MED/SURG PRIVATE ROOM

## 2017-11-17 PROCEDURE — 85049 AUTOMATED PLATELET COUNT: CPT

## 2017-11-17 PROCEDURE — 25000003 PHARM REV CODE 250

## 2017-11-17 PROCEDURE — 99232 SBSQ HOSP IP/OBS MODERATE 35: CPT | Mod: GC,,, | Performed by: HOSPITALIST

## 2017-11-17 RX ADMIN — TIMOLOL MALEATE 1 DROP: 5 SOLUTION OPHTHALMIC at 08:11

## 2017-11-17 RX ADMIN — TRAMADOL HYDROCHLORIDE 50 MG: 50 TABLET, FILM COATED ORAL at 10:11

## 2017-11-17 RX ADMIN — ACETAMINOPHEN 650 MG: 325 TABLET ORAL at 08:11

## 2017-11-17 RX ADMIN — METOPROLOL SUCCINATE 25 MG: 25 TABLET, EXTENDED RELEASE ORAL at 08:11

## 2017-11-17 RX ADMIN — TIMOLOL MALEATE 1 DROP: 5 SOLUTION OPHTHALMIC at 09:11

## 2017-11-17 RX ADMIN — LEVOTHYROXINE SODIUM 50 MCG: 50 TABLET ORAL at 05:11

## 2017-11-17 NOTE — ASSESSMENT & PLAN NOTE
Monique Lew is a 87 y.o. 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

## 2017-11-17 NOTE — PROGRESS NOTES
"Pt c/o pain/discomfort to bladder and that "it feels full" since she is now able to drink water.     Urine still appearing dark red, however bladder appears non distended. Forrest continues to have good OP. Bladder scan completed with a max reading of 75ccs. Will continue to closely monitor, and notify Urology if necessary. Will administer tylenol for discomfort.   "

## 2017-11-17 NOTE — HPI
Pt is an 88 yo F with h/o CVA in 2008 with symptoms of speech difficulty and dysphagia now resolved, hypothyroidism, HTN, MI, breast cacner diagnosed in 1992 in the left breast s/p lumpectomy LN dissection, RT and tamoxifen who presents to the hospital with complaint of hematuria.  Upon admisison the patietn underwent CT of the abdomen on 11/11/17 showing a pleft sided bladder mass measuring 6.3 x 4.3 x 4.0 cm.  Ct chest performed on 11/12/17 showed bilateral pulmonary nodules with the largest nodular opacity in the right upper lobe measures approximately 1.6 cm. Urology was oncuslted and performed a biopsy of the bladder mass on 11/13/17 showing high grade urothelial carcinoma with infiltration involving the muscularis layer.  Currently the patient complains of pain in the left hip, hematuria.  Over the past few months the patient endorses weight loss, decreased appetite, and several falls.  The patient denies CP, SOB but endorses occasional coughing while eating.

## 2017-11-17 NOTE — NURSING
Upon shift report cbi was noted to be clamped. Urology services was paged.  said to leave clamped, notifiy md if urine becomes more bloody. If clots develop irrigate, restart cbi and notify md.

## 2017-11-17 NOTE — ASSESSMENT & PLAN NOTE
CT urogram with evidence of large, obstructing mass and hemorrhage in the bladder.  - Home coumadin and ASA held, per urology recs subq heparin for DVT prophylaxis  - Hg baseline 14, 11 at presentation.   - Type and screen ordered  - CBC q12h, will transfuse for Hg <7  - 11/13 OR for cystoscopy, TURBT, and pyelogram; stent could not be placed as urethral orifice could not be visualized.   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 Bilateral nodules found on CT Chest have high likelihood of being metastases. Oncology consulted to discuss treatment options. If pt does not want to pursue intervention/tx, will consult palliative care.

## 2017-11-17 NOTE — PHYSICIAN QUERY
PT Name: Monique Lew  MR #: [id number]  Physician Query Form - Renal Clarification     CDS: Yue Byrnes RN     Contact information:  [email or web address]    Phone: [phone]    This form is a permanent document in the medical record.     QueryDate: November 17, 2017    By submitting this query, we are merely seeking further clarification of documentation. Please utilize your independent clinical judgment when addressing the question(s) below.    The Medical record contains the following:   Indicator Supporting Clinical Findings Location in Medical Record    Kidney (Renal) Insufficiency      Kidney (Renal) Failure / Injury      Nephrotoxic Agents      X BUN/Creatinine GFR  BUN= 21 --> 27--> 28   Crt= 0.9 --> 1.1 --> 1.0   eGFR= 57.7 --> 45.3 --> 50.8  Lab 11/11/17 --> 11/13/17 --> 11/16/17   Lab 11/11/17 --> 11/13/17 --> 11/16/17   Lab 11/11/17 --> 11/13/17 --> 11/16/17    Urine: Casts         Eosinophils      Dehydration      Nausea/Vomiting      Dialysis/CRRT      Treatment:      X Other:  Presentation concerning for bladder cancer vs. Acute kidney injury vs. Pyelonephritis vs. Other  mass.    CT Urogram obtained shows a 6 cm enhancing left sided bladder mass with severe left hydronephrosis and no contrast excretion from left kidney. No lymphadenopathy.     Past Medical History:Hypertension       ED note 11/11/17         Urology Consult 11/11/17             H&P 11/12/17       Provider, please specify the diagnosis or diagnoses associated with above clinical findings.    [ ] Acute Kidney Failure/Injury with Acute Cortical Necrosis  [ ] Acute Kidney Failure/Injury with Medullary Necrosis  [ ] Acute Kidney Failure/Injury with Tubular Necrosis  [ ] Other Acute Kidney Failure/Injury (please specify): ____________  [ ] Unspecified Acute Kidney Failure/Injury  [ ] Acute Nephritic Syndrome  [ ] Acute Renal Insufficiency  [ ] Acute on Chronic Renal Insufficiency (please specify stage*): _____________  [ ]  Acute on Chronic Renal Failure (please specify stage*): _____________  [ ] Chronic Renal Insufficiency (please specify stage*): _____________  [ x] Chronic Kidney Disease (CKD) (please specify stage* below):      *[employer] Definitions:   Stage Description      eGFR (mL/min)     [ x] III  Moderately reduced kidney function    30-59   [ ] IV  Severly reduced kidney function     15-29   [ ] V  Kidney failure, requiring transplant or dialysis   < 15      [ ] Other (please specify): _______________________________  [ ] Clinically Undetermined    Please document in your progress notes daily for the duration of treatment, until resolved, and include in your discharge summary.

## 2017-11-17 NOTE — PT/OT/SLP PROGRESS
Occupational Therapy      Monique Keely Lew  MRN: [id number]    Patient not seen today secondary to pt refusal. Pt reports feeling fatigued because she is still actively bleeding from her bladder. Pt was transfused with blood, but still bleeding and refusing therapy due to fatigue. OT to follow-up next visit.    ERASMO Castillo  11/17/2017  Pager: [phone]

## 2017-11-17 NOTE — SUBJECTIVE & OBJECTIVE
Interval History: Patient without complaints this morning and NAEON. Pathology report discussed with patient and family, and its severity. Patient acknowledged understanding. Discussed with patient the choice of pursuing treatment with oncology versus palliative care; patient states she would like to discuss treatment options with oncology before deciding.     Review of Systems   Constitutional: Positive for appetite change. Negative for chills, diaphoresis, fatigue and fever.   HENT: Negative for sore throat and trouble swallowing.    Respiratory: Negative for cough and shortness of breath.    Cardiovascular: Negative for chest pain, palpitations and leg swelling.   Gastrointestinal: Negative for abdominal distention, abdominal pain, constipation, diarrhea, nausea and vomiting.   Genitourinary: Positive for hematuria. Negative for difficulty urinating, dysuria, flank pain, frequency and urgency.   Musculoskeletal: Negative for arthralgias and back pain.   Skin: Positive for pallor. Negative for color change.   Neurological: Negative for dizziness and light-headedness.   Psychiatric/Behavioral: Negative for confusion.     Objective:     Vital Signs (Most Recent):  Temp: 97.3 °F (36.3 °C) (11/17/17 1136)  Pulse: 68 (11/17/17 1136)  Resp: 18 (11/17/17 1136)  BP: (!) 140/70 (11/17/17 1136)  SpO2: (!) 94 % (11/17/17 1136) Vital Signs (24h Range):  Temp:  [96.9 °F (36.1 °C)-98.4 °F (36.9 °C)] 97.3 °F (36.3 °C)  Pulse:  [55-84] 68  Resp:  [16-18] 18  SpO2:  [94 %-100 %] 94 %  BP: ()/(57-80) 140/70     Weight: 45.4 kg (100 lb)  Body mass index is 19.53 kg/m².    Intake/Output Summary (Last 24 hours) at 11/17/17 1433  Last data filed at 11/17/17 0831   Gross per 24 hour   Intake              980 ml   Output             2000 ml   Net            -1020 ml      Physical Exam   Constitutional: She is oriented to person, place, and time. No distress.   Thin, frail appearing elderly lady.   HENT:   Head: Normocephalic and  atraumatic.   Mouth/Throat: Oropharynx is clear and moist.   Eyes: No scleral icterus.   Pale conjunctivae.   Neck: Normal range of motion. Neck supple.   Cardiovascular: Normal rate, regular rhythm, normal heart sounds and intact distal pulses.    Pulmonary/Chest: Effort normal and breath sounds normal.   Abdominal: Soft. Bowel sounds are normal. She exhibits no distension. There is no tenderness. There is no guarding.   Genitourinary:   Genitourinary Comments: Forrest bag draining sanguinous urine.    Neurological: She is alert and oriented to person, place, and time.   Skin: Skin is warm and dry. She is not diaphoretic. There is pallor.   Psychiatric: She has a normal mood and affect. Her behavior is normal.   Vitals reviewed.      Significant Labs:   Recent Results (from the past 24 hour(s))   CBC auto differential    Collection Time: 11/16/17  7:53 PM   Result Value Ref Range    WBC 10.27 3.90 - 12.70 K/uL    RBC 2.08 (L) 4.00 - 5.40 M/uL    Hemoglobin 6.8 (L) 12.0 - 16.0 g/dL    Hematocrit 19.2 (LL) 37.0 - 48.5 %    MCV 92 82 - 98 fL    MCH 32.7 (H) 27.0 - 31.0 pg    MCHC 35.4 32.0 - 36.0 g/dL    RDW 13.4 11.5 - 14.5 %    Platelets 229 150 - 350 K/uL    MPV 10.3 9.2 - 12.9 fL    Immature Granulocytes 0.6 (H) 0.0 - 0.5 %    Gran # 8.3 (H) 1.8 - 7.7 K/uL    Immature Grans (Abs) 0.06 (H) 0.00 - 0.04 K/uL    Lymph # 1.1 1.0 - 4.8 K/uL    Mono # 0.6 0.3 - 1.0 K/uL    Eos # 0.2 0.0 - 0.5 K/uL    Baso # 0.02 0.00 - 0.20 K/uL    nRBC 0 0 /100 WBC    Gran% 80.8 (H) 38.0 - 73.0 %    Lymph% 10.5 (L) 18.0 - 48.0 %    Mono% 5.9 4.0 - 15.0 %    Eosinophil% 2.0 0.0 - 8.0 %    Basophil% 0.2 0.0 - 1.9 %    Differential Method Automated    Type & Screen    Collection Time: 11/16/17  8:50 PM   Result Value Ref Range    Group & Rh AB NEG     Indirect Camila NEG    Prepare RBC 1 Unit    Collection Time: 11/16/17  8:50 PM   Result Value Ref Range    UNIT NUMBER [id number]     PRODUCT CODE R9624G51     DISPENSE STATUS TRANSFUSED      CODING SYSTEM FYEL220     Unit Blood Type Code 2800     Unit Blood Type AB NEG     Unit Expiration [id number]    Basic metabolic panel    Collection Time: 11/17/17  3:45 AM   Result Value Ref Range    Sodium 132 (L) 136 - 145 mmol/L    Potassium 4.4 3.5 - 5.1 mmol/L    Chloride 102 95 - 110 mmol/L    CO2 24 23 - 29 mmol/L    Glucose 101 70 - 110 mg/dL    BUN, Bld 25 (H) 8 - 23 mg/dL    Creatinine 0.9 0.5 - 1.4 mg/dL    Calcium 8.7 8.7 - 10.5 mg/dL    Anion Gap 6 (L) 8 - 16 mmol/L    eGFR if African American >60.0 >60 mL/min/1.73 m^2    eGFR if non  57.7 (A) >60 mL/min/1.73 m^2   CBC auto differential    Collection Time: 11/17/17  7:54 AM   Result Value Ref Range    WBC 9.26 3.90 - 12.70 K/uL    RBC 2.83 (L) 4.00 - 5.40 M/uL    Hemoglobin 8.5 (L) 12.0 - 16.0 g/dL    Hematocrit 25.1 (L) 37.0 - 48.5 %    MCV 89 82 - 98 fL    MCH 30.0 27.0 - 31.0 pg    MCHC 33.9 32.0 - 36.0 g/dL    RDW 13.9 11.5 - 14.5 %    Platelets 227 150 - 350 K/uL    MPV 10.1 9.2 - 12.9 fL    Immature Granulocytes 0.5 0.0 - 0.5 %    Gran # 7.4 1.8 - 7.7 K/uL    Immature Grans (Abs) 0.05 (H) 0.00 - 0.04 K/uL    Lymph # 0.8 (L) 1.0 - 4.8 K/uL    Mono # 0.8 0.3 - 1.0 K/uL    Eos # 0.2 0.0 - 0.5 K/uL    Baso # 0.03 0.00 - 0.20 K/uL    nRBC 0 0 /100 WBC    Gran% 79.5 (H) 38.0 - 73.0 %    Lymph% 9.1 (L) 18.0 - 48.0 %    Mono% 8.2 4.0 - 15.0 %    Eosinophil% 2.4 0.0 - 8.0 %    Basophil% 0.3 0.0 - 1.9 %    Differential Method Automated      Pathology Report: 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

## 2017-11-17 NOTE — PROGRESS NOTES
Urology    Urine clearing off CBI, off heparin.  Discussed diagnosis and options with patient, and she informs us that she has had a discussion with heme/onc as well.  She has not yet decided how she would like to proceed.  She understands that she is not a candidate for surgery with curative intent.      - follow up in clinic on Monday with Dr. Enriquez   - recommend holding anticoagulation until after wilson catheter is removed   - if heme onc offers patient chemotherapy or other nephrotoxic treatment and patient agrees, then we recommend that heme/onc order a left nephrostomy tube to be placed by interventional radiology  - do not recommend radiation for hematuria   - please call with questions    Raymon Tran MD, PGY-III  [facility]

## 2017-11-17 NOTE — PROGRESS NOTES
[facility] Hospital Medicine  Progress Note    Patient Name: Monique Lew  MRN: [id number]  Patient Class: IP- Inpatient   Admission Date: 11/11/2017  Length of Stay: 5 days  Attending Physician: Summer Grewal*  Primary Care Provider: Aguila Hsieh MD    [facility] Medicine Team: [facility] HOSP MED 5 Jay Juan MD    Subjective:     Principal Problem:Bladder cancer    HPI:  Ms. 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 States she first noticed blood in her urine 2 days before presentation. This temporarily resolved, with hematuria occuring again the day before presentation. Hematuria is associated with increased urge to urinate and incontinence. Prior to this episode, patient states last episode of gross hematuria was in 2012, which was found to be 2/2 cystitis. Patient has had multiple UTIs and microhematuria on urinalysis since that time. She follows with a urologist, Dr. Enriquez at [facility], and has had 2 cystoscopies - most recently in 2014 which no evidence of tumor. Denies fever/chills/nausea/vomiting. Does states she has had decreased appetite, increased fatigue, and night sweats which cause her to soak through her clothes.     In the ED, was found to have Hg of 11 which trended down to 10 in 6 hours (baseline 14). Type and screen obtained. CT urogram performed showed 6.3x4.3x4.0 cm mass in bladder causing severe left hydronephrosis and hydroureter with evidence of hemorrhage in bladder. Also evaluated by urology in ED, who began CBI and recommended she be NPO for possible procedure.     Hospital Course:  Patient admitted to hospital medicine. Anticoagulation held in setting hemorrhage. CT Urogram on 11/11 demonstrated a large bladder mass. Urology consulted. 11/12 CT Chest also demonstrates bilateral pulmonary nodules that could be due to metastases. H/H  stabilized. Patient taken to OR 11/13 for cystoscopy, TURBT, pyelogram; stent unable to be placed as urethral orifice could not be visualized. Path pending. 11/14: started hep gtt. Pt still thinking if she wants nephrostomy tube or not. Urology to keep wilson in until Friday 11/17. If pt decides on no procedures, then will resume warfarin.     Interval History: Patient without complaints this morning and NAEON. Pathology report discussed with patient and family, and its severity. Patient acknowledged understanding. Discussed with patient the choice of pursuing treatment with oncology versus palliative care; patient states she would like to discuss treatment options with oncology before deciding.     Review of Systems   Constitutional: Positive for appetite change. Negative for chills, diaphoresis, fatigue and fever.   HENT: Negative for sore throat and trouble swallowing.    Respiratory: Negative for cough and shortness of breath.    Cardiovascular: Negative for chest pain, palpitations and leg swelling.   Gastrointestinal: Negative for abdominal distention, abdominal pain, constipation, diarrhea, nausea and vomiting.   Genitourinary: Positive for hematuria. Negative for difficulty urinating, dysuria, flank pain, frequency and urgency.   Musculoskeletal: Negative for arthralgias and back pain.   Skin: Positive for pallor. Negative for color change.   Neurological: Negative for dizziness and light-headedness.   Psychiatric/Behavioral: Negative for confusion.     Objective:     Vital Signs (Most Recent):  Temp: 97.3 °F (36.3 °C) (11/17/17 1136)  Pulse: 68 (11/17/17 1136)  Resp: 18 (11/17/17 1136)  BP: (!) 140/70 (11/17/17 1136)  SpO2: (!) 94 % (11/17/17 1136) Vital Signs (24h Range):  Temp:  [96.9 °F (36.1 °C)-98.4 °F (36.9 °C)] 97.3 °F (36.3 °C)  Pulse:  [55-84] 68  Resp:  [16-18] 18  SpO2:  [94 %-100 %] 94 %  BP: ()/(57-80) 140/70     Weight: 45.4 kg (100 lb)  Body mass index is 19.53 kg/m².    Intake/Output  Summary (Last 24 hours) at 11/17/17 1433  Last data filed at 11/17/17 0831   Gross per 24 hour   Intake              980 ml   Output             2000 ml   Net            -1020 ml      Physical Exam   Constitutional: She is oriented to person, place, and time. No distress.   Thin, frail appearing elderly lady.   HENT:   Head: Normocephalic and atraumatic.   Mouth/Throat: Oropharynx is clear and moist.   Eyes: No scleral icterus.   Pale conjunctivae.   Neck: Normal range of motion. Neck supple.   Cardiovascular: Normal rate, regular rhythm, normal heart sounds and intact distal pulses.    Pulmonary/Chest: Effort normal and breath sounds normal.   Abdominal: Soft. Bowel sounds are normal. She exhibits no distension. There is no tenderness. There is no guarding.   Genitourinary:   Genitourinary Comments: Forrest bag draining sanguinous urine.    Neurological: She is alert and oriented to person, place, and time.   Skin: Skin is warm and dry. She is not diaphoretic. There is pallor.   Psychiatric: She has a normal mood and affect. Her behavior is normal.   Vitals reviewed.      Significant Labs:   Recent Results (from the past 24 hour(s))   CBC auto differential    Collection Time: 11/16/17  7:53 PM   Result Value Ref Range    WBC 10.27 3.90 - 12.70 K/uL    RBC 2.08 (L) 4.00 - 5.40 M/uL    Hemoglobin 6.8 (L) 12.0 - 16.0 g/dL    Hematocrit 19.2 (LL) 37.0 - 48.5 %    MCV 92 82 - 98 fL    MCH 32.7 (H) 27.0 - 31.0 pg    MCHC 35.4 32.0 - 36.0 g/dL    RDW 13.4 11.5 - 14.5 %    Platelets 229 150 - 350 K/uL    MPV 10.3 9.2 - 12.9 fL    Immature Granulocytes 0.6 (H) 0.0 - 0.5 %    Gran # 8.3 (H) 1.8 - 7.7 K/uL    Immature Grans (Abs) 0.06 (H) 0.00 - 0.04 K/uL    Lymph # 1.1 1.0 - 4.8 K/uL    Mono # 0.6 0.3 - 1.0 K/uL    Eos # 0.2 0.0 - 0.5 K/uL    Baso # 0.02 0.00 - 0.20 K/uL    nRBC 0 0 /100 WBC    Gran% 80.8 (H) 38.0 - 73.0 %    Lymph% 10.5 (L) 18.0 - 48.0 %    Mono% 5.9 4.0 - 15.0 %    Eosinophil% 2.0 0.0 - 8.0 %    Basophil%  0.2 0.0 - 1.9 %    Differential Method Automated    Type & Screen    Collection Time: 11/16/17  8:50 PM   Result Value Ref Range    Group & Rh AB NEG     Indirect Camila NEG    Prepare RBC 1 Unit    Collection Time: 11/16/17  8:50 PM   Result Value Ref Range    UNIT NUMBER [id number]     PRODUCT CODE G8514L20     DISPENSE STATUS TRANSFUSED     CODING SYSTEM QVUB417     Unit Blood Type Code 2800     Unit Blood Type AB NEG     Unit Expiration [id number]    Basic metabolic panel    Collection Time: 11/17/17  3:45 AM   Result Value Ref Range    Sodium 132 (L) 136 - 145 mmol/L    Potassium 4.4 3.5 - 5.1 mmol/L    Chloride 102 95 - 110 mmol/L    CO2 24 23 - 29 mmol/L    Glucose 101 70 - 110 mg/dL    BUN, Bld 25 (H) 8 - 23 mg/dL    Creatinine 0.9 0.5 - 1.4 mg/dL    Calcium 8.7 8.7 - 10.5 mg/dL    Anion Gap 6 (L) 8 - 16 mmol/L    eGFR if African American >60.0 >60 mL/min/1.73 m^2    eGFR if non  57.7 (A) >60 mL/min/1.73 m^2   CBC auto differential    Collection Time: 11/17/17  7:54 AM   Result Value Ref Range    WBC 9.26 3.90 - 12.70 K/uL    RBC 2.83 (L) 4.00 - 5.40 M/uL    Hemoglobin 8.5 (L) 12.0 - 16.0 g/dL    Hematocrit 25.1 (L) 37.0 - 48.5 %    MCV 89 82 - 98 fL    MCH 30.0 27.0 - 31.0 pg    MCHC 33.9 32.0 - 36.0 g/dL    RDW 13.9 11.5 - 14.5 %    Platelets 227 150 - 350 K/uL    MPV 10.1 9.2 - 12.9 fL    Immature Granulocytes 0.5 0.0 - 0.5 %    Gran # 7.4 1.8 - 7.7 K/uL    Immature Grans (Abs) 0.05 (H) 0.00 - 0.04 K/uL    Lymph # 0.8 (L) 1.0 - 4.8 K/uL    Mono # 0.8 0.3 - 1.0 K/uL    Eos # 0.2 0.0 - 0.5 K/uL    Baso # 0.03 0.00 - 0.20 K/uL    nRBC 0 0 /100 WBC    Gran% 79.5 (H) 38.0 - 73.0 %    Lymph% 9.1 (L) 18.0 - 48.0 %    Mono% 8.2 4.0 - 15.0 %    Eosinophil% 2.4 0.0 - 8.0 %    Basophil% 0.3 0.0 - 1.9 %    Differential Method Automated      Pathology Report: 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    Assessment/Plan:      * Bladder cancer    CT urogram with evidence of  large, obstructing mass and hemorrhage in the bladder.  - Home coumadin and ASA held, per urology recs subq heparin for DVT prophylaxis  - Hg baseline 14, 11 at presentation.   - Type and screen ordered  - CBC q12h, will transfuse for Hg <7  - 11/13 OR for cystoscopy, TURBT, and pyelogram; stent could not be placed as urethral orifice could not be visualized.   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 Bilateral nodules found on CT Chest have high likelihood of being metastases. Oncology consulted to discuss treatment options. If pt does not want to pursue intervention/tx, will consult palliative care.           Bladder mass              Hydronephrosis of left kidney    -See bladder mass          Gross hematuria    -See Bladder cancer        Depression (emotion)    - d/c'ed venlafaxine as pt was not taking it at home            Chronic hyponatremia    Sodium 125 at admission, had been normal in January 2017  - Trending up. D/c'ed venlafaxine as she was not taking this at home          Acquired hypothyroidism    - Continue synthroid 50 mcg  - TSH WNL          Essential hypertension              History of CVA (cerebrovascular accident)    Follows with neurology. 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  - Coumadin held          Chronic atrial fibrillation    - Regular rate and rhythm on exam at presentation  - Coumadin held in setting of hemorrhage and surgery .  - 11/14 hep gtt started, will resume coumadin depending on if urology does any more interventions/nephrostomy tube  - 11/15 hep ggt stopped per urology due to excessive hematuria. Although patient is at risk for stroke, we agree that heparin can be stopped temporarily due to excessive blood loss through hematuria. Per urology, continue to hold heparin given hematuria.        Current use of long term anticoagulation    - INR 2.7 at presentation  - Currently no anticoagulation per  urology, will restart when able.             VTE Risk Mitigation     None        Dispo: Patient will remain inpatient until plan of care is achieved concerning cancer treatment vs palliative.     Jay Juan MD  Department of Hospital Medicine   [facility]

## 2017-11-17 NOTE — CONSULTS
[facility]  Hematology/Oncology  Consult Note    Patient Name: Monique Lew  MRN: [id number]  Admission Date: 11/11/2017  Hospital Length of Stay: 5 days  Code Status: Full Code   Attending Provider: Summer Grewal*  Consulting Provider: Herberth Alfaro MD  Primary Care Physician: Aguila Hsieh MD  Principal Problem:Bladder cancer    Inpatient consult to Hematology/Oncology  Consult performed by: HERBERTH ALFARO.  Consult ordered by: ЕЛЕНА TRIPATHI  Reason for consult: Bladder Cancer        Subjective:     HPI:  Pt is an 86 yo F with h/o CVA in 2008 with symptoms of speech difficulty and dysphagia now resolved, hypothyroidism, HTN, MI, breast cacner diagnosed in 1992 in the left breast s/p lumpectomy LN dissection, RT and tamoxifen who presents to the hospital with complaint of hematuria.  Upon admisison the patietn underwent CT of the abdomen on 11/11/17 showing a pleft sided bladder mass measuring 6.3 x 4.3 x 4.0 cm.  Ct chest performed on 11/12/17 showed bilateral pulmonary nodules with the largest nodular opacity in the right upper lobe measures approximately 1.6 cm. Urology was oncuslted and performed a biopsy of the bladder mass on 11/13/17 showing high grade urothelial carcinoma with infiltration involving the muscularis layer.  Currently the patient complains of pain in the left hip, hematuria.  Over the past few months the patient endorses weight loss, decreased appetite, and several falls.  The patient denies CP, SOB but endorses occasional coughing while eating.    Oncology Treatment Plan:   [No treatment plan]    Medications:  Continuous Infusions:   Scheduled Meds:   levothyroxine  50 mcg Oral Before breakfast    metoprolol succinate  25 mg Oral Daily    timolol maleate 0.5%  1 drop Both Eyes BID     PRN Meds:sodium chloride, acetaminophen, influenza, magnesium hydroxide 400 mg/5 ml, sodium chloride 0.9%, sodium chloride 0.9%, traMADol     Review of patient's allergies  indicates:   Allergen Reactions    Cosopt [dorzolamide-timolol] Swelling     Swelling of eyelids    Mevacor [lovastatin] Nausea And Vomiting    Prednisone Nausea And Vomiting    Vasotec [enalapril maleate] Nausea And Vomiting    Zetia [ezetimibe] Nausea And Vomiting    Furosemide Palpitations     Pt states her heart skips beats when she takes this medication.        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    CVA (cerebral infarction)     Dislocated IOL (intraocular lens), posterior - Right Eye 10/19/2012    DVT (deep venous thrombosis)     Glaucoma     Pseudoexfoliation, sever    Goiter     Heart attack     Hypertension     Hypothyroidism     Long-term (current) use of anticoagulants     Macular hole of right eye     Multinodular goiter     Other hyperparathyroidism     Phthisis bulbi of left eye     Posterior dislocation of right lens 8/5/2015    Pseudoexfoliation glaucoma, 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 hines)    BREAST LUMPECTOMY      BUNIONECTOMY      CATARACT EXTRACTION W/  INTRAOCULAR LENS IMPLANT Right 01/26/2011    WITH TRAB ()    CATARACT EXTRACTION W/  INTRAOCULAR LENS IMPLANT Left 04/09/2009    ANT. VIITRECTOMY WITH SUTURED PCIOL (DR. RYAN)    CATARACT EXTRACTION W/ INTRAOCULAR LENS IMPLANTW/ TRABECULECTOMY Right 01/26/2011        COLON SURGERY      volvulus    CONJUNCTIVAL FLAP  Left 05/28/2009    DR. RAMOS    conjuunctival flap   2009    OS w/ dr. 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 emanuel    TRABECULECTOMY Left 01/11/2006        transcleral cyclophotocoagulation   2010    OS w/ dr. hines     Family History     Problem Relation (Age of Onset)    Emphysema Mother    Hyperlipidemia Mother, Father    Hypertension Mother, Father    No Known Problems Sister, Brother, Maternal Aunt, Maternal Uncle, Paternal Aunt, Paternal Uncle, Maternal Grandmother, Maternal Grandfather, Paternal Grandmother, Paternal Grandfather        Social History Main Topics    Smoking status: Former Smoker     Quit date: 9/25/1982    Smokeless tobacco: Never Used    Alcohol use No    Drug use: No    Sexual activity: No       Review of Systems   Constitutional: Positive for activity change, appetite change, fatigue and unexpected weight change. Negative for chills and fever.   HENT: Negative for sore throat and trouble swallowing.    Eyes: Negative for photophobia, pain and visual disturbance.   Respiratory: Negative for chest tightness and shortness of breath.    Cardiovascular: Negative for chest pain, palpitations and leg swelling.   Gastrointestinal: Positive for constipation. Negative for abdominal pain, diarrhea, nausea and vomiting.   Genitourinary: Positive for hematuria. Negative for difficulty urinating and dysuria.   Musculoskeletal: Negative for arthralgias and back pain.   Skin: Negative for color change and rash.   Neurological: Negative for weakness, numbness and headaches.        Multiple falls.     Objective:     Vital Signs (Most Recent):  Temp: 97.3 °F (36.3 °C) (11/17/17 1136)  Pulse: 73 (11/17/17 1500)  Resp: 18 (11/17/17 1136)  BP: (!) 140/70 (11/17/17 1136)  SpO2: (!) 94 % (11/17/17 1136) Vital Signs (24h Range):  Temp:  [96.9 °F (36.1 °C)-98.4 °F (36.9 °C)] 97.3 °F (36.3 °C)  Pulse:  [55-79] 73  Resp:   [16-18] 18  SpO2:  [94 %-100 %] 94 %  BP: ()/(57-80) 140/70     Weight: 45.4 kg (100 lb)  Body mass index is 19.53 kg/m².  Body surface area is 1.39 meters squared.      Intake/Output Summary (Last 24 hours) at 11/17/17 1549  Last data filed at 11/17/17 0831   Gross per 24 hour   Intake              980 ml   Output             2300 ml   Net            -1320 ml       Physical Exam   Constitutional: She is oriented to person, place, and time. No distress.   Thin, Cachectic    HENT:   Head: Normocephalic and atraumatic.   Mouth/Throat: No oropharyngeal exudate.   Eyes: EOM are normal. Right eye exhibits no discharge. Left eye exhibits no discharge. No scleral icterus.   Cardiovascular: Normal rate, regular rhythm, normal heart sounds and intact distal pulses.  Exam reveals no gallop and no friction rub.    No murmur heard.  Pulmonary/Chest: Effort normal and breath sounds normal. No respiratory distress. She has no wheezes. She has no rales. She exhibits no tenderness.   Abdominal: Soft. Bowel sounds are normal. She exhibits no distension and no mass. There is no tenderness. There is no rebound and no guarding.   Genitourinary:   Genitourinary Comments: hematuria in wilson catheter   Musculoskeletal: Normal range of motion. She exhibits no edema or tenderness.   Neurological: She is alert and oriented to person, place, and time.   Skin: No rash noted. She is not diaphoretic. No erythema.   Psychiatric: She has a normal mood and affect. Her behavior is normal.       Significant Labs:   Recent Results (from the past 24 hour(s))   CBC auto differential    Collection Time: 11/16/17  7:53 PM   Result Value Ref Range    WBC 10.27 3.90 - 12.70 K/uL    RBC 2.08 (L) 4.00 - 5.40 M/uL    Hemoglobin 6.8 (L) 12.0 - 16.0 g/dL    Hematocrit 19.2 (LL) 37.0 - 48.5 %    MCV 92 82 - 98 fL    MCH 32.7 (H) 27.0 - 31.0 pg    MCHC 35.4 32.0 - 36.0 g/dL    RDW 13.4 11.5 - 14.5 %    Platelets 229 150 - 350 K/uL    MPV 10.3 9.2 - 12.9 fL     Immature Granulocytes 0.6 (H) 0.0 - 0.5 %    Gran # 8.3 (H) 1.8 - 7.7 K/uL    Immature Grans (Abs) 0.06 (H) 0.00 - 0.04 K/uL    Lymph # 1.1 1.0 - 4.8 K/uL    Mono # 0.6 0.3 - 1.0 K/uL    Eos # 0.2 0.0 - 0.5 K/uL    Baso # 0.02 0.00 - 0.20 K/uL    nRBC 0 0 /100 WBC    Gran% 80.8 (H) 38.0 - 73.0 %    Lymph% 10.5 (L) 18.0 - 48.0 %    Mono% 5.9 4.0 - 15.0 %    Eosinophil% 2.0 0.0 - 8.0 %    Basophil% 0.2 0.0 - 1.9 %    Differential Method Automated    Type & Screen    Collection Time: 11/16/17  8:50 PM   Result Value Ref Range    Group & Rh AB NEG     Indirect Camila NEG    Prepare RBC 1 Unit    Collection Time: 11/16/17  8:50 PM   Result Value Ref Range    UNIT NUMBER [id number]     PRODUCT CODE I4254S67     DISPENSE STATUS TRANSFUSED     CODING SYSTEM JTGG167     Unit Blood Type Code 2800     Unit Blood Type AB NEG     Unit Expiration [id number]    Basic metabolic panel    Collection Time: 11/17/17  3:45 AM   Result Value Ref Range    Sodium 132 (L) 136 - 145 mmol/L    Potassium 4.4 3.5 - 5.1 mmol/L    Chloride 102 95 - 110 mmol/L    CO2 24 23 - 29 mmol/L    Glucose 101 70 - 110 mg/dL    BUN, Bld 25 (H) 8 - 23 mg/dL    Creatinine 0.9 0.5 - 1.4 mg/dL    Calcium 8.7 8.7 - 10.5 mg/dL    Anion Gap 6 (L) 8 - 16 mmol/L    eGFR if African American >60.0 >60 mL/min/1.73 m^2    eGFR if non  57.7 (A) >60 mL/min/1.73 m^2   CBC auto differential    Collection Time: 11/17/17  7:54 AM   Result Value Ref Range    WBC 9.26 3.90 - 12.70 K/uL    RBC 2.83 (L) 4.00 - 5.40 M/uL    Hemoglobin 8.5 (L) 12.0 - 16.0 g/dL    Hematocrit 25.1 (L) 37.0 - 48.5 %    MCV 89 82 - 98 fL    MCH 30.0 27.0 - 31.0 pg    MCHC 33.9 32.0 - 36.0 g/dL    RDW 13.9 11.5 - 14.5 %    Platelets 227 150 - 350 K/uL    MPV 10.1 9.2 - 12.9 fL    Immature Granulocytes 0.5 0.0 - 0.5 %    Gran # 7.4 1.8 - 7.7 K/uL    Immature Grans (Abs) 0.05 (H) 0.00 - 0.04 K/uL    Lymph # 0.8 (L) 1.0 - 4.8 K/uL    Mono # 0.8 0.3 - 1.0 K/uL    Eos # 0.2 0.0 - 0.5  K/uL    Baso # 0.03 0.00 - 0.20 K/uL    nRBC 0 0 /100 WBC    Gran% 79.5 (H) 38.0 - 73.0 %    Lymph% 9.1 (L) 18.0 - 48.0 %    Mono% 8.2 4.0 - 15.0 %    Eosinophil% 2.4 0.0 - 8.0 %    Basophil% 0.3 0.0 - 1.9 %    Differential Method Automated          Diagnostic Results:    CT Urogram:    6.3 x 4.3 x 4.0 cm left sided enhancing bladder mass.    Severe left hydroureteronephrosis. Delayed left nephrogram.    Contrast layering about hyperintense regions within the bladder similar to the noncontrast portion of the exam, likely hemorrhage within the bladder.    Bladder not included on arterial phase imaging, and active arterial extravasation cannot be definitively identified. Forrest catheter and small volume of air within the bladder.    Other findings including groundglass opacification the lungs, multiple hepatic hypodensities, surgical changes of bowel resection and anastomosis, osseous degenerative changes, and  moderate atherosclerosis    CT Chest:    1.  Bilateral pulmonary nodules with locations and measurements as detailed above.  The largest nodular opacities in the right upper lobe measures approximately 1.6 cm.  Given patient's reported history of suspected malignancy, findings could represent underlying neoplastic process.  Clinical considerations will determine the need for and schedule of further surveillance imaging.    2.  Nonspecific groundglass opacity within the lower lobes.  Findings could relate to edema versus infectious or noninfectious inflammatory process.    3. Calcific atherosclerosis of the coronary vessels and aorta.     4. Left-sided hydronephrosis, hepatic hypodensities and probable subacute right rib fracture deformities.    Assessment/Plan:     * Bladder cancer    -The patient has been diagnosed with urothelia carcinoma of the bladder with concern for metastatic disease  -Currently the patient is not a candidate for treatment given the active bleeding from the bladder tumor.  -I spoke with  the [facility] service whom does not plan on any surgical interventions to stop bleeding at this point.   -Would consult Radiation oncology for radiation to the tumor in order to control bleeding.  -If bleeding is controlled the patient would potentially be a candidate for chemotherapy or immunotherapy as an outpatient.  -This was discussed with the patient.        Gross hematuria    Would consult radiation oncology for management.            Thank you for your consult. I will follow-up with patient. Please contact us if you have any additional questions.    Herberth Alfaro MD  Hematology/Oncology  [facility]    Attending Note  I have personally taken the history and examined this patient and agree with the fellow's note as stated above.  Discussed above- treatment for her cancer would require control of bleeding first

## 2017-11-17 NOTE — ASSESSMENT & PLAN NOTE
-The patient has been diagnosed with urothelia carcinoma of the bladder with concern for metastatic disease  -Currently the patient is not a candidate for treatment given the active bleeding from the bladder tumor.  -I spoke with the [facility] service whom does not plan on any surgical interventions to stop bleeding at this point.   -Would consult Radiation oncology for radiation to the tumor in order to control bleeding.  -If bleeding is controlled the patient would potentially be a candidate for chemotherapy or immunotherapy as an outpatient.  -This was discussed with the patient.

## 2017-11-17 NOTE — SUBJECTIVE & OBJECTIVE
Interval History:     SHAHZAD  No issues with wilson catheter     Review of Systems  Objective:     Temp:  [96.9 °F (36.1 °C)-98.4 °F (36.9 °C)] 97.8 °F (36.6 °C)  Pulse:  [58-84] 67  Resp:  [16-18] 16  SpO2:  [93 %-100 %] 100 %  BP: ()/(56-80) 132/80     Body mass index is 19.53 kg/m².            Drains     Drain                 Urethral Catheter 11/13/17 1557 Triple-lumen 24 Fr. 3 days                Physical Exam   Constitutional: She is oriented to person, place, and time. She appears cachectic. She appears ill. No distress.   HENT:   Head: Normocephalic and atraumatic.   Eyes: No scleral icterus.   Neck: No JVD present.   Cardiovascular: Normal rate.  An irregularly irregular rhythm present.   Pulmonary/Chest: Effort normal. No respiratory distress.   Abdominal: Soft. She exhibits no distension. There is no tenderness. There is no rebound and no guarding.   Well healed midline scar   Genitourinary:   Genitourinary Comments: 24 Fr wilson draining clear pink on slow drip CB     Neurological: She is alert and oriented to person, place, and time.   Skin: She is not diaphoretic. There is pallor.     Psychiatric: She has a normal mood and affect. Her behavior is normal.       Significant Labs:    BMP:    Recent Labs  Lab 11/15/17  0552 11/16/17  0616 11/17/17  0345   * 129* 132*   K 3.5 3.6 4.4    100 102   CO2 21* 22* 24   BUN 21 28* 25*   CREATININE 0.9 1.0 0.9   CALCIUM 8.4* 8.3* 8.7       CBC:     Recent Labs  Lab 11/15/17  1952 11/16/17  0813 11/16/17 1953   WBC 10.80 10.03 10.27   HGB 7.6* 7.1* 6.8*   HCT 22.2* 20.9* 19.2*    203 229       All pertinent labs results from the past 24 hours have been reviewed.    Significant Imaging:  All pertinent imaging results/findings from the past 24 hours have been reviewed.

## 2017-11-17 NOTE — PROGRESS NOTES
Continuing to monitor wilson cathter OP since irrigation d/c'd per Urology. Urine still dark red with noted sediment, however no darker than this morning and no visible clots. Urine continues to drain without complications, pt with c/o pain 4/10 responded to tylenol. No pressure, bladder not distended though mass palpable. Bladder scan completed to assure with 40ccs noted to bladder. Will continue to closely monitor.

## 2017-11-17 NOTE — ASSESSMENT & PLAN NOTE
- recommend heme/onc consultation.  Not a candidate for surgical intervention with curative intent

## 2017-11-17 NOTE — PROGRESS NOTES
[facility]  Urology  Progress Note    Patient Name: Monique Lew  MRN: [id number]  Admission Date: 11/11/2017  Hospital Length of Stay: 5 days  Code Status: Full Code   Attending Provider: Tiffanie Enriquez MD  Primary Care Physician: Aguila Hsieh MD    Subjective:     HPI:  Monique Lew is a 87 y.o. female with hx of HTN, HLD, breast cancer, stoke, DVT, and afib who presented to the ED with complaints of gross hematuria x 1 day. She has a hx of microscopic hematuria and is s/p negative workup in 2014 for an episode of gross hematuria.    She states along with the hematuria she has had frequency, urgency, and urge incontinence. Denies straining or clots. She has had weight loss recently associated with a decreased appetite. Denies flank pain or bone pain.     CT Urogram obtained shows a 6 cm enhancing left sided bladder mass with severe left hydronephrosis and no contrast excretion from left kidney. No lymphadenopathy. Creatinine is normal. H/H is slightly lower than baseline. Vitals have been stable.    She does have a 20 pack/year smoking history, quit 30 years ago. Hx of breast cancer in 1992 treated with radiation.     S/p cystoscopy with TURBT on 11/13/2017-- unable to identify left ureteral orifice at that time, and thus unable to place left ureteral stent.  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     Interval History:     SHAHZAD  No issues with wilson catheter     Review of Systems  Objective:     Temp:  [96.9 °F (36.1 °C)-98.4 °F (36.9 °C)] 97.8 °F (36.6 °C)  Pulse:  [58-84] 67  Resp:  [16-18] 16  SpO2:  [93 %-100 %] 100 %  BP: ()/(56-80) 132/80     Body mass index is 19.53 kg/m².            Drains     Drain                 Urethral Catheter 11/13/17 1557 Triple-lumen 24 Fr. 3 days                Physical Exam   Constitutional: She is oriented to person, place, and time. She appears cachectic. She appears ill. No distress.   HENT:   Head:  Normocephalic and atraumatic.   Eyes: No scleral icterus.   Neck: No JVD present.   Cardiovascular: Normal rate.  An irregularly irregular rhythm present.   Pulmonary/Chest: Effort normal. No respiratory distress.   Abdominal: Soft. She exhibits no distension. There is no tenderness. There is no rebound and no guarding.   Well healed midline scar   Genitourinary:   Genitourinary Comments: 24 Fr wilson draining clear pink on slow drip CB     Neurological: She is alert and oriented to person, place, and time.   Skin: She is not diaphoretic. There is pallor.     Psychiatric: She has a normal mood and affect. Her behavior is normal.       Significant Labs:    BMP:    Recent Labs  Lab 11/15/17  0552 11/16/17  0616 11/17/17  0345   * 129* 132*   K 3.5 3.6 4.4    100 102   CO2 21* 22* 24   BUN 21 28* 25*   CREATININE 0.9 1.0 0.9   CALCIUM 8.4* 8.3* 8.7       CBC:     Recent Labs  Lab 11/15/17  1952 11/16/17  0813 11/16/17 1953   WBC 10.80 10.03 10.27   HGB 7.6* 7.1* 6.8*   HCT 22.2* 20.9* 19.2*    203 229       All pertinent labs results from the past 24 hours have been reviewed.    Significant Imaging:  All pertinent imaging results/findings from the past 24 hours have been reviewed.                  Assessment/Plan:     High grade muscle invasive bladder cancer     Monique Lew is a 87 y.o. female who presented with gross hematuria secondary to left sided bladder mass with severe left hydronephrosis    S/p TURBT 11/13/2017, unable to place left ureteral stent as UO could not be identified     - pathology high grade muscle invasive urothelial carcinoma, chest CT shows lung nodule suspicious for malignancy   - CBI clamped on rounds, will recheck later today  - maintain wilson  - please continue to hold heparin-- hematuria clearing off heparin, not completely clear yet   - recommend heme/onc consultation.  Not a candidate for surgical intervention with curative intent   - will discuss nephrostomy tube  with patient                     VTE Risk Mitigation     None          Raymon Tran MD  Urology  [facility]

## 2017-11-17 NOTE — SUBJECTIVE & OBJECTIVE
Oncology Treatment Plan:   [No treatment plan]    Medications:  Continuous Infusions:   Scheduled Meds:   levothyroxine  50 mcg Oral Before breakfast    metoprolol succinate  25 mg Oral Daily    timolol maleate 0.5%  1 drop Both Eyes BID     PRN Meds:sodium chloride, acetaminophen, influenza, magnesium hydroxide 400 mg/5 ml, sodium chloride 0.9%, sodium chloride 0.9%, traMADol     Review of patient's allergies indicates:   Allergen Reactions    Cosopt [dorzolamide-timolol] Swelling     Swelling of eyelids    Mevacor [lovastatin] Nausea And Vomiting    Prednisone Nausea And Vomiting    Vasotec [enalapril maleate] Nausea And Vomiting    Zetia [ezetimibe] Nausea And Vomiting    Furosemide Palpitations     Pt states her heart skips beats when she takes this medication.        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    CVA (cerebral infarction)     Dislocated IOL (intraocular lens), posterior - Right Eye 10/19/2012    DVT (deep venous thrombosis)     Glaucoma     Pseudoexfoliation, sever    Goiter     Heart attack     Hypertension     Hypothyroidism     Long-term (current) use of anticoagulants     Macular hole of right eye     Multinodular goiter     Other hyperparathyroidism     Phthisis bulbi of left eye     Posterior dislocation of right lens 8/5/2015    Pseudoexfoliation glaucoma, 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 hines)    BREAST LUMPECTOMY      BUNIONECTOMY      CATARACT EXTRACTION W/  INTRAOCULAR LENS IMPLANT Right  01/26/2011    WITH TRAB ()    CATARACT EXTRACTION W/  INTRAOCULAR LENS IMPLANT Left 04/09/2009    ANT. VIITRECTOMY WITH SUTURED PCIOL (DR. RYAN)    CATARACT EXTRACTION W/ INTRAOCULAR LENS IMPLANTW/ TRABECULECTOMY Right 01/26/2011        COLON SURGERY      volvulus    CONJUNCTIVAL FLAP  Left 05/28/2009    DR. JARAMILLO    conjuunctival flap   2009    OS w/ dr. 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 emanuel    TRABECULECTOMY Left 01/11/2006        transcleral cyclophotocoagulation   2010    OS w/ dr. hines     Family History     Problem Relation (Age of Onset)    Emphysema Mother    Hyperlipidemia Mother, Father    Hypertension Mother, Father    No Known Problems Sister, Brother, Maternal Aunt, Maternal Uncle, Paternal Aunt, Paternal Uncle, Maternal Grandmother, Maternal Grandfather, Paternal Grandmother, Paternal Grandfather        Social History Main Topics    Smoking status: Former Smoker     Quit date: 9/25/1982    Smokeless tobacco: Never Used    Alcohol use No    Drug use: No    Sexual activity: No       Review of Systems   Constitutional: Positive for activity change, appetite change, fatigue and unexpected weight change. Negative for chills and fever.   HENT: Negative for sore throat and trouble swallowing.    Eyes: Negative for photophobia, pain and visual disturbance.   Respiratory: Negative for chest tightness and shortness of breath.    Cardiovascular: Negative for chest pain, palpitations and leg swelling.   Gastrointestinal: Positive for constipation. Negative for abdominal pain, diarrhea, nausea and vomiting.   Genitourinary: Positive for hematuria. Negative for difficulty urinating and dysuria.   Musculoskeletal: Negative for arthralgias and back pain.   Skin: Negative for color change and  rash.   Neurological: Negative for weakness, numbness and headaches.        Multiple falls.     Objective:     Vital Signs (Most Recent):  Temp: 97.3 °F (36.3 °C) (11/17/17 1136)  Pulse: 73 (11/17/17 1500)  Resp: 18 (11/17/17 1136)  BP: (!) 140/70 (11/17/17 1136)  SpO2: (!) 94 % (11/17/17 1136) Vital Signs (24h Range):  Temp:  [96.9 °F (36.1 °C)-98.4 °F (36.9 °C)] 97.3 °F (36.3 °C)  Pulse:  [55-79] 73  Resp:  [16-18] 18  SpO2:  [94 %-100 %] 94 %  BP: ()/(57-80) 140/70     Weight: 45.4 kg (100 lb)  Body mass index is 19.53 kg/m².  Body surface area is 1.39 meters squared.      Intake/Output Summary (Last 24 hours) at 11/17/17 1549  Last data filed at 11/17/17 0831   Gross per 24 hour   Intake              980 ml   Output             2300 ml   Net            -1320 ml       Physical Exam   Constitutional: She is oriented to person, place, and time. No distress.   Thin, Cachectic    HENT:   Head: Normocephalic and atraumatic.   Mouth/Throat: No oropharyngeal exudate.   Eyes: EOM are normal. Right eye exhibits no discharge. Left eye exhibits no discharge. No scleral icterus.   Cardiovascular: Normal rate, regular rhythm, normal heart sounds and intact distal pulses.  Exam reveals no gallop and no friction rub.    No murmur heard.  Pulmonary/Chest: Effort normal and breath sounds normal. No respiratory distress. She has no wheezes. She has no rales. She exhibits no tenderness.   Abdominal: Soft. Bowel sounds are normal. She exhibits no distension and no mass. There is no tenderness. There is no rebound and no guarding.   Genitourinary:   Genitourinary Comments: hematuria in wilson catheter   Musculoskeletal: Normal range of motion. She exhibits no edema or tenderness.   Neurological: She is alert and oriented to person, place, and time.   Skin: No rash noted. She is not diaphoretic. No erythema.   Psychiatric: She has a normal mood and affect. Her behavior is normal.       Significant Labs:   Recent Results (from the  past 24 hour(s))   CBC auto differential    Collection Time: 11/16/17  7:53 PM   Result Value Ref Range    WBC 10.27 3.90 - 12.70 K/uL    RBC 2.08 (L) 4.00 - 5.40 M/uL    Hemoglobin 6.8 (L) 12.0 - 16.0 g/dL    Hematocrit 19.2 (LL) 37.0 - 48.5 %    MCV 92 82 - 98 fL    MCH 32.7 (H) 27.0 - 31.0 pg    MCHC 35.4 32.0 - 36.0 g/dL    RDW 13.4 11.5 - 14.5 %    Platelets 229 150 - 350 K/uL    MPV 10.3 9.2 - 12.9 fL    Immature Granulocytes 0.6 (H) 0.0 - 0.5 %    Gran # 8.3 (H) 1.8 - 7.7 K/uL    Immature Grans (Abs) 0.06 (H) 0.00 - 0.04 K/uL    Lymph # 1.1 1.0 - 4.8 K/uL    Mono # 0.6 0.3 - 1.0 K/uL    Eos # 0.2 0.0 - 0.5 K/uL    Baso # 0.02 0.00 - 0.20 K/uL    nRBC 0 0 /100 WBC    Gran% 80.8 (H) 38.0 - 73.0 %    Lymph% 10.5 (L) 18.0 - 48.0 %    Mono% 5.9 4.0 - 15.0 %    Eosinophil% 2.0 0.0 - 8.0 %    Basophil% 0.2 0.0 - 1.9 %    Differential Method Automated    Type & Screen    Collection Time: 11/16/17  8:50 PM   Result Value Ref Range    Group & Rh AB NEG     Indirect Camila NEG    Prepare RBC 1 Unit    Collection Time: 11/16/17  8:50 PM   Result Value Ref Range    UNIT NUMBER [id number]     PRODUCT CODE U1213J98     DISPENSE STATUS TRANSFUSED     CODING SYSTEM [id number]     Unit Blood Type Code 2800     Unit Blood Type AB NEG     Unit Expiration [id number]    Basic metabolic panel    Collection Time: 11/17/17  3:45 AM   Result Value Ref Range    Sodium 132 (L) 136 - 145 mmol/L    Potassium 4.4 3.5 - 5.1 mmol/L    Chloride 102 95 - 110 mmol/L    CO2 24 23 - 29 mmol/L    Glucose 101 70 - 110 mg/dL    BUN, Bld 25 (H) 8 - 23 mg/dL    Creatinine 0.9 0.5 - 1.4 mg/dL    Calcium 8.7 8.7 - 10.5 mg/dL    Anion Gap 6 (L) 8 - 16 mmol/L    eGFR if African American >60.0 >60 mL/min/1.73 m^2    eGFR if non  57.7 (A) >60 mL/min/1.73 m^2   CBC auto differential    Collection Time: 11/17/17  7:54 AM   Result Value Ref Range    WBC 9.26 3.90 - 12.70 K/uL    RBC 2.83 (L) 4.00 - 5.40 M/uL    Hemoglobin 8.5 (L) 12.0 - 16.0  g/dL    Hematocrit 25.1 (L) 37.0 - 48.5 %    MCV 89 82 - 98 fL    MCH 30.0 27.0 - 31.0 pg    MCHC 33.9 32.0 - 36.0 g/dL    RDW 13.9 11.5 - 14.5 %    Platelets 227 150 - 350 K/uL    MPV 10.1 9.2 - 12.9 fL    Immature Granulocytes 0.5 0.0 - 0.5 %    Gran # 7.4 1.8 - 7.7 K/uL    Immature Grans (Abs) 0.05 (H) 0.00 - 0.04 K/uL    Lymph # 0.8 (L) 1.0 - 4.8 K/uL    Mono # 0.8 0.3 - 1.0 K/uL    Eos # 0.2 0.0 - 0.5 K/uL    Baso # 0.03 0.00 - 0.20 K/uL    nRBC 0 0 /100 WBC    Gran% 79.5 (H) 38.0 - 73.0 %    Lymph% 9.1 (L) 18.0 - 48.0 %    Mono% 8.2 4.0 - 15.0 %    Eosinophil% 2.4 0.0 - 8.0 %    Basophil% 0.3 0.0 - 1.9 %    Differential Method Automated          Diagnostic Results:    CT Urogram:    6.3 x 4.3 x 4.0 cm left sided enhancing bladder mass.    Severe left hydroureteronephrosis. Delayed left nephrogram.    Contrast layering about hyperintense regions within the bladder similar to the noncontrast portion of the exam, likely hemorrhage within the bladder.    Bladder not included on arterial phase imaging, and active arterial extravasation cannot be definitively identified. Forrest catheter and small volume of air within the bladder.    Other findings including groundglass opacification the lungs, multiple hepatic hypodensities, surgical changes of bowel resection and anastomosis, osseous degenerative changes, and  moderate atherosclerosis    CT Chest:    1.  Bilateral pulmonary nodules with locations and measurements as detailed above.  The largest nodular opacities in the right upper lobe measures approximately 1.6 cm.  Given patient's reported history of suspected malignancy, findings could represent underlying neoplastic process.  Clinical considerations will determine the need for and schedule of further surveillance imaging.    2.  Nonspecific groundglass opacity within the lower lobes.  Findings could relate to edema versus infectious or noninfectious inflammatory process.    3. Calcific atherosclerosis of the  coronary vessels and aorta.     4. Left-sided hydronephrosis, hepatic hypodensities and probable subacute right rib fracture deformities.

## 2017-11-17 NOTE — PT/OT/SLP PROGRESS
Physical Therapy      Monique Keely Lew  MRN: [id number]    Patient not seen today secondary to  (patient refused. Patient stated she hasn't eaten since yesterday, bcecause she was supoosed to have surgery today. Patient also stated she is tired and hungry and doesn't want to do anything. She is waiting for her food.  Will follow-up next scheduled visit.    Brice Zaragoza II, PTA

## 2017-11-18 LAB
ANION GAP SERPL CALC-SCNC: 7 MMOL/L
BASOPHILS # BLD AUTO: 0.05 K/UL
BASOPHILS # BLD AUTO: 0.05 K/UL
BASOPHILS NFR BLD: 0.6 %
BASOPHILS NFR BLD: 0.6 %
BUN SERPL-MCNC: 23 MG/DL
CALCIUM SERPL-MCNC: 9 MG/DL
CHLORIDE SERPL-SCNC: 99 MMOL/L
CO2 SERPL-SCNC: 23 MMOL/L
CREAT SERPL-MCNC: 1 MG/DL
DIFFERENTIAL METHOD: ABNORMAL
DIFFERENTIAL METHOD: ABNORMAL
EOSINOPHIL # BLD AUTO: 0.3 K/UL
EOSINOPHIL # BLD AUTO: 0.3 K/UL
EOSINOPHIL NFR BLD: 3.4 %
EOSINOPHIL NFR BLD: 3.4 %
ERYTHROCYTE [DISTWIDTH] IN BLOOD BY AUTOMATED COUNT: 14.4 %
ERYTHROCYTE [DISTWIDTH] IN BLOOD BY AUTOMATED COUNT: 14.6 %
EST. GFR  (AFRICAN AMERICAN): 58.5 ML/MIN/1.73 M^2
EST. GFR  (NON AFRICAN AMERICAN): 50.8 ML/MIN/1.73 M^2
GLUCOSE SERPL-MCNC: 113 MG/DL
HCT VFR BLD AUTO: 24.8 %
HCT VFR BLD AUTO: 26.1 %
HGB BLD-MCNC: 8.1 G/DL
HGB BLD-MCNC: 8.7 G/DL
IMM GRANULOCYTES # BLD AUTO: 0.03 K/UL
IMM GRANULOCYTES # BLD AUTO: 0.04 K/UL
IMM GRANULOCYTES NFR BLD AUTO: 0.4 %
IMM GRANULOCYTES NFR BLD AUTO: 0.5 %
LYMPHOCYTES # BLD AUTO: 0.6 K/UL
LYMPHOCYTES # BLD AUTO: 0.7 K/UL
LYMPHOCYTES NFR BLD: 6.9 %
LYMPHOCYTES NFR BLD: 8.6 %
MCH RBC QN AUTO: 29.8 PG
MCH RBC QN AUTO: 29.9 PG
MCHC RBC AUTO-ENTMCNC: 32.7 G/DL
MCHC RBC AUTO-ENTMCNC: 33.3 G/DL
MCV RBC AUTO: 89 FL
MCV RBC AUTO: 92 FL
MONOCYTES # BLD AUTO: 0.7 K/UL
MONOCYTES # BLD AUTO: 0.8 K/UL
MONOCYTES NFR BLD: 10 %
MONOCYTES NFR BLD: 8.2 %
NEUTROPHILS # BLD AUTO: 6.1 K/UL
NEUTROPHILS # BLD AUTO: 6.6 K/UL
NEUTROPHILS NFR BLD: 76.9 %
NEUTROPHILS NFR BLD: 80.5 %
NRBC BLD-RTO: 0 /100 WBC
NRBC BLD-RTO: 0 /100 WBC
PLATELET # BLD AUTO: 245 K/UL
PLATELET # BLD AUTO: 249 K/UL
PMV BLD AUTO: 10 FL
PMV BLD AUTO: 9.9 FL
POTASSIUM SERPL-SCNC: 4.8 MMOL/L
RBC # BLD AUTO: 2.71 M/UL
RBC # BLD AUTO: 2.92 M/UL
SODIUM SERPL-SCNC: 129 MMOL/L
WBC # BLD AUTO: 7.93 K/UL
WBC # BLD AUTO: 8.14 K/UL

## 2017-11-18 PROCEDURE — 97530 THERAPEUTIC ACTIVITIES: CPT

## 2017-11-18 PROCEDURE — 99232 SBSQ HOSP IP/OBS MODERATE 35: CPT | Mod: GC,,, | Performed by: HOSPITALIST

## 2017-11-18 PROCEDURE — 25000003 PHARM REV CODE 250

## 2017-11-18 PROCEDURE — 80048 BASIC METABOLIC PNL TOTAL CA: CPT

## 2017-11-18 PROCEDURE — 36415 COLL VENOUS BLD VENIPUNCTURE: CPT

## 2017-11-18 PROCEDURE — 25000003 PHARM REV CODE 250: Performed by: STUDENT IN AN ORGANIZED HEALTH CARE EDUCATION/TRAINING PROGRAM

## 2017-11-18 PROCEDURE — 97116 GAIT TRAINING THERAPY: CPT

## 2017-11-18 PROCEDURE — 85025 COMPLETE CBC W/AUTO DIFF WBC: CPT | Mod: 91

## 2017-11-18 PROCEDURE — 11000001 HC ACUTE MED/SURG PRIVATE ROOM

## 2017-11-18 RX ADMIN — TIMOLOL MALEATE 1 DROP: 5 SOLUTION OPHTHALMIC at 09:11

## 2017-11-18 RX ADMIN — ACETAMINOPHEN 650 MG: 325 TABLET ORAL at 09:11

## 2017-11-18 RX ADMIN — LEVOTHYROXINE SODIUM 50 MCG: 50 TABLET ORAL at 05:11

## 2017-11-18 RX ADMIN — METOPROLOL SUCCINATE 25 MG: 25 TABLET, EXTENDED RELEASE ORAL at 09:11

## 2017-11-18 RX ADMIN — ACETAMINOPHEN 650 MG: 325 TABLET ORAL at 01:11

## 2017-11-18 NOTE — ASSESSMENT & PLAN NOTE
Monique Lew is a 87 y.o. female who presented with gross hematuria secondary to left sided bladder mass with severe left hydronephrosis, pathology high grade muscle invasive urothelial carcinoma--[id number]      S/p TURBT 11/13/2017, unable to place left ureteral stent as UO could not be identified      - CBI clamped on rounds, will recheck later today  - maintain wilson  - please continue to hold heparin  - hematuria clearing off heparin, not completely clear yet  - XRT is not in the treatment algorithm for gross hematuria, especially in the post-operative setting.  We do not recommend XRT as a treatment for gross hematuria. We expect the gross hematuria to continue to clear as long as patient remains off anticoagulation.  We also recommend in-depth goals of care discussion with patient by heme/onc and primary team.

## 2017-11-18 NOTE — PROGRESS NOTES
Urology    Nurse called to inform urology that patient's urine significantly more red than earlier.  Irrigated to clear easily, one small clot returned, slow drip CBI restarted.  Will reassess in AM.     Raymon Tran MD, PGY-III  [facility]

## 2017-11-18 NOTE — PT/OT/SLP PROGRESS
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  Pt agreed to work with therapy     Pain/Comfort  Pain Rating 1: 0/10  Pain Rating Post-Intervention 1: 0/10    Objective:   Patient found with:  (all lines intact)    Functional Mobility:  Bed Mobility:    Not performed 2/2 pt found and left seated bedside chair.     Transfers:  Sit <> Stand Assistance: Contact Guard Assistance (x2 trials to/from bedside chair)  Sit <> Stand Assistive Device: Rolling Walker    Gait:   Gait Distance:  (~100 ft., Pt required manual cues to direct the walker. )  Assistance 1: Minimum assistance  Gait Assistive Device: Rolling walker  Gait Deviation(s): decreased ijeoma, decreased step length, backward lean, decreased toe-to-floor clearance    Stairs: Not performed on this date 2/2 lines.     Therapeutic Activities and Exercises:  B LE therex x15 reps: AP, LAQ, Hip Flexion, and GS.    AM-PAC 6 CLICK MOBILITY  How much help from another person does this patient currently need?   1 = Unable, Total/Dependent Assistance  2 = A lot, Maximum/Moderate Assistance  3 = A little, Minimum/Contact Guard/Supervision  4 = None, Modified [place]/Independent    Turning over in bed (including adjusting bedclothes, sheets and blankets)?: 3  Sitting down on and standing up from a chair with arms (e.g., wheelchair, bedside commode, etc.): 3  Moving from lying on  back to sitting on the side of the bed?: 3  Moving to and from a bed to a chair (including a wheelchair)?: 3  Need to walk in hospital room?: 3  Climbing 3-5 steps with a railing?: 3  Total Score: 18    AM-PAC Raw Score CMS G-Code Modifier Level of Impairment Assistance   6 % Total / Unable   7 - 9 CM 80 - 100% Maximal Assist   10 - 14 CL 60 - 80% Moderate Assist   15 - 19 CK 40 - 60% Moderate Assist   20 - 22 CJ 20 - 40% Minimal Assist   23 CI 1-20% SBA / CGA   24 CH 0% Independent/ Mod I     Patient left up in chair with all lines intact, call button in reach and family friend present.    Assessment:  Monique Lew is a 87 y.o. female with a medical diagnosis of Bladder cancer and presents with all deficits noted below. Pt tolerated treatment fairly well, and will continue to benefit from PT services at this time. Continue with PT POC as indicated.    Rehab identified problem list/impairments: Rehab identified problem list/impairments: weakness, impaired endurance, impaired self care skills, impaired functional mobilty, gait instability, impaired balance, visual deficits, decreased safety awareness    Rehab potential is good.    Activity tolerance: Good    Discharge recommendations: Discharge Facility/Level Of Care Needs: nursing facility, skilled (short stay)     Barriers to discharge: Barriers to Discharge: Inaccessible home environment, Decreased caregiver support (14 steps to her bedroom and lives alone)    Equipment recommendations: Equipment Needed After Discharge: walker, rolling     GOALS:    Physical Therapy Goals        Problem: Physical Therapy Goal    Goal Priority Disciplines Outcome Goal Variances Interventions   Physical Therapy Goal     PT/OT, PT Ongoing (interventions implemented as appropriate)     Description:  PT goals until 11/22/17    1. Pt supine to sit with supervision-not met  2. Pt sit to supine with supervision-not met  3. Pt sit to stand with or without RW as needed for  safety with SBA-not met  4. Pt to perform gait 150ft with or without RW as needed for safety with SBA.-not met  5. Pt to up/down 10 steps with R UE rail with CGA.-not met  6. Pt to perform B LE exs in sitting x 15 reps to increase B LE strength to improve functional mobility.-not met                      PLAN:    Patient to be seen 4 x/week  to address the above listed problems via gait training, therapeutic activities, therapeutic exercises, neuromuscular re-education  Plan of Care expires: 12/15/17  Plan of Care reviewed with: patient         Deana Sharma, PTA  11/18/2017

## 2017-11-18 NOTE — SUBJECTIVE & OBJECTIVE
Interval History:  NAEON. Yesterday, discussed with patient the choice of pursuing treatment with oncology versus palliative care; patient states she would like to discuss treatment options with oncology before deciding. Per onc, not a chemo candidate with right now. Onc and urology following, will touch base with both consults regarding radiation.    Review of Systems   Constitutional: Positive for appetite change. Negative for chills, diaphoresis, fatigue and fever.   HENT: Negative for sore throat and trouble swallowing.    Respiratory: Negative for cough and shortness of breath.    Cardiovascular: Negative for chest pain, palpitations and leg swelling.   Gastrointestinal: Negative for abdominal distention, abdominal pain, constipation, diarrhea, nausea and vomiting.   Genitourinary: Positive for hematuria. Negative for difficulty urinating, dysuria, flank pain, frequency and urgency.   Musculoskeletal: Negative for arthralgias and back pain.   Skin: Positive for pallor. Negative for color change.   Neurological: Negative for dizziness and light-headedness.   Psychiatric/Behavioral: Negative for confusion.     Objective:     Vital Signs (Most Recent):  Temp: 97.6 °F (36.4 °C) (11/18/17 1150)  Pulse: 66 (11/18/17 1150)  Resp: 18 (11/18/17 1150)  BP: 131/75 (11/18/17 1150)  SpO2: 100 % (11/18/17 0732) Vital Signs (24h Range):  Temp:  [97.6 °F (36.4 °C)-98.7 °F (37.1 °C)] 97.6 °F (36.4 °C)  Pulse:  [58-74] 66  Resp:  [16-18] 18  SpO2:  [99 %-100 %] 100 %  BP: (105-160)/(55-79) 131/75     Weight: 45.4 kg (100 lb)  Body mass index is 19.53 kg/m².    Intake/Output Summary (Last 24 hours) at 11/18/17 1157  Last data filed at 11/18/17 0920   Gross per 24 hour   Intake              900 ml   Output             5895 ml   Net            -4995 ml      Physical Exam   Constitutional: She is oriented to person, place, and time. No distress.   Thin, frail appearing elderly lady.   HENT:   Head: Normocephalic and atraumatic.    Mouth/Throat: Oropharynx is clear and moist.   Eyes: No scleral icterus.   Pale conjunctivae.   Neck: Normal range of motion. Neck supple.   Cardiovascular: Normal rate, regular rhythm, normal heart sounds and intact distal pulses.    Pulmonary/Chest: Effort normal and breath sounds normal.   Abdominal: Soft. Bowel sounds are normal. She exhibits no distension. There is no tenderness. There is no guarding.   Genitourinary:   Genitourinary Comments: Forrest bag draining sanguinous urine.    Neurological: She is alert and oriented to person, place, and time.   Skin: Skin is warm and dry. She is not diaphoretic. There is pallor.   Psychiatric: She has a normal mood and affect. Her behavior is normal.   Vitals reviewed.      Significant Labs:   Recent Results (from the past 24 hour(s))   CBC auto differential    Collection Time: 11/17/17  7:54 PM   Result Value Ref Range    WBC 9.99 3.90 - 12.70 K/uL    RBC 2.95 (L) 4.00 - 5.40 M/uL    Hemoglobin 9.2 (L) 12.0 - 16.0 g/dL    Hematocrit 26.4 (L) 37.0 - 48.5 %    MCV 90 82 - 98 fL    MCH 31.2 (H) 27.0 - 31.0 pg    MCHC 34.8 32.0 - 36.0 g/dL    RDW 14.4 11.5 - 14.5 %    Platelets SEE COMMENT 150 - 350 K/uL    MPV SEE COMMENT 9.2 - 12.9 fL    Immature Granulocytes 2.4 (H) 0.0 - 0.5 %    Gran # 7.6 1.8 - 7.7 K/uL    Immature Grans (Abs) 0.24 (H) 0.00 - 0.04 K/uL    Lymph # 0.9 (L) 1.0 - 4.8 K/uL    Mono # 0.9 0.3 - 1.0 K/uL    Eos # 0.3 0.0 - 0.5 K/uL    Baso # 0.04 0.00 - 0.20 K/uL    nRBC 0 0 /100 WBC    Gran% 76.1 (H) 38.0 - 73.0 %    Lymph% 9.4 (L) 18.0 - 48.0 %    Mono% 9.2 4.0 - 15.0 %    Eosinophil% 2.5 0.0 - 8.0 %    Basophil% 0.4 0.0 - 1.9 %    Aniso Slight     Poik Slight     Poly Occasional     Ovalocytes Occasional     [place] Cells Occasional     Differential Method AUTO    Platelet count    Collection Time: 11/17/17  9:30 PM   Result Value Ref Range    Platelets 254 150 - 350 K/uL    MPV 10.7 9.2 - 12.9 fL   Basic metabolic panel    Collection Time: 11/18/17  4:26  AM   Result Value Ref Range    Sodium 129 (L) 136 - 145 mmol/L    Potassium 4.8 3.5 - 5.1 mmol/L    Chloride 99 95 - 110 mmol/L    CO2 23 23 - 29 mmol/L    Glucose 113 (H) 70 - 110 mg/dL    BUN, Bld 23 8 - 23 mg/dL    Creatinine 1.0 0.5 - 1.4 mg/dL    Calcium 9.0 8.7 - 10.5 mg/dL    Anion Gap 7 (L) 8 - 16 mmol/L    eGFR if African American 58.5 (A) >60 mL/min/1.73 m^2    eGFR if non African American 50.8 (A) >60 mL/min/1.73 m^2   CBC auto differential    Collection Time: 11/18/17  7:49 AM   Result Value Ref Range    WBC 8.14 3.90 - 12.70 K/uL    RBC 2.92 (L) 4.00 - 5.40 M/uL    Hemoglobin 8.7 (L) 12.0 - 16.0 g/dL    Hematocrit 26.1 (L) 37.0 - 48.5 %    MCV 89 82 - 98 fL    MCH 29.8 27.0 - 31.0 pg    MCHC 33.3 32.0 - 36.0 g/dL    RDW 14.4 11.5 - 14.5 %    Platelets 245 150 - 350 K/uL    MPV 10.0 9.2 - 12.9 fL    Immature Granulocytes 0.4 0.0 - 0.5 %    Gran # 6.6 1.8 - 7.7 K/uL    Immature Grans (Abs) 0.03 0.00 - 0.04 K/uL    Lymph # 0.6 (L) 1.0 - 4.8 K/uL    Mono # 0.7 0.3 - 1.0 K/uL    Eos # 0.3 0.0 - 0.5 K/uL    Baso # 0.05 0.00 - 0.20 K/uL    nRBC 0 0 /100 WBC    Gran% 80.5 (H) 38.0 - 73.0 %    Lymph% 6.9 (L) 18.0 - 48.0 %    Mono% 8.2 4.0 - 15.0 %    Eosinophil% 3.4 0.0 - 8.0 %    Basophil% 0.6 0.0 - 1.9 %    Differential Method Automated      Pathology Report: 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

## 2017-11-18 NOTE — PROGRESS NOTES
[facility]  Urology  Progress Note    Patient Name: Monique Lew  MRN: [id number]  Admission Date: 11/11/2017  Hospital Length of Stay: 6 days  Code Status: Full Code   Attending Provider: Tristan Durán MD   Primary Care Physician: Aguila Hsieh MD    Subjective:     HPI:  Monique Lew is a 87 y.o. female with hx of HTN, HLD, breast cancer, stoke, DVT, and afib who presented to the ED with complaints of gross hematuria x 1 day. She has a hx of microscopic hematuria and is s/p negative workup in 2014 for an episode of gross hematuria.    She states along with the hematuria she has had frequency, urgency, and urge incontinence. Denies straining or clots. She has had weight loss recently associated with a decreased appetite. Denies flank pain or bone pain.     CT Urogram obtained shows a 6 cm enhancing left sided bladder mass with severe left hydronephrosis and no contrast excretion from left kidney. No lymphadenopathy. Creatinine is normal. H/H is slightly lower than baseline. Vitals have been stable.    She does have a 20 pack/year smoking history, quit 30 years ago. Hx of breast cancer in 1992 treated with radiation.     S/p cystoscopy with TURBT on 11/13/2017-- unable to identify left ureteral orifice at that time, and thus unable to place left ureteral stent.  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     Interval History:     SHAHZAD  CBI overnight  Did not require irrigation     Review of Systems  Objective:     Temp:  [97.6 °F (36.4 °C)-98.7 °F (37.1 °C)] 97.6 °F (36.4 °C)  Pulse:  [58-74] 66  Resp:  [16-18] 18  SpO2:  [99 %-100 %] 100 %  BP: (105-160)/(55-79) 131/75     Body mass index is 19.53 kg/m².      Date 11/18/17 0700 - 11/19/17 0659   Shift 7471-0271 6115-7968 0917-7603 24 Hour Total   I  N  T  A  K  E   P.O. 450   450    Shift Total  (mL/kg) 450  (9.9)   450  (9.9)   O  U  T  P  U  T   Urine  (mL/kg/hr) 375   375    Shift Total  (mL/kg) 375  (8.3)    375  (8.3)   Weight (kg) 45.4 45.4 45.4 45.4     Bladder Scan Volume (mL): 40 mL (11/17/17 1000)    Drains     Drain                 Urethral Catheter 11/13/17 1557 Triple-lumen 24 Fr. 4 days                Physical Exam   Constitutional: She is oriented to person, place, and time. She appears cachectic. She appears ill. No distress.   HENT:   Head: Normocephalic and atraumatic.   Eyes: No scleral icterus.   Neck: No JVD present.   Cardiovascular: Normal rate.    Pulmonary/Chest: Effort normal. No respiratory distress.   Abdominal: Soft. She exhibits no distension. There is no tenderness. There is no rebound and no guarding.   Well healed midline scar   Genitourinary:   Genitourinary Comments: 24 Fr wilson draining clear light pink on slow drip CBI     Neurological: She is alert and oriented to person, place, and time.   Skin: She is not diaphoretic. There is pallor.     Psychiatric: She has a normal mood and affect. Her behavior is normal.       Significant Labs:    BMP:    Recent Labs  Lab 11/16/17  0616 11/17/17  0345 11/18/17  0426   * 132* 129*   K 3.6 4.4 4.8    102 99   CO2 22* 24 23   BUN 28* 25* 23   CREATININE 1.0 0.9 1.0   CALCIUM 8.3* 8.7 9.0       CBC:     Recent Labs  Lab 11/17/17  0754 11/17/17  1954 11/17/17  2130 11/18/17  0749   WBC 9.26 9.99  --  8.14   HGB 8.5* 9.2*  --  8.7*   HCT 25.1* 26.4*  --  26.1*    SEE COMMENT [phone]       All pertinent labs results from the past 24 hours have been reviewed.    Significant Imaging:  All pertinent imaging results/findings from the past 24 hours have been reviewed.                  Assessment/Plan:     Hydronephrosis of left kidney    - patient would like to wait until pathology returns from TURBT specimen prior to deciding on nephrostomy tube insertion-- no urgent indication for decompression of left renal collecting system at this time         Gross hematuria    Monique Lew is a 87 y.o. female who presented with gross hematuria  secondary to left sided bladder mass with severe left hydronephrosis, pathology high grade muscle invasive urothelial carcinoma--[id number]      S/p TURBT 11/13/2017, unable to place left ureteral stent as UO could not be identified      - CBI clamped on rounds, will recheck later today  - maintain wilson  - please continue to hold heparin  - hematuria clearing off heparin, not completely clear yet  - XRT is not in the treatment algorithm for gross hematuria, especially in the post-operative setting.  We do not recommend XRT as a treatment for gross hematuria. We expect the gross hematuria to continue to clear as long as patient remains off anticoagulation.  We also recommend in-depth goals of care discussion with patient by heme/onc and primary team.                   VTE Risk Mitigation     None          Raymon Tran MD  Urology  [facility]

## 2017-11-18 NOTE — PLAN OF CARE
Problem: Physical Therapy Goal  Goal: Physical Therapy Goal  PT goals until 11/22/17    1. Pt supine to sit with supervision-not met  2. Pt sit to supine with supervision-not met  3. Pt sit to stand with or without RW as needed for safety with SBA-not met  4. Pt to perform gait 150ft with or without RW as needed for safety with SBA.-not met  5. Pt to up/down 10 steps with R UE rail with CGA.-not met  6. Pt to perform B LE exs in sitting x 15 reps to increase B LE strength to improve functional mobility.-not met     Goals remain appropriate at time. Continue with PT POC as indicated.

## 2017-11-18 NOTE — PROGRESS NOTES
[facility] Hospital Medicine  Progress Note    Patient Name: Monique Lew  MRN: [id number]  Patient Class: IP- Inpatient   Admission Date: 11/11/2017  Length of Stay: 6 days  Attending Physician: Summer Grewal*  Primary Care Provider: Aguila Hsieh MD    [facility] Medicine Team: [facility] 5 Natividad Mota MD    Subjective:     Principal Problem:Bladder cancer    HPI:  Ms. 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 States she first noticed blood in her urine 2 days before presentation. This temporarily resolved, with hematuria occuring again the day before presentation. Hematuria is associated with increased urge to urinate and incontinence. Prior to this episode, patient states last episode of gross hematuria was in 2012, which was found to be 2/2 cystitis. Patient has had multiple UTIs and microhematuria on urinalysis since that time. She follows with a urologist, Dr. Enriquez at [facility], and has had 2 cystoscopies - most recently in 2014 which no evidence of tumor. Denies fever/chills/nausea/vomiting. Does states she has had decreased appetite, increased fatigue, and night sweats which cause her to soak through her clothes.     In the ED, was found to have Hg of 11 which trended down to 10 in 6 hours (baseline 14). Type and screen obtained. CT urogram performed showed 6.3x4.3x4.0 cm mass in bladder causing severe left hydronephrosis and hydroureter with evidence of hemorrhage in bladder. Also evaluated by urology in ED, who began CBI and recommended she be NPO for possible procedure.     Hospital Course:  Patient admitted to hospital medicine. Anticoagulation held in setting hemorrhage. CT Urogram on 11/11 demonstrated a large bladder mass. Urology consulted. 11/12 CT Chest also demonstrates bilateral pulmonary nodules that could be due to metastases. H/H  stabilized. Patient taken to OR 11/13 for cystoscopy, TURBT, pyelogram; stent unable to be placed as urethral orifice could not be visualized. Path pending. 11/14: started hep gtt. Pt still thinking if she wants nephrostomy tube or not. Urology to keep wilson in until Friday 11/17. Patient began to produce significant hematuria requiring 1u pRBC on 11/16 and the decision was made to stop heparin ggt due to risk outweighing benefit. 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 Results discussed with family and the poor prognosis associated given nodules found in lungs on CT previously. Patient decided that she would like to speak with oncology over her treatment options before deciding whether to pursue cancer treatment versus palliative care. Onc and urology following. No surgeries planned by urology. Per onc, pt is not a chemo candidate with active bleeding, will consider radiation but discuss with consults.    Interval History:  Hematuria overnight, seen by urology who started slow drip CBI again and will re-assess today. Yesterday, discussed with patient the choice of pursuing treatment with oncology versus palliative care; patient states she would like to discuss treatment options with oncology before deciding. Per onc, not a chemo candidate with right now. Onc and urology following, will touch base with both consults regarding radiation.    Review of Systems   Constitutional: Positive for appetite change. Negative for chills, diaphoresis, fatigue and fever.   HENT: Negative for sore throat and trouble swallowing.    Respiratory: Negative for cough and shortness of breath.    Cardiovascular: Negative for chest pain, palpitations and leg swelling.   Gastrointestinal: Negative for abdominal distention, abdominal pain, constipation, diarrhea, nausea and vomiting.   Genitourinary: Positive for hematuria. Negative for difficulty urinating, dysuria, flank pain, frequency and  urgency.   Musculoskeletal: Negative for arthralgias and back pain.   Skin: Positive for pallor. Negative for color change.   Neurological: Negative for dizziness and light-headedness.   Psychiatric/Behavioral: Negative for confusion.     Objective:     Vital Signs (Most Recent):  Temp: 97.6 °F (36.4 °C) (11/18/17 1150)  Pulse: 66 (11/18/17 1150)  Resp: 18 (11/18/17 1150)  BP: 131/75 (11/18/17 1150)  SpO2: 100 % (11/18/17 0732) Vital Signs (24h Range):  Temp:  [97.6 °F (36.4 °C)-98.7 °F (37.1 °C)] 97.6 °F (36.4 °C)  Pulse:  [58-74] 66  Resp:  [16-18] 18  SpO2:  [99 %-100 %] 100 %  BP: (105-160)/(55-79) 131/75     Weight: 45.4 kg (100 lb)  Body mass index is 19.53 kg/m².    Intake/Output Summary (Last 24 hours) at 11/18/17 1157  Last data filed at 11/18/17 0920   Gross per 24 hour   Intake              900 ml   Output             5895 ml   Net            -4995 ml      Physical Exam   Constitutional: She is oriented to person, place, and time. No distress.   Thin, frail appearing elderly lady.   HENT:   Head: Normocephalic and atraumatic.   Mouth/Throat: Oropharynx is clear and moist.   Eyes: No scleral icterus.   Pale conjunctivae.   Neck: Normal range of motion. Neck supple.   Cardiovascular: Normal rate, regular rhythm, normal heart sounds and intact distal pulses.    Pulmonary/Chest: Effort normal and breath sounds normal.   Abdominal: Soft. Bowel sounds are normal. She exhibits no distension. There is no tenderness. There is no guarding.   Genitourinary:   Genitourinary Comments: Forrest bag draining sanguinous urine.    Neurological: She is alert and oriented to person, place, and time.   Skin: Skin is warm and dry. She is not diaphoretic. There is pallor.   Psychiatric: She has a normal mood and affect. Her behavior is normal.   Vitals reviewed.      Significant Labs:   Recent Results (from the past 24 hour(s))   CBC auto differential    Collection Time: 11/17/17  7:54 PM   Result Value Ref Range    WBC 9.99 3.90  - 12.70 K/uL    RBC 2.95 (L) 4.00 - 5.40 M/uL    Hemoglobin 9.2 (L) 12.0 - 16.0 g/dL    Hematocrit 26.4 (L) 37.0 - 48.5 %    MCV 90 82 - 98 fL    MCH 31.2 (H) 27.0 - 31.0 pg    MCHC 34.8 32.0 - 36.0 g/dL    RDW 14.4 11.5 - 14.5 %    Platelets SEE COMMENT 150 - 350 K/uL    MPV SEE COMMENT 9.2 - 12.9 fL    Immature Granulocytes 2.4 (H) 0.0 - 0.5 %    Gran # 7.6 1.8 - 7.7 K/uL    Immature Grans (Abs) 0.24 (H) 0.00 - 0.04 K/uL    Lymph # 0.9 (L) 1.0 - 4.8 K/uL    Mono # 0.9 0.3 - 1.0 K/uL    Eos # 0.3 0.0 - 0.5 K/uL    Baso # 0.04 0.00 - 0.20 K/uL    nRBC 0 0 /100 WBC    Gran% 76.1 (H) 38.0 - 73.0 %    Lymph% 9.4 (L) 18.0 - 48.0 %    Mono% 9.2 4.0 - 15.0 %    Eosinophil% 2.5 0.0 - 8.0 %    Basophil% 0.4 0.0 - 1.9 %    [place]iso Slight     Poik Slight     Poly Occasional     Ovalocytes Occasional     [place] Cells Occasional     Differential Method AUTO    Platelet count    Collection Time: 11/17/17  9:30 PM   Result Value Ref Range    Platelets 254 150 - 350 K/uL    MPV 10.7 9.2 - 12.9 fL   Basic metabolic panel    Collection Time: 11/18/17  4:26 AM   Result Value Ref Range    Sodium 129 (L) 136 - 145 mmol/L    Potassium 4.8 3.5 - 5.1 mmol/L    Chloride 99 95 - 110 mmol/L    CO2 23 23 - 29 mmol/L    Glucose 113 (H) 70 - 110 mg/dL    BUN, Bld 23 8 - 23 mg/dL    Creatinine 1.0 0.5 - 1.4 mg/dL    Calcium 9.0 8.7 - 10.5 mg/dL    Anion Gap 7 (L) 8 - 16 mmol/L    eGFR if African American 58.5 (A) >60 mL/min/1.73 m^2    eGFR if non African American 50.8 (A) >60 mL/min/1.73 m^2   CBC auto differential    Collection Time: 11/18/17  7:49 AM   Result Value Ref Range    WBC 8.14 3.90 - 12.70 K/uL    RBC 2.92 (L) 4.00 - 5.40 M/uL    Hemoglobin 8.7 (L) 12.0 - 16.0 g/dL    Hematocrit 26.1 (L) 37.0 - 48.5 %    MCV 89 82 - 98 fL    MCH 29.8 27.0 - 31.0 pg    MCHC 33.3 32.0 - 36.0 g/dL    RDW 14.4 11.5 - 14.5 %    Platelets 245 150 - 350 K/uL    MPV 10.0 9.2 - 12.9 fL    Immature Granulocytes 0.4 0.0 - 0.5 %    Gran # 6.6 1.8 - 7.7 K/uL     Immature Grans (Abs) 0.03 0.00 - 0.04 K/uL    Lymph # 0.6 (L) 1.0 - 4.8 K/uL    Mono # 0.7 0.3 - 1.0 K/uL    Eos # 0.3 0.0 - 0.5 K/uL    Baso # 0.05 0.00 - 0.20 K/uL    nRBC 0 0 /100 WBC    Gran% 80.5 (H) 38.0 - 73.0 %    Lymph% 6.9 (L) 18.0 - 48.0 %    Mono% 8.2 4.0 - 15.0 %    Eosinophil% 3.4 0.0 - 8.0 %    Basophil% 0.6 0.0 - 1.9 %    Differential Method Automated      Pathology Report: 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    Assessment/Plan:      * Bladder cancer    CT urogram with evidence of large, obstructing mass and hemorrhage in the bladder.  - Home coumadin and ASA held, per urology recs subq heparin for DVT prophylaxis  - Hg baseline 14, 11 at presentation.   - Type and screen ordered  - CBC q12h, will transfuse for Hg <7  - 11/13 OR for cystoscopy, TURBT, and pyelogram; stent could not be placed as urethral orifice could not be visualized.   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 Bilateral nodules found on CT Chest have high likelihood of being metastases. Oncology consulted to discuss treatment options. If pt does not want to pursue intervention/tx, will consult palliative care.   - per onc, not a chemo candidate with active bleeding, may consider radiation but will touch base with both onc and urology regarding radiation          Bladder mass              Hydronephrosis of left kidney    -See bladder mass          Gross hematuria    -See Bladder cancer        Depression (emotion)    - d/c'ed venlafaxine as pt was not taking it at home            Chronic hyponatremia    Sodium 125 at admission, had been normal in January 2017  - Trending up. D/c'ed venlafaxine as she was not taking this at home          Acquired hypothyroidism    - Continue synthroid 50 mcg  - TSH WNL          Essential hypertension              History of CVA (cerebrovascular accident)    Follows with neurology. 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  - Coumadin held          Chronic atrial fibrillation    - Regular rate and rhythm on exam at presentation  - Coumadin held in setting of hemorrhage and surgery .  - 11/14 hep gtt started, will resume coumadin depending on if urology does any more interventions/nephrostomy tube  - 11/15 hep ggt stopped per urology due to excessive hematuria. Although patient is at risk for stroke, we agree that heparin can be stopped temporarily due to excessive blood loss through hematuria. Per urology, continue to hold heparin given hematuria.        Current use of long term anticoagulation    - INR 2.7 at presentation  - Currently no anticoagulation per urology, will restart when able.             VTE Risk Mitigation     None              Natividad Mota MD  Department of Hospital Medicine   [facility]

## 2017-11-18 NOTE — ASSESSMENT & PLAN NOTE
CT urogram with evidence of large, obstructing mass and hemorrhage in the bladder.  - Home coumadin and ASA held, per urology recs subq heparin for DVT prophylaxis  - Hg baseline 14, 11 at presentation.   - Type and screen ordered  - CBC q12h, will transfuse for Hg <7  - 11/13 OR for cystoscopy, TURBT, and pyelogram; stent could not be placed as urethral orifice could not be visualized.   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 Bilateral nodules found on CT Chest have high likelihood of being metastases. Oncology consulted to discuss treatment options. If pt does not want to pursue intervention/tx, will consult palliative care.   - per onc, not a chemo candidate with active bleeding, may consider radiation but will touch base with both onc and urology regarding radiation

## 2017-11-18 NOTE — SUBJECTIVE & OBJECTIVE
Interval History:     SHAHZAD  CBI overnight  Did not require irrigation     Review of Systems  Objective:     Temp:  [97.6 °F (36.4 °C)-98.7 °F (37.1 °C)] 97.6 °F (36.4 °C)  Pulse:  [58-74] 66  Resp:  [16-18] 18  SpO2:  [99 %-100 %] 100 %  BP: (105-160)/(55-79) 131/75     Body mass index is 19.53 kg/m².      Date 11/18/17 0700 - 11/19/17 0659   Shift 6850-4683 6196-9864 7870-1809 24 Hour Total   I  N  T  A  K  E   P.O. 450   450    Shift Total  (mL/kg) 450  (9.9)   450  (9.9)   O  U  T  P  U  T   Urine  (mL/kg/hr) 375   375    Shift Total  (mL/kg) 375  (8.3)   375  (8.3)   Weight (kg) 45.4 45.4 45.4 45.4     Bladder Scan Volume (mL): 40 mL (11/17/17 1000)    Drains     Drain                 Urethral Catheter 11/13/17 1557 Triple-lumen 24 Fr. 4 days                Physical Exam   Constitutional: She is oriented to person, place, and time. She appears cachectic. She appears ill. No distress.   HENT:   Head: Normocephalic and atraumatic.   Eyes: No scleral icterus.   Neck: No JVD present.   Cardiovascular: Normal rate.    Pulmonary/Chest: Effort normal. No respiratory distress.   Abdominal: Soft. She exhibits no distension. There is no tenderness. There is no rebound and no guarding.   Well healed midline scar   Genitourinary:   Genitourinary Comments: 24 Fr wilson draining clear light pink on slow drip CBI     Neurological: She is alert and oriented to person, place, and time.   Skin: She is not diaphoretic. There is pallor.     Psychiatric: She has a normal mood and affect. Her behavior is normal.       Significant Labs:    BMP:    Recent Labs  Lab 11/16/17  0616 11/17/17  0345 11/18/17  0426   * 132* 129*   K 3.6 4.4 4.8    102 99   CO2 22* 24 23   BUN 28* 25* 23   CREATININE 1.0 0.9 1.0   CALCIUM 8.3* 8.7 9.0       CBC:     Recent Labs  Lab 11/17/17  0754 11/17/17  1954 11/17/17  2130 11/18/17  0749   WBC 9.26 9.99  --  8.14   HGB 8.5* 9.2*  --  8.7*   HCT 25.1* 26.4*  --  26.1*    SEE COMMENT 254  245       All pertinent labs results from the past 24 hours have been reviewed.    Significant Imaging:  All pertinent imaging results/findings from the past 24 hours have been reviewed.

## 2017-11-18 NOTE — PLAN OF CARE
Problem: Patient Care Overview  Goal: Plan of Care Review  Pt following plan of care well. Pt remained free from falls: bed low position, call bell in reach and audible, alarm activated, commode at bedside. Pt able to pass bowels this shift. Irrigation held all shift, however towards end of shift, urine noted to be darker with small clots. Irrigated X1 and after eval per MD CBI re-initiated at slow rate for duration of night. Tylenol x1 for pain. Heme/Onc consulted. Pt tolerating regular diet well. VSS, controlled atrial rate and rhythm on monitor. Will continue to closely monitor pt.

## 2017-11-18 NOTE — PROGRESS NOTES
Notified Dr. Tran on call with Urology that urine in wilson bag appearing darker and noted one small clot. Concerned that pt may need irrigation. Per MD okay to irrigate at this time and to always have irrigation supplies at bedside. MD reported to bedside to evaluate pt, and decided to re-initiate CBI at slow rate overnight. New bag initiated, supplies placed at bedside.     Will continue to closely monitor pt.

## 2017-11-19 LAB
ANION GAP SERPL CALC-SCNC: 7 MMOL/L
BASOPHILS # BLD AUTO: 0.03 K/UL
BASOPHILS # BLD AUTO: 0.08 K/UL
BASOPHILS NFR BLD: 0.5 %
BASOPHILS NFR BLD: 0.9 %
BUN SERPL-MCNC: 31 MG/DL
CALCIUM SERPL-MCNC: 9 MG/DL
CHLORIDE SERPL-SCNC: 102 MMOL/L
CO2 SERPL-SCNC: 24 MMOL/L
CREAT SERPL-MCNC: 1 MG/DL
DIFFERENTIAL METHOD: ABNORMAL
DIFFERENTIAL METHOD: ABNORMAL
EOSINOPHIL # BLD AUTO: 0.3 K/UL
EOSINOPHIL # BLD AUTO: 0.4 K/UL
EOSINOPHIL NFR BLD: 4.6 %
EOSINOPHIL NFR BLD: 4.7 %
ERYTHROCYTE [DISTWIDTH] IN BLOOD BY AUTOMATED COUNT: 14.2 %
ERYTHROCYTE [DISTWIDTH] IN BLOOD BY AUTOMATED COUNT: 14.4 %
EST. GFR  (AFRICAN AMERICAN): 58.5 ML/MIN/1.73 M^2
EST. GFR  (NON AFRICAN AMERICAN): 50.8 ML/MIN/1.73 M^2
GLUCOSE SERPL-MCNC: 112 MG/DL
HCT VFR BLD AUTO: 25.9 %
HCT VFR BLD AUTO: 28.7 %
HGB BLD-MCNC: 8.7 G/DL
HGB BLD-MCNC: 9.7 G/DL
IMM GRANULOCYTES # BLD AUTO: 0.03 K/UL
IMM GRANULOCYTES # BLD AUTO: 0.04 K/UL
IMM GRANULOCYTES NFR BLD AUTO: 0.4 %
IMM GRANULOCYTES NFR BLD AUTO: 0.5 %
LYMPHOCYTES # BLD AUTO: 0.8 K/UL
LYMPHOCYTES # BLD AUTO: 0.8 K/UL
LYMPHOCYTES NFR BLD: 12.8 %
LYMPHOCYTES NFR BLD: 9 %
MCH RBC QN AUTO: 30.1 PG
MCH RBC QN AUTO: 30.6 PG
MCHC RBC AUTO-ENTMCNC: 33.6 G/DL
MCHC RBC AUTO-ENTMCNC: 33.8 G/DL
MCV RBC AUTO: 90 FL
MCV RBC AUTO: 91 FL
MONOCYTES # BLD AUTO: 0.4 K/UL
MONOCYTES # BLD AUTO: 1.1 K/UL
MONOCYTES NFR BLD: 11.7 %
MONOCYTES NFR BLD: 6.6 %
NEUTROPHILS # BLD AUTO: 4.7 K/UL
NEUTROPHILS # BLD AUTO: 6.9 K/UL
NEUTROPHILS NFR BLD: 73.4 %
NEUTROPHILS NFR BLD: 74.9 %
NRBC BLD-RTO: 0 /100 WBC
NRBC BLD-RTO: 0 /100 WBC
PLATELET # BLD AUTO: 245 K/UL
PLATELET # BLD AUTO: 278 K/UL
PMV BLD AUTO: 9.6 FL
PMV BLD AUTO: 9.8 FL
POTASSIUM SERPL-SCNC: 4.5 MMOL/L
RBC # BLD AUTO: 2.89 M/UL
RBC # BLD AUTO: 3.17 M/UL
SODIUM SERPL-SCNC: 133 MMOL/L
WBC # BLD AUTO: 6.32 K/UL
WBC # BLD AUTO: 9.34 K/UL

## 2017-11-19 PROCEDURE — 11000001 HC ACUTE MED/SURG PRIVATE ROOM

## 2017-11-19 PROCEDURE — 25000003 PHARM REV CODE 250

## 2017-11-19 PROCEDURE — 85025 COMPLETE CBC W/AUTO DIFF WBC: CPT | Mod: 91

## 2017-11-19 PROCEDURE — 36415 COLL VENOUS BLD VENIPUNCTURE: CPT

## 2017-11-19 PROCEDURE — 25000003 PHARM REV CODE 250: Performed by: STUDENT IN AN ORGANIZED HEALTH CARE EDUCATION/TRAINING PROGRAM

## 2017-11-19 PROCEDURE — 80048 BASIC METABOLIC PNL TOTAL CA: CPT

## 2017-11-19 PROCEDURE — 99232 SBSQ HOSP IP/OBS MODERATE 35: CPT | Mod: GC,,, | Performed by: HOSPITALIST

## 2017-11-19 RX ADMIN — ACETAMINOPHEN 650 MG: 325 TABLET ORAL at 05:11

## 2017-11-19 RX ADMIN — TIMOLOL MALEATE 1 DROP: 5 SOLUTION OPHTHALMIC at 09:11

## 2017-11-19 RX ADMIN — ACETAMINOPHEN 650 MG: 325 TABLET ORAL at 09:11

## 2017-11-19 RX ADMIN — LEVOTHYROXINE SODIUM 50 MCG: 50 TABLET ORAL at 05:11

## 2017-11-19 RX ADMIN — TIMOLOL MALEATE 1 DROP: 5 SOLUTION OPHTHALMIC at 08:11

## 2017-11-19 NOTE — ASSESSMENT & PLAN NOTE
- Regular rate and rhythm on exam at presentation  - Coumadin held in setting of hemorrhage and surgery .  - 11/14 hep gtt started, will resume coumadin depending on if urology does any more interventions/nephrostomy tube  - 11/15 hep ggt stopped per urology due to excessive hematuria. Although patient is at risk for stroke, we agree that heparin can be stopped temporarily due to excessive blood loss through hematuria. Per urology, continue to hold heparin given hematuria.  -Hematuria resolving off anticoagulation. Forrest will be removed this morning. If patient passes voiding trial overnight with clear urine, anticoagulation can be re-initiated.

## 2017-11-19 NOTE — ASSESSMENT & PLAN NOTE
-If patient wishes to pursue chemotherapy, will need IR consult for nephrostomy tube placement eventually.

## 2017-11-19 NOTE — PROGRESS NOTES
UROLOGY PROGRESS NOTE    Patient resting this am in no distress.    Urine draining clear yellow off all CBI.    Will plan for removal of wilson in am. May resume anticoagulation after removal of wilson and passage of voiding trial per primary team if urine remains clear overnight.    Azul Prieto MD  Urology PGY.5  (p) [phone]

## 2017-11-19 NOTE — NURSING
Uneventful night.  Remains aaox4. Requested PRN Tylenol for headache. CBI remains clamped,800 ml of dark pink urine voided to  bag with no blood clots or sediments noted. Flushed with 60 ml of NS, approximately 80 ml of  clear light pink fluid noted to  bag. Denies Bladder pain or discomfort.  Vitals signs stable. Bed in lowest position, call bell in reach.

## 2017-11-19 NOTE — NURSING
Forrest emptied 700/ml of red colored urine .Forrest catheter irrigated with 60/ml of sterile Normal Saline will continue to monitor.

## 2017-11-19 NOTE — PROGRESS NOTES
[facility] Hospital Medicine  Progress Note    Patient Name: Monique Lew  MRN: [id number]  Patient Class: IP- Inpatient   Admission Date: 11/11/2017  Length of Stay: 7 days  Attending Physician: Summer Grewal*  Primary Care Provider: Aguila Hsieh MD    [facility] Medicine Team: [facility] 5 Jay Juan MD    Subjective:     Principal Problem:Bladder cancer    HPI:  Ms. 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 States she first noticed blood in her urine 2 days before presentation. This temporarily resolved, with hematuria occuring again the day before presentation. Hematuria is associated with increased urge to urinate and incontinence. Prior to this episode, patient states last episode of gross hematuria was in 2012, which was found to be 2/2 cystitis. Patient has had multiple UTIs and microhematuria on urinalysis since that time. She follows with a urologist, Dr. Enriquez at [facility], and has had 2 cystoscopies - most recently in 2014 which no evidence of tumor. Denies fever/chills/nausea/vomiting. Does states she has had decreased appetite, increased fatigue, and night sweats which cause her to soak through her clothes.     In the ED, was found to have Hg of 11 which trended down to 10 in 6 hours (baseline 14). Type and screen obtained. CT urogram performed showed 6.3x4.3x4.0 cm mass in bladder causing severe left hydronephrosis and hydroureter with evidence of hemorrhage in bladder. Also evaluated by urology in ED, who began CBI and recommended she be NPO for possible procedure.     Hospital Course:  Patient admitted to hospital medicine. Anticoagulation held in setting hemorrhage. CT Urogram on 11/11 demonstrated a large bladder mass. Urology consulted. 11/12 CT Chest also demonstrates bilateral pulmonary nodules that could be due to metastases. H/H  "stabilized. Patient taken to OR 11/13 for cystoscopy, TURBT, pyelogram; stent unable to be placed as urethral orifice could not be visualized. Path pending. 11/14: started hep gtt. Pt still thinking if she wants nephrostomy tube or not. Urology to keep wilson in until Friday 11/17. Patient began to produce significant hematuria requiring 1u pRBC on 11/16 and the decision was made to stop heparin ggt due to risk outweighing benefit. 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 Results discussed with family and the poor prognosis associated given nodules found in lungs on CT previously. Patient decided that she would like to speak with oncology over her treatment options before deciding whether to pursue cancer treatment versus palliative care. Onc and urology following. No surgeries planned by urology. Per onc, pt is not a chemo candidate with active bleeding, will consider radiation but discuss with consults.    Interval History: Patient has no medical complaints this morning; however does express some frustration with her current situation. She understands the risk of starting anticoagulation versus the risk of holding anticoagulation. Her frustration stems from her feeling that her medical problems keep compounding and that "it's always one thing after another." She still wishes to consider treatment options before making a decision on palliative care.     Review of Systems   Constitutional: Positive for appetite change. Negative for chills, diaphoresis, fatigue and fever.   HENT: Negative for sore throat and trouble swallowing.    Respiratory: Negative for cough and shortness of breath.    Cardiovascular: Negative for chest pain, palpitations and leg swelling.   Gastrointestinal: Negative for abdominal distention, abdominal pain, constipation, diarrhea, nausea and vomiting.   Genitourinary: Positive for hematuria. Negative for difficulty urinating, dysuria, flank pain, " frequency and urgency.   Musculoskeletal: Negative for arthralgias and back pain.   Skin: Positive for pallor. Negative for color change.   Neurological: Negative for dizziness and light-headedness.   Psychiatric/Behavioral: Negative for confusion.     Objective:     Vital Signs (Most Recent):  Temp: 97.6 °F (36.4 °C) (11/19/17 0816)  Pulse: (!) 59 (11/19/17 0816)  Resp: 18 (11/19/17 0816)  BP: (!) 152/80 (11/19/17 0816)  SpO2: 98 % (11/19/17 0816) Vital Signs (24h Range):  Temp:  [97.5 °F (36.4 °C)-98.8 °F (37.1 °C)] 97.6 °F (36.4 °C)  Pulse:  [54-81] 59  Resp:  [16-18] 18  SpO2:  [94 %-99 %] 98 %  BP: ()/(50-80) 152/80     Weight: 45.4 kg (100 lb)  Body mass index is 19.53 kg/m².    Intake/Output Summary (Last 24 hours) at 11/19/17 1000  Last data filed at 11/19/17 0816   Gross per 24 hour   Intake                0 ml   Output             1680 ml   Net            -1680 ml      Physical Exam   Constitutional: She is oriented to person, place, and time. No distress.   Thin, frail appearing elderly lady.   HENT:   Head: Normocephalic and atraumatic.   Mouth/Throat: Oropharynx is clear and moist.   Eyes: No scleral icterus.   Pale conjunctivae.   Neck: Normal range of motion. Neck supple.   Cardiovascular: Normal rate, regular rhythm, normal heart sounds and intact distal pulses.    Pulmonary/Chest: Effort normal and breath sounds normal.   Abdominal: Soft. Bowel sounds are normal. She exhibits no distension. There is no tenderness. There is no guarding.   Genitourinary:   Genitourinary Comments: Forrest bag draining sanguinous urine.    Neurological: She is alert and oriented to person, place, and time.   Skin: Skin is warm and dry. She is not diaphoretic. There is pallor.   Psychiatric: She has a normal mood and affect. Her behavior is normal.   Vitals reviewed.      Significant Labs:   Recent Results (from the past 24 hour(s))   CBC auto differential    Collection Time: 11/18/17  8:05 PM   Result Value Ref  Range    WBC 7.93 3.90 - 12.70 K/uL    RBC 2.71 (L) 4.00 - 5.40 M/uL    Hemoglobin 8.1 (L) 12.0 - 16.0 g/dL    Hematocrit 24.8 (L) 37.0 - 48.5 %    MCV 92 82 - 98 fL    MCH 29.9 27.0 - 31.0 pg    MCHC 32.7 32.0 - 36.0 g/dL    RDW 14.6 (H) 11.5 - 14.5 %    Platelets 249 150 - 350 K/uL    MPV 9.9 9.2 - 12.9 fL    Immature Granulocytes 0.5 0.0 - 0.5 %    Gran # 6.1 1.8 - 7.7 K/uL    Immature Grans (Abs) 0.04 0.00 - 0.04 K/uL    Lymph # 0.7 (L) 1.0 - 4.8 K/uL    Mono # 0.8 0.3 - 1.0 K/uL    Eos # 0.3 0.0 - 0.5 K/uL    Baso # 0.05 0.00 - 0.20 K/uL    nRBC 0 0 /100 WBC    Gran% 76.9 (H) 38.0 - 73.0 %    Lymph% 8.6 (L) 18.0 - 48.0 %    Mono% 10.0 4.0 - 15.0 %    Eosinophil% 3.4 0.0 - 8.0 %    Basophil% 0.6 0.0 - 1.9 %    Differential Method Automated    Basic metabolic panel    Collection Time: 11/19/17  3:55 AM   Result Value Ref Range    Sodium 133 (L) 136 - 145 mmol/L    Potassium 4.5 3.5 - 5.1 mmol/L    Chloride 102 95 - 110 mmol/L    CO2 24 23 - 29 mmol/L    Glucose 112 (H) 70 - 110 mg/dL    BUN, Bld 31 (H) 8 - 23 mg/dL    Creatinine 1.0 0.5 - 1.4 mg/dL    Calcium 9.0 8.7 - 10.5 mg/dL    Anion Gap 7 (L) 8 - 16 mmol/L    eGFR if African American 58.5 (A) >60 mL/min/1.73 m^2    eGFR if non African American 50.8 (A) >60 mL/min/1.73 m^2   CBC auto differential    Collection Time: 11/19/17  7:50 AM   Result Value Ref Range    WBC 6.32 3.90 - 12.70 K/uL    RBC 2.89 (L) 4.00 - 5.40 M/uL    Hemoglobin 8.7 (L) 12.0 - 16.0 g/dL    Hematocrit 25.9 (L) 37.0 - 48.5 %    MCV 90 82 - 98 fL    MCH 30.1 27.0 - 31.0 pg    MCHC 33.6 32.0 - 36.0 g/dL    RDW 14.4 11.5 - 14.5 %    Platelets 245 150 - 350 K/uL    MPV 9.6 9.2 - 12.9 fL    Immature Granulocytes 0.5 0.0 - 0.5 %    Gran # 4.7 1.8 - 7.7 K/uL    Immature Grans (Abs) 0.03 0.00 - 0.04 K/uL    Lymph # 0.8 (L) 1.0 - 4.8 K/uL    Mono # 0.4 0.3 - 1.0 K/uL    Eos # 0.3 0.0 - 0.5 K/uL    Baso # 0.03 0.00 - 0.20 K/uL    nRBC 0 0 /100 WBC    Gran% 74.9 (H) 38.0 - 73.0 %    Lymph% 12.8 (L)  18.0 - 48.0 %    Mono% 6.6 4.0 - 15.0 %    Eosinophil% 4.7 0.0 - 8.0 %    Basophil% 0.5 0.0 - 1.9 %    Differential Method Automated          Assessment/Plan:      * Bladder cancer    CT urogram with evidence of large, obstructing mass and hemorrhage in the bladder.  - Home coumadin and ASA held, per urology recs subq heparin for DVT prophylaxis  - Hg baseline 14, 11 at presentation.   - Type and screen ordered  - CBC q12h, will transfuse for Hg <7  - 11/13 OR for cystoscopy, TURBT, and pyelogram; stent could not be placed as urethral orifice could not be visualized.   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 Bilateral nodules found on CT Chest have high likelihood of being metastases. Oncology consulted to discuss treatment options. If pt does not want to pursue intervention/tx, will consult palliative care.           Bladder mass              Hydronephrosis of left kidney    -If patient wishes to pursue chemotherapy, will need IR consult for nephrostomy tube placement eventually.           Gross hematuria    -See Bladder cancer        Depression (emotion)    - d/c'ed venlafaxine as pt was not taking it at home            Chronic hyponatremia    Sodium 125 at admission, had been normal in January 2017  - Trending up. D/c'ed venlafaxine as she was not taking this at home          Acquired hypothyroidism    - Continue synthroid 50 mcg  - TSH WNL          Essential hypertension              History of CVA (cerebrovascular accident)    Follows with neurology. 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  - Coumadin held          Chronic atrial fibrillation    - Regular rate and rhythm on exam at presentation  - Coumadin held in setting of hemorrhage and surgery .  - 11/14 hep gtt started, will resume coumadin depending on if urology does any more interventions/nephrostomy tube  - 11/15 hep ggt stopped per urology due to excessive hematuria.  Although patient is at risk for stroke, we agree that heparin can be stopped temporarily due to excessive blood loss through hematuria. Per urology, continue to hold heparin given hematuria.  -Hematuria resolving off anticoagulation. Forrest will be removed this morning. If patient passes voiding trial overnight with clear urine, anticoagulation can be re-initiated.         Current use of long term anticoagulation    - INR 2.7 at presentation  - Currently no anticoagulation per urology, will restart when able.             VTE Risk Mitigation     None        Dispo: Forrest to be removed this am. If passes voiding trial with clear urine overnight, can resume anticoagulation. Oncology should see patient tomorrow if back on anticoagulation to discuss treatment options.     Jay Juan MD  Department of Hospital Medicine   [facility]

## 2017-11-19 NOTE — SUBJECTIVE & OBJECTIVE
"Interval History: Patient has no medical complaints this morning; however does express some frustration with her current situation. She understands the risk of starting anticoagulation versus the risk of holding anticoagulation. Her frustration stems from her feeling that her medical problems keep compounding and that "it's always one thing after another." She still wishes to consider treatment options before making a decision on palliative care.     Review of Systems   Constitutional: Positive for appetite change. Negative for chills, diaphoresis, fatigue and fever.   HENT: Negative for sore throat and trouble swallowing.    Respiratory: Negative for cough and shortness of breath.    Cardiovascular: Negative for chest pain, palpitations and leg swelling.   Gastrointestinal: Negative for abdominal distention, abdominal pain, constipation, diarrhea, nausea and vomiting.   Genitourinary: Positive for hematuria. Negative for difficulty urinating, dysuria, flank pain, frequency and urgency.   Musculoskeletal: Negative for arthralgias and back pain.   Skin: Positive for pallor. Negative for color change.   Neurological: Negative for dizziness and light-headedness.   Psychiatric/Behavioral: Negative for confusion.     Objective:     Vital Signs (Most Recent):  Temp: 97.6 °F (36.4 °C) (11/19/17 0816)  Pulse: (!) 59 (11/19/17 0816)  Resp: 18 (11/19/17 0816)  BP: (!) 152/80 (11/19/17 0816)  SpO2: 98 % (11/19/17 0816) Vital Signs (24h Range):  Temp:  [97.5 °F (36.4 °C)-98.8 °F (37.1 °C)] 97.6 °F (36.4 °C)  Pulse:  [54-81] 59  Resp:  [16-18] 18  SpO2:  [94 %-99 %] 98 %  BP: ()/(50-80) 152/80     Weight: 45.4 kg (100 lb)  Body mass index is 19.53 kg/m².    Intake/Output Summary (Last 24 hours) at 11/19/17 1000  Last data filed at 11/19/17 0816   Gross per 24 hour   Intake                0 ml   Output             1680 ml   Net            -1680 ml      Physical Exam   Constitutional: She is oriented to person, place, and " time. No distress.   Thin, frail appearing elderly lady.   HENT:   Head: Normocephalic and atraumatic.   Mouth/Throat: Oropharynx is clear and moist.   Eyes: No scleral icterus.   Pale conjunctivae.   Neck: Normal range of motion. Neck supple.   Cardiovascular: Normal rate, regular rhythm, normal heart sounds and intact distal pulses.    Pulmonary/Chest: Effort normal and breath sounds normal.   Abdominal: Soft. Bowel sounds are normal. She exhibits no distension. There is no tenderness. There is no guarding.   Genitourinary:   Genitourinary Comments: Forrest bag draining sanguinous urine.    Neurological: She is alert and oriented to person, place, and time.   Skin: Skin is warm and dry. She is not diaphoretic. There is pallor.   Psychiatric: She has a normal mood and affect. Her behavior is normal.   Vitals reviewed.      Significant Labs:   Recent Results (from the past 24 hour(s))   CBC auto differential    Collection Time: 11/18/17  8:05 PM   Result Value Ref Range    WBC 7.93 3.90 - 12.70 K/uL    RBC 2.71 (L) 4.00 - 5.40 M/uL    Hemoglobin 8.1 (L) 12.0 - 16.0 g/dL    Hematocrit 24.8 (L) 37.0 - 48.5 %    MCV 92 82 - 98 fL    MCH 29.9 27.0 - 31.0 pg    MCHC 32.7 32.0 - 36.0 g/dL    RDW 14.6 (H) 11.5 - 14.5 %    Platelets 249 150 - 350 K/uL    MPV 9.9 9.2 - 12.9 fL    Immature Granulocytes 0.5 0.0 - 0.5 %    Gran # 6.1 1.8 - 7.7 K/uL    Immature Grans (Abs) 0.04 0.00 - 0.04 K/uL    Lymph # 0.7 (L) 1.0 - 4.8 K/uL    Mono # 0.8 0.3 - 1.0 K/uL    Eos # 0.3 0.0 - 0.5 K/uL    Baso # 0.05 0.00 - 0.20 K/uL    nRBC 0 0 /100 WBC    Gran% 76.9 (H) 38.0 - 73.0 %    Lymph% 8.6 (L) 18.0 - 48.0 %    Mono% 10.0 4.0 - 15.0 %    Eosinophil% 3.4 0.0 - 8.0 %    Basophil% 0.6 0.0 - 1.9 %    Differential Method Automated    Basic metabolic panel    Collection Time: 11/19/17  3:55 AM   Result Value Ref Range    Sodium 133 (L) 136 - 145 mmol/L    Potassium 4.5 3.5 - 5.1 mmol/L    Chloride 102 95 - 110 mmol/L    CO2 24 23 - 29 mmol/L     Glucose 112 (H) 70 - 110 mg/dL    BUN, Bld 31 (H) 8 - 23 mg/dL    Creatinine 1.0 0.5 - 1.4 mg/dL    Calcium 9.0 8.7 - 10.5 mg/dL    Anion Gap 7 (L) 8 - 16 mmol/L    eGFR if African American 58.5 (A) >60 mL/min/1.73 m^2    eGFR if non African American 50.8 (A) >60 mL/min/1.73 m^2   CBC auto differential    Collection Time: 11/19/17  7:50 AM   Result Value Ref Range    WBC 6.32 3.90 - 12.70 K/uL    RBC 2.89 (L) 4.00 - 5.40 M/uL    Hemoglobin 8.7 (L) 12.0 - 16.0 g/dL    Hematocrit 25.9 (L) 37.0 - 48.5 %    MCV 90 82 - 98 fL    MCH 30.1 27.0 - 31.0 pg    MCHC 33.6 32.0 - 36.0 g/dL    RDW 14.4 11.5 - 14.5 %    Platelets 245 150 - 350 K/uL    MPV 9.6 9.2 - 12.9 fL    Immature Granulocytes 0.5 0.0 - 0.5 %    Gran # 4.7 1.8 - 7.7 K/uL    Immature Grans (Abs) 0.03 0.00 - 0.04 K/uL    Lymph # 0.8 (L) 1.0 - 4.8 K/uL    Mono # 0.4 0.3 - 1.0 K/uL    Eos # 0.3 0.0 - 0.5 K/uL    Baso # 0.03 0.00 - 0.20 K/uL    nRBC 0 0 /100 WBC    Gran% 74.9 (H) 38.0 - 73.0 %    Lymph% 12.8 (L) 18.0 - 48.0 %    Mono% 6.6 4.0 - 15.0 %    Eosinophil% 4.7 0.0 - 8.0 %    Basophil% 0.5 0.0 - 1.9 %    Differential Method Automated

## 2017-11-19 NOTE — PT/OT/SLP PROGRESS
"Occupational Therapy      Monique Lew  MRN: [id number]    Patient not seen today secondary to Patient unwilling to participate. Pt found sitting EOB with family members present. Pt stated, " You don't understand, I can't do what you want me to do until this bleeding stops.", " The doctors told me I can have a massive stroke at at any minute. I don't want to do anything with you right now." Pt educated on importance of participating  in OOB mobility and participating in OT sessions in order to improve functional (I). Will follow-up at next scheduled OT session.    Arely Taylor, OT  11/19/2017  "

## 2017-11-20 LAB
ANION GAP SERPL CALC-SCNC: 6 MMOL/L
BASOPHILS # BLD AUTO: 0.06 K/UL
BASOPHILS # BLD AUTO: 0.06 K/UL
BASOPHILS NFR BLD: 0.7 %
BASOPHILS NFR BLD: 0.9 %
BUN SERPL-MCNC: 27 MG/DL
CALCIUM SERPL-MCNC: 9.1 MG/DL
CHLORIDE SERPL-SCNC: 102 MMOL/L
CO2 SERPL-SCNC: 25 MMOL/L
CREAT SERPL-MCNC: 1.1 MG/DL
DIFFERENTIAL METHOD: ABNORMAL
DIFFERENTIAL METHOD: ABNORMAL
EOSINOPHIL # BLD AUTO: 0.3 K/UL
EOSINOPHIL # BLD AUTO: 0.4 K/UL
EOSINOPHIL NFR BLD: 4.6 %
EOSINOPHIL NFR BLD: 4.9 %
ERYTHROCYTE [DISTWIDTH] IN BLOOD BY AUTOMATED COUNT: 14.1 %
ERYTHROCYTE [DISTWIDTH] IN BLOOD BY AUTOMATED COUNT: 14.1 %
EST. GFR  (AFRICAN AMERICAN): 52.2 ML/MIN/1.73 M^2
EST. GFR  (NON AFRICAN AMERICAN): 45.3 ML/MIN/1.73 M^2
GLUCOSE SERPL-MCNC: 102 MG/DL
HCT VFR BLD AUTO: 26.8 %
HCT VFR BLD AUTO: 28 %
HGB BLD-MCNC: 9 G/DL
HGB BLD-MCNC: 9.3 G/DL
IMM GRANULOCYTES # BLD AUTO: 0.03 K/UL
IMM GRANULOCYTES # BLD AUTO: 0.05 K/UL
IMM GRANULOCYTES NFR BLD AUTO: 0.4 %
IMM GRANULOCYTES NFR BLD AUTO: 0.6 %
LYMPHOCYTES # BLD AUTO: 0.7 K/UL
LYMPHOCYTES # BLD AUTO: 0.9 K/UL
LYMPHOCYTES NFR BLD: 9.8 %
LYMPHOCYTES NFR BLD: 9.8 %
MCH RBC QN AUTO: 30 PG
MCH RBC QN AUTO: 30.3 PG
MCHC RBC AUTO-ENTMCNC: 33.2 G/DL
MCHC RBC AUTO-ENTMCNC: 33.6 G/DL
MCV RBC AUTO: 90 FL
MCV RBC AUTO: 90 FL
MONOCYTES # BLD AUTO: 0.8 K/UL
MONOCYTES # BLD AUTO: 0.8 K/UL
MONOCYTES NFR BLD: 11.3 %
MONOCYTES NFR BLD: 9.3 %
NEUTROPHILS # BLD AUTO: 4.9 K/UL
NEUTROPHILS # BLD AUTO: 6.8 K/UL
NEUTROPHILS NFR BLD: 72.7 %
NEUTROPHILS NFR BLD: 75 %
NRBC BLD-RTO: 0 /100 WBC
NRBC BLD-RTO: 0 /100 WBC
PLATELET # BLD AUTO: 270 K/UL
PLATELET # BLD AUTO: 284 K/UL
PMV BLD AUTO: 9.4 FL
PMV BLD AUTO: 9.5 FL
POTASSIUM SERPL-SCNC: 4.7 MMOL/L
RBC # BLD AUTO: 2.97 M/UL
RBC # BLD AUTO: 3.1 M/UL
SODIUM SERPL-SCNC: 133 MMOL/L
WBC # BLD AUTO: 6.74 K/UL
WBC # BLD AUTO: 9.07 K/UL

## 2017-11-20 PROCEDURE — 25000003 PHARM REV CODE 250: Performed by: STUDENT IN AN ORGANIZED HEALTH CARE EDUCATION/TRAINING PROGRAM

## 2017-11-20 PROCEDURE — 99232 SBSQ HOSP IP/OBS MODERATE 35: CPT | Mod: GC,,, | Performed by: HOSPITALIST

## 2017-11-20 PROCEDURE — 97530 THERAPEUTIC ACTIVITIES: CPT

## 2017-11-20 PROCEDURE — 85025 COMPLETE CBC W/AUTO DIFF WBC: CPT | Mod: 91

## 2017-11-20 PROCEDURE — 86580 TB INTRADERMAL TEST: CPT | Performed by: HOSPITALIST

## 2017-11-20 PROCEDURE — 63600175 PHARM REV CODE 636 W HCPCS: Performed by: HOSPITALIST

## 2017-11-20 PROCEDURE — 25000003 PHARM REV CODE 250: Performed by: HOSPITALIST

## 2017-11-20 PROCEDURE — 11000001 HC ACUTE MED/SURG PRIVATE ROOM

## 2017-11-20 PROCEDURE — 36415 COLL VENOUS BLD VENIPUNCTURE: CPT

## 2017-11-20 PROCEDURE — 97110 THERAPEUTIC EXERCISES: CPT

## 2017-11-20 PROCEDURE — 80048 BASIC METABOLIC PNL TOTAL CA: CPT

## 2017-11-20 PROCEDURE — 25000003 PHARM REV CODE 250

## 2017-11-20 PROCEDURE — 99233 SBSQ HOSP IP/OBS HIGH 50: CPT | Mod: GC,,, | Performed by: INTERNAL MEDICINE

## 2017-11-20 RX ADMIN — LEVOTHYROXINE SODIUM 50 MCG: 50 TABLET ORAL at 05:11

## 2017-11-20 RX ADMIN — HYPROMELLOSE 2910 1 DROP: 5 SOLUTION OPHTHALMIC at 12:11

## 2017-11-20 RX ADMIN — TIMOLOL MALEATE 1 DROP: 5 SOLUTION OPHTHALMIC at 09:11

## 2017-11-20 RX ADMIN — Medication 5 UNITS: at 03:11

## 2017-11-20 RX ADMIN — ACETAMINOPHEN 650 MG: 325 TABLET ORAL at 09:11

## 2017-11-20 RX ADMIN — METOPROLOL SUCCINATE 25 MG: 25 TABLET, EXTENDED RELEASE ORAL at 09:11

## 2017-11-20 NOTE — SUBJECTIVE & OBJECTIVE
Interval History:   NAEON. Wilson d/c today morning, blood tinged urine noted in wilson bag. Denies any fevers, chills, SOB, n/v, c/d.     Review of Systems   Constitutional: Positive for appetite change. Negative for chills, diaphoresis, fatigue and fever.   HENT: Negative for sore throat and trouble swallowing.    Respiratory: Negative for cough and shortness of breath.    Cardiovascular: Negative for chest pain, palpitations and leg swelling.   Gastrointestinal: Negative for abdominal distention, abdominal pain, constipation, diarrhea, nausea and vomiting.   Genitourinary: Positive for hematuria. Negative for difficulty urinating, dysuria, flank pain, frequency and urgency.   Musculoskeletal: Negative for arthralgias and back pain.   Skin: Positive for pallor. Negative for color change.   Neurological: Negative for dizziness and light-headedness.   Psychiatric/Behavioral: Negative for confusion.     Objective:     Vital Signs (Most Recent):  Temp: 97.8 °F (36.6 °C) (11/20/17 0712)  Pulse: 67 (11/20/17 1144)  Resp: 16 (11/20/17 1144)  BP: (!) 147/85 (11/20/17 1144)  SpO2: 95 % (11/20/17 0712) Vital Signs (24h Range):  Temp:  [97.5 °F (36.4 °C)-98.1 °F (36.7 °C)] 97.8 °F (36.6 °C)  Pulse:  [59-84] 67  Resp:  [16-17] 16  SpO2:  [95 %-100 %] 95 %  BP: (111-185)/() 147/85     Weight: 45.4 kg (100 lb)  Body mass index is 19.53 kg/m².    Intake/Output Summary (Last 24 hours) at 11/20/17 1334  Last data filed at 11/20/17 0900   Gross per 24 hour   Intake              600 ml   Output             3300 ml   Net            -2700 ml      Physical Exam   Constitutional: She is oriented to person, place, and time. No distress.   Thin, frail appearing elderly lady.   HENT:   Head: Normocephalic and atraumatic.   Mouth/Throat: Oropharynx is clear and moist.   Eyes: No scleral icterus.   Pale conjunctivae.   Neck: Normal range of motion. Neck supple.   Cardiovascular: Normal rate, regular rhythm, normal heart sounds and intact  distal pulses.    Pulmonary/Chest: Effort normal and breath sounds normal.   Abdominal: Soft. Bowel sounds are normal. She exhibits no distension. There is no tenderness. There is no guarding.   Genitourinary:   Genitourinary Comments: Forrest bag draining blood tingled urine.    Neurological: She is alert and oriented to person, place, and time.   Skin: Skin is warm and dry. She is not diaphoretic. There is pallor.   Psychiatric: She has a normal mood and affect. Her behavior is normal.   Vitals reviewed.      Significant Labs:   CBC:   Recent Labs  Lab 11/19/17  0750 11/19/17  2019 11/20/17  0751   WBC 6.32 9.34 6.74   HGB 8.7* 9.7* 9.0*   HCT 25.9* 28.7* 26.8*    278 270     CMP:   Recent Labs  Lab 11/19/17  0355 11/20/17  0420   * 133*   K 4.5 4.7    102   CO2 24 25   * 102   BUN 31* 27*   CREATININE 1.0 1.1   CALCIUM 9.0 9.1   ANIONGAP 7* 6*   EGFRNONAA 50.8* 45.3*       Significant Imaging: I have reviewed all pertinent imaging results/findings within the past 24 hours.

## 2017-11-20 NOTE — PLAN OF CARE
Pt alone in room AAO with CM discussing discharge plans with pt living home alone and PT/OT recs SNF.  Pt states she is open to a ST SNF if needed with CM informing of OSNF 7-10days with pt ok to go there for therapy prior to discharge home.  CM will continue to follow.     11/20/17 1230   Right Care Assessment   Can the patient answer the patient profile reliably? Yes, cognitively intact   How often would a person be available to care for the patient? Occasionally   Describe the patient's ability to walk at the present time. Minor restrictions or changes   How does the patient rate their overall health at the present time? Poor   Number of comorbid conditions (as recorded on the chart) Four   During the past month, has the patient often been bothered by feeling down, depressed or hopeless? No   During the past month, has the patient often been bothered by little interest or pleasure in doing things? No     Plan A: SNF  Plan B: HH

## 2017-11-20 NOTE — PLAN OF CARE
Problem: Fall Risk (Adult)  Goal: Absence of Falls  Patient will demonstrate the desired outcomes by discharge/transition of care.   Outcome: Ongoing (interventions implemented as appropriate)  Patient reports no falls this shift .Safety precautions in place bed alarm ,side rails up x3, nurses call bell in reach.

## 2017-11-20 NOTE — ASSESSMENT & PLAN NOTE
-See Bladder cancer    - patient's wilson bag with blood tinged urine  - wilson removed  - will monitor urine 24s; possible restart AC brandy

## 2017-11-20 NOTE — ASSESSMENT & PLAN NOTE
CT urogram with evidence of large, obstructing mass and hemorrhage in the bladder.  - Home coumadin and ASA held, per urology recs subq heparin for DVT prophylaxis  - Hg baseline 14, 11 at presentation.   - Type and screen ordered  - CBC q12h, will transfuse for Hg <7  - 11/13 OR for cystoscopy, TURBT, and pyelogram; stent could not be placed as urethral orifice could not be visualized.   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 Bilateral nodules found on CT Chest have high likelihood of being metastases.   - Oncology consulted to discuss treatment options. If pt does not want to pursue intervention/tx, will consult palliative care.

## 2017-11-20 NOTE — ASSESSMENT & PLAN NOTE
- if patient elects for chemotherapy or nephrotoxic treatment, then we recommend heme/onc order a left percutaneous nephrostomy tube to be placed by IR on outpatient basis

## 2017-11-20 NOTE — PLAN OF CARE
Problem: Physical Therapy Goal  Goal: Physical Therapy Goal  PT goals until 11/22/17    1. Pt supine to sit with supervision-Met  2. Pt sit to supine with supervision-not met  3. Pt sit to stand with or without RW as needed for safety with SBA-Met  4. Pt to perform gait 150ft with or without RW as needed for safety with SBA.-not met  5. Pt to up/down 10 steps with R UE rail with CGA.-not met  6. Pt to perform B LE exs in sitting x 15 reps to increase B LE strength to improve functional mobility.-Met     Goals remain appropriate at time. Continue with PT POC as indicated.

## 2017-11-20 NOTE — PHYSICIAN QUERY
"PT Name: Monique Lew  MR #: [id number]    Physician Query Form - Hematology Clarification      CDS: Yue Byrnes RN     Contact information:  [email or web address]    Phone: [phone]    This form is a permanent document in the medical record.      Query Date: November 20, 2017    By submitting this query, we are merely seeking further clarification of documentation. Please utilize your independent clinical judgment when addressing the question(s) below.    The Medical record contains the following:   Indicators  Supporting Clinical Findings Location in Medical Record    "Anemia" documented      X H & H =  Hgb= 11.5 --> 8.9 --> 6.8   Hct= 33.5 --> 26.2 --> 19.2  Lab 11/11/17 --> 11/14--> 11/16   Lab 11/11/17 --> 11/14--> 11/16    BP =                     HR=      "GI bleeding" documented      X Acute bleeding (Non GI site) presents with profuse gross hematuria  H&P 11/12/17    X Transfusion(s)  Prepare RBC 1 Unit   blood bank order 11/16/17    Treatment:      X Other:  Very large left lateral bladder wall tumor, did not appear typical of a urothelial carcinoma. Multiple good resections were taken and sent for pathology  Op note 11/14/17     Provider, please specify diagnosis or diagnoses associated with above clinical findings.    [ X ] Acute blood loss anemia expected post-operatively  [  ] Acute blood loss anemia  [  ] Iron deficiency anemia  [  ] Chronic blood loss anemia  [  ] Precipitous drop in Hematocrit    [  ] Anemia of chronic disease ( Specify chronic disease)      [  ] CKD (specify stage) ___________________________     [  ] Neoplastic disease      [  ] Other (Specify) _______________________________     [  ] Clinically Undetermined     [  ] Other Hematological Diagnosis (please specify): _________________________________    [  ] Clinically Undetermined       Please document in your progress notes daily for the duration of treatment, until resolved, and include in your discharge summary. "

## 2017-11-20 NOTE — PT/OT/SLP PROGRESS
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  Pt agreed to work with therapy.     Pain/Comfort  Pain Rating 1: 0/10  Pain Rating Post-Intervention 1: 0/10    Objective:   Patient found with: telemetry    Functional Mobility:  Bed Mobility:    Supine to Sit: SBA  Sit to Supine: Not performed 2* to pt left UIC.    Transfers:  Sit <> Stand Assistance: Stand By Assistance (to/from bed, to/from bedside chair)  Sit <> Stand Assistive Device: No Assistive Device    Gait:   Gait Distance:  (~120 ft., pt required manual cues to direct the walker. )  Assistance 1: Minimum assistance  Gait Assistive Device: Rolling walker  Gait Deviation(s): decreased ijeoma, decreased step length, backward lean, decreased toe-to-floor clearance    Therapeutic Activities and Exercises:  Donned extra gown at start of treatment session.   B LE therex 2x15 reps: AP, LAQ, Hip Flexion, and GS.     AM-PAC 6 CLICK MOBILITY  How much help from another person does this patient currently need?   1 = Unable, Total/Dependent Assistance  2 = A lot, Maximum/Moderate Assistance  3 = A little, Minimum/Contact Guard/Supervision  4 = None, Modified [place]/Independent    Turning over in bed (including adjusting bedclothes, sheets and blankets)?: 3  Sitting down on and standing up from a chair with arms (e.g., wheelchair, bedside commode, etc.): 3  Moving  from lying on back to sitting on the side of the bed?: 3  Moving to and from a bed to a chair (including a wheelchair)?: 3  Need to walk in hospital room?: 3  Climbing 3-5 steps with a railing?: 3  Total Score: 18    AM-PAC Raw Score CMS G-Code Modifier Level of Impairment Assistance   6 % Total / Unable   7 - 9 CM 80 - 100% Maximal Assist   10 - 14 CL 60 - 80% Moderate Assist   15 - 19 CK 40 - 60% Moderate Assist   20 - 22 CJ 20 - 40% Minimal Assist   23 CI 1-20% SBA / CGA   24 CH 0% Independent/ Mod I     Patient left up in chair with all lines intact and call button in reach.    Assessment:  Monique Lew is a 87 y.o. female with a medical diagnosis of Bladder cancer and presents with all deficits noted below. Pt t/fs w/o AD w/ SBA, and ambulated ~120 feet w/ Min A using RW. Pt tolerated B LE exercises well, and will continue to benefit from skilled PT services at this time. Continue with PT POC as indicated.    Rehab identified problem list/impairments: Rehab identified problem list/impairments: impaired endurance, weakness, impaired self care skills, impaired functional mobilty, gait instability, impaired balance, visual deficits, decreased safety awareness    Rehab potential is good.    Activity tolerance: Good    Discharge recommendations: Discharge Facility/Level Of Care Needs: nursing facility, skilled (short stay)     Barriers to discharge: Barriers to Discharge: Decreased caregiver support, Inaccessible home environment (14 steps to her bedroom and she lives alone)    Equipment recommendations: Equipment Needed After Discharge: walker, rolling     GOALS:    Physical Therapy Goals        Problem: Physical Therapy Goal    Goal Priority Disciplines Outcome Goal Variances Interventions   Physical Therapy Goal     PT/OT, PT Ongoing (interventions implemented as appropriate)     Description:  PT goals until 11/22/17    1. Pt supine to sit with supervision-Met  2. Pt sit to supine with  supervision-not met  3. Pt sit to stand with or without RW as needed for safety with SBA-Met  4. Pt to perform gait 150ft with or without RW as needed for safety with SBA.-not met  5. Pt to up/down 10 steps with R UE rail with CGA.-not met  6. Pt to perform B LE exs in sitting x 15 reps to increase B LE strength to improve functional mobility.-Met                       PLAN:    Patient to be seen 4 x/week  to address the above listed problems via therapeutic activities, gait training, therapeutic exercises, neuromuscular re-education  Plan of Care expires: 12/15/17  Plan of Care reviewed with: patient         Deana Sharma, PTA  11/20/2017

## 2017-11-20 NOTE — SUBJECTIVE & OBJECTIVE
Interval History:    SHAHZAD  No issues with wilson catheter     Review of Systems  Objective:     Temp:  [97.5 °F (36.4 °C)-98.1 °F (36.7 °C)] 97.8 °F (36.6 °C)  Pulse:  [59-84] 78  Resp:  [16-17] 16  SpO2:  [95 %-100 %] 95 %  BP: (111-185)/() 185/110     Body mass index is 19.53 kg/m².       Bladder Scan Volume (mL): 40 mL (11/17/17 1000)    Drains     Drain                 Urethral Catheter 11/13/17 1557 Triple-lumen 24 Fr. 6 days                Physical Exam   Constitutional: She is oriented to person, place, and time. She appears cachectic. She appears ill. No distress.   HENT:   Head: Normocephalic and atraumatic.   Eyes: No scleral icterus.   Neck: No JVD present.   Cardiovascular: Normal rate.    Pulmonary/Chest: Effort normal. No respiratory distress.   Abdominal: Soft. She exhibits no distension. There is no tenderness. There is no rebound and no guarding.   Well healed midline scar   Genitourinary:   Genitourinary Comments: 24 Fr wilson draining clear yellow off CBI     Neurological: She is alert and oriented to person, place, and time.   Skin: She is not diaphoretic. There is pallor.     Psychiatric: She has a normal mood and affect. Her behavior is normal.       Significant Labs:    BMP:    Recent Labs  Lab 11/18/17  0426 11/19/17  0355 11/20/17  0420   * 133* 133*   K 4.8 4.5 4.7   CL 99 102 102   CO2 23 24 25   BUN 23 31* 27*   CREATININE 1.0 1.0 1.1   CALCIUM 9.0 9.0 9.1       CBC:     Recent Labs  Lab 11/19/17  0750 11/19/17  2019 11/20/17  0751   WBC 6.32 9.34 6.74   HGB 8.7* 9.7* 9.0*   HCT 25.9* 28.7* 26.8*    278 270       All pertinent labs results from the past 24 hours have been reviewed.    Significant Imaging:  All pertinent imaging results/findings from the past 24 hours have been reviewed.

## 2017-11-20 NOTE — ASSESSMENT & PLAN NOTE
Monique Lew is a 87 y.o. 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

## 2017-11-20 NOTE — NURSING
Uneventful night. Aaox4.  Emptied 1100 ml of lillian colored urine from Forrest. No blood or sediments noted. No s/s of distress, vital signs stable. Bed in lowest position, call bell in reach.

## 2017-11-20 NOTE — PROGRESS NOTES
[facility] Hospital Medicine  Progress Note    Patient Name: Monique Lew  MRN: [id number]  Patient Class: IP- Inpatient   Admission Date: 11/11/2017  Length of Stay: 8 days  Attending Physician: Summer Grewal*  Primary Care Provider: Aguila Hsieh MD    [facility] Medicine Team: [facility] [place] 5 Navi Jha MD    Subjective:     Principal Problem:Bladder cancer    HPI:  Ms. 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 States she first noticed blood in her urine 2 days before presentation. This temporarily resolved, with hematuria occuring again the day before presentation. Hematuria is associated with increased urge to urinate and incontinence. Prior to this episode, patient states last episode of gross hematuria was in 2012, which was found to be 2/2 cystitis. Patient has had multiple UTIs and microhematuria on urinalysis since that time. She follows with a urologist, Dr. Enriquez at [facility], and has had 2 cystoscopies - most recently in 2014 which no evidence of tumor. Denies fever/chills/nausea/vomiting. Does states she has had decreased appetite, increased fatigue, and night sweats which cause her to soak through her clothes.     In the ED, was found to have Hg of 11 which trended down to 10 in 6 hours (baseline 14). Type and screen obtained. CT urogram performed showed 6.3x4.3x4.0 cm mass in bladder causing severe left hydronephrosis and hydroureter with evidence of hemorrhage in bladder. Also evaluated by urology in ED, who began CBI and recommended she be NPO for possible procedure.     Hospital Course:  Patient admitted to hospital medicine. Anticoagulation held in setting hemorrhage. CT Urogram on 11/11 demonstrated a large bladder mass. Urology consulted. 11/12 CT Chest also demonstrates bilateral pulmonary nodules that could be due to metastases. H/H  stabilized. Patient taken to OR 11/13 for cystoscopy, TURBT, pyelogram; stent unable to be placed as urethral orifice could not be visualized. Path pending. 11/14: started hep gtt. Pt still thinking if she wants nephrostomy tube or not. Urology to keep wilson in until Friday 11/17. Patient began to produce significant hematuria requiring 1u pRBC on 11/16 and the decision was made to stop heparin ggt due to risk outweighing benefit. 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 Results discussed with family and the poor prognosis associated given nodules found in lungs on CT previously. Patient decided that she would like to speak with oncology over her treatment options before deciding whether to pursue cancer treatment versus palliative care. Onc and urology following. No surgeries planned by urology. Per onc, pt is not a chemo candidate with active bleeding, but will discuss treatment options with patient once bleeding is controlled.     Interval History:   NAEON. Wilson d/c today morning, blood tinged urine noted in wilson bag. Denies any fevers, chills, SOB, n/v, c/d.     Review of Systems   Constitutional: Positive for appetite change. Negative for chills, diaphoresis, fatigue and fever.   HENT: Negative for sore throat and trouble swallowing.    Respiratory: Negative for cough and shortness of breath.    Cardiovascular: Negative for chest pain, palpitations and leg swelling.   Gastrointestinal: Negative for abdominal distention, abdominal pain, constipation, diarrhea, nausea and vomiting.   Genitourinary: Positive for hematuria. Negative for difficulty urinating, dysuria, flank pain, frequency and urgency.   Musculoskeletal: Negative for arthralgias and back pain.   Skin: Positive for pallor. Negative for color change.   Neurological: Negative for dizziness and light-headedness.   Psychiatric/Behavioral: Negative for confusion.     Objective:     Vital Signs (Most  Recent):  Temp: 97.8 °F (36.6 °C) (11/20/17 0712)  Pulse: 67 (11/20/17 1144)  Resp: 16 (11/20/17 1144)  BP: (!) 147/85 (11/20/17 1144)  SpO2: 95 % (11/20/17 0712) Vital Signs (24h Range):  Temp:  [97.5 °F (36.4 °C)-98.1 °F (36.7 °C)] 97.8 °F (36.6 °C)  Pulse:  [59-84] 67  Resp:  [16-17] 16  SpO2:  [95 %-100 %] 95 %  BP: (111-185)/() 147/85     Weight: 45.4 kg (100 lb)  Body mass index is 19.53 kg/m².    Intake/Output Summary (Last 24 hours) at 11/20/17 1334  Last data filed at 11/20/17 0900   Gross per 24 hour   Intake              600 ml   Output             3300 ml   Net            -2700 ml      Physical Exam   Constitutional: She is oriented to person, place, and time. No distress.   Thin, frail appearing elderly lady.   HENT:   Head: Normocephalic and atraumatic.   Mouth/Throat: Oropharynx is clear and moist.   Eyes: No scleral icterus.   Pale conjunctivae.   Neck: Normal range of motion. Neck supple.   Cardiovascular: Normal rate, regular rhythm, normal heart sounds and intact distal pulses.    Pulmonary/Chest: Effort normal and breath sounds normal.   Abdominal: Soft. Bowel sounds are normal. She exhibits no distension. There is no tenderness. There is no guarding.   Genitourinary:   Genitourinary Comments: Forrest bag draining blood tingled urine.    Neurological: She is alert and oriented to person, place, and time.   Skin: Skin is warm and dry. She is not diaphoretic. There is pallor.   Psychiatric: She has a normal mood and affect. Her behavior is normal.   Vitals reviewed.      Significant Labs:   CBC:   Recent Labs  Lab 11/19/17  0750 11/19/17  2019 11/20/17  0751   WBC 6.32 9.34 6.74   HGB 8.7* 9.7* 9.0*   HCT 25.9* 28.7* 26.8*    278 270     CMP:   Recent Labs  Lab 11/19/17  0355 11/20/17  0420   * 133*   K 4.5 4.7    102   CO2 24 25   * 102   BUN 31* 27*   CREATININE 1.0 1.1   CALCIUM 9.0 9.1   ANIONGAP 7* 6*   EGFRNONAA 50.8* 45.3*       Significant Imaging: I have  reviewed all pertinent imaging results/findings within the past 24 hours.    Assessment/Plan:      * Bladder cancer    CT urogram with evidence of large, obstructing mass and hemorrhage in the bladder.  - Home coumadin and ASA held, per urology recs subq heparin for DVT prophylaxis  - Hg baseline 14, 11 at presentation.   - Type and screen ordered  - CBC q12h, will transfuse for Hg <7  - 11/13 OR for cystoscopy, TURBT, and pyelogram; stent could not be placed as urethral orifice could not be visualized.   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 Bilateral nodules found on CT Chest have high likelihood of being metastases.   - Oncology consulted to discuss treatment options. If pt does not want to pursue intervention/tx, will consult palliative care.           Gross hematuria    -See Bladder cancer    - patient's wilson bag with blood tinged urine  - wilson removed  - will monitor urine 24s; possible restart AC brandy        Bladder mass              Hydronephrosis of left kidney    -If patient wishes to pursue chemotherapy, will need IR consult for nephrostomy tube placement eventually.           Depression (emotion)    - d/c'ed venlafaxine as pt was not taking it at home            Chronic hyponatremia    Sodium 125 at admission, had been normal in January 2017  - Trending up. D/c'ed venlafaxine as she was not taking this at home          Acquired hypothyroidism    - Continue synthroid 50 mcg  - TSH WNL          Essential hypertension              History of CVA (cerebrovascular accident)    Follows with neurology. 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  - Coumadin held          Chronic atrial fibrillation    - Regular rate and rhythm on exam at presentation  - Coumadin held in setting of hemorrhage and surgery .  - 11/14 hep gtt started, will resume coumadin depending on if urology does any more interventions/nephrostomy tube  - 11/15 hep ggt  stopped per urology due to excessive hematuria. Although patient is at risk for stroke, we agree that heparin can be stopped temporarily due to excessive blood loss through hematuria. Per urology, continue to hold heparin given hematuria.  -Hematuria resolving off anticoagulation. Forrest will be removed this morning. If patient passes voiding trial overnight with clear urine, anticoagulation can be re-initiated.         Current use of long term anticoagulation    - INR 2.7 at presentation  - Currently no anticoagulation per urology, will restart when able.             VTE Risk Mitigation     None              Navi Jha MD  Department of Hospital Medicine   [facility]

## 2017-11-20 NOTE — PROGRESS NOTES
[facility]  Urology  Progress Note    Patient Name: Monique Lew  MRN: [id number]  Admission Date: 11/11/2017  Hospital Length of Stay: 8 days  Code Status: Full Code   Attending Provider: Tiffanie Enriquez MD   Primary Care Physician: Aguila Hsieh MD    Subjective:     HPI:  Monique Lew is a 87 y.o. female with hx of HTN, HLD, breast cancer, stoke, DVT, and afib who presented to the ED with complaints of gross hematuria x 1 day. She has a hx of microscopic hematuria and is s/p negative workup in 2014 for an episode of gross hematuria.    She states along with the hematuria she has had frequency, urgency, and urge incontinence. Denies straining or clots. She has had weight loss recently associated with a decreased appetite. Denies flank pain or bone pain.     CT Urogram obtained shows a 6 cm enhancing left sided bladder mass with severe left hydronephrosis and no contrast excretion from left kidney. No lymphadenopathy. Creatinine is normal. H/H is slightly lower than baseline. Vitals have been stable.    She does have a 20 pack/year smoking history, quit 30 years ago. Hx of breast cancer in 1992 treated with radiation.     S/p cystoscopy with TURBT on 11/13/2017-- unable to identify left ureteral orifice at that time, and thus unable to place left ureteral stent.  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     Interval History:    SHAHZAD  No issues with wilson catheter     Review of Systems  Objective:     Temp:  [97.5 °F (36.4 °C)-98.1 °F (36.7 °C)] 97.8 °F (36.6 °C)  Pulse:  [59-84] 78  Resp:  [16-17] 16  SpO2:  [95 %-100 %] 95 %  BP: (111-185)/() 185/110     Body mass index is 19.53 kg/m².       Bladder Scan Volume (mL): 40 mL (11/17/17 1000)    Drains     Drain                 Urethral Catheter 11/13/17 1557 Triple-lumen 24 Fr. 6 days                Physical Exam   Constitutional: She is oriented to person, place, and time. She appears cachectic. She  appears ill. No distress.   HENT:   Head: Normocephalic and atraumatic.   Eyes: No scleral icterus.   Neck: No JVD present.   Cardiovascular: Normal rate.    Pulmonary/Chest: Effort normal. No respiratory distress.   Abdominal: Soft. She exhibits no distension. There is no tenderness. There is no rebound and no guarding.   Well healed midline scar   Genitourinary:   Genitourinary Comments: 24 Fr wilson draining clear yellow off CBI     Neurological: She is alert and oriented to person, place, and time.   Skin: She is not diaphoretic. There is pallor.     Psychiatric: She has a normal mood and affect. Her behavior is normal.       Significant Labs:    BMP:    Recent Labs  Lab 11/18/17  0426 11/19/17  0355 11/20/17  0420   * 133* 133*   K 4.8 4.5 4.7   CL 99 102 102   CO2 23 24 25   BUN 23 31* 27*   CREATININE 1.0 1.0 1.1   CALCIUM 9.0 9.0 9.1       CBC:     Recent Labs  Lab 11/19/17  0750 11/19/17  2019 11/20/17  0751   WBC 6.32 9.34 6.74   HGB 8.7* 9.7* 9.0*   HCT 25.9* 28.7* 26.8*    278 270       All pertinent labs results from the past 24 hours have been reviewed.    Significant Imaging:  All pertinent imaging results/findings from the past 24 hours have been reviewed.                  Assessment/Plan:     Hydronephrosis of left kidney    - if patient elects for chemotherapy or nephrotoxic treatment, then we recommend heme/onc order a left percutaneous nephrostomy tube to be placed by IR on outpatient basis         Gross hematuria    Monique Lew is a 87 y.o. female who presented with gross hematuria secondary to left sided bladder mass with severe left hydronephrosis, pathology high grade muscle invasive urothelial carcinoma--[id number]      S/p TURBT 11/13/2017, unable to place left ureteral stent as UO could not be identified      - hematuria resolved  - dc wilson, voiding trial today   - if patient is voiding well after wilson removal, then okay to restart anticoagulation                  VTE Risk  Mitigation     None          Raymon Tran MD  Urology  [facility]

## 2017-11-21 LAB
ALBUMIN SERPL BCP-MCNC: 3 G/DL
ALP SERPL-CCNC: 74 U/L
ALT SERPL W/O P-5'-P-CCNC: 9 U/L
ANION GAP SERPL CALC-SCNC: 10 MMOL/L
ANION GAP SERPL CALC-SCNC: 8 MMOL/L
AST SERPL-CCNC: 22 U/L
BASOPHILS # BLD AUTO: 0.04 K/UL
BASOPHILS # BLD AUTO: 0.04 K/UL
BASOPHILS # BLD AUTO: 0.06 K/UL
BASOPHILS NFR BLD: 0.4 %
BASOPHILS NFR BLD: 0.4 %
BASOPHILS NFR BLD: 0.6 %
BILIRUB SERPL-MCNC: 0.5 MG/DL
BUN SERPL-MCNC: 25 MG/DL
BUN SERPL-MCNC: 29 MG/DL
CALCIUM SERPL-MCNC: 9.2 MG/DL
CALCIUM SERPL-MCNC: 9.4 MG/DL
CHLORIDE SERPL-SCNC: 94 MMOL/L
CHLORIDE SERPL-SCNC: 97 MMOL/L
CO2 SERPL-SCNC: 19 MMOL/L
CO2 SERPL-SCNC: 21 MMOL/L
CREAT SERPL-MCNC: 0.9 MG/DL
CREAT SERPL-MCNC: 1 MG/DL
DIFFERENTIAL METHOD: ABNORMAL
EOSINOPHIL # BLD AUTO: 0.3 K/UL
EOSINOPHIL NFR BLD: 2.5 %
EOSINOPHIL NFR BLD: 2.7 %
EOSINOPHIL NFR BLD: 3 %
ERYTHROCYTE [DISTWIDTH] IN BLOOD BY AUTOMATED COUNT: 13.6 %
ERYTHROCYTE [DISTWIDTH] IN BLOOD BY AUTOMATED COUNT: 13.7 %
ERYTHROCYTE [DISTWIDTH] IN BLOOD BY AUTOMATED COUNT: 13.9 %
EST. GFR  (AFRICAN AMERICAN): 58.5 ML/MIN/1.73 M^2
EST. GFR  (AFRICAN AMERICAN): >60 ML/MIN/1.73 M^2
EST. GFR  (NON AFRICAN AMERICAN): 50.8 ML/MIN/1.73 M^2
EST. GFR  (NON AFRICAN AMERICAN): 57.7 ML/MIN/1.73 M^2
GLUCOSE SERPL-MCNC: 118 MG/DL
GLUCOSE SERPL-MCNC: 122 MG/DL
HCT VFR BLD AUTO: 25.6 %
HCT VFR BLD AUTO: 25.8 %
HCT VFR BLD AUTO: 28.7 %
HGB BLD-MCNC: 8.8 G/DL
HGB BLD-MCNC: 8.9 G/DL
HGB BLD-MCNC: 9.4 G/DL
IMM GRANULOCYTES # BLD AUTO: 0.03 K/UL
IMM GRANULOCYTES # BLD AUTO: 0.04 K/UL
IMM GRANULOCYTES # BLD AUTO: 0.05 K/UL
IMM GRANULOCYTES NFR BLD AUTO: 0.3 %
IMM GRANULOCYTES NFR BLD AUTO: 0.4 %
IMM GRANULOCYTES NFR BLD AUTO: 0.5 %
LYMPHOCYTES # BLD AUTO: 0.9 K/UL
LYMPHOCYTES # BLD AUTO: 0.9 K/UL
LYMPHOCYTES # BLD AUTO: 1.4 K/UL
LYMPHOCYTES NFR BLD: 14.3 %
LYMPHOCYTES NFR BLD: 8.7 %
LYMPHOCYTES NFR BLD: 9.3 %
MCH RBC QN AUTO: 29.1 PG
MCH RBC QN AUTO: 30.1 PG
MCH RBC QN AUTO: 30.1 PG
MCHC RBC AUTO-ENTMCNC: 32.8 G/DL
MCHC RBC AUTO-ENTMCNC: 34.4 G/DL
MCHC RBC AUTO-ENTMCNC: 34.5 G/DL
MCV RBC AUTO: 87 FL
MCV RBC AUTO: 88 FL
MCV RBC AUTO: 89 FL
MONOCYTES # BLD AUTO: 0.7 K/UL
MONOCYTES # BLD AUTO: 0.8 K/UL
MONOCYTES # BLD AUTO: 0.8 K/UL
MONOCYTES NFR BLD: 7.6 %
MONOCYTES NFR BLD: 8.2 %
MONOCYTES NFR BLD: 8.4 %
NEUTROPHILS # BLD AUTO: 7 K/UL
NEUTROPHILS # BLD AUTO: 7.8 K/UL
NEUTROPHILS # BLD AUTO: 7.9 K/UL
NEUTROPHILS NFR BLD: 74 %
NEUTROPHILS NFR BLD: 79 %
NEUTROPHILS NFR BLD: 79.7 %
NRBC BLD-RTO: 0 /100 WBC
PLATELET # BLD AUTO: 282 K/UL
PLATELET # BLD AUTO: 289 K/UL
PLATELET # BLD AUTO: 361 K/UL
PMV BLD AUTO: 9.4 FL
PMV BLD AUTO: 9.4 FL
PMV BLD AUTO: 9.6 FL
POTASSIUM SERPL-SCNC: 4.2 MMOL/L
POTASSIUM SERPL-SCNC: 4.5 MMOL/L
PROT SERPL-MCNC: 6.2 G/DL
RBC # BLD AUTO: 2.92 M/UL
RBC # BLD AUTO: 2.96 M/UL
RBC # BLD AUTO: 3.23 M/UL
SODIUM SERPL-SCNC: 123 MMOL/L
SODIUM SERPL-SCNC: 126 MMOL/L
WBC # BLD AUTO: 9.47 K/UL
WBC # BLD AUTO: 9.83 K/UL
WBC # BLD AUTO: 9.93 K/UL

## 2017-11-21 PROCEDURE — 99235 HOSP IP/OBS SAME DATE MOD 70: CPT | Mod: ,,, | Performed by: RADIOLOGY

## 2017-11-21 PROCEDURE — 80048 BASIC METABOLIC PNL TOTAL CA: CPT

## 2017-11-21 PROCEDURE — 80053 COMPREHEN METABOLIC PANEL: CPT

## 2017-11-21 PROCEDURE — 85025 COMPLETE CBC W/AUTO DIFF WBC: CPT | Mod: 91

## 2017-11-21 PROCEDURE — 25000003 PHARM REV CODE 250: Performed by: STUDENT IN AN ORGANIZED HEALTH CARE EDUCATION/TRAINING PROGRAM

## 2017-11-21 PROCEDURE — 11000001 HC ACUTE MED/SURG PRIVATE ROOM

## 2017-11-21 PROCEDURE — 36415 COLL VENOUS BLD VENIPUNCTURE: CPT

## 2017-11-21 PROCEDURE — 99232 SBSQ HOSP IP/OBS MODERATE 35: CPT | Mod: GC,,, | Performed by: HOSPITALIST

## 2017-11-21 PROCEDURE — 25000003 PHARM REV CODE 250: Performed by: INTERNAL MEDICINE

## 2017-11-21 PROCEDURE — 25000003 PHARM REV CODE 250

## 2017-11-21 RX ORDER — CALCIUM CARBONATE 200(500)MG
500 TABLET,CHEWABLE ORAL 2 TIMES DAILY PRN
Status: DISCONTINUED | OUTPATIENT
Start: 2017-11-21 | End: 2017-11-29 | Stop reason: HOSPADM

## 2017-11-21 RX ORDER — PHENAZOPYRIDINE HYDROCHLORIDE 100 MG/1
100 TABLET, FILM COATED ORAL
Status: DISCONTINUED | OUTPATIENT
Start: 2017-11-21 | End: 2017-11-21

## 2017-11-21 RX ADMIN — SODIUM CHLORIDE 500 ML: 0.9 INJECTION, SOLUTION INTRAVENOUS at 05:11

## 2017-11-21 RX ADMIN — ACETAMINOPHEN 650 MG: 325 TABLET ORAL at 10:11

## 2017-11-21 RX ADMIN — CALCIUM CARBONATE (ANTACID) CHEW TAB 500 MG 500 MG: 500 CHEW TAB at 09:11

## 2017-11-21 RX ADMIN — TIMOLOL MALEATE 1 DROP: 5 SOLUTION OPHTHALMIC at 08:11

## 2017-11-21 RX ADMIN — LEVOTHYROXINE SODIUM 50 MCG: 50 TABLET ORAL at 05:11

## 2017-11-21 RX ADMIN — METOPROLOL SUCCINATE 25 MG: 25 TABLET, EXTENDED RELEASE ORAL at 08:11

## 2017-11-21 NOTE — SUBJECTIVE & OBJECTIVE
Interval History:   Complains of intermittent difficulty voiding overnight. Bladder scan early this AM was 260cc, patient was then catheterized with return of 600 cc light pink urine with no clots. Patient has voided on her own since then.  Patient wishes to pursue immunotherapy.     Denies any fevers, chills, CP, SOB, n/v, c/d.    Review of Systems   Constitutional: Positive for appetite change. Negative for chills, diaphoresis, fatigue and fever.   HENT: Negative for sore throat and trouble swallowing.    Respiratory: Negative for cough and shortness of breath.    Cardiovascular: Negative for chest pain, palpitations and leg swelling.   Gastrointestinal: Negative for abdominal distention, abdominal pain, constipation, diarrhea, nausea and vomiting.   Genitourinary: Positive for hematuria. Negative for difficulty urinating, dysuria, flank pain, frequency and urgency.   Musculoskeletal: Negative for arthralgias and back pain.   Skin: Positive for pallor. Negative for color change.   Neurological: Negative for dizziness and light-headedness.   Psychiatric/Behavioral: Negative for confusion.     Objective:     Vital Signs (Most Recent):  Temp: 97.6 °F (36.4 °C) (11/21/17 0746)  Pulse: 66 (11/21/17 1100)  Resp: 18 (11/21/17 0746)  BP: (!) 120/90 (11/21/17 0746)  SpO2: 95 % (11/21/17 0746) Vital Signs (24h Range):  Temp:  [97.6 °F (36.4 °C)-98.5 °F (36.9 °C)] 97.6 °F (36.4 °C)  Pulse:  [61-92] 66  Resp:  [16-18] 18  SpO2:  [95 %-100 %] 95 %  BP: (120-178)/() 120/90     Weight: 45.4 kg (100 lb)  Body mass index is 19.53 kg/m².    Intake/Output Summary (Last 24 hours) at 11/21/17 1148  Last data filed at 11/21/17 1000   Gross per 24 hour   Intake              500 ml   Output             1351 ml   Net             -851 ml      Physical Exam   Constitutional: She is oriented to person, place, and time. No distress.   Thin, frail appearing elderly lady.   HENT:   Head: Normocephalic and atraumatic.   Mouth/Throat:  Oropharynx is clear and moist.   Eyes: No scleral icterus.   Pale conjunctivae.   Neck: Normal range of motion. Neck supple.   Cardiovascular: Normal rate, regular rhythm, normal heart sounds and intact distal pulses.    Pulmonary/Chest: Effort normal and breath sounds normal.   Abdominal: Soft. Bowel sounds are normal. She exhibits no distension. There is no tenderness. There is no guarding.   Neurological: She is alert and oriented to person, place, and time.   Skin: Skin is warm and dry. She is not diaphoretic. There is pallor.   Psychiatric: She has a normal mood and affect. Her behavior is normal.   Vitals reviewed.      Significant Labs:   CBC:   Recent Labs  Lab 11/20/17  1939 11/21/17  0737 11/21/17  0738   WBC 9.07 9.83 9.93   HGB 9.3* 8.8* 8.9*   HCT 28.0* 25.6* 25.8*    282 289     CMP:   Recent Labs  Lab 11/20/17  0420 11/21/17  0414 11/21/17  0738   * 123* 126*   K 4.7 4.5 4.2    94* 97   CO2 25 19* 21*    118* 122*   BUN 27* 29* 25*   CREATININE 1.1 1.0 0.9   CALCIUM 9.1 9.4 9.2   PROT  --   --  6.2   ALBUMIN  --   --  3.0*   BILITOT  --   --  0.5   ALKPHOS  --   --  74   AST  --   --  22   ALT  --   --  9*   ANIONGAP 6* 10 8   EGFRNONAA 45.3* 50.8* 57.7*       Significant Imaging: I have reviewed all pertinent imaging results/findings within the past 24 hours.

## 2017-11-21 NOTE — CONSULTS
[facility]  Radiation Oncology  Consult Note    Patient Name: Monique Lew  MRN: [id number]  Admission Date: 11/11/2017  Hospital Length of Stay: 9 days  Code Status: Full Code   Attending Provider: Summer Grewal*  Consulting Provider: Luciano Altamirano MD  Primary Care Physician: Aguila Hsieh MD  Principal Problem:Bladder cancer    Inpatient consult to Radiation Oncology  Consult performed by: LUCIANO ALTAMIRANO.  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    OTHER MEDICAL HISTORY: Patient has had bowel resection for volvulus, corneal transplant, glaucoma shunt, several other eye surgeries, thyroid surgery. She is treated or followed for history of CVA, MI, DVT, poor vision, atrial fibrillation, hypothyroidism and hypertension. PS is ECOG 3.     PRIOR CANCER HISTORY: Patient underwent lumpectomy for an early cancer of the left breast in 1992. She was treated with radiation to the breast and had no other treatment.     PRESENT ILLNESS: Patient was seen in the ED on 11/11/17 because of hematuria. CT urogram showed a 6 cm left sided bladder mass and left hydrourteronephrosis, liver and lung lesions that appear to be metastasis. On 11/12/17 she underwent transurethral resection of the bladder mass which proved to be a high grade urothelial carcinoma invading muscularis propria. The Hb was 14.5 on 9/3/17 and was 10.8 after admission on 11/11/17.     RADIOLOGIC STUDIES: In addition to that noted above, CT of the chest on 11/12/17 confirms the presence of lung metastasis.     LABORATORY STUDIES: In addition to that noted above, on 11/21/17 the Hb is 8.9 with a wbc of 9,930 and a  platelet count of 289,000. Comprehensive metabolic panel on 11/11/17 is remarkable for sodium of 125 and glucose of 133.     IMPRESSION: Treatment of the bladder mass with radiation is recommended to stop bleeding and possibly clear ureter obstruction.     PLAN: Patient to come to radiation oncology for treatment planning simulation at 10 am tomorrow 11/22/17. A course of radiation to a dose of about 40 Gy in fractions of 2.5 Gy each is expected to begin later in the day on 11/22/17. We will treat patient on Friday 11/24.         No new subjective & objective note has been filed under this hospital service since the last note was generated.    Assessment/Plan:     No notes have been filed under this hospital service.  Service: Radiation Oncology      Thank you for your consult.     Luciano Hsieh MD  Radiation Oncology  [facility]

## 2017-11-21 NOTE — NURSING
Resting in bed, easily aroused to verbal stimuli and stated she feels spasms coming and going and that she wet the bed again, but the PCT changed her pads. No s/s of distress. Bed in lowest position, call bell in reach.

## 2017-11-21 NOTE — PROGRESS NOTES
Progress Note    Admit Date: 11/11/2017   LOS: 8 days     SUBJECTIVE:     Follow-up For:  Pt is an 88 yo F with recent diagnosis of presumed metastatic bladder cancer.    Pt with wilson removed and subsequent voiding trial.  Pt with no acute events overnight.  Pt denies fever, chills, CP, SOB, cough, N/V, diarrhea.  Pt complains of constipation.    Scheduled Meds:   levothyroxine  50 mcg Oral Before breakfast    metoprolol succinate  25 mg Oral Daily    timolol maleate 0.5%  1 drop Both Eyes BID     Continuous Infusions:   PRN Meds:acetaminophen, artificial tears, influenza, magnesium hydroxide 400 mg/5 ml, sodium chloride 0.9%, sodium chloride 0.9%, traMADol    Review of patient's allergies indicates:   Allergen Reactions    Cosopt [dorzolamide-timolol] Swelling     Swelling of eyelids    Mevacor [lovastatin] Nausea And Vomiting    Prednisone Nausea And Vomiting    Vasotec [enalapril maleate] Nausea And Vomiting    Zetia [ezetimibe] Nausea And Vomiting    Furosemide Palpitations     Pt states her heart skips beats when she takes this medication.       Review of Systems   Constitutional: Negative for chills and fever.   HENT: Negative for congestion.    Respiratory: Negative for cough, sputum production and shortness of breath.    Cardiovascular: Negative for chest pain and leg swelling.   Gastrointestinal: Positive for constipation. Negative for abdominal pain, diarrhea, nausea and vomiting.   Neurological: Negative for headaches.     OBJECTIVE:     Vital Signs (Most Recent)  Temp: 98.2 °F (36.8 °C) (11/20/17 1910)  Pulse: 72 (11/20/17 1936)  Resp: 16 (11/20/17 1910)  BP: 138/74 (11/20/17 1910)  SpO2: 98 % (11/20/17 1910)    Vital Signs Range (Last 24H):  Temp:  [97.7 °F (36.5 °C)-98.2 °F (36.8 °C)]   Pulse:  [59-84]   Resp:  [16]   BP: (131-185)/()   SpO2:  [95 %-100 %]     I & O (Last 24H):  Intake/Output Summary (Last 24 hours) at 11/20/17 8271  Last data filed at 11/20/17 1600   Gross per 24 hour    Intake              700 ml   Output             3401 ml   Net            -2701 ml     Physical Exam   Constitutional: She is oriented to person, place, and time.   Thin, Cachectic   Eyes: EOM are normal. Right eye exhibits no discharge. Left eye exhibits no discharge.   Cardiovascular: Normal rate, regular rhythm and normal heart sounds.  Exam reveals no gallop and no friction rub.    No murmur heard.  Pulmonary/Chest: Effort normal and breath sounds normal. No respiratory distress. She has no wheezes. She has no rales.   Abdominal: Soft. Bowel sounds are normal. She exhibits no distension. There is no tenderness. There is no rebound and no guarding.   Neurological: She is alert and oriented to person, place, and time.       Laboratory:  Recent Results (from the past 24 hour(s))   Basic metabolic panel    Collection Time: 11/20/17  4:20 AM   Result Value Ref Range    Sodium 133 (L) 136 - 145 mmol/L    Potassium 4.7 3.5 - 5.1 mmol/L    Chloride 102 95 - 110 mmol/L    CO2 25 23 - 29 mmol/L    Glucose 102 70 - 110 mg/dL    BUN, Bld 27 (H) 8 - 23 mg/dL    Creatinine 1.1 0.5 - 1.4 mg/dL    Calcium 9.1 8.7 - 10.5 mg/dL    Anion Gap 6 (L) 8 - 16 mmol/L    eGFR if African American 52.2 (A) >60 mL/min/1.73 m^2    eGFR if non  45.3 (A) >60 mL/min/1.73 m^2   CBC auto differential    Collection Time: 11/20/17  7:51 AM   Result Value Ref Range    WBC 6.74 3.90 - 12.70 K/uL    RBC 2.97 (L) 4.00 - 5.40 M/uL    Hemoglobin 9.0 (L) 12.0 - 16.0 g/dL    Hematocrit 26.8 (L) 37.0 - 48.5 %    MCV 90 82 - 98 fL    MCH 30.3 27.0 - 31.0 pg    MCHC 33.6 32.0 - 36.0 g/dL    RDW 14.1 11.5 - 14.5 %    Platelets 270 150 - 350 K/uL    MPV 9.4 9.2 - 12.9 fL    Immature Granulocytes 0.4 0.0 - 0.5 %    Gran # 4.9 1.8 - 7.7 K/uL    Immature Grans (Abs) 0.03 0.00 - 0.04 K/uL    Lymph # 0.7 (L) 1.0 - 4.8 K/uL    Mono # 0.8 0.3 - 1.0 K/uL    Eos # 0.3 0.0 - 0.5 K/uL    Baso # 0.06 0.00 - 0.20 K/uL    nRBC 0 0 /100 WBC    Gran% 72.7 38.0  - 73.0 %    Lymph% 9.8 (L) 18.0 - 48.0 %    Mono% 11.3 4.0 - 15.0 %    Eosinophil% 4.9 0.0 - 8.0 %    Basophil% 0.9 0.0 - 1.9 %    Differential Method Automated    CBC auto differential    Collection Time: 11/20/17  7:39 PM   Result Value Ref Range    WBC 9.07 3.90 - 12.70 K/uL    RBC 3.10 (L) 4.00 - 5.40 M/uL    Hemoglobin 9.3 (L) 12.0 - 16.0 g/dL    Hematocrit 28.0 (L) 37.0 - 48.5 %    MCV 90 82 - 98 fL    MCH 30.0 27.0 - 31.0 pg    MCHC 33.2 32.0 - 36.0 g/dL    RDW 14.1 11.5 - 14.5 %    Platelets 284 150 - 350 K/uL    MPV 9.5 9.2 - 12.9 fL    Immature Granulocytes 0.6 (H) 0.0 - 0.5 %    Gran # 6.8 1.8 - 7.7 K/uL    Immature Grans (Abs) 0.05 (H) 0.00 - 0.04 K/uL    Lymph # 0.9 (L) 1.0 - 4.8 K/uL    Mono # 0.8 0.3 - 1.0 K/uL    Eos # 0.4 0.0 - 0.5 K/uL    Baso # 0.06 0.00 - 0.20 K/uL    nRBC 0 0 /100 WBC    Gran% 75.0 (H) 38.0 - 73.0 %    Lymph% 9.8 (L) 18.0 - 48.0 %    Mono% 9.3 4.0 - 15.0 %    Eosinophil% 4.6 0.0 - 8.0 %    Basophil% 0.7 0.0 - 1.9 %    Differential Method Automated          Diagnostic Results:  Imaging reviewed    ASSESSMENT/PLAN:     Pt is an 88 yo F with h/o CVA, hypothyroidism, HTN, MI, breast cancer who presented to the hospital with hematuria, found to have presumed metastatic bladder cancer.    Plan:     Bladder Cancer - the patient is potentially a candidate for treatment with Cisplatin or Pembrolizumab  -We are aware that if the patient were to require nephrotoxic chemotherapy, she would require a left nephrostomy tube to be placed.  -Upon discharge, the patient will need to follow up with medical oncology so as to assess her current performance status at that time and better determine treatment.  -It was discussed with the patient that if she continued to bleed Hospice might be the best option as she would not be a candidate for treatment.  -Hospice was also discussed as an alternative to aggressive outpatient treatment.    Hematuria - if bleeding continues, would consider radiation  oncology consult.    Attending Note  I have personally taken the history and examined this patient and agree with the fellow's note as stated above.  We will arrange outpatient follow-up.

## 2017-11-21 NOTE — PLAN OF CARE
CM in to see pt AAO with family at bedside.  CM discussed with pt and family medical plan of Radiation Oncology following pt with possible intervention for bleeding and decision of long term treatment plan, verbalizes understanding and agreement aware of earliest d/c next week to skilled nursing facility with OSNF following.

## 2017-11-21 NOTE — PT/OT/SLP PROGRESS
Occupational Therapy      Monique Keely Lew  MRN: [id number]    Patient not seen today secondary to pt refusal due to pain and generally not feeling well. Pt reports she has been ambulating with PT and staff and has been performing self care tasks with Min A at most. OT to follow-up next visit.    ERASMO Castillo  11/21/2017  Pager: [phone]

## 2017-11-21 NOTE — NURSING
"Pt aaox4. Bed saturated with pink tinged urine, patient stated she feels bladder spasms all of a sudden that come and go and that when the spasms stops, she urinates on herself. Pt stated she feels a burring sensation when she urinates, stated " I have cystitis, that catheter they put in me was so big."  Manual /96. Bladder scan revealed 260 ml. Notified Dr. Barriga of above assessment and patient's complaint's he stated he would come to see her. Pt calm, but concerned that she can't make it to the bedside commode anymore. No acute distress, will continue to assess.  "

## 2017-11-21 NOTE — SUBJECTIVE & OBJECTIVE
Interval History:     Forrest removed yesterday  Complains of intermittent difficulty voiding overnight, but urinating  No clots     Review of Systems  Objective:     Temp:  [97.7 °F (36.5 °C)-98.5 °F (36.9 °C)] 98.5 °F (36.9 °C)  Pulse:  [61-84] 72  Resp:  [16] 16  SpO2:  [95 %-100 %] 97 %  BP: (124-185)/() 160/80     Body mass index is 19.53 kg/m².       Bladder Scan Volume (mL): 0 mL (11/20/17 1600)    Drains     Drain                 Urethral Catheter 11/13/17 1557 Triple-lumen 24 Fr. 7 days                Physical Exam   Constitutional: She is oriented to person, place, and time. She appears cachectic. She appears ill. No distress.   HENT:   Head: Normocephalic and atraumatic.   Eyes: No scleral icterus.   Neck: No JVD present.   Cardiovascular: Normal rate.    Pulmonary/Chest: Effort normal. No respiratory distress.   Abdominal: Soft. She exhibits no distension. There is no tenderness. There is no rebound and no guarding.   Well healed midline scar    Bladder non-palpable, no tenderness or distention of suprapubic area    Genitourinary:   Genitourinary Comments: Voided urine clear very light pink no clots      Neurological: She is alert and oriented to person, place, and time.   Skin: She is not diaphoretic. There is pallor.     Psychiatric: She has a normal mood and affect. Her behavior is normal.       Significant Labs:    BMP:    Recent Labs  Lab 11/19/17  0355 11/20/17  0420 11/21/17  0414   * 133* 123*   K 4.5 4.7 4.5    102 94*   CO2 24 25 19*   BUN 31* 27* 29*   CREATININE 1.0 1.1 1.0   CALCIUM 9.0 9.1 9.4       CBC:     Recent Labs  Lab 11/19/17  2019 11/20/17  0751 11/20/17  1939   WBC 9.34 6.74 9.07   HGB 9.7* 9.0* 9.3*   HCT 28.7* 26.8* 28.0*    270 284       All pertinent labs results from the past 24 hours have been reviewed.    Significant Imaging:  All pertinent imaging results/findings from the past 24 hours have been reviewed.

## 2017-11-21 NOTE — PROGRESS NOTES
[facility]  Urology  Progress Note    Patient Name: Monique Lew  MRN: [id number]  Admission Date: 11/11/2017  Hospital Length of Stay: 9 days  Code Status: Full Code   Attending Provider: Tiffanie Enriquez MD  Primary Care Physician: Aguila Hsieh MD    Subjective:     HPI:  Monique Lew is a 87 y.o. female with hx of HTN, HLD, breast cancer, stoke, DVT, and afib who presented to the ED with complaints of gross hematuria x 1 day. She has a hx of microscopic hematuria and is s/p negative workup in 2014 for an episode of gross hematuria.    She states along with the hematuria she has had frequency, urgency, and urge incontinence. Denies straining or clots. She has had weight loss recently associated with a decreased appetite. Denies flank pain or bone pain.     CT Urogram obtained shows a 6 cm enhancing left sided bladder mass with severe left hydronephrosis and no contrast excretion from left kidney. No lymphadenopathy. Creatinine is normal. H/H is slightly lower than baseline. Vitals have been stable.    She does have a 20 pack/year smoking history, quit 30 years ago. Hx of breast cancer in 1992 treated with radiation.     S/p cystoscopy with TURBT on 11/13/2017-- unable to identify left ureteral orifice at that time, and thus unable to place left ureteral stent.  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     Interval History:     Forrest removed yesterday  Complains of intermittent difficulty voiding overnight, no clots  Bladder scan early this AM was 260cc, patient was then catheterized with return of 600 cc light pink urine     Review of Systems  Objective:     Temp:  [97.7 °F (36.5 °C)-98.5 °F (36.9 °C)] 98.5 °F (36.9 °C)  Pulse:  [61-84] 72  Resp:  [16] 16  SpO2:  [95 %-100 %] 97 %  BP: (124-185)/() 160/80     Body mass index is 19.53 kg/m².       Bladder Scan Volume (mL): 0 mL (11/20/17 1600)    Drains     Drain                 Urethral Catheter  11/13/17 1557 Triple-lumen 24 Fr. 7 days                Physical Exam   Constitutional: She is oriented to person, place, and time. She appears cachectic. She appears ill. No distress.   HENT:   Head: Normocephalic and atraumatic.   Eyes: No scleral icterus.   Neck: No JVD present.   Cardiovascular: Normal rate.    Pulmonary/Chest: Effort normal. No respiratory distress.   Abdominal: Soft. She exhibits no distension. There is no tenderness. There is no rebound and no guarding.   Well healed midline scar    Bladder non-palpable, no tenderness or distention of suprapubic area    Genitourinary:   Genitourinary Comments: Voided urine clear very light pink no clots      Neurological: She is alert and oriented to person, place, and time.   Skin: She is not diaphoretic. There is pallor.     Psychiatric: She has a normal mood and affect. Her behavior is normal.       Significant Labs:    BMP:    Recent Labs  Lab 11/19/17  0355 11/20/17  0420 11/21/17  0414   * 133* 123*   K 4.5 4.7 4.5    102 94*   CO2 24 25 19*   BUN 31* 27* 29*   CREATININE 1.0 1.1 1.0   CALCIUM 9.0 9.1 9.4       CBC:     Recent Labs  Lab 11/19/17  2019 11/20/17  0751 11/20/17  1939   WBC 9.34 6.74 9.07   HGB 9.7* 9.0* 9.3*   HCT 28.7* 26.8* 28.0*    270 284       All pertinent labs results from the past 24 hours have been reviewed.    Significant Imaging:  All pertinent imaging results/findings from the past 24 hours have been reviewed.                  Assessment/Plan:     Hydronephrosis of left kidney    - if patient elects for chemotherapy or nephrotoxic treatment, then we recommend heme/onc order a left percutaneous nephrostomy tube to be placed by IR on outpatient basis         Gross hematuria    Monique Lew is a 87 y.o. female who presented with gross hematuria secondary to left sided bladder mass with severe left hydronephrosis, pathology high grade muscle invasive urothelial carcinoma--[id number]      S/p TURBT 11/13/2017,  unable to place left ureteral stent as UO could not be identified      - patient had to be straight cathed early this AM  - does not want wilson replaced  - will observe at this time and recheck later in the day                  VTE Risk Mitigation     None          Raymon Tran MD  Urology  [facility]

## 2017-11-21 NOTE — ASSESSMENT & PLAN NOTE
Monique Lew is a 87 y.o. 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

## 2017-11-21 NOTE — ASSESSMENT & PLAN NOTE
-See Bladder cancer    - Complains of intermittent difficulty voiding overnight. Bladder scan early this AM was 260cc, patient was then catheterized with return of 600 cc light pink urine, no clots  - nurse reported that she noticed blood in her urine this morning.  - will not restart AC; monitor 24 hrs   - consult radiation oncology for intractable hematuria

## 2017-11-21 NOTE — ASSESSMENT & PLAN NOTE
CT urogram with evidence of large, obstructing mass and hemorrhage in the bladder.  - Home coumadin and ASA held, per urology recs subq heparin for DVT prophylaxis  - Hg baseline 14, 11 at presentation.   - Type and screen ordered  - CBC q12h, will transfuse for Hg <7  - 11/13 OR for cystoscopy, TURBT, and pyelogram; stent could not be placed as urethral orifice could not be visualized.   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 Bilateral nodules found on CT Chest have high likelihood of being metastases.   - Oncology consulted to discuss treatment options. Pt would like to pursue immunotherapy.

## 2017-11-21 NOTE — NURSING
MD assessed Pt.new order for In and out cath performed maintaining aseptic technique.600 ml of light pink urine obtained. Pt tolerated well. B/P 150 /84 HR 75 Resp 16 T 97.8 O2 sat 97 room air. Pt reports feeling better. Notified Dr. Linus Greeneally of output and vitals. No s/s of distress, will continue to assess.

## 2017-11-21 NOTE — PROGRESS NOTES
[facility] Hospital Medicine  Progress Note    Patient Name: Monique Lew  MRN: [id number]  Patient Class: IP- Inpatient   Admission Date: 11/11/2017  Length of Stay: 9 days  Attending Physician: Summer Grewal*  Primary Care Provider: Aguila Hsieh MD    [facility] Medicine Team: [facility] HOSP MED 5 Navi Jha MD    Subjective:     Principal Problem:Bladder cancer    HPI:  Ms. 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 States she first noticed blood in her urine 2 days before presentation. This temporarily resolved, with hematuria occuring again the day before presentation. Hematuria is associated with increased urge to urinate and incontinence. Prior to this episode, patient states last episode of gross hematuria was in 2012, which was found to be 2/2 cystitis. Patient has had multiple UTIs and microhematuria on urinalysis since that time. She follows with a urologist, Dr. Enriquez at [facility], and has had 2 cystoscopies - most recently in 2014 which no evidence of tumor. Denies fever/chills/nausea/vomiting. Does states she has had decreased appetite, increased fatigue, and night sweats which cause her to soak through her clothes.     In the ED, was found to have Hg of 11 which trended down to 10 in 6 hours (baseline 14). Type and screen obtained. CT urogram performed showed 6.3x4.3x4.0 cm mass in bladder causing severe left hydronephrosis and hydroureter with evidence of hemorrhage in bladder. Also evaluated by urology in ED, who began CBI and recommended she be NPO for possible procedure.     Hospital Course:  Patient admitted to hospital medicine. Anticoagulation held in setting hemorrhage. CT Urogram on 11/11 demonstrated a large bladder mass. Urology consulted. 11/12 CT Chest also demonstrates bilateral pulmonary nodules that could be due to metastases. H/H  stabilized. Patient taken to OR 11/13 for cystoscopy, TURBT, pyelogram; stent unable to be placed as urethral orifice could not be visualized. Path pending. 11/14: started hep gtt. Pt still thinking if she wants nephrostomy tube or not. Urology to keep wilson in until Friday 11/17. Patient began to produce significant hematuria requiring 1u pRBC on 11/16 and the decision was made to stop heparin ggt due to risk outweighing benefit. 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 Results discussed with family and the poor prognosis associated given nodules found in lungs on CT previously. Patient decided that she would like to speak with oncology over her treatment options before deciding whether to pursue cancer treatment versus palliative care. Onc and urology following. No surgeries planned by urology. Per onc, pt is not a chemo candidate with active bleeding, but will discuss treatment options with patient once bleeding is controlled.     Interval History:   Complains of intermittent difficulty voiding overnight. Bladder scan early this AM was 260cc, patient was then catheterized with return of 600 cc light pink urine with no clots. Patient has voided on her own since then.  Patient wishes to pursue immunotherapy.     Denies any fevers, chills, CP, SOB, n/v, c/d.    Review of Systems   Constitutional: Positive for appetite change. Negative for chills, diaphoresis, fatigue and fever.   HENT: Negative for sore throat and trouble swallowing.    Respiratory: Negative for cough and shortness of breath.    Cardiovascular: Negative for chest pain, palpitations and leg swelling.   Gastrointestinal: Negative for abdominal distention, abdominal pain, constipation, diarrhea, nausea and vomiting.   Genitourinary: Positive for hematuria. Negative for difficulty urinating, dysuria, flank pain, frequency and urgency.   Musculoskeletal: Negative for arthralgias and back pain.   Skin: Positive  for pallor. Negative for color change.   Neurological: Negative for dizziness and light-headedness.   Psychiatric/Behavioral: Negative for confusion.     Objective:     Vital Signs (Most Recent):  Temp: 97.6 °F (36.4 °C) (11/21/17 0746)  Pulse: 66 (11/21/17 1100)  Resp: 18 (11/21/17 0746)  BP: (!) 120/90 (11/21/17 0746)  SpO2: 95 % (11/21/17 0746) Vital Signs (24h Range):  Temp:  [97.6 °F (36.4 °C)-98.5 °F (36.9 °C)] 97.6 °F (36.4 °C)  Pulse:  [61-92] 66  Resp:  [16-18] 18  SpO2:  [95 %-100 %] 95 %  BP: (120-178)/() 120/90     Weight: 45.4 kg (100 lb)  Body mass index is 19.53 kg/m².    Intake/Output Summary (Last 24 hours) at 11/21/17 1148  Last data filed at 11/21/17 1000   Gross per 24 hour   Intake              500 ml   Output             1351 ml   Net             -851 ml      Physical Exam   Constitutional: She is oriented to person, place, and time. No distress.   Thin, frail appearing elderly lady.   HENT:   Head: Normocephalic and atraumatic.   Mouth/Throat: Oropharynx is clear and moist.   Eyes: No scleral icterus.   Pale conjunctivae.   Neck: Normal range of motion. Neck supple.   Cardiovascular: Normal rate, regular rhythm, normal heart sounds and intact distal pulses.    Pulmonary/Chest: Effort normal and breath sounds normal.   Abdominal: Soft. Bowel sounds are normal. She exhibits no distension. There is no tenderness. There is no guarding.   Neurological: She is alert and oriented to person, place, and time.   Skin: Skin is warm and dry. She is not diaphoretic. There is pallor.   Psychiatric: She has a normal mood and affect. Her behavior is normal.   Vitals reviewed.      Significant Labs:   CBC:   Recent Labs  Lab 11/20/17  1939 11/21/17  0737 11/21/17  0738   WBC 9.07 9.83 9.93   HGB 9.3* 8.8* 8.9*   HCT 28.0* 25.6* 25.8*    282 289     CMP:   Recent Labs  Lab 11/20/17  0420 11/21/17  0414 11/21/17  0738   * 123* 126*   K 4.7 4.5 4.2    94* 97   CO2 25 19* 21*     118* 122*   BUN 27* 29* 25*   CREATININE 1.1 1.0 0.9   CALCIUM 9.1 9.4 9.2   PROT  --   --  6.2   ALBUMIN  --   --  3.0*   BILITOT  --   --  0.5   ALKPHOS  --   --  74   AST  --   --  22   ALT  --   --  9*   ANIONGAP 6* 10 8   EGFRNONAA 45.3* 50.8* 57.7*       Significant Imaging: I have reviewed all pertinent imaging results/findings within the past 24 hours.    Assessment/Plan:      * Bladder cancer    CT urogram with evidence of large, obstructing mass and hemorrhage in the bladder.  - Home coumadin and ASA held, per urology recs subq heparin for DVT prophylaxis  - Hg baseline 14, 11 at presentation.   - Type and screen ordered  - CBC q12h, will transfuse for Hg <7  - 11/13 OR for cystoscopy, TURBT, and pyelogram; stent could not be placed as urethral orifice could not be visualized.   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 Bilateral nodules found on CT Chest have high likelihood of being metastases.   - Oncology consulted to discuss treatment options. Pt would like to pursue immunotherapy.             Gross hematuria    -See Bladder cancer    - Complains of intermittent difficulty voiding overnight. Bladder scan early this AM was 260cc, patient was then catheterized with return of 600 cc light pink urine, no clots  - nurse reported that she noticed blood in her urine this morning.  - will not restart AC; monitor 24 hrs   - consult radiation oncology for intractable hematuria         Bladder mass              Hydronephrosis of left kidney    -If patient wishes to pursue chemotherapy, will need IR consult for nephrostomy tube placement eventually.           Depression (emotion)    - d/c'ed venlafaxine as pt was not taking it at home            Chronic hyponatremia    Sodium 125 at admission, had been normal in January 2017  - Trending up. D/c'ed venlafaxine as she was not taking this at home          Acquired hypothyroidism    - Continue synthroid 50 mcg  - TSH WNL           Essential hypertension              History of CVA (cerebrovascular accident)    Follows with neurology. 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  - Coumadin held          Chronic atrial fibrillation    - Regular rate and rhythm on exam at presentation  - Coumadin held in setting of hemorrhage and surgery .  - 11/14 hep gtt started, will resume coumadin depending on if urology does any more interventions/nephrostomy tube  - 11/15 hep ggt stopped per urology due to excessive hematuria. Although patient is at risk for stroke, we agree that heparin can be stopped temporarily due to excessive blood loss through hematuria. Per urology, continue to hold heparin given hematuria.  -Hematuria resolving off anticoagulation. Forrest will be removed this morning. If patient passes voiding trial overnight with clear urine, anticoagulation can be re-initiated.         Current use of long term anticoagulation    - INR 2.7 at presentation  - Currently no anticoagulation per urology, will restart when able.             VTE Risk Mitigation     None              Navi Jha MD  Department of Hospital Medicine   [facility]

## 2017-11-21 NOTE — PLAN OF CARE
JUANA messaged OSNF liaison, Bridgette, in regards to consult placed yesterday for SNF placement asking about plan for chemotherapy.  JUANA called [facility] resident, Dr. MARGARITO Jha, stating he has call into nephrology in regards to possible nephrostomy tube placement in IR today and will get back with me.  JUANA updated Bridgette with medical plan thus far.

## 2017-11-21 NOTE — PLAN OF CARE
Problem: Patient Care Overview  Goal: Plan of Care Review  Outcome: Ongoing (interventions implemented as appropriate)  Plan of care reviewed with patient in reference to consult to Inpatient SNF .

## 2017-11-22 LAB
ANION GAP SERPL CALC-SCNC: 9 MMOL/L
BASOPHILS # BLD AUTO: 0.06 K/UL
BASOPHILS # BLD AUTO: 0.07 K/UL
BASOPHILS NFR BLD: 0.6 %
BASOPHILS NFR BLD: 0.7 %
BUN SERPL-MCNC: 34 MG/DL
CALCIUM SERPL-MCNC: 9.3 MG/DL
CHLORIDE SERPL-SCNC: 99 MMOL/L
CO2 SERPL-SCNC: 22 MMOL/L
CREAT SERPL-MCNC: 1.2 MG/DL
DIFFERENTIAL METHOD: ABNORMAL
DIFFERENTIAL METHOD: ABNORMAL
EOSINOPHIL # BLD AUTO: 0.2 K/UL
EOSINOPHIL # BLD AUTO: 0.2 K/UL
EOSINOPHIL NFR BLD: 2.1 %
EOSINOPHIL NFR BLD: 2.2 %
ERYTHROCYTE [DISTWIDTH] IN BLOOD BY AUTOMATED COUNT: 13.9 %
ERYTHROCYTE [DISTWIDTH] IN BLOOD BY AUTOMATED COUNT: 14.1 %
EST. GFR  (AFRICAN AMERICAN): 47 ML/MIN/1.73 M^2
EST. GFR  (NON AFRICAN AMERICAN): 40.7 ML/MIN/1.73 M^2
GLUCOSE SERPL-MCNC: 103 MG/DL
HCT VFR BLD AUTO: 28.1 %
HCT VFR BLD AUTO: 28.7 %
HGB BLD-MCNC: 9.3 G/DL
HGB BLD-MCNC: 9.5 G/DL
IMM GRANULOCYTES # BLD AUTO: 0.05 K/UL
IMM GRANULOCYTES # BLD AUTO: 0.05 K/UL
IMM GRANULOCYTES NFR BLD AUTO: 0.5 %
IMM GRANULOCYTES NFR BLD AUTO: 0.5 %
LYMPHOCYTES # BLD AUTO: 1.1 K/UL
LYMPHOCYTES # BLD AUTO: 1.5 K/UL
LYMPHOCYTES NFR BLD: 11.8 %
LYMPHOCYTES NFR BLD: 15.3 %
MCH RBC QN AUTO: 29.9 PG
MCH RBC QN AUTO: 30 PG
MCHC RBC AUTO-ENTMCNC: 33.1 G/DL
MCHC RBC AUTO-ENTMCNC: 33.1 G/DL
MCV RBC AUTO: 90 FL
MCV RBC AUTO: 91 FL
MONOCYTES # BLD AUTO: 0.6 K/UL
MONOCYTES # BLD AUTO: 0.7 K/UL
MONOCYTES NFR BLD: 6.2 %
MONOCYTES NFR BLD: 7.2 %
NEUTROPHILS # BLD AUTO: 7.2 K/UL
NEUTROPHILS # BLD AUTO: 7.3 K/UL
NEUTROPHILS NFR BLD: 75.2 %
NEUTROPHILS NFR BLD: 77.7 %
NRBC BLD-RTO: 0 /100 WBC
NRBC BLD-RTO: 0 /100 WBC
PLATELET # BLD AUTO: 338 K/UL
PLATELET # BLD AUTO: 355 K/UL
PMV BLD AUTO: 9.7 FL
PMV BLD AUTO: 9.9 FL
POTASSIUM SERPL-SCNC: 4.9 MMOL/L
RBC # BLD AUTO: 3.1 M/UL
RBC # BLD AUTO: 3.18 M/UL
SODIUM SERPL-SCNC: 130 MMOL/L
TB INDURATION 48 - 72 HR READ: 0 MM
WBC # BLD AUTO: 9.29 K/UL
WBC # BLD AUTO: 9.67 K/UL

## 2017-11-22 PROCEDURE — 77300 RADIATION THERAPY DOSE PLAN: CPT | Mod: 26,,, | Performed by: RADIOLOGY

## 2017-11-22 PROCEDURE — 77300 RADIATION THERAPY DOSE PLAN: CPT | Mod: TC | Performed by: RADIOLOGY

## 2017-11-22 PROCEDURE — 77334 RADIATION TREATMENT AID(S): CPT | Mod: TC | Performed by: RADIOLOGY

## 2017-11-22 PROCEDURE — 77417 THER RADIOLOGY PORT IMAGE(S): CPT | Performed by: RADIOLOGY

## 2017-11-22 PROCEDURE — 77295 3-D RADIOTHERAPY PLAN: CPT | Mod: 26,,, | Performed by: RADIOLOGY

## 2017-11-22 PROCEDURE — 77295 3-D RADIOTHERAPY PLAN: CPT | Mod: TC | Performed by: RADIOLOGY

## 2017-11-22 PROCEDURE — 77387 GUIDANCE FOR RADJ TX DLVR: CPT | Mod: TC | Performed by: RADIOLOGY

## 2017-11-22 PROCEDURE — 80048 BASIC METABOLIC PNL TOTAL CA: CPT

## 2017-11-22 PROCEDURE — 77014 HC CT GUIDANCE RADIATION THERAPY FLDS PLACEMENT: CPT | Mod: TC | Performed by: RADIOLOGY

## 2017-11-22 PROCEDURE — 99232 SBSQ HOSP IP/OBS MODERATE 35: CPT | Mod: GC,,, | Performed by: HOSPITALIST

## 2017-11-22 PROCEDURE — 36415 COLL VENOUS BLD VENIPUNCTURE: CPT

## 2017-11-22 PROCEDURE — 85025 COMPLETE CBC W/AUTO DIFF WBC: CPT | Mod: 91

## 2017-11-22 PROCEDURE — 11000001 HC ACUTE MED/SURG PRIVATE ROOM

## 2017-11-22 PROCEDURE — 77263 THER RADIOLOGY TX PLNG CPLX: CPT | Mod: ,,, | Performed by: RADIOLOGY

## 2017-11-22 PROCEDURE — 77412 RADIATION TX DELIVERY LVL 3: CPT | Performed by: RADIOLOGY

## 2017-11-22 PROCEDURE — 77290 THER RAD SIMULAJ FIELD CPLX: CPT | Mod: TC | Performed by: RADIOLOGY

## 2017-11-22 PROCEDURE — 77334 RADIATION TREATMENT AID(S): CPT | Mod: 26,,, | Performed by: RADIOLOGY

## 2017-11-22 PROCEDURE — 25000003 PHARM REV CODE 250

## 2017-11-22 PROCEDURE — G6002 STEREOSCOPIC X-RAY GUIDANCE: HCPCS | Mod: 26,,, | Performed by: RADIOLOGY

## 2017-11-22 PROCEDURE — 25000003 PHARM REV CODE 250: Performed by: STUDENT IN AN ORGANIZED HEALTH CARE EDUCATION/TRAINING PROGRAM

## 2017-11-22 RX ADMIN — METOPROLOL SUCCINATE 25 MG: 25 TABLET, EXTENDED RELEASE ORAL at 08:11

## 2017-11-22 RX ADMIN — TIMOLOL MALEATE 1 DROP: 5 SOLUTION OPHTHALMIC at 08:11

## 2017-11-22 RX ADMIN — LEVOTHYROXINE SODIUM 50 MCG: 50 TABLET ORAL at 06:11

## 2017-11-22 NOTE — ASSESSMENT & PLAN NOTE
-See Bladder cancer    - Complains of intermittent difficulty voiding overnight. Bladder scan early this AM was 260cc, patient was then catheterized with return of 600 cc light pink urine, no clots  - nurse reported that she noticed blood in her urine this morning.  - will not restart AC; monitor 24 hrs   - consult radiation oncology for intractable hematuria   - scheduled for radiation simulation and tx today.

## 2017-11-22 NOTE — SUBJECTIVE & OBJECTIVE
Interval History:   NAEON. Patient reports she's urinating; not retaining; describes color as light pink; improved from yesterday. Scheduled for radiation today. Denies any fevers, chills, CP, SOB, n/v, c/d.    Review of Systems   Constitutional: Positive for appetite change. Negative for chills, diaphoresis, fatigue and fever.   HENT: Negative for sore throat and trouble swallowing.    Respiratory: Negative for cough and shortness of breath.    Cardiovascular: Negative for chest pain, palpitations and leg swelling.   Gastrointestinal: Negative for abdominal distention, abdominal pain, constipation, diarrhea, nausea and vomiting.   Genitourinary: Positive for hematuria. Negative for difficulty urinating, dysuria, flank pain, frequency and urgency.   Musculoskeletal: Negative for arthralgias and back pain.   Skin: Negative for color change and pallor.   Neurological: Negative for dizziness and light-headedness.   Psychiatric/Behavioral: Negative for confusion.     Objective:     Vital Signs (Most Recent):  Temp: 97.9 °F (36.6 °C) (11/22/17 1228)  Pulse: 68 (11/22/17 1228)  Resp: 17 (11/22/17 1228)  BP: 136/72 (11/22/17 1228)  SpO2: (!) 91 % (11/22/17 1228) Vital Signs (24h Range):  Temp:  [97.4 °F (36.3 °C)-97.9 °F (36.6 °C)] 97.9 °F (36.6 °C)  Pulse:  [56-74] 68  Resp:  [16-18] 17  SpO2:  [91 %-100 %] 91 %  BP: ()/(54-91) 136/72     Weight: 45.4 kg (100 lb)  Body mass index is 19.53 kg/m².    Intake/Output Summary (Last 24 hours) at 11/22/17 1229  Last data filed at 11/22/17 0600   Gross per 24 hour   Intake             1360 ml   Output             1550 ml   Net             -190 ml      Physical Exam   Constitutional: She is oriented to person, place, and time. No distress.   Thin, frail appearing elderly lady.   HENT:   Head: Normocephalic and atraumatic.   Mouth/Throat: Oropharynx is clear and moist.   Eyes: No scleral icterus.   Neck: Normal range of motion. Neck supple.   Cardiovascular: Normal rate,  regular rhythm, normal heart sounds and intact distal pulses.    Pulmonary/Chest: Effort normal and breath sounds normal.   Abdominal: Soft. Bowel sounds are normal. She exhibits no distension. There is no tenderness. There is no guarding.   Neurological: She is alert and oriented to person, place, and time.   Skin: Skin is warm and dry. She is not diaphoretic. No pallor.   Psychiatric: She has a normal mood and affect. Her behavior is normal.   Vitals reviewed.      Significant Labs:   CBC:     Recent Labs  Lab 11/21/17 0738 11/21/17 2006 11/22/17  0758   WBC 9.93 9.47 9.29   HGB 8.9* 9.4* 9.5*   HCT 25.8* 28.7* 28.7*    361* 338     CMP:     Recent Labs  Lab 11/21/17  0414 11/21/17 0738 11/22/17  0401   * 126* 130*   K 4.5 4.2 4.9   CL 94* 97 99   CO2 19* 21* 22*   * 122* 103   BUN 29* 25* 34*   CREATININE 1.0 0.9 1.2   CALCIUM 9.4 9.2 9.3   PROT  --  6.2  --    ALBUMIN  --  3.0*  --    BILITOT  --  0.5  --    ALKPHOS  --  74  --    AST  --  22  --    ALT  --  9*  --    ANIONGAP 10 8 9   EGFRNONAA 50.8* 57.7* 40.7*       Significant Imaging: I have reviewed all pertinent imaging results/findings within the past 24 hours.

## 2017-11-22 NOTE — PROGRESS NOTES
[facility]  Adult Nutrition  Progress Note    SUMMARY     Recommendations    Recommendation/Intervention: 1. Continue w/ Regular diet. 2.Encourage PO intake >/= 75% EEN/EPN 3. If PO intake is <50% provide Boost and encourage HVP 1st w/ each meal. 4. RD to follow  Goals: pt to consume nutrients >/= 85% EEN/EPN   Nutrition Goal Status: new  Communication of RD Recs: reviewed with RN (gina)     Reason for Assessment    Reason for Assessment: length of stay  Diagnosis:  (bladder cancer)  Relevent Medical History: Breast cancer, HTN, stroke, thyriod disorders   Interdisciplinary Rounds: did not attend  General Information Comments: Pt states good po intake. Better at breakfast then L/D meals.   Nutrition Discharge Planning: Regular diet w/ po intake 75% EEN/ EPN prior to d/c    Nutrition Prescription Ordered    Current Diet Order: Regular      Evaluation of Received Nutrients/Fluid Intake     Energy Calories Required: meeting needs   Protein Required: meeting needs   I/O: -18.7ml    Intake/Output Summary (Last 24 hours) at 11/22/17 1431  Last data filed at 11/22/17 0600   Gross per 24 hour   Intake             1160 ml   Output             1500 ml   Net             -340 ml   Fluid Required: meeting needs  Comments: LBM:11/21     % Intake of Estimated Energy Needs: 75 - 100 %  % Meal Intake: 100%     Nutrition Risk Screen     Nutrition Risk Screen: no indicators present    Nutrition/Diet History    Patient Reported Diet/Restrictions/Preferences: general  Typical Food/Fluid Intake: adequate PTA. pt states not a big eater her whole life  Food Preferences: No cultural or [employer] preferences noted        Factors Affecting Nutritional Intake: painful swallowing, difficulty/impaired swallowing (possible aspiration)                Labs/Tests/Procedures/Meds       Pertinent Labs Reviewed: reviewed  Pertinent Labs Comments: Na-130, BUN-34, GFR-40.7, Glu-103, ALT-9  Pertinent Medications Reviewed:  reviewed  Pertinent Medications Comments: none pertinent    Physical Findings    Overall Physical Appearance: generalized wasting, weak, underweight     Oral/Mouth Cavity: tooth/teeth missing  Skin: intact    Anthropometrics    Temp: 97.9 °F (36.6 °C)  Height: 5' (152.4 cm)  Weight Method: Standard Scale  Weight: 45.4 kg (100 lb)  Ideal Body Weight (IBW), Female: 100 lb  % Ideal Body Weight, Female (lb): 100 lb  BMI (Calculated): 19.6        Assessment and Plan    * Bladder cancer    Nutrition Problem  Increased nutrient needs    Related to (etiology):   Bladder cancer    Signs and Symptoms (as evidenced by):   Wt loss despite eating well. Pt  Appears weak and signs of general wasting     Interventions/Recommendations (treatment strategy):  See recs above  Consult SLP for pos. aspiration    Nutrition Diagnosis Status:   New                  Monitor and Evaluation    Food and Nutrient Intake: energy intake, food and beverage intake  Food and Nutrient Adminstration: diet order  Physical Activity and Function: nutrition-related ADLs and IADLs  Anthropometric Measurements: weight, weight change  Biochemical Data, Medical Tests and Procedures:  (All labs)  Nutrition-Focused Physical Findings: overall appearance    Nutrition Risk    Level of Risk:  (f/u 1x/wk)    Nutrition Follow-Up    RD Follow-up?: Yes

## 2017-11-22 NOTE — ASSESSMENT & PLAN NOTE
Nutrition Problem  Increased nutrient needs    Related to (etiology):   Bladder cancer    Signs and Symptoms (as evidenced by):   Wt loss despite eating well. Pt  Appears weak and signs of general wasting     Interventions/Recommendations (treatment strategy):  See recs above  Consult SLP for pos. aspiration    Nutrition Diagnosis Status:   New

## 2017-11-22 NOTE — PLAN OF CARE
Problem: Patient Care Overview  Goal: Plan of Care Review  Outcome: Ongoing (interventions implemented as appropriate)  AA&Ox4 updated on plan of care, all questions and concerns addressed.  Uses bedside commode without difficultyRemains free from falls, no significant events.  Went for radiation therapy this AM, no distress, will monitor.

## 2017-11-22 NOTE — PROGRESS NOTES
[facility] Hospital Medicine  Progress Note    Patient Name: Monique Lew  MRN: [id number]  Patient Class: IP- Inpatient   Admission Date: 11/11/2017  Length of Stay: 10 days  Attending Physician: Summer Grewal*  Primary Care Provider: Aguila Hsieh MD    [facility] Medicine Team: [facility] 5 Navi Jha MD    Subjective:     Principal Problem:Bladder cancer    HPI:  Ms. 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 States she first noticed blood in her urine 2 days before presentation. This temporarily resolved, with hematuria occuring again the day before presentation. Hematuria is associated with increased urge to urinate and incontinence. Prior to this episode, patient states last episode of gross hematuria was in 2012, which was found to be 2/2 cystitis. Patient has had multiple UTIs and microhematuria on urinalysis since that time. She follows with a urologist, Dr. Enriquez at [facility], and has had 2 cystoscopies - most recently in 2014 which no evidence of tumor. Denies fever/chills/nausea/vomiting. Does states she has had decreased appetite, increased fatigue, and night sweats which cause her to soak through her clothes.     In the ED, was found to have Hg of 11 which trended down to 10 in 6 hours (baseline 14). Type and screen obtained. CT urogram performed showed 6.3x4.3x4.0 cm mass in bladder causing severe left hydronephrosis and hydroureter with evidence of hemorrhage in bladder. Also evaluated by urology in ED, who began CBI and recommended she be NPO for possible procedure.     Hospital Course:  Patient admitted to hospital medicine. Anticoagulation held in setting hemorrhage. CT Urogram on 11/11 demonstrated a large bladder mass. Urology consulted. 11/12 CT Chest also demonstrates bilateral pulmonary nodules that could be due to metastases. H/H  stabilized. Patient taken to OR 11/13 for cystoscopy, TURBT, pyelogram; stent unable to be placed as urethral orifice could not be visualized. Path pending. 11/14: started hep gtt. Pt still thinking if she wants nephrostomy tube or not. Urology to keep wilson in until Friday 11/17. Patient began to produce significant hematuria requiring 1u pRBC on 11/16 and the decision was made to stop heparin ggt due to risk outweighing benefit. 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 Results discussed with family and the poor prognosis associated given nodules found in lungs on CT previously. Patient decided that she would like to speak with oncology over her treatment options before deciding whether to pursue cancer treatment versus palliative care. Onc and urology following. No surgeries planned by urology. Per onc, pt is not a chemo candidate with active bleeding, but will discuss treatment options with patient once bleeding is controlled.     Interval History:   NAEON. Patient reports she's urinating; not retaining; describes color as light pink; improved from yesterday. Scheduled for radiation today. Denies any fevers, chills, CP, SOB, n/v, c/d.    Review of Systems   Constitutional: Positive for appetite change. Negative for chills, diaphoresis, fatigue and fever.   HENT: Negative for sore throat and trouble swallowing.    Respiratory: Negative for cough and shortness of breath.    Cardiovascular: Negative for chest pain, palpitations and leg swelling.   Gastrointestinal: Negative for abdominal distention, abdominal pain, constipation, diarrhea, nausea and vomiting.   Genitourinary: Positive for hematuria. Negative for difficulty urinating, dysuria, flank pain, frequency and urgency.   Musculoskeletal: Negative for arthralgias and back pain.   Skin: Negative for color change and pallor.   Neurological: Negative for dizziness and light-headedness.   Psychiatric/Behavioral: Negative  for confusion.     Objective:     Vital Signs (Most Recent):  Temp: 97.9 °F (36.6 °C) (11/22/17 1228)  Pulse: 68 (11/22/17 1228)  Resp: 17 (11/22/17 1228)  BP: 136/72 (11/22/17 1228)  SpO2: (!) 91 % (11/22/17 1228) Vital Signs (24h Range):  Temp:  [97.4 °F (36.3 °C)-97.9 °F (36.6 °C)] 97.9 °F (36.6 °C)  Pulse:  [56-74] 68  Resp:  [16-18] 17  SpO2:  [91 %-100 %] 91 %  BP: ()/(54-91) 136/72     Weight: 45.4 kg (100 lb)  Body mass index is 19.53 kg/m².    Intake/Output Summary (Last 24 hours) at 11/22/17 1229  Last data filed at 11/22/17 0600   Gross per 24 hour   Intake             1360 ml   Output             1550 ml   Net             -190 ml      Physical Exam   Constitutional: She is oriented to person, place, and time. No distress.   Thin, frail appearing elderly lady.   HENT:   Head: Normocephalic and atraumatic.   Mouth/Throat: Oropharynx is clear and moist.   Eyes: No scleral icterus.   Neck: Normal range of motion. Neck supple.   Cardiovascular: Normal rate, regular rhythm, normal heart sounds and intact distal pulses.    Pulmonary/Chest: Effort normal and breath sounds normal.   Abdominal: Soft. Bowel sounds are normal. She exhibits no distension. There is no tenderness. There is no guarding.   Neurological: She is alert and oriented to person, place, and time.   Skin: Skin is warm and dry. She is not diaphoretic. No pallor.   Psychiatric: She has a normal mood and affect. Her behavior is normal.   Vitals reviewed.      Significant Labs:   CBC:     Recent Labs  Lab 11/21/17 0738 11/21/17 2006 11/22/17  0758   WBC 9.93 9.47 9.29   HGB 8.9* 9.4* 9.5*   HCT 25.8* 28.7* 28.7*    361* 338     CMP:     Recent Labs  Lab 11/21/17  0414 11/21/17  0738 11/22/17  0401   * 126* 130*   K 4.5 4.2 4.9   CL 94* 97 99   CO2 19* 21* 22*   * 122* 103   BUN 29* 25* 34*   CREATININE 1.0 0.9 1.2   CALCIUM 9.4 9.2 9.3   PROT  --  6.2  --    ALBUMIN  --  3.0*  --    BILITOT  --  0.5  --    ALKPHOS  --  74   --    AST  --  22  --    ALT  --  9*  --    ANIONGAP 10 8 9   EGFRNONAA 50.8* 57.7* 40.7*       Significant Imaging: I have reviewed all pertinent imaging results/findings within the past 24 hours.    Assessment/Plan:      * Bladder cancer    CT urogram with evidence of large, obstructing mass and hemorrhage in the bladder.  - Home coumadin and ASA held, per urology recs subq heparin for DVT prophylaxis  - Hg baseline 14, 11 at presentation.   - Type and screen ordered  - CBC q12h, will transfuse for Hg <7  - 11/13 OR for cystoscopy, TURBT, and pyelogram; stent could not be placed as urethral orifice could not be visualized.   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 Bilateral nodules found on CT Chest have high likelihood of being metastases.   - Oncology consulted to discuss treatment options. Pt would like to pursue immunotherapy.             Gross hematuria    -See Bladder cancer    - Complains of intermittent difficulty voiding overnight. Bladder scan early this AM was 260cc, patient was then catheterized with return of 600 cc light pink urine, no clots  - nurse reported that she noticed blood in her urine this morning.  - will not restart AC; monitor 24 hrs   - consult radiation oncology for intractable hematuria   - scheduled for radiation simulation and tx today.         Bladder mass              Hydronephrosis of left kidney    -If patient wishes to pursue chemotherapy, will need IR consult for nephrostomy tube placement eventually.           Depression (emotion)    - d/c'ed venlafaxine as pt was not taking it at home            Chronic hyponatremia    Sodium 125 at admission, had been normal in January 2017  - Trending up. D/c'ed venlafaxine as she was not taking this at home          Acquired hypothyroidism    - Continue synthroid 50 mcg  - TSH WNL          Essential hypertension              History of CVA (cerebrovascular accident)    Follows with neurology. Per chart  review, multiple strokes likely 2/2 embolic phenomena resulting from a fib.  - Coumadin held          Chronic atrial fibrillation    - Regular rate and rhythm on exam at presentation  - Coumadin held in setting of hemorrhage and surgery .  - 11/14 hep gtt started, will resume coumadin depending on if urology does any more interventions/nephrostomy tube  - 11/15 hep ggt stopped per urology due to excessive hematuria. Although patient is at risk for stroke, we agree that heparin can be stopped temporarily due to excessive blood loss through hematuria. Per urology, continue to hold heparin given hematuria.  -Hematuria resolving off anticoagulation. Forrest will be removed this morning. If patient passes voiding trial overnight with clear urine, anticoagulation can be re-initiated.         Current use of long term anticoagulation    - INR 2.7 at presentation  - Currently no anticoagulation per urology, will restart when able.             VTE Risk Mitigation     None              Navi Jha MD  Department of Hospital Medicine   [facility]

## 2017-11-22 NOTE — PLAN OF CARE
Problem: Oncology Care (Adult)  Intervention: Prevent Deficiencies/Improve Nutritional Intake    Assessment and Plan         * Bladder cancer     Nutrition Problem  Increased nutrient needs     Related to (etiology):   Bladder cancer     Signs and Symptoms (as evidenced by):   Wt loss despite eating well. Pt  Appears weak and signs of general wasting      Interventions/Recommendations (treatment strategy):  See recs above  Consult SLP for pos. aspiration     Nutrition Diagnosis Status:   New       Recommendations     Recommendation/Intervention: 1. Continue w/ Regular diet. 2.Encourage PO intake >/= 75% EEN/EPN 3. If PO intake is <50% provide Boost and encourage HVP 1st w/ each meal. 4. RD to follow  Goals: pt to consume nutrients >/= 85% EEN/EPN   Nutrition Goal Status: new  Communication of RD Recs: reviewed with RN (gina)

## 2017-11-22 NOTE — PROGRESS NOTES
Urology Progress Note    Urine continues to clear. No indication for take back to OR or wilson placement at this time. H/H stable. May perfrom CIC as needed if patient is unable to void and is uncomfortable.     Further management per Heme/Onc and Rad Onc.     Please call with any questions. Will sign off. Patient may follow up with Dr. Enriquez upon discharge.       Candy Jay MD  Urology, PGY-3  Pager# [phone]

## 2017-11-23 LAB
ANION GAP SERPL CALC-SCNC: 8 MMOL/L
BASOPHILS # BLD AUTO: 0.06 K/UL
BASOPHILS # BLD AUTO: 0.09 K/UL
BASOPHILS NFR BLD: 0.7 %
BASOPHILS NFR BLD: 1 %
BUN SERPL-MCNC: 33 MG/DL
CALCIUM SERPL-MCNC: 9.7 MG/DL
CHLORIDE SERPL-SCNC: 97 MMOL/L
CO2 SERPL-SCNC: 24 MMOL/L
CREAT SERPL-MCNC: 1 MG/DL
DIFFERENTIAL METHOD: ABNORMAL
DIFFERENTIAL METHOD: ABNORMAL
EOSINOPHIL # BLD AUTO: 0.2 K/UL
EOSINOPHIL # BLD AUTO: 0.2 K/UL
EOSINOPHIL NFR BLD: 1.7 %
EOSINOPHIL NFR BLD: 2.2 %
ERYTHROCYTE [DISTWIDTH] IN BLOOD BY AUTOMATED COUNT: 13.8 %
ERYTHROCYTE [DISTWIDTH] IN BLOOD BY AUTOMATED COUNT: 13.9 %
EST. GFR  (AFRICAN AMERICAN): 58.5 ML/MIN/1.73 M^2
EST. GFR  (NON AFRICAN AMERICAN): 50.8 ML/MIN/1.73 M^2
GLUCOSE SERPL-MCNC: 116 MG/DL
HCT VFR BLD AUTO: 26.9 %
HCT VFR BLD AUTO: 27.2 %
HGB BLD-MCNC: 8.8 G/DL
HGB BLD-MCNC: 8.9 G/DL
IMM GRANULOCYTES # BLD AUTO: 0.03 K/UL
IMM GRANULOCYTES # BLD AUTO: 0.04 K/UL
IMM GRANULOCYTES NFR BLD AUTO: 0.3 %
IMM GRANULOCYTES NFR BLD AUTO: 0.5 %
INR PPP: 1
LYMPHOCYTES # BLD AUTO: 1.2 K/UL
LYMPHOCYTES # BLD AUTO: 1.2 K/UL
LYMPHOCYTES NFR BLD: 14 %
LYMPHOCYTES NFR BLD: 14.2 %
MCH RBC QN AUTO: 29.6 PG
MCH RBC QN AUTO: 29.8 PG
MCHC RBC AUTO-ENTMCNC: 32.7 G/DL
MCHC RBC AUTO-ENTMCNC: 32.7 G/DL
MCV RBC AUTO: 90 FL
MCV RBC AUTO: 91 FL
MONOCYTES # BLD AUTO: 0.7 K/UL
MONOCYTES # BLD AUTO: 0.7 K/UL
MONOCYTES NFR BLD: 8.2 %
MONOCYTES NFR BLD: 8.5 %
NEUTROPHILS # BLD AUTO: 6.1 K/UL
NEUTROPHILS # BLD AUTO: 6.6 K/UL
NEUTROPHILS NFR BLD: 73.9 %
NEUTROPHILS NFR BLD: 74.8 %
NRBC BLD-RTO: 0 /100 WBC
NRBC BLD-RTO: 0 /100 WBC
PLATELET # BLD AUTO: 329 K/UL
PLATELET # BLD AUTO: 373 K/UL
PMV BLD AUTO: 9.7 FL
PMV BLD AUTO: 9.7 FL
POTASSIUM SERPL-SCNC: 4.5 MMOL/L
PROTHROMBIN TIME: 10.5 SEC
RBC # BLD AUTO: 2.95 M/UL
RBC # BLD AUTO: 3.01 M/UL
SODIUM SERPL-SCNC: 129 MMOL/L
WBC # BLD AUTO: 8.26 K/UL
WBC # BLD AUTO: 8.78 K/UL

## 2017-11-23 PROCEDURE — 36415 COLL VENOUS BLD VENIPUNCTURE: CPT

## 2017-11-23 PROCEDURE — 11000001 HC ACUTE MED/SURG PRIVATE ROOM

## 2017-11-23 PROCEDURE — 25000003 PHARM REV CODE 250: Performed by: STUDENT IN AN ORGANIZED HEALTH CARE EDUCATION/TRAINING PROGRAM

## 2017-11-23 PROCEDURE — 99232 SBSQ HOSP IP/OBS MODERATE 35: CPT | Mod: GC,,, | Performed by: HOSPITALIST

## 2017-11-23 PROCEDURE — 80048 BASIC METABOLIC PNL TOTAL CA: CPT

## 2017-11-23 PROCEDURE — 25000003 PHARM REV CODE 250

## 2017-11-23 PROCEDURE — 85610 PROTHROMBIN TIME: CPT

## 2017-11-23 PROCEDURE — 85025 COMPLETE CBC W/AUTO DIFF WBC: CPT | Mod: 91

## 2017-11-23 RX ADMIN — TIMOLOL MALEATE 1 DROP: 5 SOLUTION OPHTHALMIC at 09:11

## 2017-11-23 RX ADMIN — LEVOTHYROXINE SODIUM 50 MCG: 50 TABLET ORAL at 05:11

## 2017-11-23 RX ADMIN — TIMOLOL MALEATE 1 DROP: 5 SOLUTION OPHTHALMIC at 08:11

## 2017-11-23 RX ADMIN — METOPROLOL SUCCINATE 25 MG: 25 TABLET, EXTENDED RELEASE ORAL at 08:11

## 2017-11-23 NOTE — PLAN OF CARE
Problem: Fall Risk (Adult)  Intervention: Monitor/Assist with Self Care   17   Activity   Activity Assistance Provided assistance, 1 person   Daily Care Interventions   Self-Care Promotion independence encouraged   Functional Level Current   Ambulation 2 - assistive person   Transferring 2 - assistive person   Toileting 2 - assistive person   Bathing 2 - assistive person   Dressing 2 - assistive person   Eating 0 - independent   Communication 0 - understands/communicates without difficulty   Swallowing 0 - swallows foods/liquids without difficulty     Intervention: Reduce Risk/Promote Restraint Free Environment   17   Safety Interventions   Safety Precautions emergency equipment at bedside   Safety Interventions   Environmental Safety Modification assistive device/personal items within reach;clutter free environment maintained;lighting adjusted;room near unit station;room organization consistent   Prevent  Drop/Fall   Safety/Security Measures bed alarm set     Intervention: Review Medications/Identify Contributors to Fall Risk   17   Safety Interventions   Medication Review/Management medications reviewed     Intervention: Patient Rounds   17   Safety Interventions   Patient Rounds bed in low position;bed wheels locked;call light in reach;clutter free environment maintained;ID band on;placement of personal items at bedside;toileting offered;visualized patient       Goal: Identify Related Risk Factors and Signs and Symptoms  Related risk factors and signs and symptoms are identified upon initiation of Human Response Clinical Practice Guideline (CPG)   Outcome: Ongoing (interventions implemented as appropriate)   17   Fall Risk   Related Risk Factors (Fall Risk) age-related changes;bladder function altered;gait/mobility problems;impaired vision;inadequate lighting;sensory deficits;environment unfamiliar   Signs and Symptoms (Fall Risk) presence of risk  factors     Goal: Absence of Falls  Patient will demonstrate the desired outcomes by discharge/transition of care.   Outcome: Ongoing (interventions implemented as appropriate)   11/23/17 0213   Fall Risk (Adult)   Absence of Falls making progress toward outcome       Comments: Care plan discussed with pt. Understanding of plan acknowledged by pt. Fall precautions explained to pt. Pt verbalized  understanding of precautions and safety instructions. Bed in low, locked position, call light within reach, bed alarm active and audiable. Personal items within a safe distance for reach. Pt remains free from falls this shift.

## 2017-11-23 NOTE — SUBJECTIVE & OBJECTIVE
Interval History:   NAEON. Patient had radiation simulation yesterday and 1/14 round of radiation. Spoke with radiation oncologist; regarding re-starting AC; stated re-starting it at a later date; possible Monday.     Patient reports she's urinating; not retaining; describes color as light pink; improved from yesterday. Denies any fevers, chills, CP, SOB, n/v, c/d    Review of Systems   Constitutional: Positive for appetite change. Negative for chills, diaphoresis, fatigue and fever.   HENT: Negative for sore throat and trouble swallowing.    Respiratory: Negative for cough and shortness of breath.    Cardiovascular: Negative for chest pain, palpitations and leg swelling.   Gastrointestinal: Negative for abdominal distention, abdominal pain, constipation, diarrhea, nausea and vomiting.   Genitourinary: Positive for hematuria. Negative for difficulty urinating, dysuria, flank pain, frequency and urgency.   Musculoskeletal: Negative for arthralgias and back pain.   Skin: Negative for color change and pallor.   Neurological: Negative for dizziness and light-headedness.   Psychiatric/Behavioral: Negative for confusion.     Objective:     Vital Signs (Most Recent):  Temp: 97.1 °F (36.2 °C) (11/23/17 0710)  Pulse: 80 (11/23/17 1100)  Resp: 16 (11/23/17 0710)  BP: (!) 149/88 (11/23/17 0710)  SpO2: 100 % (11/23/17 0710) Vital Signs (24h Range):  Temp:  [97.1 °F (36.2 °C)-98.6 °F (37 °C)] 97.1 °F (36.2 °C)  Pulse:  [60-90] 80  Resp:  [16-18] 16  SpO2:  [91 %-100 %] 100 %  BP: (102-151)/(58-88) 149/88     Weight: 45.4 kg (100 lb)  Body mass index is 19.53 kg/m².    Intake/Output Summary (Last 24 hours) at 11/23/17 1140  Last data filed at 11/23/17 0815   Gross per 24 hour   Intake              360 ml   Output                0 ml   Net              360 ml      Physical Exam   Constitutional: She is oriented to person, place, and time. No distress.   Thin, frail appearing elderly lady.   HENT:   Head: Normocephalic and  atraumatic.   Mouth/Throat: Oropharynx is clear and moist.   Eyes: No scleral icterus.   Neck: Normal range of motion. Neck supple.   Cardiovascular: Normal rate, regular rhythm, normal heart sounds and intact distal pulses.    Pulmonary/Chest: Effort normal and breath sounds normal.   Abdominal: Soft. Bowel sounds are normal. She exhibits no distension. There is no tenderness. There is no guarding.   Neurological: She is alert and oriented to person, place, and time.   Skin: Skin is warm and dry. She is not diaphoretic. No pallor.   Psychiatric: She has a normal mood and affect. Her behavior is normal.   Vitals reviewed.      Significant Labs:   CBC:   Recent Labs  Lab 11/22/17  0758 11/22/17 2005 11/23/17  0756   WBC 9.29 9.67 8.26   HGB 9.5* 9.3* 8.9*   HCT 28.7* 28.1* 27.2*    355* 329     CMP:   Recent Labs  Lab 11/22/17  0401 11/23/17  0648   * 129*   K 4.9 4.5   CL 99 97   CO2 22* 24    116*   BUN 34* 33*   CREATININE 1.2 1.0   CALCIUM 9.3 9.7   ANIONGAP 9 8   EGFRNONAA 40.7* 50.8*       Significant Imaging: I have reviewed all pertinent imaging results/findings within the past 24 hours.

## 2017-11-23 NOTE — PLAN OF CARE
Problem: Patient Care Overview  Goal: Plan of Care Review  Outcome: Ongoing (interventions implemented as appropriate)  AA&Ox4 updated on plan of care, remains free from falls, no significant events.  Denies pain, denies weakness or dizziness.  VSS, bed in low position, call light within reach.  Will continue to monitor

## 2017-11-23 NOTE — PROGRESS NOTES
[facility] Hospital Medicine  Progress Note    Patient Name: Monique Lew  MRN: [id number]  Patient Class: IP- Inpatient   Admission Date: 11/11/2017  Length of Stay: 11 days  Attending Physician: Summer Grewal*  Primary Care Provider: Aguila Hsieh MD    [facility] Medicine Team: [facility] [place] [facility]OSP MED 5 Navi Jha MD    Subjective:     Principal Problem:Bladder cancer    HPI:  Ms. 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 States she first noticed blood in her urine 2 days before presentation. This temporarily resolved, with hematuria occuring again the day before presentation. Hematuria is associated with increased urge to urinate and incontinence. Prior to this episode, patient states last episode of gross hematuria was in 2012, which was found to be 2/2 cystitis. Patient has had multiple UTIs and microhematuria on urinalysis since that time. She follows with a urologist, Dr. Enriquez at [facility], and has had 2 cystoscopies - most recently in 2014 which no evidence of tumor. Denies fever/chills/nausea/vomiting. Does states she has had decreased appetite, increased fatigue, and night sweats which cause her to soak through her clothes.     In the ED, was found to have Hg of 11 which trended down to 10 in 6 hours (baseline 14). Type and screen obtained. CT urogram performed showed 6.3x4.3x4.0 cm mass in bladder causing severe left hydronephrosis and hydroureter with evidence of hemorrhage in bladder. Also evaluated by urology in ED, who began CBI and recommended she be NPO for possible procedure.     Hospital Course:  Patient admitted to hospital medicine. Anticoagulation held in setting hemorrhage. CT Urogram on 11/11 demonstrated a large bladder mass. Urology consulted. 11/12 CT Chest also demonstrates bilateral pulmonary nodules that could be due to metastases. H/H  stabilized. Patient taken to OR 11/13 for cystoscopy, TURBT, pyelogram; stent unable to be placed as urethral orifice could not be visualized. Path pending. 11/14: started hep gtt. Pt still thinking if she wants nephrostomy tube or not. Urology to keep wilson in until Friday 11/17. Patient began to produce significant hematuria requiring 1u pRBC on 11/16 and the decision was made to stop heparin ggt due to risk outweighing benefit. 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 Results discussed with family and the poor prognosis associated given nodules found in lungs on CT previously. Patient decided that she would like to speak with oncology over her treatment options before deciding whether to pursue cancer treatment versus palliative care. Onc and urology following. No surgeries planned by urology. Per onc, pt is not a chemo candidate with active bleeding, but will discuss treatment options with patient once bleeding is controlled.     Interval History:   NAEON. Patient had radiation simulation yesterday and 1/14 round of radiation. Spoke with radiation oncologist; regarding re-starting AC; stated re-starting it at a later date; possible Monday.     Patient reports she's urinating; not retaining; describes color as light pink; improved from yesterday. Denies any fevers, chills, CP, SOB, n/v, c/d    Review of Systems   Constitutional: Positive for appetite change. Negative for chills, diaphoresis, fatigue and fever.   HENT: Negative for sore throat and trouble swallowing.    Respiratory: Negative for cough and shortness of breath.    Cardiovascular: Negative for chest pain, palpitations and leg swelling.   Gastrointestinal: Negative for abdominal distention, abdominal pain, constipation, diarrhea, nausea and vomiting.   Genitourinary: Positive for hematuria. Negative for difficulty urinating, dysuria, flank pain, frequency and urgency.   Musculoskeletal: Negative for  arthralgias and back pain.   Skin: Negative for color change and pallor.   Neurological: Negative for dizziness and light-headedness.   Psychiatric/Behavioral: Negative for confusion.     Objective:     Vital Signs (Most Recent):  Temp: 97.1 °F (36.2 °C) (11/23/17 0710)  Pulse: 80 (11/23/17 1100)  Resp: 16 (11/23/17 0710)  BP: (!) 149/88 (11/23/17 0710)  SpO2: 100 % (11/23/17 0710) Vital Signs (24h Range):  Temp:  [97.1 °F (36.2 °C)-98.6 °F (37 °C)] 97.1 °F (36.2 °C)  Pulse:  [60-90] 80  Resp:  [16-18] 16  SpO2:  [91 %-100 %] 100 %  BP: (102-151)/(58-88) 149/88     Weight: 45.4 kg (100 lb)  Body mass index is 19.53 kg/m².    Intake/Output Summary (Last 24 hours) at 11/23/17 1140  Last data filed at 11/23/17 0815   Gross per 24 hour   Intake              360 ml   Output                0 ml   Net              360 ml      Physical Exam   Constitutional: She is oriented to person, place, and time. No distress.   Thin, frail appearing elderly lady.   HENT:   Head: Normocephalic and atraumatic.   Mouth/Throat: Oropharynx is clear and moist.   Eyes: No scleral icterus.   Neck: Normal range of motion. Neck supple.   Cardiovascular: Normal rate, regular rhythm, normal heart sounds and intact distal pulses.    Pulmonary/Chest: Effort normal and breath sounds normal.   Abdominal: Soft. Bowel sounds are normal. She exhibits no distension. There is no tenderness. There is no guarding.   Neurological: She is alert and oriented to person, place, and time.   Skin: Skin is warm and dry. She is not diaphoretic. No pallor.   Psychiatric: She has a normal mood and affect. Her behavior is normal.   Vitals reviewed.      Significant Labs:   CBC:   Recent Labs  Lab 11/22/17 0758 11/22/17 2005 11/23/17  0756   WBC 9.29 9.67 8.26   HGB 9.5* 9.3* 8.9*   HCT 28.7* 28.1* 27.2*    355* 329     CMP:   Recent Labs  Lab 11/22/17  0401 11/23/17  0648   * 129*   K 4.9 4.5   CL 99 97   CO2 22* 24    116*   BUN 34* 33*    CREATININE 1.2 1.0   CALCIUM 9.3 9.7   ANIONGAP 9 8   EGFRNONAA 40.7* 50.8*       Significant Imaging: I have reviewed all pertinent imaging results/findings within the past 24 hours.    Assessment/Plan:      * Bladder cancer    CT urogram with evidence of large, obstructing mass and hemorrhage in the bladder.  - Home coumadin and ASA held, per urology recs subq heparin for DVT prophylaxis  - Hg baseline 14, 11 at presentation.   - Type and screen ordered  - CBC q12h, will transfuse for Hg <7  - 11/13 OR for cystoscopy, TURBT, and pyelogram; stent could not be placed as urethral orifice could not be visualized.   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 Bilateral nodules found on CT Chest have high likelihood of being metastases.   - Oncology consulted to discuss treatment options. Pt would like to pursue immunotherapy.             Gross hematuria    -See Bladder cancer    - Complains of intermittent difficulty voiding overnight. Bladder scan early this AM was 260cc, patient was then catheterized with return of 600 cc light pink urine, no clots  - nurse reported that she noticed blood in her urine this morning.  - will not restart AC; monitor 24 hrs   - consult radiation oncology for intractable hematuria   - received 1/14 round of radiatio; scheduled for next one Friday    Discussed with radiation oncologist regarding re-starting AC; patient has had multiple episodes of urination without blood. Per rad-onc it is safer to re-start AC at a latter date; possibly Monday.         Bladder mass              Hydronephrosis of left kidney    -If patient wishes to pursue chemotherapy, will need IR consult for nephrostomy tube placement eventually.           Depression (emotion)    - d/c'ed venlafaxine as pt was not taking it at home            Chronic hyponatremia    Sodium 125 at admission, had been normal in January 2017  - Trending up. D/c'ed venlafaxine as she was not taking this at  home          Acquired hypothyroidism    - Continue synthroid 50 mcg  - TSH WNL          Essential hypertension              History of CVA (cerebrovascular accident)    Follows with neurology. 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  - Coumadin held          Chronic atrial fibrillation    - Regular rate and rhythm on exam at presentation  - Coumadin held in setting of hemorrhage and surgery .  - 11/14 hep gtt started, will resume coumadin depending on if urology does any more interventions/nephrostomy tube  - 11/15 hep ggt stopped per urology due to excessive hematuria. Although patient is at risk for stroke, we agree that heparin can be stopped temporarily due to excessive blood loss through hematuria. Per urology, continue to hold heparin given hematuria.  -Hematuria resolving off anticoagulation. Forrest will be removed this morning. If patient passes voiding trial overnight with clear urine, anticoagulation can be re-initiated.         Current use of long term anticoagulation    - INR 2.7 at presentation  - Currently no anticoagulation per urology, will restart when able.             VTE Risk Mitigation     None              Navi Jha MD  Department of Hospital Medicine   [facility]

## 2017-11-24 LAB
ANION GAP SERPL CALC-SCNC: 8 MMOL/L
BASOPHILS # BLD AUTO: 0.07 K/UL
BASOPHILS # BLD AUTO: 0.08 K/UL
BASOPHILS NFR BLD: 0.9 %
BASOPHILS NFR BLD: 1 %
BUN SERPL-MCNC: 30 MG/DL
CALCIUM SERPL-MCNC: 9.1 MG/DL
CHLORIDE SERPL-SCNC: 102 MMOL/L
CO2 SERPL-SCNC: 21 MMOL/L
CREAT SERPL-MCNC: 1 MG/DL
DIFFERENTIAL METHOD: ABNORMAL
DIFFERENTIAL METHOD: ABNORMAL
EOSINOPHIL # BLD AUTO: 0.1 K/UL
EOSINOPHIL # BLD AUTO: 0.1 K/UL
EOSINOPHIL NFR BLD: 1.4 %
EOSINOPHIL NFR BLD: 1.4 %
ERYTHROCYTE [DISTWIDTH] IN BLOOD BY AUTOMATED COUNT: 13.9 %
ERYTHROCYTE [DISTWIDTH] IN BLOOD BY AUTOMATED COUNT: 14 %
EST. GFR  (AFRICAN AMERICAN): 58.5 ML/MIN/1.73 M^2
EST. GFR  (NON AFRICAN AMERICAN): 50.8 ML/MIN/1.73 M^2
GLUCOSE SERPL-MCNC: 96 MG/DL
HCT VFR BLD AUTO: 25.7 %
HCT VFR BLD AUTO: 25.8 %
HGB BLD-MCNC: 8.5 G/DL
HGB BLD-MCNC: 8.5 G/DL
IMM GRANULOCYTES # BLD AUTO: 0.03 K/UL
IMM GRANULOCYTES # BLD AUTO: 0.04 K/UL
IMM GRANULOCYTES NFR BLD AUTO: 0.4 %
IMM GRANULOCYTES NFR BLD AUTO: 0.5 %
INR PPP: 1
LYMPHOCYTES # BLD AUTO: 1 K/UL
LYMPHOCYTES # BLD AUTO: 1.2 K/UL
LYMPHOCYTES NFR BLD: 12.2 %
LYMPHOCYTES NFR BLD: 14.7 %
MCH RBC QN AUTO: 29.3 PG
MCH RBC QN AUTO: 29.7 PG
MCHC RBC AUTO-ENTMCNC: 32.9 G/DL
MCHC RBC AUTO-ENTMCNC: 33.1 G/DL
MCV RBC AUTO: 89 FL
MCV RBC AUTO: 90 FL
MONOCYTES # BLD AUTO: 0.7 K/UL
MONOCYTES # BLD AUTO: 0.7 K/UL
MONOCYTES NFR BLD: 8.4 %
MONOCYTES NFR BLD: 8.6 %
NEUTROPHILS # BLD AUTO: 6 K/UL
NEUTROPHILS # BLD AUTO: 6.1 K/UL
NEUTROPHILS NFR BLD: 73.9 %
NEUTROPHILS NFR BLD: 76.6 %
NRBC BLD-RTO: 0 /100 WBC
NRBC BLD-RTO: 0 /100 WBC
PLATELET # BLD AUTO: 344 K/UL
PLATELET # BLD AUTO: 360 K/UL
PMV BLD AUTO: 9.3 FL
PMV BLD AUTO: 9.8 FL
POTASSIUM SERPL-SCNC: 4 MMOL/L
PROTHROMBIN TIME: 10.8 SEC
RBC # BLD AUTO: 2.86 M/UL
RBC # BLD AUTO: 2.9 M/UL
SODIUM SERPL-SCNC: 131 MMOL/L
WBC # BLD AUTO: 8.01 K/UL
WBC # BLD AUTO: 8.11 K/UL

## 2017-11-24 PROCEDURE — 99232 SBSQ HOSP IP/OBS MODERATE 35: CPT | Mod: GC,,, | Performed by: HOSPITALIST

## 2017-11-24 PROCEDURE — 85610 PROTHROMBIN TIME: CPT

## 2017-11-24 PROCEDURE — 25000003 PHARM REV CODE 250

## 2017-11-24 PROCEDURE — 11000001 HC ACUTE MED/SURG PRIVATE ROOM

## 2017-11-24 PROCEDURE — G6002 STEREOSCOPIC X-RAY GUIDANCE: HCPCS | Mod: 26,,, | Performed by: RADIOLOGY

## 2017-11-24 PROCEDURE — 77412 RADIATION TX DELIVERY LVL 3: CPT | Performed by: RADIOLOGY

## 2017-11-24 PROCEDURE — 77387 GUIDANCE FOR RADJ TX DLVR: CPT | Mod: TC | Performed by: RADIOLOGY

## 2017-11-24 PROCEDURE — 97530 THERAPEUTIC ACTIVITIES: CPT

## 2017-11-24 PROCEDURE — 36415 COLL VENOUS BLD VENIPUNCTURE: CPT

## 2017-11-24 PROCEDURE — 97116 GAIT TRAINING THERAPY: CPT

## 2017-11-24 PROCEDURE — 25000003 PHARM REV CODE 250: Performed by: STUDENT IN AN ORGANIZED HEALTH CARE EDUCATION/TRAINING PROGRAM

## 2017-11-24 PROCEDURE — 85025 COMPLETE CBC W/AUTO DIFF WBC: CPT | Mod: 91

## 2017-11-24 PROCEDURE — 97535 SELF CARE MNGMENT TRAINING: CPT

## 2017-11-24 PROCEDURE — 80048 BASIC METABOLIC PNL TOTAL CA: CPT

## 2017-11-24 RX ADMIN — TIMOLOL MALEATE 1 DROP: 5 SOLUTION OPHTHALMIC at 10:11

## 2017-11-24 RX ADMIN — ACETAMINOPHEN 650 MG: 325 TABLET ORAL at 09:11

## 2017-11-24 RX ADMIN — TIMOLOL MALEATE 1 DROP: 5 SOLUTION OPHTHALMIC at 08:11

## 2017-11-24 RX ADMIN — LEVOTHYROXINE SODIUM 50 MCG: 50 TABLET ORAL at 05:11

## 2017-11-24 RX ADMIN — METOPROLOL SUCCINATE 25 MG: 25 TABLET, EXTENDED RELEASE ORAL at 10:11

## 2017-11-24 NOTE — PLAN OF CARE
Problem: Physical Therapy Goal  Goal: Physical Therapy Goal  PT goals until 12/04/17    1. Pt supine to sit with supervision-Met  2. Pt sit to supine with supervision- met  3. Pt sit to stand with or without RW as needed for safety with SBA-Met  4. Pt to perform gait 150ft with or without RW as needed for safety with SBA.-not met  5. Pt to up/down 10 steps with R UE rail with CGA.- met  6. Pt to perform B LE exs in sitting x 15 reps to increase B LE strength to improve functional mobility.-Met       Patient met 5/6 goals.  Will have PT reassess goals next visit. Brice Hooker,PTA

## 2017-11-24 NOTE — PLAN OF CARE
Pt AAO sitting on side of bed with 2 family members at bedside.  CM discussed with pt and family barrier to discharge from hospital and transfer to OSNF with radiation treatments and needing [employer] authorization and [employer] closed today, verbalizing understanding.  CM updated Olga Lidia ALEXANDRA of above.    11:45am:  CM informed per [facility] liaison, facility will not be able to take pt on radiation therapy and asked Olga Lidia ALEXANDRA, in regards to [facility] SNF taking pt on radiation treatments stating she pretty much knew no because it's a new pt to them not a resident of the facility, [facility] updated.     11/24/17 1624   Right Care Assessment   Can the patient answer the patient profile reliably? Yes, cognitively intact   How often would a person be available to care for the patient? Often   Describe the patient's ability to walk at the present time. Minor restrictions or changes   How does the patient rate their overall health at the present time? Poor   Number of comorbid conditions (as recorded on the chart) Four   During the past month, has the patient often been bothered by feeling down, depressed or hopeless? No   During the past month, has the patient often been bothered by little interest or pleasure in doing things? No     Plan A: Home with family HH  Plan B: Home

## 2017-11-24 NOTE — ASSESSMENT & PLAN NOTE
- Patient with high CHADS-VASc score but holding anticoagulation till Monday   - Plan with re-start coumadin with lovenox or heparin bridge

## 2017-11-24 NOTE — PLAN OF CARE
Problem: Fall Risk (Adult)  Goal: Identify Related Risk Factors and Signs and Symptoms  Related risk factors and signs and symptoms are identified upon initiation of Human Response Clinical Practice Guideline (CPG)   Outcome: Outcome(s) achieved Date Met: 11/24/17  Reviewed plan of care inclusing fall precautions. Pt verbalized understanding.

## 2017-11-24 NOTE — PT/OT/SLP PROGRESS
Physical Therapy  Treatment    Monique Lew   MRN: [id number]   Admitting Diagnosis: Bladder cancer    PT Received On: 11/24/17  PT Start Time: 1340     PT Stop Time: 1410    PT Total Time (min): 30 min       Billable Minutes:  Gait [id number] and Therapeutic Activity 10    Treatment Type: Treatment  PT/PTA: PTA     PTA Visit Number: 4       General Precautions: Standard, fall, vision impaired  Orthopedic Precautions: N/A   Braces:      Do you have any cultural, spiritual, Yarsani conflicts, given your current situation?: none noted    Subjective:  Communicated with NSG prior to session.  Patient agreeable to therapy.      Pain/Comfort  Pain Rating 1: 0/10    Objective:   Patient found with: telemetry    Functional Mobility:  Bed Mobility:   Supine to Sit: Modified Independent  Sit to Supine: Modified Independent    Transfers:  Sit <> Stand Assistance: Supervision  Sit <> Stand Assistive Device: No Assistive Device    Gait:   Gait Distance: 15 feet x2 trials and 85 feet  Assistance 1: Contact Guard Assistance, Stand by Assistance  Gait Assistive Device: No device  Gait Pattern: swing-through gait  Gait Deviation(s): decreased ijeoma, decreased step length, decreased stride length, foot flat (no LOB but noted increase KASHIF and left/right lateral sway)    Stairs:  Ascend and descend 10 steps with R HR single UE support CGA. Patient required seated rest break post stair climbing.    Balance:   Static Sit: NORMAL: No deviations seen in posture held statically  Dynamic Sit: NORMAL: No deviations seen in posture held dynamically  Static Stand: NORMAL: No deviations seen in posture held statically  Dynamic stand: GOOD-: Needs SUPERVISION only during gait and able to self right with moderate      Therapeutic Activities and Exercises:  Increase time discussing POC with patient and Patient's Sister.     AM-PAC 6 CLICK MOBILITY  How much help from another person does this patient currently need?   1 = Unable,  Total/Dependent Assistance  2 = A lot, Maximum/Moderate Assistance  3 = A little, Minimum/Contact Guard/Supervision  4 = None, Modified [place]/Independent    Turning over in bed (including adjusting bedclothes, sheets and blankets)?: 4  Sitting down on and standing up from a chair with arms (e.g., wheelchair, bedside commode, etc.): 4  Moving from lying on back to sitting on the side of the bed?: 4  Moving to and from a bed to a chair (including a wheelchair)?: 4  Need to walk in hospital room?: 4  Climbing 3-5 steps with a railing?: 3  Total Score: 23    AM-PAC Raw Score CMS G-Code Modifier Level of Impairment Assistance   6 % Total / Unable   7 - 9 CM 80 - 100% Maximal Assist   10 - 14 CL 60 - 80% Moderate Assist   15 - 19 CK 40 - 60% Moderate Assist   20 - 22 CJ 20 - 40% Minimal Assist   23 CI 1-20% SBA / CGA   24 CH 0% Independent/ Mod I     Patient left seated EOB with call button in reach and sister present.    Assessment:  Monique Lew is a 87 y.o. female with a medical diagnosis of Bladder cancer and presents with decrease balance, endurance and strength.  Patient required less assistance with mobility and transfers.  Noted increase fatigue post stair climbing and required seated rest break. Patient demonstrated unsteady gait but no LOB.  Patient would benefit from being discharged to SNF to help increase endurance, balance and strength.     Rehab identified problem list/impairments: Rehab identified problem list/impairments: impaired endurance, gait instability, impaired balance, visual deficits    Rehab potential is good.    Activity tolerance: Fair    Discharge recommendations: Discharge Facility/Level Of Care Needs: nursing facility, skilled     Barriers to discharge: Barriers to Discharge: Decreased caregiver support    Equipment recommendations: Equipment Needed After Discharge: walker, rolling, bedside commode, bath bench     GOALS:    Physical Therapy Goals        Problem: Physical  Therapy Goal    Goal Priority Disciplines Outcome Goal Variances Interventions   Physical Therapy Goal     PT/OT, PT Ongoing (interventions implemented as appropriate)     Description:  PT goals until 12/04/17    1. Pt supine to sit with supervision-Met  2. Pt sit to supine with supervision- met  3. Pt sit to stand with or without RW as needed for safety with SBA-Met  4. Pt to perform gait 150ft with or without RW as needed for safety with SBA.-not met  5. Pt to up/down 10 steps with R UE rail with CGA.- met  6. Pt to perform B LE exs in sitting x 15 reps to increase B LE strength to improve functional mobility.-Met                         PLAN:    Patient to be seen 4 x/week  to address the above listed problems via gait training, therapeutic activities, therapeutic exercises, neuromuscular re-education  Plan of Care expires: 12/15/17  Plan of Care reviewed with: patient, sibling         Brice Zaragoza II, PTA  11/24/2017

## 2017-11-24 NOTE — PT/OT/SLP PROGRESS
Physical Therapy      Monique Keely Domingomagaly  MRN: [id number]    Patient not seen today secondary to  (Patient stated you're too late. I've been walking around for the last two days without any assistance.  Patient stated I don't think I need y'all anymore.). Will have a PT follow up with patient..    Brice Zaragoza II, PTA

## 2017-11-24 NOTE — SUBJECTIVE & OBJECTIVE
Interval History: no acute events overnight. Hematuria has resolved. Plan for radiation tx today. Patient otherwise denies any complaints    Review of Systems   Constitutional: Positive for appetite change. Negative for chills, diaphoresis, fatigue and fever.   HENT: Negative for sore throat and trouble swallowing.    Respiratory: Negative for cough and shortness of breath.    Cardiovascular: Negative for chest pain, palpitations and leg swelling.   Gastrointestinal: Negative for abdominal distention, abdominal pain, constipation, diarrhea, nausea and vomiting.   Genitourinary: Negative for difficulty urinating, dysuria, flank pain, frequency, hematuria and urgency.   Musculoskeletal: Negative for arthralgias and back pain.   Skin: Negative for color change and pallor.   Neurological: Negative for dizziness and light-headedness.   Psychiatric/Behavioral: Negative for confusion.     Objective:     Vital Signs (Most Recent):  Temp: 98.6 °F (37 °C) (11/24/17 1123)  Pulse: 68 (11/24/17 1500)  Resp: 20 (11/24/17 1123)  BP: 110/63 (11/24/17 1123)  SpO2: 95 % (11/24/17 1123) Vital Signs (24h Range):  Temp:  [96.9 °F (36.1 °C)-98.6 °F (37 °C)] 98.6 °F (37 °C)  Pulse:  [57-84] 68  Resp:  [16-20] 20  SpO2:  [95 %-100 %] 95 %  BP: (110-154)/(63-87) 110/63     Weight: 45.4 kg (100 lb)  Body mass index is 19.53 kg/m².  No intake or output data in the 24 hours ending 11/24/17 1725   Physical Exam   Constitutional: She is oriented to person, place, and time. No distress.   Thin, frail appearing elderly lady.   HENT:   Head: Normocephalic and atraumatic.   Mouth/Throat: Oropharynx is clear and moist.   Eyes: No scleral icterus.   Neck: Normal range of motion. Neck supple.   Cardiovascular: Normal rate, regular rhythm, normal heart sounds and intact distal pulses.    Pulmonary/Chest: Effort normal and breath sounds normal.   Abdominal: Soft. Bowel sounds are normal. She exhibits no distension. There is no tenderness. There is no  guarding.   Neurological: She is alert and oriented to person, place, and time.   Skin: Skin is warm and dry. She is not diaphoretic. No pallor.   Psychiatric: She has a normal mood and affect. Her behavior is normal.   Vitals reviewed.      Significant Labs: All pertinent labs within the past 24 hours have been reviewed.    Significant Imaging: I have reviewed all pertinent imaging results/findings within the past 24 hours.

## 2017-11-24 NOTE — PLAN OF CARE
SW met with pt to discuss DC plan and explained that pt would not be able to go into a SNF facility because of the radiation therapy.  Pt stated that she has already had 2 Tx and would like to stay with it at this point.  Pt expressed understanding with not being able to go to SNF and asked if she could have Hh.  SW then spoke with [facility] resident and was told that he will look into DC situation with Dr. Grewal and advise Sw.  ADDENDUM:340pm--SW placed call to pt's sister, Aylin Aguilera ([phone]) and received vm.  In message, NASRIN explained that it was not recommended that pt go home without 24 hour care and that pt would not be able to go to SNF due to the radiation therapy. NASRIN asked for a return call in order for case management to determine the appropriate level of care upon DC.        Olga Lidia Mcgovern LMSW

## 2017-11-24 NOTE — PT/OT/SLP PROGRESS
"Occupational Therapy  Treatment/ Discharge Summary    Monique Lew   MRN: [id number]   Admitting Diagnosis: Bladder cancer    OT Date of Treatment: 11/24/17   OT Start Time: 1052  OT Stop Time: 1100  OT Total Time (min): 8 min    Billable Minutes:  Self Care/Home Management 8 minutes    General Precautions: Standard, fall, vision impaired    Do you have any cultural, spiritual, Gnosticist conflicts, given your current situation?: none stated    Subjective:  Communicated with RN prior to session.  "I've been doing fine now."   "I don't think you need to keep coming."  Pain/Comfort  Pain Rating 1: 0/10  Pain Rating Post-Intervention 1: 0/10    Objective:  Patient found with: telemetry, pt found sitting EOB and agreeable to therapy.     Functional Mobility:  Bed Mobility:  Supine to Sit: Supervision    Transfers:  Sit <> Stand Assistance: Supervision  Sit <> Stand Assistive Device: No Assistive Device  Bed <> Chair Technique: Stand Pivot  Bed <> Chair Transfer Assistance: Supervision  Bed <> Chair Assistive Device: No Assistive Device  Toilet Transfer Technique: Stand Pivot  Toilet Transfer Assistance: Supervision  Toilet Transfer Assistive Device: bedside commode    Functional Ambulation: Supervision short distance    Activities of Daily Living:  Feeding Level of Assistance: Set-up Assistance  LE Dressing Level of Assistance: Supervision (socks)  Toileting Where Assessed: Bedside commode  Toileting Level of Assistance: Supervision    Balance:   Static Sit: NORMAL: No deviations seen in posture held statically  Dynamic Sit: GOOD: Maintains balance through MODERATE excursions of active trunk movement  Static Stand: FAIR+: Takes MINIMAL challenges from all directions  Dynamic stand: FAIR+: Needs CLOSE SUPERVISION during gait and is able to right self with minor LOB    Therapeutic Activities and Exercises:  Pt ed re OT role and POC. Pt performing bed mobility, transfers, ambulation (short distance) with Supervision. Pt " "able to perform all necessary ADLs. Pt has family support and politely declined further therapy.    AM-PAC 6 CLICK ADL   How much help from another person does this patient currently need?   1 = Unable, Total/Dependent Assistance  2 = A lot, Maximum/Moderate Assistance  3 = A little, Minimum/Contact Guard/Supervision  4 = None, Modified [place]/Independent    Putting on and taking off regular lower body clothing? : 3  Bathing (including washing, rinsing, drying)?: 3  Toileting, which includes using toilet, bedpan, or urinal? : 3  Putting on and taking off regular upper body clothing?: 3  Taking care of personal grooming such as brushing teeth?: 4  Eating meals?: 4  Total Score: 20     AM-PAC Raw Score CMS "G-Code Modifier Level of Impairment Assistance   6 % Total / Unable   7 - 8 CM 80 - 100% Maximal Assist   9-13 CL 60 - 80% Moderate Assist   14 - 19 CK 40 - 60% Moderate Assist   20 - 22 CJ 20 - 40% Minimal Assist   23 CI 1-20% SBA / CGA   24 CH 0% Independent/ Mod I       Patient left sitting EOB with call button in reach and family present    ASSESSMENT:  Monique Lew is a 87 y.o. female with a medical diagnosis of Bladder cancer and presents with the impairments listed below. Pt is pleasant and participates well, however pt now requesting OT to discontinue as she has been able to perform all necessary ADLs with Supervision from her family. Pt will continue to work with PT to progress toward safe ambulation and navigation of stairs. OT to d/c.    Rehab identified problem list/impairments: Rehab identified problem list/impairments: impaired endurance, visual deficits, gait instability    Rehab potential is good.    Activity tolerance: Good    Discharge recommendations: Discharge Facility/Level Of Care Needs: home with home health     Barriers to discharge: Barriers to Discharge: None    Equipment recommendations: walker, rolling     GOALS:    Occupational Therapy Goals     Not on file          " Multidisciplinary Problems (Resolved)        Problem: Occupational Therapy Goal    Goal Priority Disciplines Outcome Interventions   Occupational Therapy Goal   (Resolved)     OT, PT/OT Outcome(s) achieved    Description:  Goals to be met by: 11/30/17     Patient will increase functional independence with ADLs by performing:    UE Dressing with Set-up Assistance and Supervision.  LE Dressing with Set-up Assistance and Stand-by Assistance.  Grooming while standing at sink with Stand-by Assistance.  Toileting from toilet with Set-up Assistance and Supervision for hygiene and clothing management.   Supine to sit with Modified [place].  Stand pivot transfers with Stand-by Assistance.  Toilet transfer to toilet with Stand-by Assistance.                      Plan: Pt politely requested discontinuation of OT. OT to d/c as pt has met the above goals.    ERASMO Castillo  11/24/2017  Pager: [phone]

## 2017-11-24 NOTE — PROGRESS NOTES
[facility] Hospital Medicine  Progress Note    Patient Name: Monique Lew  MRN: [id number]  Patient Class: IP- Inpatient   Admission Date: 11/11/2017  Length of Stay: 12 days  Attending Physician: Summer Grewal*  Primary Care Provider: Aguila Hsieh MD    Hospital Medicine Team: [facility] HOSP MED 5 José Luis Jeffrey MD    Subjective:     Principal Problem:Bladder cancer    HPI:  Ms. 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 States she first noticed blood in her urine 2 days before presentation. This temporarily resolved, with hematuria occuring again the day before presentation. Hematuria is associated with increased urge to urinate and incontinence. Prior to this episode, patient states last episode of gross hematuria was in 2012, which was found to be 2/2 cystitis. Patient has had multiple UTIs and microhematuria on urinalysis since that time. She follows with a urologist, Dr. Enriquez at [facility], and has had 2 cystoscopies - most recently in 2014 which no evidence of tumor. Denies fever/chills/nausea/vomiting. Does states she has had decreased appetite, increased fatigue, and night sweats which cause her to soak through her clothes.     In the ED, was found to have Hg of 11 which trended down to 10 in 6 hours (baseline 14). Type and screen obtained. CT urogram performed showed 6.3x4.3x4.0 cm mass in bladder causing severe left hydronephrosis and hydroureter with evidence of hemorrhage in bladder. Also evaluated by urology in ED, who began CBI and recommended she be NPO for possible procedure.     Hospital Course:  Patient admitted to hospital medicine. Anticoagulation held in setting hemorrhage. CT Urogram on 11/11 demonstrated a large bladder mass. Urology consulted. 11/12 CT Chest also demonstrates bilateral pulmonary nodules that could be due to metastases. H/H  stabilized. Patient taken to OR 11/13 for cystoscopy, TURBT, pyelogram; stent unable to be placed as urethral orifice could not be visualized. Path pending. 11/14: started hep gtt. Pt still thinking if she wants nephrostomy tube or not. Urology to keep wilson in until Friday 11/17. Patient began to produce significant hematuria requiring 1u pRBC on 11/16 and the decision was made to stop heparin ggt due to risk outweighing benefit. 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 Results discussed with family and the poor prognosis associated given nodules found in lungs on CT previously. Patient decided that she would like to speak with oncology over her treatment options before deciding whether to pursue cancer treatment versus palliative care. Onc and urology following. No surgeries planned by urology. Per onc, pt is not a chemo candidate with active bleeding, but will discuss treatment options with patient once bleeding is controlled.     Interval History: no acute events overnight. Hematuria has resolved. Plan for radiation tx today. Patient otherwise denies any complaints    Review of Systems   Constitutional: Positive for appetite change. Negative for chills, diaphoresis, fatigue and fever.   HENT: Negative for sore throat and trouble swallowing.    Respiratory: Negative for cough and shortness of breath.    Cardiovascular: Negative for chest pain, palpitations and leg swelling.   Gastrointestinal: Negative for abdominal distention, abdominal pain, constipation, diarrhea, nausea and vomiting.   Genitourinary: Negative for difficulty urinating, dysuria, flank pain, frequency, hematuria and urgency.   Musculoskeletal: Negative for arthralgias and back pain.   Skin: Negative for color change and pallor.   Neurological: Negative for dizziness and light-headedness.   Psychiatric/Behavioral: Negative for confusion.     Objective:     Vital Signs (Most Recent):  Temp: 98.6 °F (37 °C)  (11/24/17 1123)  Pulse: 68 (11/24/17 1500)  Resp: 20 (11/24/17 1123)  BP: 110/63 (11/24/17 1123)  SpO2: 95 % (11/24/17 1123) Vital Signs (24h Range):  Temp:  [96.9 °F (36.1 °C)-98.6 °F (37 °C)] 98.6 °F (37 °C)  Pulse:  [57-84] 68  Resp:  [16-20] 20  SpO2:  [95 %-100 %] 95 %  BP: (110-154)/(63-87) 110/63     Weight: 45.4 kg (100 lb)  Body mass index is 19.53 kg/m².  No intake or output data in the 24 hours ending 11/24/17 1725   Physical Exam   Constitutional: She is oriented to person, place, and time. No distress.   Thin, frail appearing elderly lady.   HENT:   Head: Normocephalic and atraumatic.   Mouth/Throat: Oropharynx is clear and moist.   Eyes: No scleral icterus.   Neck: Normal range of motion. Neck supple.   Cardiovascular: Normal rate, regular rhythm, normal heart sounds and intact distal pulses.    Pulmonary/Chest: Effort normal and breath sounds normal.   Abdominal: Soft. Bowel sounds are normal. She exhibits no distension. There is no tenderness. There is no guarding.   Neurological: She is alert and oriented to person, place, and time.   Skin: Skin is warm and dry. She is not diaphoretic. No pallor.   Psychiatric: She has a normal mood and affect. Her behavior is normal.   Vitals reviewed.      Significant Labs: All pertinent labs within the past 24 hours have been reviewed.    Significant Imaging: I have reviewed all pertinent imaging results/findings within the past 24 hours.    Assessment/Plan:      * Bladder cancer    CT urogram with evidence of large, obstructing mass and hemorrhage in the bladder.  - Home coumadin and ASA held, per urology recs subq heparin for DVT prophylaxis  - Hg baseline 14, 11 at presentation.   - Type and screen ordered  - CBC q12h, will transfuse for Hg <7  - 11/13 OR for cystoscopy, TURBT, and pyelogram; stent could not be placed as urethral orifice could not be visualized.   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 Bilateral nodules found  on CT Chest have high likelihood of being metastases.   - Oncology consulted to discuss treatment options. Pt would like to pursue immunotherapy.             Bladder mass              Hydronephrosis of left kidney    -If patient wishes to pursue chemotherapy, will need IR consult for nephrostomy tube placement eventually.           Gross hematuria    -See Bladder cancer    - Complains of intermittent difficulty voiding overnight. Bladder scan early this AM was 260cc, patient was then catheterized with return of 600 cc light pink urine, no clots  - nurse reported that she noticed blood in her urine this morning.  - will not restart AC; monitor 24 hrs   - consult radiation oncology for intractable hematuria   - received 1/14 round of radiatio; will receive today (needs a total of 14 days)    Discussed with radiation oncologist regarding re-starting AC; patient has had multiple episodes of urination without blood. Per rad-onc it is safer to re-start AC at a latter date; possibly Monday.         Depression (emotion)    - d/c'ed venlafaxine as pt was not taking it at home            Chronic hyponatremia    Sodium 125 at admission, had been normal in January 2017  - Trending up. D/c'ed venlafaxine as she was not taking this at home          Acquired hypothyroidism    - Continue synthroid 50 mcg  - TSH WNL          Essential hypertension              History of CVA (cerebrovascular accident)    Follows with neurology. 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  - Coumadin held          Chronic atrial fibrillation    - Patient with high CHADS-VASc score but holding anticoagulation till Monday   - Plan with re-start coumadin with lovenox or heparin bridge         Current use of long term anticoagulation    - INR 2.7 at presentation  - Currently no anticoagulation per urology, will restart when able.             VTE Risk Mitigation     None              José Luis CEJA Wojciech,  MD  Department of Hospital Medicine   [facility]

## 2017-11-24 NOTE — PLAN OF CARE
Problem: Physical Therapy Goal  Goal: Physical Therapy Goal  PT goals until 12/04/17    1. Pt supine to sit with supervision-Met  2. Pt sit to supine with supervision-not met  3. Pt sit to stand with or without RW as needed for safety with SBA-Met  4. Pt to perform gait 150ft with or without RW as needed for safety with SBA.-not met  5. Pt to up/down 10 steps with R UE rail with CGA.-not met  6. Pt to perform B LE exs in sitting x 15 reps to increase B LE strength to improve functional mobility.-Met      Outcome: Ongoing (interventions implemented as appropriate)  PT Goals extended, remain appropriate    Oren Napoles, PT  11/24/2017         No

## 2017-11-24 NOTE — PLAN OF CARE
Problem: Occupational Therapy Goal  Goal: Occupational Therapy Goal  Goals to be met by: 11/30/17     Patient will increase functional independence with ADLs by performing:    UE Dressing with Set-up Assistance and Supervision.  LE Dressing with Set-up Assistance and Stand-by Assistance.  Grooming while standing at sink with Stand-by Assistance.  Toileting from toilet with Set-up Assistance and Supervision for hygiene and clothing management.   Supine to sit with Modified [place].  Stand pivot transfers with Stand-by Assistance.  Toilet transfer to toilet with Stand-by Assistance.     Outcome: Outcome(s) achieved Date Met: 11/24/17  Pt is performing all ADLs and short distance mobility with Supervision. Pt requests OT discontinue.    ERASMO Castillo  11/24/2017  Pager: [phone]

## 2017-11-24 NOTE — ASSESSMENT & PLAN NOTE
-See Bladder cancer    - Complains of intermittent difficulty voiding overnight. Bladder scan early this AM was 260cc, patient was then catheterized with return of 600 cc light pink urine, no clots  - nurse reported that she noticed blood in her urine this morning.  - will not restart AC; monitor 24 hrs   - consult radiation oncology for intractable hematuria   - received 1/14 round of radiatio; will receive today (needs a total of 14 days)    Discussed with radiation oncologist regarding re-starting AC; patient has had multiple episodes of urination without blood. Per rad-onc it is safer to re-start AC at a latter date; possibly Monday.

## 2017-11-25 ENCOUNTER — DOCUMENTATION ONLY (OUTPATIENT)
Dept: RADIATION ONCOLOGY | Facility: CLINIC | Age: 82
End: 2017-11-25

## 2017-11-25 LAB
ANION GAP SERPL CALC-SCNC: 6 MMOL/L
BASOPHILS # BLD AUTO: 0.03 K/UL
BASOPHILS # BLD AUTO: 0.04 K/UL
BASOPHILS NFR BLD: 0.5 %
BASOPHILS NFR BLD: 0.6 %
BUN SERPL-MCNC: 27 MG/DL
CALCIUM SERPL-MCNC: 9.1 MG/DL
CHLORIDE SERPL-SCNC: 99 MMOL/L
CO2 SERPL-SCNC: 24 MMOL/L
CREAT SERPL-MCNC: 1 MG/DL
CREAT UR-MCNC: 64 MG/DL
DIFFERENTIAL METHOD: ABNORMAL
DIFFERENTIAL METHOD: ABNORMAL
EOSINOPHIL # BLD AUTO: 0.1 K/UL
EOSINOPHIL # BLD AUTO: 0.1 K/UL
EOSINOPHIL NFR BLD: 1 %
EOSINOPHIL NFR BLD: 1.1 %
ERYTHROCYTE [DISTWIDTH] IN BLOOD BY AUTOMATED COUNT: 13.9 %
ERYTHROCYTE [DISTWIDTH] IN BLOOD BY AUTOMATED COUNT: 14.1 %
EST. GFR  (AFRICAN AMERICAN): 58.5 ML/MIN/1.73 M^2
EST. GFR  (NON AFRICAN AMERICAN): 50.8 ML/MIN/1.73 M^2
GLUCOSE SERPL-MCNC: 98 MG/DL
HCT VFR BLD AUTO: 23.9 %
HCT VFR BLD AUTO: 24.3 %
HGB BLD-MCNC: 8 G/DL
HGB BLD-MCNC: 8 G/DL
IMM GRANULOCYTES # BLD AUTO: 0.02 K/UL
IMM GRANULOCYTES # BLD AUTO: 0.03 K/UL
IMM GRANULOCYTES NFR BLD AUTO: 0.3 %
IMM GRANULOCYTES NFR BLD AUTO: 0.5 %
INR PPP: 1
LYMPHOCYTES # BLD AUTO: 0.7 K/UL
LYMPHOCYTES # BLD AUTO: 0.9 K/UL
LYMPHOCYTES NFR BLD: 12 %
LYMPHOCYTES NFR BLD: 13.4 %
MCH RBC QN AUTO: 29.2 PG
MCH RBC QN AUTO: 29.6 PG
MCHC RBC AUTO-ENTMCNC: 32.9 G/DL
MCHC RBC AUTO-ENTMCNC: 33.5 G/DL
MCV RBC AUTO: 89 FL
MCV RBC AUTO: 89 FL
MONOCYTES # BLD AUTO: 0.5 K/UL
MONOCYTES # BLD AUTO: 0.7 K/UL
MONOCYTES NFR BLD: 10.1 %
MONOCYTES NFR BLD: 8.5 %
NEUTROPHILS # BLD AUTO: 4.7 K/UL
NEUTROPHILS # BLD AUTO: 5.1 K/UL
NEUTROPHILS NFR BLD: 74.6 %
NEUTROPHILS NFR BLD: 77.4 %
NRBC BLD-RTO: 0 /100 WBC
NRBC BLD-RTO: 0 /100 WBC
OSMOLALITY UR: 378 MOSM/KG
PLATELET # BLD AUTO: 304 K/UL
PLATELET # BLD AUTO: 332 K/UL
PMV BLD AUTO: 9.5 FL
PMV BLD AUTO: 9.6 FL
POTASSIUM SERPL-SCNC: 4.5 MMOL/L
PROTHROMBIN TIME: 11.1 SEC
RBC # BLD AUTO: 2.7 M/UL
RBC # BLD AUTO: 2.74 M/UL
SODIUM SERPL-SCNC: 129 MMOL/L
SODIUM UR-SCNC: 30 MMOL/L
WBC # BLD AUTO: 6.09 K/UL
WBC # BLD AUTO: 6.81 K/UL

## 2017-11-25 PROCEDURE — 25000003 PHARM REV CODE 250

## 2017-11-25 PROCEDURE — 80048 BASIC METABOLIC PNL TOTAL CA: CPT

## 2017-11-25 PROCEDURE — 85025 COMPLETE CBC W/AUTO DIFF WBC: CPT

## 2017-11-25 PROCEDURE — 77412 RADIATION TX DELIVERY LVL 3: CPT | Performed by: RADIOLOGY

## 2017-11-25 PROCEDURE — 36415 COLL VENOUS BLD VENIPUNCTURE: CPT

## 2017-11-25 PROCEDURE — 83935 ASSAY OF URINE OSMOLALITY: CPT

## 2017-11-25 PROCEDURE — 11000001 HC ACUTE MED/SURG PRIVATE ROOM

## 2017-11-25 PROCEDURE — 77387 GUIDANCE FOR RADJ TX DLVR: CPT | Mod: TC | Performed by: RADIOLOGY

## 2017-11-25 PROCEDURE — 85610 PROTHROMBIN TIME: CPT

## 2017-11-25 PROCEDURE — G6002 STEREOSCOPIC X-RAY GUIDANCE: HCPCS | Mod: 26,,, | Performed by: RADIOLOGY

## 2017-11-25 PROCEDURE — 99232 SBSQ HOSP IP/OBS MODERATE 35: CPT | Mod: GC,,, | Performed by: HOSPITALIST

## 2017-11-25 PROCEDURE — 84300 ASSAY OF URINE SODIUM: CPT

## 2017-11-25 PROCEDURE — 82570 ASSAY OF URINE CREATININE: CPT

## 2017-11-25 PROCEDURE — 25000003 PHARM REV CODE 250: Performed by: STUDENT IN AN ORGANIZED HEALTH CARE EDUCATION/TRAINING PROGRAM

## 2017-11-25 RX ORDER — ENOXAPARIN SODIUM 150 MG/ML
1 INJECTION SUBCUTANEOUS 2 TIMES DAILY
Status: DISCONTINUED | OUTPATIENT
Start: 2017-11-25 | End: 2017-11-25

## 2017-11-25 RX ORDER — ENOXAPARIN SODIUM 150 MG/ML
1 INJECTION SUBCUTANEOUS
Status: DISCONTINUED | OUTPATIENT
Start: 2017-11-25 | End: 2017-11-25

## 2017-11-25 RX ORDER — WARFARIN 1 MG/1
1 TABLET ORAL DAILY
Status: DISCONTINUED | OUTPATIENT
Start: 2017-11-25 | End: 2017-11-25

## 2017-11-25 RX ADMIN — METOPROLOL SUCCINATE 25 MG: 25 TABLET, EXTENDED RELEASE ORAL at 08:11

## 2017-11-25 RX ADMIN — TIMOLOL MALEATE 1 DROP: 5 SOLUTION OPHTHALMIC at 08:11

## 2017-11-25 RX ADMIN — LEVOTHYROXINE SODIUM 50 MCG: 50 TABLET ORAL at 05:11

## 2017-11-25 NOTE — PROGRESS NOTES
[facility] Hospital Medicine  Progress Note    Patient Name: Monique Lew  MRN: [id number]  Patient Class: IP- Inpatient   Admission Date: 11/11/2017  Length of Stay: 13 days  Attending Physician: Summer Grewal*  Primary Care Provider: Aguila Hsieh MD    [place] Hospital Medicine Team: [facility] HOSP MED 5 Navi Jha MD    Subjective:     Principal Problem:Bladder cancer    HPI:  Ms. 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 States she first noticed blood in her urine 2 days before presentation. This temporarily resolved, with hematuria occuring again the day before presentation. Hematuria is associated with increased urge to urinate and incontinence. Prior to this episode, patient states last episode of gross hematuria was in 2012, which was found to be 2/2 cystitis. Patient has had multiple UTIs and microhematuria on urinalysis since that time. She follows with a urologist, Dr. Enriquez at [facility], and has had 2 cystoscopies - most recently in 2014 which no evidence of tumor. Denies fever/chills/nausea/vomiting. Does states she has had decreased appetite, increased fatigue, and night sweats which cause her to soak through her clothes.     In the ED, was found to have Hg of 11 which trended down to 10 in 6 hours (baseline 14). Type and screen obtained. CT urogram performed showed 6.3x4.3x4.0 cm mass in bladder causing severe left hydronephrosis and hydroureter with evidence of hemorrhage in bladder. Also evaluated by urology in ED, who began CBI and recommended she be NPO for possible procedure.     Hospital Course:  Patient admitted to hospital medicine. Anticoagulation held in setting hemorrhage. CT Urogram on 11/11 demonstrated a large bladder mass. Urology consulted. 11/12 CT Chest also demonstrates bilateral pulmonary nodules that could be due to metastases. H/H  stabilized. Patient taken to OR 11/13 for cystoscopy, TURBT, pyelogram; stent unable to be placed as urethral orifice could not be visualized. Path pending. 11/14: started hep gtt. Pt still thinking if she wants nephrostomy tube or not. Urology to keep wilson in until Friday 11/17. Patient began to produce significant hematuria requiring 1u pRBC on 11/16 and the decision was made to stop heparin ggt due to risk outweighing benefit. 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 Results discussed with family and the poor prognosis associated given nodules found in lungs on CT previously. Patient decided that she would like to speak with oncology over her treatment options before deciding whether to pursue cancer treatment versus palliative care. Onc and urology following. No surgeries planned by urology. Per onc, pt is not a chemo candidate with active bleeding, but will discuss treatment options with patient once bleeding is controlled.     Interval History:   No acute events overnight. Hematuria has resolved. Plan for radiation tx today. Patient otherwise denies any complaints    Review of Systems   Constitutional: Positive for appetite change. Negative for chills, diaphoresis, fatigue and fever.   HENT: Negative for sore throat and trouble swallowing.    Respiratory: Negative for cough and shortness of breath.    Cardiovascular: Negative for chest pain, palpitations and leg swelling.   Gastrointestinal: Negative for abdominal distention, abdominal pain, constipation, diarrhea, nausea and vomiting.   Genitourinary: Negative for difficulty urinating, dysuria, flank pain, frequency, hematuria and urgency.   Musculoskeletal: Negative for arthralgias and back pain.   Skin: Negative for color change and pallor.   Neurological: Negative for dizziness and light-headedness.   Psychiatric/Behavioral: Negative for confusion.     Objective:     Vital Signs (Most Recent):  Temp: 98.2 °F (36.8  °C) (11/25/17 0747)  Pulse: (!) 47 (11/25/17 1100)  Resp: 16 (11/25/17 0747)  BP: 139/64 (11/25/17 0747)  SpO2: 99 % (11/25/17 0747) Vital Signs (24h Range):  Temp:  [96.6 °F (35.9 °C)-98.2 °F (36.8 °C)] 98.2 °F (36.8 °C)  Pulse:  [47-78] 47  Resp:  [16-18] 16  SpO2:  [98 %-100 %] 99 %  BP: (131-168)/(63-83) 139/64     Weight: 45.4 kg (100 lb)  Body mass index is 19.53 kg/m².    Intake/Output Summary (Last 24 hours) at 11/25/17 1141  Last data filed at 11/25/17 0549   Gross per 24 hour   Intake              960 ml   Output                0 ml   Net              960 ml      Physical Exam   Constitutional: She is oriented to person, place, and time. No distress.   Thin, frail appearing elderly lady.   HENT:   Head: Normocephalic and atraumatic.   Mouth/Throat: Oropharynx is clear and moist.   Eyes: No scleral icterus.   Neck: Normal range of motion. Neck supple.   Cardiovascular: Normal rate, regular rhythm, normal heart sounds and intact distal pulses.    Pulmonary/Chest: Effort normal and breath sounds normal.   Abdominal: Soft. Bowel sounds are normal. She exhibits no distension. There is no tenderness. There is no guarding.   Neurological: She is alert and oriented to person, place, and time.   Skin: Skin is warm and dry. She is not diaphoretic. No pallor.   Psychiatric: She has a normal mood and affect. Her behavior is normal.   Vitals reviewed.      Significant Labs:   CBC:   Recent Labs  Lab 11/24/17  0749 11/24/17  1937 11/25/17  0746   WBC 8.01 8.11 6.09   HGB 8.5* 8.5* 8.0*   HCT 25.7* 25.8* 23.9*    360* 304     CMP:   Recent Labs  Lab 11/24/17  0517 11/25/17  0405   * 129*   K 4.0 4.5    99   CO2 21* 24   GLU 96 98   BUN 30* 27*   CREATININE 1.0 1.0   CALCIUM 9.1 9.1   ANIONGAP 8 6*   EGFRNONAA 50.8* 50.8*       Significant Imaging: I have reviewed all pertinent imaging results/findings within the past 24 hours.    Assessment/Plan:      * Bladder cancer    CT urogram with evidence of  large, obstructing mass and hemorrhage in the bladder.  - Home coumadin and ASA held, per urology recs subq heparin for DVT prophylaxis  - Hg baseline 14, 11 at presentation.   - Type and screen ordered  - CBC q12h, will transfuse for Hg <7  - 11/13 OR for cystoscopy, TURBT, and pyelogram; stent could not be placed as urethral orifice could not be visualized.   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 Bilateral nodules found on CT Chest have high likelihood of being metastases.   - Oncology consulted to discuss treatment options. Pt would like to pursue immunotherapy.       DISPO:  Pt not eligible for SNF placement while on radiation; in discussion with family on placement.           Gross hematuria    -See Bladder cancer    - Complains of intermittent difficulty voiding overnight. Bladder scan early this AM was 260cc, patient was then catheterized with return of 600 cc light pink urine, no clots  - nurse reported that she noticed blood in her urine this morning.  - will not restart AC; monitor 24 hrs   - consult radiation oncology for intractable hematuria   - received 2/14 round of radiation; will receive today (needs a total of 14 days)    Discussed with radiation oncologist regarding re-starting AC; patient has had multiple episodes of urination without blood. Per rad-onc it is safer to re-start AC at a latter date; possibly Monday.         Bladder mass              Hydronephrosis of left kidney    -If patient wishes to pursue chemotherapy, will need IR consult for nephrostomy tube placement eventually.           Depression (emotion)    - d/c'ed venlafaxine as pt was not taking it at home            Chronic hyponatremia    Sodium 125 at admission, had been normal in January 2017  - Trending up. D/c'ed venlafaxine as she was not taking this at home          Acquired hypothyroidism    - Continue synthroid 50 mcg  - TSH WNL          Essential hypertension              History of CVA  (cerebrovascular accident)    Follows with neurology. 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  - Coumadin held          Chronic atrial fibrillation    - Patient with high CHADS-VASc score but holding anticoagulation till Monday   - Plan with re-start coumadin with lovenox or heparin bridge         Current use of long term anticoagulation    - INR 2.7 at presentation  - Currently no anticoagulation per urology, will restart when able.             VTE Risk Mitigation     None              Navi Jha MD  Department of Hospital Medicine   [facility]

## 2017-11-25 NOTE — PLAN OF CARE
Problem: Infection, Risk/Actual (Adult)  Goal: Identify Related Risk Factors and Signs and Symptoms  Related risk factors and signs and symptoms are identified upon initiation of Human Response Clinical Practice Guideline (CPG)   Outcome: Ongoing (interventions implemented as appropriate)  Reviewed POC including s/s of infection and interventions to prevent infection. Pt verbalized understanding of POC.       VACCINE ADMINISTRATION RECORD  PARENT / GUARDIAN APPROVAL  Date: 2018  Vaccine administered to:  Deepak Lacey     : 2017    MRN: [id number]    A copy of the appropriate Centers for Disease Control and Prevention Vaccine Information statemen

## 2017-11-25 NOTE — ASSESSMENT & PLAN NOTE
-See Bladder cancer    - Complains of intermittent difficulty voiding overnight. Bladder scan early this AM was 260cc, patient was then catheterized with return of 600 cc light pink urine, no clots  - nurse reported that she noticed blood in her urine this morning.  - will not restart AC; monitor 24 hrs   - consult radiation oncology for intractable hematuria   - received 2/14 round of radiation; will receive today (needs a total of 14 days)    Discussed with radiation oncologist regarding re-starting AC; patient has had multiple episodes of urination without blood. Per rad-onc it is safer to re-start AC at a latter date; possibly Monday.

## 2017-11-25 NOTE — PLAN OF CARE
Problem: Patient Care Overview  Goal: Plan of Care Review  Outcome: Ongoing (interventions implemented as appropriate)  Reviewed plan of care with patient, possible discharge for tomorrow - received second round of radiation today, patient tolerating aftercare well, see flowsheets for detailed assessment information. Patient complained of Ha, tylenol administered and pt received moderate relief of pain. No other questions or concerns at this time.

## 2017-11-25 NOTE — ASSESSMENT & PLAN NOTE
CT urogram with evidence of large, obstructing mass and hemorrhage in the bladder.  - Home coumadin and ASA held, per urology recs subq heparin for DVT prophylaxis  - Hg baseline 14, 11 at presentation.   - Type and screen ordered  - CBC q12h, will transfuse for Hg <7  - 11/13 OR for cystoscopy, TURBT, and pyelogram; stent could not be placed as urethral orifice could not be visualized.   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 Bilateral nodules found on CT Chest have high likelihood of being metastases.   - Oncology consulted to discuss treatment options. Pt would like to pursue immunotherapy.       DISPO:  Pt not eligible for SNF placement while on radiation; in discussion with family on placement.

## 2017-11-25 NOTE — SUBJECTIVE & OBJECTIVE
Interval History:   No acute events overnight. Hematuria has resolved. Plan for radiation tx today. Patient otherwise denies any complaints    Review of Systems   Constitutional: Positive for appetite change. Negative for chills, diaphoresis, fatigue and fever.   HENT: Negative for sore throat and trouble swallowing.    Respiratory: Negative for cough and shortness of breath.    Cardiovascular: Negative for chest pain, palpitations and leg swelling.   Gastrointestinal: Negative for abdominal distention, abdominal pain, constipation, diarrhea, nausea and vomiting.   Genitourinary: Negative for difficulty urinating, dysuria, flank pain, frequency, hematuria and urgency.   Musculoskeletal: Negative for arthralgias and back pain.   Skin: Negative for color change and pallor.   Neurological: Negative for dizziness and light-headedness.   Psychiatric/Behavioral: Negative for confusion.     Objective:     Vital Signs (Most Recent):  Temp: 98.2 °F (36.8 °C) (11/25/17 0747)  Pulse: (!) 47 (11/25/17 1100)  Resp: 16 (11/25/17 0747)  BP: 139/64 (11/25/17 0747)  SpO2: 99 % (11/25/17 0747) Vital Signs (24h Range):  Temp:  [96.6 °F (35.9 °C)-98.2 °F (36.8 °C)] 98.2 °F (36.8 °C)  Pulse:  [47-78] 47  Resp:  [16-18] 16  SpO2:  [98 %-100 %] 99 %  BP: (131-168)/(63-83) 139/64     Weight: 45.4 kg (100 lb)  Body mass index is 19.53 kg/m².    Intake/Output Summary (Last 24 hours) at 11/25/17 1141  Last data filed at 11/25/17 0549   Gross per 24 hour   Intake              960 ml   Output                0 ml   Net              960 ml      Physical Exam   Constitutional: She is oriented to person, place, and time. No distress.   Thin, frail appearing elderly lady.   HENT:   Head: Normocephalic and atraumatic.   Mouth/Throat: Oropharynx is clear and moist.   Eyes: No scleral icterus.   Neck: Normal range of motion. Neck supple.   Cardiovascular: Normal rate, regular rhythm, normal heart sounds and intact distal pulses.    Pulmonary/Chest:  Effort normal and breath sounds normal.   Abdominal: Soft. Bowel sounds are normal. She exhibits no distension. There is no tenderness. There is no guarding.   Neurological: She is alert and oriented to person, place, and time.   Skin: Skin is warm and dry. She is not diaphoretic. No pallor.   Psychiatric: She has a normal mood and affect. Her behavior is normal.   Vitals reviewed.      Significant Labs:   CBC:   Recent Labs  Lab 11/24/17  0749 11/24/17  1937 11/25/17  0746   WBC 8.01 8.11 6.09   HGB 8.5* 8.5* 8.0*   HCT 25.7* 25.8* 23.9*    360* 304     CMP:   Recent Labs  Lab 11/24/17  0517 11/25/17  0405   * 129*   K 4.0 4.5    99   CO2 21* 24   GLU 96 98   BUN 30* 27*   CREATININE 1.0 1.0   CALCIUM 9.1 9.1   ANIONGAP 8 6*   EGFRNONAA 50.8* 50.8*       Significant Imaging: I have reviewed all pertinent imaging results/findings within the past 24 hours.

## 2017-11-26 PROBLEM — E03.4 HYPOTHYROIDISM DUE TO ACQUIRED ATROPHY OF THYROID: Status: ACTIVE | Noted: 2017-11-26

## 2017-11-26 LAB
ANION GAP SERPL CALC-SCNC: 8 MMOL/L
BASOPHILS # BLD AUTO: 0.05 K/UL
BASOPHILS # BLD AUTO: 0.06 K/UL
BASOPHILS NFR BLD: 0.7 %
BASOPHILS NFR BLD: 0.9 %
BUN SERPL-MCNC: 25 MG/DL
CALCIUM SERPL-MCNC: 9.2 MG/DL
CHLORIDE SERPL-SCNC: 98 MMOL/L
CO2 SERPL-SCNC: 23 MMOL/L
CREAT SERPL-MCNC: 1.1 MG/DL
DIFFERENTIAL METHOD: ABNORMAL
DIFFERENTIAL METHOD: ABNORMAL
EOSINOPHIL # BLD AUTO: 0.1 K/UL
EOSINOPHIL # BLD AUTO: 0.1 K/UL
EOSINOPHIL NFR BLD: 1.1 %
EOSINOPHIL NFR BLD: 1.3 %
ERYTHROCYTE [DISTWIDTH] IN BLOOD BY AUTOMATED COUNT: 14 %
ERYTHROCYTE [DISTWIDTH] IN BLOOD BY AUTOMATED COUNT: 14.2 %
EST. GFR  (AFRICAN AMERICAN): 52.2 ML/MIN/1.73 M^2
EST. GFR  (NON AFRICAN AMERICAN): 45.3 ML/MIN/1.73 M^2
GLUCOSE SERPL-MCNC: 112 MG/DL
HCT VFR BLD AUTO: 24.5 %
HCT VFR BLD AUTO: 26.6 %
HGB BLD-MCNC: 8.1 G/DL
HGB BLD-MCNC: 8.2 G/DL
IMM GRANULOCYTES # BLD AUTO: 0.02 K/UL
IMM GRANULOCYTES # BLD AUTO: 0.03 K/UL
IMM GRANULOCYTES NFR BLD AUTO: 0.3 %
IMM GRANULOCYTES NFR BLD AUTO: 0.4 %
INR PPP: 1
LYMPHOCYTES # BLD AUTO: 0.8 K/UL
LYMPHOCYTES # BLD AUTO: 1 K/UL
LYMPHOCYTES NFR BLD: 10.8 %
LYMPHOCYTES NFR BLD: 13.3 %
MCH RBC QN AUTO: 29 PG
MCH RBC QN AUTO: 29.3 PG
MCHC RBC AUTO-ENTMCNC: 30.8 G/DL
MCHC RBC AUTO-ENTMCNC: 33.1 G/DL
MCV RBC AUTO: 89 FL
MCV RBC AUTO: 94 FL
MONOCYTES # BLD AUTO: 0.5 K/UL
MONOCYTES # BLD AUTO: 0.6 K/UL
MONOCYTES NFR BLD: 7.8 %
MONOCYTES NFR BLD: 8.3 %
NEUTROPHILS # BLD AUTO: 5.5 K/UL
NEUTROPHILS # BLD AUTO: 5.7 K/UL
NEUTROPHILS NFR BLD: 76.2 %
NEUTROPHILS NFR BLD: 78.9 %
NRBC BLD-RTO: 0 /100 WBC
NRBC BLD-RTO: 0 /100 WBC
PLATELET # BLD AUTO: 311 K/UL
PLATELET # BLD AUTO: 315 K/UL
PMV BLD AUTO: 9.9 FL
PMV BLD AUTO: 9.9 FL
POTASSIUM SERPL-SCNC: 4.5 MMOL/L
PROTHROMBIN TIME: 10.7 SEC
RBC # BLD AUTO: 2.76 M/UL
RBC # BLD AUTO: 2.83 M/UL
SODIUM SERPL-SCNC: 129 MMOL/L
WBC # BLD AUTO: 6.96 K/UL
WBC # BLD AUTO: 7.43 K/UL

## 2017-11-26 PROCEDURE — 99232 SBSQ HOSP IP/OBS MODERATE 35: CPT | Mod: GC,,, | Performed by: HOSPITALIST

## 2017-11-26 PROCEDURE — 85025 COMPLETE CBC W/AUTO DIFF WBC: CPT | Mod: 91

## 2017-11-26 PROCEDURE — 25000003 PHARM REV CODE 250: Performed by: STUDENT IN AN ORGANIZED HEALTH CARE EDUCATION/TRAINING PROGRAM

## 2017-11-26 PROCEDURE — 25000003 PHARM REV CODE 250

## 2017-11-26 PROCEDURE — 36415 COLL VENOUS BLD VENIPUNCTURE: CPT

## 2017-11-26 PROCEDURE — 85610 PROTHROMBIN TIME: CPT

## 2017-11-26 PROCEDURE — 80048 BASIC METABOLIC PNL TOTAL CA: CPT

## 2017-11-26 PROCEDURE — 11000001 HC ACUTE MED/SURG PRIVATE ROOM

## 2017-11-26 RX ORDER — RAMELTEON 8 MG/1
8 TABLET ORAL NIGHTLY PRN
Status: DISCONTINUED | OUTPATIENT
Start: 2017-11-26 | End: 2017-11-29 | Stop reason: HOSPADM

## 2017-11-26 RX ADMIN — TIMOLOL MALEATE 1 DROP: 5 SOLUTION OPHTHALMIC at 09:11

## 2017-11-26 RX ADMIN — RAMELTEON 8 MG: 8 TABLET, FILM COATED ORAL at 12:11

## 2017-11-26 RX ADMIN — TIMOLOL MALEATE 1 DROP: 5 SOLUTION OPHTHALMIC at 08:11

## 2017-11-26 RX ADMIN — RAMELTEON 8 MG: 8 TABLET, FILM COATED ORAL at 09:11

## 2017-11-26 RX ADMIN — METOPROLOL SUCCINATE 25 MG: 25 TABLET, EXTENDED RELEASE ORAL at 08:11

## 2017-11-26 RX ADMIN — LEVOTHYROXINE SODIUM 50 MCG: 50 TABLET ORAL at 05:11

## 2017-11-26 NOTE — SUBJECTIVE & OBJECTIVE
Interval History:  No acute events overnight. Hematuria has resolved. Plan for radiation tx brandy; completed 3/14 cycles. Patient has no complaints. Denies fevers, chills, CP, cough, abd pain, n/v, c/d.     Review of Systems   Constitutional: Positive for appetite change. Negative for chills, diaphoresis, fatigue and fever.   HENT: Negative for sore throat and trouble swallowing.    Respiratory: Negative for cough and shortness of breath.    Cardiovascular: Negative for chest pain, palpitations and leg swelling.   Gastrointestinal: Negative for abdominal distention, abdominal pain, constipation, diarrhea, nausea and vomiting.   Genitourinary: Negative for difficulty urinating, dysuria, flank pain, frequency, hematuria and urgency.   Musculoskeletal: Negative for arthralgias and back pain.   Skin: Negative for color change and pallor.   Neurological: Negative for dizziness and light-headedness.   Psychiatric/Behavioral: Negative for confusion.     Objective:     Vital Signs (Most Recent):  Temp: 97.6 °F (36.4 °C) (11/26/17 0745)  Pulse: 67 (11/26/17 0745)  Resp: 18 (11/26/17 0745)  BP: 121/73 (11/26/17 0745)  SpO2: 96 % (11/26/17 0745) Vital Signs (24h Range):  Temp:  [97.3 °F (36.3 °C)-97.9 °F (36.6 °C)] 97.6 °F (36.4 °C)  Pulse:  [47-83] 67  Resp:  [16-18] 18  SpO2:  [95 %-100 %] 96 %  BP: (103-151)/(57-85) 121/73     Weight: 45.4 kg (100 lb)  Body mass index is 19.53 kg/m².    Intake/Output Summary (Last 24 hours) at 11/26/17 0854  Last data filed at 11/26/17 0600   Gross per 24 hour   Intake              650 ml   Output                0 ml   Net              650 ml      Physical Exam   Constitutional: She is oriented to person, place, and time. No distress.   Thin, frail appearing elderly lady.   HENT:   Head: Normocephalic and atraumatic.   Mouth/Throat: Oropharynx is clear and moist.   Eyes: No scleral icterus.   Neck: Normal range of motion. Neck supple.   Cardiovascular: Normal rate, regular rhythm, normal  heart sounds and intact distal pulses.    Pulmonary/Chest: Effort normal and breath sounds normal.   Abdominal: Soft. Bowel sounds are normal. She exhibits no distension. There is no tenderness. There is no guarding.   Neurological: She is alert and oriented to person, place, and time.   Skin: Skin is warm and dry. She is not diaphoretic. No pallor.   Psychiatric: She has a normal mood and affect. Her behavior is normal.   Vitals reviewed.      Significant Labs:   Blood Culture: No results for input(s): LABBLOO in the last 48 hours.  CBC:   Recent Labs  Lab 11/25/17  0746 11/25/17  1953 11/26/17  0752   WBC 6.09 6.81 6.96   HGB 8.0* 8.0* 8.1*   HCT 23.9* 24.3* 24.5*    332 315     CMP:   Recent Labs  Lab 11/25/17  0405 11/26/17  0334   * 129*   K 4.5 4.5   CL 99 98   CO2 24 23   GLU 98 112*   BUN 27* 25*   CREATININE 1.0 1.1   CALCIUM 9.1 9.2   ANIONGAP 6* 8   EGFRNONAA 50.8* 45.3*       Significant Imaging: I have reviewed all pertinent imaging results/findings within the past 24 hours.

## 2017-11-26 NOTE — PROGRESS NOTES
[facility] Hospital Medicine  Progress Note    Patient Name: Monique Lew  MRN: [id number]  Patient Class: IP- Inpatient   Admission Date: 11/11/2017  Length of Stay: 14 days  Attending Physician: Summer Grewal*  Primary Care Provider: Aguila Hsieh MD    [facility] Medicine Team: [facility] HOSP MED 5 Navi Jha MD    Subjective:     Principal Problem:Bladder cancer    HPI:  Ms. 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 States she first noticed blood in her urine 2 days before presentation. This temporarily resolved, with hematuria occuring again the day before presentation. Hematuria is associated with increased urge to urinate and incontinence. Prior to this episode, patient states last episode of gross hematuria was in 2012, which was found to be 2/2 cystitis. Patient has had multiple UTIs and microhematuria on urinalysis since that time. She follows with a urologist, Dr. Enriquez at [facility], and has had 2 cystoscopies - most recently in 2014 which no evidence of tumor. Denies fever/chills/nausea/vomiting. Does states she has had decreased appetite, increased fatigue, and night sweats which cause her to soak through her clothes.     In the ED, was found to have Hg of 11 which trended down to 10 in 6 hours (baseline 14). Type and screen obtained. CT urogram performed showed 6.3x4.3x4.0 cm mass in bladder causing severe left hydronephrosis and hydroureter with evidence of hemorrhage in bladder. Also evaluated by urology in ED, who began CBI and recommended she be NPO for possible procedure.     Hospital Course:  Patient admitted to hospital medicine. Anticoagulation held in setting hemorrhage. CT Urogram on 11/11 demonstrated a large bladder mass. Urology consulted. 11/12 CT Chest also demonstrates bilateral pulmonary nodules that could be due to metastases. H/H  stabilized. Patient taken to OR 11/13 for cystoscopy, TURBT, pyelogram; stent unable to be placed as urethral orifice could not be visualized. Path pending. 11/14: started hep gtt. Pt still thinking if she wants nephrostomy tube or not. Urology to keep wilson in until Friday 11/17. Patient began to produce significant hematuria requiring 1u pRBC on 11/16 and the decision was made to stop heparin ggt due to risk outweighing benefit. 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 Results discussed with family and the poor prognosis associated given nodules found in lungs on CT previously. Patient decided that she would like to speak with oncology over her treatment options before deciding whether to pursue cancer treatment versus palliative care. Onc and urology following. No surgeries planned by urology. Per onc, pt is not a chemo candidate with active bleeding, but will discuss treatment options with patient once bleeding is controlled.     Interval History:  No acute events overnight. Hematuria has resolved. Plan for radiation tx brandy; completed 3/14 cycles. Patient has no complaints. Denies fevers, chills, CP, cough, abd pain, n/v, c/d.     Review of Systems   Constitutional: Positive for appetite change. Negative for chills, diaphoresis, fatigue and fever.   HENT: Negative for sore throat and trouble swallowing.    Respiratory: Negative for cough and shortness of breath.    Cardiovascular: Negative for chest pain, palpitations and leg swelling.   Gastrointestinal: Negative for abdominal distention, abdominal pain, constipation, diarrhea, nausea and vomiting.   Genitourinary: Negative for difficulty urinating, dysuria, flank pain, frequency, hematuria and urgency.   Musculoskeletal: Negative for arthralgias and back pain.   Skin: Negative for color change and pallor.   Neurological: Negative for dizziness and light-headedness.   Psychiatric/Behavioral: Negative for confusion.      Objective:     Vital Signs (Most Recent):  Temp: 97.6 °F (36.4 °C) (11/26/17 0745)  Pulse: 67 (11/26/17 0745)  Resp: 18 (11/26/17 0745)  BP: 121/73 (11/26/17 0745)  SpO2: 96 % (11/26/17 0745) Vital Signs (24h Range):  Temp:  [97.3 °F (36.3 °C)-97.9 °F (36.6 °C)] 97.6 °F (36.4 °C)  Pulse:  [47-83] 67  Resp:  [16-18] 18  SpO2:  [95 %-100 %] 96 %  BP: (103-151)/(57-85) 121/73     Weight: 45.4 kg (100 lb)  Body mass index is 19.53 kg/m².    Intake/Output Summary (Last 24 hours) at 11/26/17 0854  Last data filed at 11/26/17 0600   Gross per 24 hour   Intake              650 ml   Output                0 ml   Net              650 ml      Physical Exam   Constitutional: She is oriented to person, place, and time. No distress.   Thin, frail appearing elderly lady.   HENT:   Head: Normocephalic and atraumatic.   Mouth/Throat: Oropharynx is clear and moist.   Eyes: No scleral icterus.   Neck: Normal range of motion. Neck supple.   Cardiovascular: Normal rate, regular rhythm, normal heart sounds and intact distal pulses.    Pulmonary/Chest: Effort normal and breath sounds normal.   Abdominal: Soft. Bowel sounds are normal. She exhibits no distension. There is no tenderness. There is no guarding.   Neurological: She is alert and oriented to person, place, and time.   Skin: Skin is warm and dry. She is not diaphoretic. No pallor.   Psychiatric: She has a normal mood and affect. Her behavior is normal.   Vitals reviewed.      Significant Labs:   Blood Culture: No results for input(s): LABBLOO in the last 48 hours.  CBC:   Recent Labs  Lab 11/25/17  0746 11/25/17 1953 11/26/17  0752   WBC 6.09 6.81 6.96   HGB 8.0* 8.0* 8.1*   HCT 23.9* 24.3* 24.5*    332 315     CMP:   Recent Labs  Lab 11/25/17  0405 11/26/17  0334   * 129*   K 4.5 4.5   CL 99 98   CO2 24 23   GLU 98 112*   BUN 27* 25*   CREATININE 1.0 1.1   CALCIUM 9.1 9.2   ANIONGAP 6* 8   EGFRNONAA 50.8* 45.3*       Significant Imaging: I have reviewed all  pertinent imaging results/findings within the past 24 hours.    Assessment/Plan:      * Bladder cancer    CT urogram with evidence of large, obstructing mass and hemorrhage in the bladder.  - Home coumadin and ASA held, per urology recs subq heparin for DVT prophylaxis  - Hg baseline 14, 11 at presentation.   - Type and screen ordered  - CBC q12h, will transfuse for Hg <7  - 11/13 OR for cystoscopy, TURBT, and pyelogram; stent could not be placed as urethral orifice could not be visualized.   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 Bilateral nodules found on CT Chest have high likelihood of being metastases.   - Oncology consulted to discuss treatment options. Pt would like to pursue immunotherapy.       DISPO:  Pt not eligible for SNF placement while on radiation; in discussion with family on placement.           Gross hematuria    -See Bladder cancer    - Complains of intermittent difficulty voiding overnight. Bladder scan early this AM was 260cc, patient was then catheterized with return of 600 cc light pink urine, no clots  - nurse reported that she noticed blood in her urine this morning.  - will not restart AC; monitor 24 hrs   - consult radiation oncology for intractable hematuria   - received 3/14 round of radiation; will receive today (needs a total of 14 days)    Discussed with radiation oncologist regarding re-starting AC; patient has had multiple episodes of urination without blood. Per rad-onc it is safer to re-start AC at a latter date; possibly Monday.         Bladder mass              Hydronephrosis of left kidney    -If patient wishes to pursue chemotherapy, will need IR consult for nephrostomy tube placement eventually.           Depression (emotion)    - d/c'ed venlafaxine as pt was not taking it at home            Chronic hyponatremia    Sodium 125 at admission, had been normal in January 2017  - Trending up. D/c'ed venlafaxine as she was not taking this at  home          Acquired hypothyroidism    - Continue synthroid 50 mcg  - TSH WNL          Essential hypertension              History of CVA (cerebrovascular accident)    Follows with neurology. 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  - Coumadin held          Chronic atrial fibrillation    - Patient with high CHADS-VASc score but holding anticoagulation till Monday   - Plan with re-start coumadin with lovenox or heparin bridge         Current use of long term anticoagulation    - INR 2.7 at presentation  - Currently no anticoagulation per urology, will restart when able.             VTE Risk Mitigation     None              Navi Jha MD  Department of Hospital Medicine   [facility]

## 2017-11-26 NOTE — PLAN OF CARE
"Problem: Patient Care Overview  Goal: Plan of Care Review  Outcome: Ongoing (interventions implemented as appropriate)  Reviewed plan of care with patient, patient received radiation treatment during day, no pain or blood in urine this shift, patient unable to sleep so given PRN sleep medication - worked moderately well. Patient was concerned regarding small "dribbles" of urination throughout day but upon questioning has not been having as much fluids either, no bladder distention or pressure noted, increased PO fluids and after an hour patient had good urine output that was not as concentrated. Patient had no questions or concerns at this time.       "

## 2017-11-26 NOTE — ASSESSMENT & PLAN NOTE
-See Bladder cancer    - Complains of intermittent difficulty voiding overnight. Bladder scan early this AM was 260cc, patient was then catheterized with return of 600 cc light pink urine, no clots  - nurse reported that she noticed blood in her urine this morning.  - will not restart AC; monitor 24 hrs   - consult radiation oncology for intractable hematuria   - received 3/14 round of radiation; will receive today (needs a total of 14 days)    Discussed with radiation oncologist regarding re-starting AC; patient has had multiple episodes of urination without blood. Per rad-onc it is safer to re-start AC at a latter date; possibly Monday.

## 2017-11-27 ENCOUNTER — DOCUMENTATION ONLY (OUTPATIENT)
Dept: RADIATION THERAPY | Facility: HOSPITAL | Age: 82
End: 2017-11-27

## 2017-11-27 LAB
ANION GAP SERPL CALC-SCNC: 7 MMOL/L
BASOPHILS # BLD AUTO: 0.04 K/UL
BASOPHILS # BLD AUTO: 0.07 K/UL
BASOPHILS NFR BLD: 0.6 %
BASOPHILS NFR BLD: 1 %
BUN SERPL-MCNC: 22 MG/DL
CALCIUM SERPL-MCNC: 9 MG/DL
CHLORIDE SERPL-SCNC: 104 MMOL/L
CO2 SERPL-SCNC: 23 MMOL/L
CREAT SERPL-MCNC: 0.9 MG/DL
DIFFERENTIAL METHOD: ABNORMAL
DIFFERENTIAL METHOD: ABNORMAL
EOSINOPHIL # BLD AUTO: 0.1 K/UL
EOSINOPHIL # BLD AUTO: 0.1 K/UL
EOSINOPHIL NFR BLD: 1.2 %
EOSINOPHIL NFR BLD: 1.5 %
ERYTHROCYTE [DISTWIDTH] IN BLOOD BY AUTOMATED COUNT: 14.2 %
ERYTHROCYTE [DISTWIDTH] IN BLOOD BY AUTOMATED COUNT: 14.2 %
EST. GFR  (AFRICAN AMERICAN): >60 ML/MIN/1.73 M^2
EST. GFR  (NON AFRICAN AMERICAN): 57.7 ML/MIN/1.73 M^2
GLUCOSE SERPL-MCNC: 96 MG/DL
HCT VFR BLD AUTO: 24.8 %
HCT VFR BLD AUTO: 26.1 %
HGB BLD-MCNC: 8.1 G/DL
HGB BLD-MCNC: 8.3 G/DL
IMM GRANULOCYTES # BLD AUTO: 0.03 K/UL
IMM GRANULOCYTES # BLD AUTO: 0.03 K/UL
IMM GRANULOCYTES NFR BLD AUTO: 0.4 %
IMM GRANULOCYTES NFR BLD AUTO: 0.4 %
INR PPP: 1
LYMPHOCYTES # BLD AUTO: 0.7 K/UL
LYMPHOCYTES # BLD AUTO: 0.8 K/UL
LYMPHOCYTES NFR BLD: 10.5 %
LYMPHOCYTES NFR BLD: 9.7 %
MCH RBC QN AUTO: 28.4 PG
MCH RBC QN AUTO: 28.8 PG
MCHC RBC AUTO-ENTMCNC: 31.8 G/DL
MCHC RBC AUTO-ENTMCNC: 32.7 G/DL
MCV RBC AUTO: 88 FL
MCV RBC AUTO: 89 FL
MONOCYTES # BLD AUTO: 0.6 K/UL
MONOCYTES # BLD AUTO: 0.7 K/UL
MONOCYTES NFR BLD: 8.7 %
MONOCYTES NFR BLD: 9.2 %
NEUTROPHILS # BLD AUTO: 5.3 K/UL
NEUTROPHILS # BLD AUTO: 5.7 K/UL
NEUTROPHILS NFR BLD: 77.4 %
NEUTROPHILS NFR BLD: 79.4 %
NRBC BLD-RTO: 0 /100 WBC
NRBC BLD-RTO: 0 /100 WBC
PLATELET # BLD AUTO: 327 K/UL
PLATELET # BLD AUTO: 339 K/UL
PMV BLD AUTO: 10 FL
PMV BLD AUTO: 9.9 FL
POTASSIUM SERPL-SCNC: 4.2 MMOL/L
PROTHROMBIN TIME: 10.8 SEC
RBC # BLD AUTO: 2.81 M/UL
RBC # BLD AUTO: 2.92 M/UL
SODIUM SERPL-SCNC: 134 MMOL/L
WBC # BLD AUTO: 6.7 K/UL
WBC # BLD AUTO: 7.3 K/UL

## 2017-11-27 PROCEDURE — 77387 GUIDANCE FOR RADJ TX DLVR: CPT | Mod: TC | Performed by: RADIOLOGY

## 2017-11-27 PROCEDURE — 99232 SBSQ HOSP IP/OBS MODERATE 35: CPT | Mod: GC,,, | Performed by: HOSPITALIST

## 2017-11-27 PROCEDURE — 63600175 PHARM REV CODE 636 W HCPCS: Performed by: STUDENT IN AN ORGANIZED HEALTH CARE EDUCATION/TRAINING PROGRAM

## 2017-11-27 PROCEDURE — 77412 RADIATION TX DELIVERY LVL 3: CPT | Performed by: RADIOLOGY

## 2017-11-27 PROCEDURE — 36415 COLL VENOUS BLD VENIPUNCTURE: CPT

## 2017-11-27 PROCEDURE — G6002 STEREOSCOPIC X-RAY GUIDANCE: HCPCS | Mod: 26,,, | Performed by: RADIOLOGY

## 2017-11-27 PROCEDURE — 85610 PROTHROMBIN TIME: CPT

## 2017-11-27 PROCEDURE — 97116 GAIT TRAINING THERAPY: CPT

## 2017-11-27 PROCEDURE — 85025 COMPLETE CBC W/AUTO DIFF WBC: CPT

## 2017-11-27 PROCEDURE — 11000001 HC ACUTE MED/SURG PRIVATE ROOM

## 2017-11-27 PROCEDURE — 25000003 PHARM REV CODE 250: Performed by: ANESTHESIOLOGY

## 2017-11-27 PROCEDURE — 25000003 PHARM REV CODE 250: Performed by: HOSPITALIST

## 2017-11-27 PROCEDURE — 80048 BASIC METABOLIC PNL TOTAL CA: CPT

## 2017-11-27 PROCEDURE — 25000003 PHARM REV CODE 250: Performed by: STUDENT IN AN ORGANIZED HEALTH CARE EDUCATION/TRAINING PROGRAM

## 2017-11-27 PROCEDURE — A4216 STERILE WATER/SALINE, 10 ML: HCPCS | Performed by: ANESTHESIOLOGY

## 2017-11-27 PROCEDURE — 25000003 PHARM REV CODE 250

## 2017-11-27 RX ORDER — WARFARIN 1 MG/1
1 TABLET ORAL DAILY
Status: DISCONTINUED | OUTPATIENT
Start: 2017-11-27 | End: 2017-11-28

## 2017-11-27 RX ORDER — ENOXAPARIN SODIUM 100 MG/ML
1 INJECTION SUBCUTANEOUS
Status: DISCONTINUED | OUTPATIENT
Start: 2017-11-27 | End: 2017-11-28

## 2017-11-27 RX ORDER — ACETAMINOPHEN 325 MG/1
650 TABLET ORAL EVERY 6 HOURS PRN
Status: DISCONTINUED | OUTPATIENT
Start: 2017-11-27 | End: 2017-11-29 | Stop reason: HOSPADM

## 2017-11-27 RX ADMIN — ACETAMINOPHEN 650 MG: 325 TABLET ORAL at 09:11

## 2017-11-27 RX ADMIN — TIMOLOL MALEATE 1 DROP: 5 SOLUTION OPHTHALMIC at 09:11

## 2017-11-27 RX ADMIN — ENOXAPARIN SODIUM 50 MG: 100 INJECTION SUBCUTANEOUS at 01:11

## 2017-11-27 RX ADMIN — LEVOTHYROXINE SODIUM 50 MCG: 50 TABLET ORAL at 05:11

## 2017-11-27 RX ADMIN — METOPROLOL SUCCINATE 25 MG: 25 TABLET, EXTENDED RELEASE ORAL at 09:11

## 2017-11-27 RX ADMIN — WARFARIN SODIUM 1 MG: 1 TABLET ORAL at 05:11

## 2017-11-27 RX ADMIN — SODIUM CHLORIDE, PRESERVATIVE FREE 3 ML: 5 INJECTION INTRAVENOUS at 01:11

## 2017-11-27 NOTE — PLAN OF CARE
Problem: Patient Care Overview  Goal: Plan of Care Review  Outcome: Ongoing (interventions implemented as appropriate)  Reviewed plan of care with patient, patient to receive another radiation treatment today, no blood in urine, pain, or bladder distention noted this shift. Patient took PRN sleeping pill again, but no other complaints. No questions or concerns at this time. See flowsheets for detailed assessment information.

## 2017-11-27 NOTE — PLAN OF CARE
CM informed per IM5 pt could technically go home if she has intermittent assistance at home and ride to radiation therapy daily for next 10 days.  JUANA updated and informed Olga Lidia ALEXANDRA, of need to speak with pt family, sister, here at hospital with pt to ensure above is possible and pt will be able to discharge home today vs staying in hospital for next 10 days to complete radiaiton therapy.  SW following.

## 2017-11-27 NOTE — ASSESSMENT & PLAN NOTE
-See Bladder cancer    - Complains of intermittent difficulty voiding overnight. Bladder scan early this AM was 260cc, patient was then catheterized with return of 600 cc light pink urine, no clots  - nurse reported that she noticed blood in her urine this morning.  - will not restart AC; monitor 24 hrs   - consult radiation oncology for intractable hematuria   - received 4/14 round of radiation; will receive today (needs a total of 14 days)    Discussed with radiation oncologist regarding re-starting AC; patient has had multiple episodes of urination without blood. Per rad-onc it is safer to re-start AC at a latter date; possibly Monday.     - restart AC today Lovenox + warfarin 11/27/17

## 2017-11-27 NOTE — PT/OT/SLP PROGRESS
"Physical Therapy  Treatment    Monique Lew   MRN: [id number]   Admitting Diagnosis: Bladder cancer    PT Received On: 11/27/17  PT Start Time: 1354     PT Stop Time: 1410    PT Total Time (min): 16 min       Billable Minutes:  Gait [id number] Min    Treatment Type: Treatment  PT/PTA: PT     PTA Visit Number: 4       General Precautions: Standard, fall, vision impaired  Orthopedic Precautions: N/A   Braces:      Do you have any cultural, spiritual, Taoism conflicts, given your current situation?: none noted    Subjective:  Communicated with nurse prior to session.  "I'm doing fine, you just wanted to walk w/ a good looking woman."    Pain/Comfort  Pain Rating 1: 0/10    Objective:   Patient found with: telemetry    Functional Mobility:  Bed Mobility:   Supine to Sit: Independent  Sit to Supine: Independent    Transfers:  Sit <> Stand Assistance: Supervision  Sit <> Stand Assistive Device: No Assistive Device    Gait:   Gait Distance: 112'  Assistance 1: Contact Guard Assistance, Stand by Assistance  Gait Assistive Device: No device  Gait Pattern: full weightbearing  Gait Deviation(s): decreased ijeoma, decreased step length, decreased stride length, foot flat (increased lateral sway but no true LOB.)    Stairs:  Pt ascended/descend 10 stair(s) with No Assistive Device with right and handrails with Stand-by Assistance.     Balance:   Static Sit: GOOD: Takes MODERATE challenges from all directions  Dynamic Sit: GOOD: Maintains balance through MODERATE excursions of active trunk movement  Static Stand: GOOD: Takes MODERATE challenges from all directions  Dynamic stand: FAIR+: Needs CLOSE SUPERVISION during gait and is able to right self with minor LOB    -Patient able to  object from ground w/ no LOB at SBA    Therapeutic Activities and Exercises:  Patient assisted w/ hallway ambulation and stair navigation     AM-PAC 6 CLICK MOBILITY  How much help from another person does this patient currently need?   1 " = Unable, Total/Dependent Assistance  2 = A lot, Maximum/Moderate Assistance  3 = A little, Minimum/Contact Guard/Supervision  4 = None, Modified [place]/Independent    Turning over in bed (including adjusting bedclothes, sheets and blankets)?: 4  Sitting down on and standing up from a chair with arms (e.g., wheelchair, bedside commode, etc.): 4  Moving from lying on back to sitting on the side of the bed?: 4  Moving to and from a bed to a chair (including a wheelchair)?: 4  Need to walk in hospital room?: 4  Climbing 3-5 steps with a railing?: 3  Total Score: 23    AM-PAC Raw Score CMS G-Code Modifier Level of Impairment Assistance   6 % Total / Unable   7 - 9 CM 80 - 100% Maximal Assist   10 - 14 CL 60 - 80% Moderate Assist   15 - 19 CK 40 - 60% Moderate Assist   20 - 22 CJ 20 - 40% Minimal Assist   23 CI 1-20% SBA / CGA   24 CH 0% Independent/ Mod I     Patient left supine with all lines intact, call button in reach, nurse notified and friend present.    Assessment:  Monique Lew is a 87 y.o. female with a medical diagnosis of Bladder cancer and presents with vision deficits, gait instability, and impaired balance. Patient demonstrates overall good bed and functional mobility but is able to safely ambulate safely w/ no AD and had no true LOB. Patient would benefit from using a RW but would likely not use one. Patient showed some SOB following stair navigation but no true signs of respiratory distress. Patient will continue to benefit from skilled PT services to address her listed impairments and to improve her out of bed mobility. Patient's frequency will be decreased due to level of performance, but will continue to be followed to address balance deficits.     Rehab identified problem list/impairments: Rehab identified problem list/impairments: impaired endurance, impaired balance, visual deficits, gait instability    Rehab potential is good.    Activity tolerance: Good    Discharge  recommendations: Discharge Facility/Level Of Care Needs: home health PT     Barriers to discharge: Barriers to Discharge: Decreased caregiver support    Equipment recommendations: Equipment Needed After Discharge: bedside commode, bath bench, walker, rolling     GOALS:    Physical Therapy Goals        Problem: Physical Therapy Goal    Goal Priority Disciplines Outcome Goal Variances Interventions   Physical Therapy Goal     PT/OT, PT Ongoing (interventions implemented as appropriate)     Description:  PT goals until 12/07/17    1. Patient will ambulate 150' at supervision w/ LRD and minimal L/R lateral Sway  2. Patient will ascend/descend 10 stairs w/ RHR at SBA   3. Patient will perform therapeutic exercises/activities in standing w/ minimal LOB/lateral sway to demonstrate improved balance.                    PLAN:    Patient to be seen 2 x/week  to address the above listed problems via gait training, therapeutic activities, therapeutic exercises, neuromuscular re-education  Plan of Care expires: 12/15/17  Plan of Care reviewed with: patient         Oren Napoles, PT  11/27/2017

## 2017-11-27 NOTE — ASSESSMENT & PLAN NOTE
- Patient with high CHADS-VASc score but holding anticoagulation till Monday   - Plan with re-start coumadin with lovenox or heparin bridge   - 11/27/17 restart AC  - continue with BB for rate control

## 2017-11-27 NOTE — PROGRESS NOTES
[facility] Hospital Medicine  Progress Note    Patient Name: Monique Lew  MRN: [id number]  Patient Class: IP- Inpatient   Admission Date: 11/11/2017  Length of Stay: 15 days  Attending Physician: Summer Grewal*  Primary Care Provider: Aguila Hsieh MD    [facility] Medicine Team: [facility] HOSP MED 5 Navi Jha MD    Subjective:     Principal Problem:Bladder cancer    HPI:  Ms. 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 States she first noticed blood in her urine 2 days before presentation. This temporarily resolved, with hematuria occuring again the day before presentation. Hematuria is associated with increased urge to urinate and incontinence. Prior to this episode, patient states last episode of gross hematuria was in 2012, which was found to be 2/2 cystitis. Patient has had multiple UTIs and microhematuria on urinalysis since that time. She follows with a urologist, Dr. Enriquez at [facility], and has had 2 cystoscopies - most recently in 2014 which no evidence of tumor. Denies fever/chills/nausea/vomiting. Does states she has had decreased appetite, increased fatigue, and night sweats which cause her to soak through her clothes.     In the ED, was found to have Hg of 11 which trended down to 10 in 6 hours (baseline 14). Type and screen obtained. CT urogram performed showed 6.3x4.3x4.0 cm mass in bladder causing severe left hydronephrosis and hydroureter with evidence of hemorrhage in bladder. Also evaluated by urology in ED, who began CBI and recommended she be NPO for possible procedure.     Hospital Course:  Patient admitted to hospital medicine. Anticoagulation held in setting hemorrhage. CT Urogram on 11/11 demonstrated a large bladder mass. Urology consulted. 11/12 CT Chest also demonstrates bilateral pulmonary nodules that could be due to metastases. H/H  stabilized. Patient taken to OR 11/13 for cystoscopy, TURBT, pyelogram; stent unable to be placed as urethral orifice could not be visualized. Path pending. 11/14: started hep gtt. Pt still thinking if she wants nephrostomy tube or not. Urology to keep wilson in until Friday 11/17. Patient began to produce significant hematuria requiring 1u pRBC on 11/16 and the decision was made to stop heparin ggt due to risk outweighing benefit. 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 Results discussed with family and the poor prognosis associated given nodules found in lungs on CT previously. Patient decided that she would like to speak with oncology over her treatment options before deciding whether to pursue cancer treatment versus palliative care. Onc and urology following. No surgeries planned by urology. Per onc, pt is not a chemo candidate with active bleeding, but will discuss treatment options with patient once bleeding is controlled.     Interval History: No acute events overnight. Hematuria has resolved. Plan for radiation tx brandy; completed 4/14 cycles. Patient has no complaints. Denies fevers, chills, CP, cough, abd pain, n/v, c/d.     Review of Systems   Constitutional: Positive for appetite change. Negative for chills, diaphoresis, fatigue and fever.   HENT: Negative for sore throat and trouble swallowing.    Respiratory: Negative for cough and shortness of breath.    Cardiovascular: Negative for chest pain, palpitations and leg swelling.   Gastrointestinal: Negative for abdominal distention, abdominal pain, constipation, diarrhea, nausea and vomiting.   Genitourinary: Negative for difficulty urinating, dysuria, flank pain, frequency, hematuria and urgency.   Musculoskeletal: Negative for arthralgias and back pain.   Skin: Negative for color change and pallor.   Neurological: Negative for dizziness and light-headedness.   Psychiatric/Behavioral: Negative for confusion.      Objective:     Vital Signs (Most Recent):  Temp: 97.2 °F (36.2 °C) (11/27/17 1149)  Pulse: (!) 58 (11/27/17 1149)  Resp: 17 (11/27/17 1149)  BP: (!) 117/56 (11/27/17 1149)  SpO2: 98 % (11/27/17 1149) Vital Signs (24h Range):  Temp:  [97.2 °F (36.2 °C)-98.2 °F (36.8 °C)] 97.2 °F (36.2 °C)  Pulse:  [58-78] 58  Resp:  [14-17] 17  SpO2:  [96 %-100 %] 98 %  BP: (117-154)/(56-87) 117/56     Weight: 45.4 kg (100 lb)  Body mass index is 19.53 kg/m².    Intake/Output Summary (Last 24 hours) at 11/27/17 1321  Last data filed at 11/27/17 0200   Gross per 24 hour   Intake              240 ml   Output                0 ml   Net              240 ml      Physical Exam   Constitutional: She is oriented to person, place, and time. No distress.   Thin, frail appearing elderly lady.   HENT:   Head: Normocephalic and atraumatic.   Mouth/Throat: Oropharynx is clear and moist.   Eyes: No scleral icterus.   Neck: Normal range of motion. Neck supple.   Cardiovascular: Normal rate, regular rhythm, normal heart sounds and intact distal pulses.    Pulmonary/Chest: Effort normal and breath sounds normal.   Abdominal: Soft. Bowel sounds are normal. She exhibits no distension. There is no tenderness. There is no guarding.   Neurological: She is alert and oriented to person, place, and time.   Skin: Skin is warm and dry. She is not diaphoretic. No pallor.   Psychiatric: She has a normal mood and affect. Her behavior is normal.   Vitals reviewed.      Significant Labs:   CBC:   Recent Labs  Lab 11/26/17  0752 11/26/17 2015 11/27/17  0841   WBC 6.96 7.43 6.70   HGB 8.1* 8.2* 8.3*   HCT 24.5* 26.6* 26.1*    311 327     CMP:   Recent Labs  Lab 11/26/17  0334 11/27/17  0403   * 134*   K 4.5 4.2   CL 98 104   CO2 23 23   * 96   BUN 25* 22   CREATININE 1.1 0.9   CALCIUM 9.2 9.0   ANIONGAP 8 7*   EGFRNONAA 45.3* 57.7*       Significant Imaging: I have reviewed and interpreted all pertinent imaging results/findings within the past  24 hours.    Assessment/Plan:      * Bladder cancer    CT urogram with evidence of large, obstructing mass and hemorrhage in the bladder.  - Home coumadin and ASA held, per urology recs subq heparin for DVT prophylaxis  - Hg baseline 14, 11 at presentation.   - Type and screen ordered  - CBC q12h, will transfuse for Hg <7  - 11/13 OR for cystoscopy, TURBT, and pyelogram; stent could not be placed as urethral orifice could not be visualized.   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 Bilateral nodules found on CT Chest have high likelihood of being metastases.   - Oncology consulted to discuss treatment options. Pt would like to pursue immunotherapy.       DISPO:  Pt not eligible for SNF placement while on radiation; in discussion with family on placement.           Gross hematuria    -See Bladder cancer    - Complains of intermittent difficulty voiding overnight. Bladder scan early this AM was 260cc, patient was then catheterized with return of 600 cc light pink urine, no clots  - nurse reported that she noticed blood in her urine this morning.  - will not restart AC; monitor 24 hrs   - consult radiation oncology for intractable hematuria   - received 4/14 round of radiation; will receive today (needs a total of 14 days)    Discussed with radiation oncologist regarding re-starting AC; patient has had multiple episodes of urination without blood. Per rad-onc it is safer to re-start AC at a latter date; possibly Monday.     - restart AC today Lovenox + warfarin 11/27/17        Bladder mass              Hydronephrosis of left kidney    -If patient wishes to pursue chemotherapy, will need IR consult for nephrostomy tube placement eventually.           Depression (emotion)    - d/c'ed venlafaxine as pt was not taking it at home            Chronic hyponatremia    Sodium 125 at admission, had been normal in January 2017  - Trending up. D/c'ed venlafaxine as she was not taking this at home           Acquired hypothyroidism    - Continue synthroid 50 mcg  - TSH WNL          Essential hypertension              History of CVA (cerebrovascular accident)    Follows with neurology. 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  - Coumadin held          Chronic atrial fibrillation    - Patient with high CHADS-VASc score but holding anticoagulation till Monday   - Plan with re-start coumadin with lovenox or heparin bridge   - 11/27/17 restart AC  - continue with BB for rate control         Current use of long term anticoagulation    - INR 2.7 at presentation              VTE Risk Mitigation         Ordered     warfarin (COUMADIN) tablet 1 mg  Daily     Route:  Oral        11/27/17 1130     enoxaparin injection 50 mg  Every 12 hours (non-standard times)     Route:  Subcutaneous        11/27/17 1130              Navi Jha MD  Department of Hospital Medicine   [facility]

## 2017-11-27 NOTE — PLAN OF CARE
Problem: Physical Therapy Goal  Goal: Physical Therapy Goal  PT goals until 12/07/17    1. Patient will ambulate 150' at supervision w/ LRD and minimal L/R lateral Sway  2. Patient will ascend/descend 10 stairs w/ RHR at SBA   3. Patient will perform therapeutic exercises/activities in standing w/ minimal LOB/lateral sway to demonstrate improved balance.  Outcome: Ongoing (interventions implemented as appropriate)  PT Goals extended and updated    Oren Napoles, PT  11/27/2017

## 2017-11-27 NOTE — SUBJECTIVE & OBJECTIVE
Interval History: No acute events overnight. Hematuria has resolved. Plan for radiation tx brandy; completed 4/14 cycles. Patient has no complaints. Denies fevers, chills, CP, cough, abd pain, n/v, c/d.     Review of Systems   Constitutional: Positive for appetite change. Negative for chills, diaphoresis, fatigue and fever.   HENT: Negative for sore throat and trouble swallowing.    Respiratory: Negative for cough and shortness of breath.    Cardiovascular: Negative for chest pain, palpitations and leg swelling.   Gastrointestinal: Negative for abdominal distention, abdominal pain, constipation, diarrhea, nausea and vomiting.   Genitourinary: Negative for difficulty urinating, dysuria, flank pain, frequency, hematuria and urgency.   Musculoskeletal: Negative for arthralgias and back pain.   Skin: Negative for color change and pallor.   Neurological: Negative for dizziness and light-headedness.   Psychiatric/Behavioral: Negative for confusion.     Objective:     Vital Signs (Most Recent):  Temp: 97.2 °F (36.2 °C) (11/27/17 1149)  Pulse: (!) 58 (11/27/17 1149)  Resp: 17 (11/27/17 1149)  BP: (!) 117/56 (11/27/17 1149)  SpO2: 98 % (11/27/17 1149) Vital Signs (24h Range):  Temp:  [97.2 °F (36.2 °C)-98.2 °F (36.8 °C)] 97.2 °F (36.2 °C)  Pulse:  [58-78] 58  Resp:  [14-17] 17  SpO2:  [96 %-100 %] 98 %  BP: (117-154)/(56-87) 117/56     Weight: 45.4 kg (100 lb)  Body mass index is 19.53 kg/m².    Intake/Output Summary (Last 24 hours) at 11/27/17 1321  Last data filed at 11/27/17 0200   Gross per 24 hour   Intake              240 ml   Output                0 ml   Net              240 ml      Physical Exam   Constitutional: She is oriented to person, place, and time. No distress.   Thin, frail appearing elderly lady.   HENT:   Head: Normocephalic and atraumatic.   Mouth/Throat: Oropharynx is clear and moist.   Eyes: No scleral icterus.   Neck: Normal range of motion. Neck supple.   Cardiovascular: Normal rate, regular rhythm,  normal heart sounds and intact distal pulses.    Pulmonary/Chest: Effort normal and breath sounds normal.   Abdominal: Soft. Bowel sounds are normal. She exhibits no distension. There is no tenderness. There is no guarding.   Neurological: She is alert and oriented to person, place, and time.   Skin: Skin is warm and dry. She is not diaphoretic. No pallor.   Psychiatric: She has a normal mood and affect. Her behavior is normal.   Vitals reviewed.      Significant Labs:   CBC:   Recent Labs  Lab 11/26/17  0752 11/26/17 2015 11/27/17  0841   WBC 6.96 7.43 6.70   HGB 8.1* 8.2* 8.3*   HCT 24.5* 26.6* 26.1*    311 327     CMP:   Recent Labs  Lab 11/26/17  0334 11/27/17  0403   * 134*   K 4.5 4.2   CL 98 104   CO2 23 23   * 96   BUN 25* 22   CREATININE 1.1 0.9   CALCIUM 9.2 9.0   ANIONGAP 8 7*   EGFRNONAA 45.3* 57.7*       Significant Imaging: I have reviewed and interpreted all pertinent imaging results/findings within the past 24 hours.

## 2017-11-27 NOTE — PLAN OF CARE
CM in to see pt alone in room sitting on side of bed AAO in no distress discussing discharge plan.  Pt states she would like to go home and her sister is going to stay with her; however, she left today to go to [place] to get some things and will be back Wednesday to stay and transport her to/from radiation and followups with 5 team aware.  HH orders for SNV requested per CM from [facility] at time of discharge with Olga Lidia ALEXANDRA, to follow with referral.

## 2017-11-27 NOTE — PLAN OF CARE
11/27/17 1250   Medicare Message   Important Message from Medicare regarding Discharge Appeal Rights Given to patient/caregiver;Explained to patient/caregiver;Signed/date by patient/caregiver   Date IMM was signed 11/27/17   Time IMM was signed 1251

## 2017-11-28 LAB
ANION GAP SERPL CALC-SCNC: 7 MMOL/L
BASOPHILS # BLD AUTO: 0.05 K/UL
BASOPHILS # BLD AUTO: 0.06 K/UL
BASOPHILS NFR BLD: 0.7 %
BASOPHILS NFR BLD: 1 %
BUN SERPL-MCNC: 18 MG/DL
CALCIUM SERPL-MCNC: 9.1 MG/DL
CHLORIDE SERPL-SCNC: 99 MMOL/L
CO2 SERPL-SCNC: 24 MMOL/L
CREAT SERPL-MCNC: 1 MG/DL
DIFFERENTIAL METHOD: ABNORMAL
DIFFERENTIAL METHOD: ABNORMAL
EOSINOPHIL # BLD AUTO: 0.1 K/UL
EOSINOPHIL # BLD AUTO: 0.1 K/UL
EOSINOPHIL NFR BLD: 1.4 %
EOSINOPHIL NFR BLD: 1.6 %
ERYTHROCYTE [DISTWIDTH] IN BLOOD BY AUTOMATED COUNT: 14.1 %
ERYTHROCYTE [DISTWIDTH] IN BLOOD BY AUTOMATED COUNT: 14.1 %
EST. GFR  (AFRICAN AMERICAN): 58.5 ML/MIN/1.73 M^2
EST. GFR  (NON AFRICAN AMERICAN): 50.8 ML/MIN/1.73 M^2
GLUCOSE SERPL-MCNC: 111 MG/DL
HCT VFR BLD AUTO: 22.9 %
HCT VFR BLD AUTO: 25.2 %
HGB BLD-MCNC: 7.4 G/DL
HGB BLD-MCNC: 8.2 G/DL
IMM GRANULOCYTES # BLD AUTO: 0.02 K/UL
IMM GRANULOCYTES # BLD AUTO: 0.04 K/UL
IMM GRANULOCYTES NFR BLD AUTO: 0.3 %
IMM GRANULOCYTES NFR BLD AUTO: 0.6 %
INR PPP: 1.1
LYMPHOCYTES # BLD AUTO: 0.7 K/UL
LYMPHOCYTES # BLD AUTO: 0.8 K/UL
LYMPHOCYTES NFR BLD: 11.3 %
LYMPHOCYTES NFR BLD: 11.6 %
MCH RBC QN AUTO: 28.9 PG
MCH RBC QN AUTO: 29.5 PG
MCHC RBC AUTO-ENTMCNC: 32.3 G/DL
MCHC RBC AUTO-ENTMCNC: 32.5 G/DL
MCV RBC AUTO: 90 FL
MCV RBC AUTO: 91 FL
MONOCYTES # BLD AUTO: 0.6 K/UL
MONOCYTES # BLD AUTO: 0.7 K/UL
MONOCYTES NFR BLD: 10.1 %
MONOCYTES NFR BLD: 9.6 %
NEUTROPHILS # BLD AUTO: 4.4 K/UL
NEUTROPHILS # BLD AUTO: 5.1 K/UL
NEUTROPHILS NFR BLD: 75.9 %
NEUTROPHILS NFR BLD: 75.9 %
NRBC BLD-RTO: 0 /100 WBC
NRBC BLD-RTO: 0 /100 WBC
PLATELET # BLD AUTO: 309 K/UL
PLATELET # BLD AUTO: 346 K/UL
PMV BLD AUTO: 9.8 FL
PMV BLD AUTO: 9.9 FL
POTASSIUM SERPL-SCNC: 3.7 MMOL/L
PROTHROMBIN TIME: 11.3 SEC
RBC # BLD AUTO: 2.56 M/UL
RBC # BLD AUTO: 2.78 M/UL
SODIUM SERPL-SCNC: 130 MMOL/L
WBC # BLD AUTO: 5.77 K/UL
WBC # BLD AUTO: 6.75 K/UL

## 2017-11-28 PROCEDURE — 25000003 PHARM REV CODE 250

## 2017-11-28 PROCEDURE — 77387 GUIDANCE FOR RADJ TX DLVR: CPT | Mod: TC | Performed by: RADIOLOGY

## 2017-11-28 PROCEDURE — 25000003 PHARM REV CODE 250: Performed by: STUDENT IN AN ORGANIZED HEALTH CARE EDUCATION/TRAINING PROGRAM

## 2017-11-28 PROCEDURE — 11000001 HC ACUTE MED/SURG PRIVATE ROOM

## 2017-11-28 PROCEDURE — 36415 COLL VENOUS BLD VENIPUNCTURE: CPT

## 2017-11-28 PROCEDURE — 63600175 PHARM REV CODE 636 W HCPCS: Performed by: STUDENT IN AN ORGANIZED HEALTH CARE EDUCATION/TRAINING PROGRAM

## 2017-11-28 PROCEDURE — 85025 COMPLETE CBC W/AUTO DIFF WBC: CPT

## 2017-11-28 PROCEDURE — 99232 SBSQ HOSP IP/OBS MODERATE 35: CPT | Mod: GC,,, | Performed by: INTERNAL MEDICINE

## 2017-11-28 PROCEDURE — 25000003 PHARM REV CODE 250: Performed by: HOSPITALIST

## 2017-11-28 PROCEDURE — 80048 BASIC METABOLIC PNL TOTAL CA: CPT

## 2017-11-28 PROCEDURE — 85610 PROTHROMBIN TIME: CPT

## 2017-11-28 PROCEDURE — 77412 RADIATION TX DELIVERY LVL 3: CPT | Performed by: RADIOLOGY

## 2017-11-28 PROCEDURE — G6002 STEREOSCOPIC X-RAY GUIDANCE: HCPCS | Mod: 26,,, | Performed by: RADIOLOGY

## 2017-11-28 RX ORDER — SERTRALINE HYDROCHLORIDE 25 MG/1
25 TABLET, FILM COATED ORAL DAILY
Status: DISCONTINUED | OUTPATIENT
Start: 2017-11-28 | End: 2017-11-28

## 2017-11-28 RX ORDER — WARFARIN 2 MG/1
2 TABLET ORAL DAILY
Status: DISCONTINUED | OUTPATIENT
Start: 2017-11-28 | End: 2017-11-28

## 2017-11-28 RX ADMIN — LEVOTHYROXINE SODIUM 50 MCG: 50 TABLET ORAL at 05:11

## 2017-11-28 RX ADMIN — METOPROLOL SUCCINATE 25 MG: 25 TABLET, EXTENDED RELEASE ORAL at 08:11

## 2017-11-28 RX ADMIN — TIMOLOL MALEATE 1 DROP: 5 SOLUTION OPHTHALMIC at 08:11

## 2017-11-28 RX ADMIN — ACETAMINOPHEN 650 MG: 325 TABLET ORAL at 03:11

## 2017-11-28 RX ADMIN — ENOXAPARIN SODIUM 50 MG: 100 INJECTION SUBCUTANEOUS at 12:11

## 2017-11-28 NOTE — PLAN OF CARE
Pt AAO stating she is feeling ok with IM5 team stating pt is bleeding and will not discharge home today as well as her caretaker, sister, out of town until tomorrow.  Pt continued to medically treated with radiation therapy with continued discharge plan of home with sister and Home Health.  CM will continue to follow with needs.     11/28/17 9721   Right Care Assessment   Can the patient answer the patient profile reliably? Yes, cognitively intact   How often would a person be available to care for the patient? Often   Describe the patient's ability to walk at the present time. Minor restrictions or changes   How does the patient rate their overall health at the present time? Poor   Number of comorbid conditions (as recorded on the chart) Four   During the past month, has the patient often been bothered by feeling down, depressed or hopeless? No   During the past month, has the patient often been bothered by little interest or pleasure in doing things? No     Plan A: Home with sister and

## 2017-11-28 NOTE — ASSESSMENT & PLAN NOTE
CT urogram with evidence of large, obstructing mass and hemorrhage in the bladder.  - Home coumadin and ASA held, per urology recs subq heparin for DVT prophylaxis  - Hg baseline 14, 11 at presentation.   - Type and screen ordered  - CBC q12h, will transfuse for Hg <7  - 11/13 OR for cystoscopy, TURBT, and pyelogram; stent could not be placed as urethral orifice could not be visualized.   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 Bilateral nodules found on CT Chest have high likelihood of being metastases.   - Oncology consulted to discuss treatment options. Pt would like to pursue immunotherapy.       DISPO:  Pt not eligible for SNF placement while on radiation; in discussion with family on placement. Will likely go home with home health

## 2017-11-28 NOTE — SUBJECTIVE & OBJECTIVE
Interval History: hematuria returned this AM after starting anticoagulation yesterday. Denies fever/chills, SOB, CP. Plan for radiation tx (dose 5 of 14) today     Review of Systems   Constitutional: Positive for appetite change. Negative for chills, diaphoresis, fatigue and fever.   HENT: Negative for sore throat and trouble swallowing.    Respiratory: Negative for cough and shortness of breath.    Cardiovascular: Negative for chest pain, palpitations and leg swelling.   Gastrointestinal: Negative for abdominal distention, abdominal pain, constipation, diarrhea, nausea and vomiting.   Genitourinary: Positive for dysuria and hematuria. Negative for difficulty urinating, flank pain, frequency and urgency.   Musculoskeletal: Negative for arthralgias and back pain.   Skin: Negative for color change and pallor.   Neurological: Negative for dizziness and light-headedness.   Psychiatric/Behavioral: Negative for confusion.     Objective:     Vital Signs (Most Recent):  Temp: 97.4 °F (36.3 °C) (11/28/17 1204)  Pulse: 69 (11/28/17 1500)  Resp: 17 (11/28/17 1204)  BP: 128/78 (11/28/17 1204)  SpO2: 99 % (11/28/17 1204) Vital Signs (24h Range):  Temp:  [97.4 °F (36.3 °C)-97.8 °F (36.6 °C)] 97.4 °F (36.3 °C)  Pulse:  [58-85] 69  Resp:  [15-18] 17  SpO2:  [98 %-100 %] 99 %  BP: (128-156)/(71-89) 128/78     Weight: 45.4 kg (100 lb)  Body mass index is 19.53 kg/m².    Intake/Output Summary (Last 24 hours) at 11/28/17 1700  Last data filed at 11/28/17 1300   Gross per 24 hour   Intake             1290 ml   Output                0 ml   Net             1290 ml      Physical Exam   Constitutional: She is oriented to person, place, and time. No distress.   Thin, frail appearing elderly lady.   HENT:   Head: Normocephalic and atraumatic.   Mouth/Throat: Oropharynx is clear and moist.   Eyes: No scleral icterus.   Neck: Normal range of motion. Neck supple.   Cardiovascular: Normal rate, regular rhythm, normal heart sounds and intact  distal pulses.    Pulmonary/Chest: Effort normal and breath sounds normal.   Abdominal: Soft. Bowel sounds are normal. She exhibits no distension. There is no tenderness. There is no guarding.   Neurological: She is alert and oriented to person, place, and time.   Skin: Skin is warm and dry. She is not diaphoretic. No pallor.   Psychiatric: She has a normal mood and affect. Her behavior is normal.   Vitals reviewed.      Significant Labs: All pertinent labs within the past 24 hours have been reviewed.    Significant Imaging: I have reviewed all pertinent imaging results/findings within the past 24 hours.

## 2017-11-28 NOTE — PROGRESS NOTES
[facility] Hospital Medicine  Progress Note    Patient Name: Monique Lew  MRN: [id number]  Patient Class: IP- Inpatient   Admission Date: 11/11/2017  Length of Stay: 16 days  Attending Physician: Sundeep Gibbs MD  Primary Care Provider: Aguila Hsieh MD    [facility] Medicine Team: [facility] MED 5 José Luis Jeffrey MD    Subjective:     Principal Problem:Bladder cancer    HPI:  Ms. 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 States she first noticed blood in her urine 2 days before presentation. This temporarily resolved, with hematuria occuring again the day before presentation. Hematuria is associated with increased urge to urinate and incontinence. Prior to this episode, patient states last episode of gross hematuria was in 2012, which was found to be 2/2 cystitis. Patient has had multiple UTIs and microhematuria on urinalysis since that time. She follows with a urologist, Dr. Enriquez at [facility], and has had 2 cystoscopies - most recently in 2014 which no evidence of tumor. Denies fever/chills/nausea/vomiting. Does states she has had decreased appetite, increased fatigue, and night sweats which cause her to soak through her clothes.     In the ED, was found to have Hg of 11 which trended down to 10 in 6 hours (baseline 14). Type and screen obtained. CT urogram performed showed 6.3x4.3x4.0 cm mass in bladder causing severe left hydronephrosis and hydroureter with evidence of hemorrhage in bladder. Also evaluated by urology in ED, who began CBI and recommended she be NPO for possible procedure.     Hospital Course:  Patient admitted to hospital medicine. Anticoagulation held in setting hemorrhage. CT Urogram on 11/11 demonstrated a large bladder mass. Urology consulted. 11/12 CT Chest also demonstrates bilateral pulmonary nodules that could be due to metastases. H/H stabilized.  Patient taken to OR 11/13 for cystoscopy, TURBT, pyelogram; stent unable to be placed as urethral orifice could not be visualized. Path pending. 11/14: started hep gtt. Pt still thinking if she wants nephrostomy tube or not. Urology to keep wilson in until Friday 11/17. Patient began to produce significant hematuria requiring 1u pRBC on 11/16 and the decision was made to stop heparin ggt due to risk outweighing benefit. 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 Results discussed with family and the poor prognosis associated given nodules found in lungs on CT previously. Patient decided that she would like to speak with oncology over her treatment options before deciding whether to pursue cancer treatment versus palliative care. Onc and urology following. No surgeries planned by urology. Per onc, pt is not a chemo candidate with active bleeding, but will discuss treatment options with patient once bleeding is controlled.     Interval History: hematuria returned this AM after starting anticoagulation yesterday. Denies fever/chills, SOB, CP. Plan for radiation tx (dose 5 of 14) today     Review of Systems   Constitutional: Positive for appetite change. Negative for chills, diaphoresis, fatigue and fever.   HENT: Negative for sore throat and trouble swallowing.    Respiratory: Negative for cough and shortness of breath.    Cardiovascular: Negative for chest pain, palpitations and leg swelling.   Gastrointestinal: Negative for abdominal distention, abdominal pain, constipation, diarrhea, nausea and vomiting.   Genitourinary: Positive for dysuria and hematuria. Negative for difficulty urinating, flank pain, frequency and urgency.   Musculoskeletal: Negative for arthralgias and back pain.   Skin: Negative for color change and pallor.   Neurological: Negative for dizziness and light-headedness.   Psychiatric/Behavioral: Negative for confusion.     Objective:     Vital Signs (Most  Recent):  Temp: 97.4 °F (36.3 °C) (11/28/17 1204)  Pulse: 69 (11/28/17 1500)  Resp: 17 (11/28/17 1204)  BP: 128/78 (11/28/17 1204)  SpO2: 99 % (11/28/17 1204) Vital Signs (24h Range):  Temp:  [97.4 °F (36.3 °C)-97.8 °F (36.6 °C)] 97.4 °F (36.3 °C)  Pulse:  [58-85] 69  Resp:  [15-18] 17  SpO2:  [98 %-100 %] 99 %  BP: (128-156)/(71-89) 128/78     Weight: 45.4 kg (100 lb)  Body mass index is 19.53 kg/m².    Intake/Output Summary (Last 24 hours) at 11/28/17 1700  Last data filed at 11/28/17 1300   Gross per 24 hour   Intake             1290 ml   Output                0 ml   Net             1290 ml      Physical Exam   Constitutional: She is oriented to person, place, and time. No distress.   Thin, frail appearing elderly lady.   HENT:   Head: Normocephalic and atraumatic.   Mouth/Throat: Oropharynx is clear and moist.   Eyes: No scleral icterus.   Neck: Normal range of motion. Neck supple.   Cardiovascular: Normal rate, regular rhythm, normal heart sounds and intact distal pulses.    Pulmonary/Chest: Effort normal and breath sounds normal.   Abdominal: Soft. Bowel sounds are normal. She exhibits no distension. There is no tenderness. There is no guarding.   Neurological: She is alert and oriented to person, place, and time.   Skin: Skin is warm and dry. She is not diaphoretic. No pallor.   Psychiatric: She has a normal mood and affect. Her behavior is normal.   Vitals reviewed.      Significant Labs: All pertinent labs within the past 24 hours have been reviewed.    Significant Imaging: I have reviewed all pertinent imaging results/findings within the past 24 hours.    Assessment/Plan:      * Bladder cancer    CT urogram with evidence of large, obstructing mass and hemorrhage in the bladder.  - Home coumadin and ASA held, per urology recs subq heparin for DVT prophylaxis  - Hg baseline 14, 11 at presentation.   - Type and screen ordered  - CBC q12h, will transfuse for Hg <7  - 11/13 OR for cystoscopy, TURBT, and  pyelogram; stent could not be placed as urethral orifice could not be visualized.   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 Bilateral nodules found on CT Chest have high likelihood of being metastases.   - Oncology consulted to discuss treatment options. Pt would like to pursue immunotherapy.       DISPO:  Pt not eligible for SNF placement while on radiation; in discussion with family on placement. Will likely go home with home health           Bladder mass              Hydronephrosis of left kidney    -If patient wishes to pursue chemotherapy, will need IR consult for nephrostomy tube placement eventually.           Gross hematuria    -See Bladder cancer    - Complains of intermittent difficulty voiding overnight. Bladder scan early this AM was 260cc, patient was then catheterized with return of 600 cc light pink urine, no clots  - nurse reported that she noticed blood in her urine this morning.  - will not restart AC; monitor 24 hrs   - consult radiation oncology for intractable hematuria   - Started radiation tx on 11/24  Needs a total of 14 days (now 5 of 14)  - Started on lovenox+heparin yesterday causing patient to have hematuria. Will hold all anticoagulation         Depression (emotion)    - d/c'ed venlafaxine as pt was not taking it at home            Chronic hyponatremia    Sodium 125 at admission, had been normal in January 2017  - Trending up. D/c'ed venlafaxine as she was not taking this at home          Acquired hypothyroidism    - Continue synthroid 50 mcg  - TSH WNL          Essential hypertension              History of CVA (cerebrovascular accident)    Follows with neurology. 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  - Coumadin held          Chronic atrial fibrillation    - Patient with high CHADS-VASc score but holding anticoagulation till Monday   - Plan with re-start coumadin with lovenox or heparin bridge   - 11/27/17  restart ACed. Will stop due to hematuria   - continue with BB for rate control           VTE Risk Mitigation     None              José Luis Jeffrey MD  Department of Hospital Medicine   [facility]

## 2017-11-28 NOTE — PLAN OF CARE
Problem: Patient Care Overview  Goal: Plan of Care Review  Outcome: Ongoing (interventions implemented as appropriate)  Reviewed plan of care with patient, patient ready to go home and states she has help with transportation to radiation appointments. Patient demonstrated self administration of Lovenox with minimal help, however, for her safety and proper administration, another person should assist her as her sight impairs her administration technique. Otherwise patient remained free from falls, no blood in urine, some HA noted, see flowsheets for detailed assessment information.

## 2017-11-28 NOTE — PLAN OF CARE
Problem: Patient Care Overview  Goal: Plan of Care Review  Outcome: Ongoing (interventions implemented as appropriate)  POC reviewed with pt and family.  Pt verbalized understanding.  Questions and concerns addressed, and no acute events today.  Pt progressing toward goals, will continue to monitor.  See flowsheets for full assessment and VS info.

## 2017-11-28 NOTE — ASSESSMENT & PLAN NOTE
- Patient with high CHADS-VASc score but holding anticoagulation till Monday   - Plan with re-start coumadin with lovenox or heparin bridge   - 11/27/17 restart ACed. Will stop due to hematuria   - continue with BB for rate control

## 2017-11-28 NOTE — PROGRESS NOTES
"Patient passed moderate sized (2"x1") blood clot during urination, no pain, weakness, dizziness or fatigue noted. Physician on-call notified. Left in toilet for physician to visualize if needed, hat placed in toilet as well in event of more clots passing.   "

## 2017-11-28 NOTE — ASSESSMENT & PLAN NOTE
-See Bladder cancer    - Complains of intermittent difficulty voiding overnight. Bladder scan early this AM was 260cc, patient was then catheterized with return of 600 cc light pink urine, no clots  - nurse reported that she noticed blood in her urine this morning.  - will not restart AC; monitor 24 hrs   - consult radiation oncology for intractable hematuria   - Started radiation tx on 11/24  Needs a total of 14 days (now 5 of 14)  - Started on lovenox+heparin yesterday causing patient to have hematuria. Will hold all anticoagulation

## 2017-11-29 ENCOUNTER — DOCUMENTATION ONLY (OUTPATIENT)
Dept: RADIATION ONCOLOGY | Facility: CLINIC | Age: 82
End: 2017-11-29

## 2017-11-29 ENCOUNTER — DOCUMENTATION ONLY (OUTPATIENT)
Dept: RADIATION THERAPY | Facility: HOSPITAL | Age: 82
End: 2017-11-29

## 2017-11-29 VITALS
WEIGHT: 100 LBS | HEIGHT: 60 IN | RESPIRATION RATE: 20 BRPM | HEART RATE: 84 BPM | OXYGEN SATURATION: 97 % | TEMPERATURE: 98 F | DIASTOLIC BLOOD PRESSURE: 88 MMHG | SYSTOLIC BLOOD PRESSURE: 159 MMHG | BODY MASS INDEX: 19.63 KG/M2

## 2017-11-29 LAB
BASOPHILS # BLD AUTO: 0.05 K/UL
BASOPHILS NFR BLD: 0.5 %
DIFFERENTIAL METHOD: ABNORMAL
EOSINOPHIL # BLD AUTO: 0.1 K/UL
EOSINOPHIL NFR BLD: 0.5 %
ERYTHROCYTE [DISTWIDTH] IN BLOOD BY AUTOMATED COUNT: 14.2 %
HCT VFR BLD AUTO: 27.4 %
HGB BLD-MCNC: 9 G/DL
IMM GRANULOCYTES # BLD AUTO: 0.06 K/UL
IMM GRANULOCYTES NFR BLD AUTO: 0.6 %
LYMPHOCYTES # BLD AUTO: 0.5 K/UL
LYMPHOCYTES NFR BLD: 4.7 %
MCH RBC QN AUTO: 29.2 PG
MCHC RBC AUTO-ENTMCNC: 32.8 G/DL
MCV RBC AUTO: 89 FL
MONOCYTES # BLD AUTO: 0.6 K/UL
MONOCYTES NFR BLD: 5.2 %
NEUTROPHILS # BLD AUTO: 9.6 K/UL
NEUTROPHILS NFR BLD: 88.5 %
NRBC BLD-RTO: 0 /100 WBC
PLATELET # BLD AUTO: 379 K/UL
PMV BLD AUTO: 9.7 FL
RBC # BLD AUTO: 3.08 M/UL
WBC # BLD AUTO: 10.78 K/UL

## 2017-11-29 PROCEDURE — 77336 RADIATION PHYSICS CONSULT: CPT | Performed by: RADIOLOGY

## 2017-11-29 PROCEDURE — 25000003 PHARM REV CODE 250: Performed by: STUDENT IN AN ORGANIZED HEALTH CARE EDUCATION/TRAINING PROGRAM

## 2017-11-29 PROCEDURE — 36415 COLL VENOUS BLD VENIPUNCTURE: CPT

## 2017-11-29 PROCEDURE — 77387 GUIDANCE FOR RADJ TX DLVR: CPT | Mod: TC | Performed by: RADIOLOGY

## 2017-11-29 PROCEDURE — 85025 COMPLETE CBC W/AUTO DIFF WBC: CPT

## 2017-11-29 PROCEDURE — 25000003 PHARM REV CODE 250

## 2017-11-29 PROCEDURE — G6002 STEREOSCOPIC X-RAY GUIDANCE: HCPCS | Mod: 26,,, | Performed by: RADIOLOGY

## 2017-11-29 PROCEDURE — 99239 HOSP IP/OBS DSCHRG MGMT >30: CPT | Mod: GC,,, | Performed by: INTERNAL MEDICINE

## 2017-11-29 PROCEDURE — 77412 RADIATION TX DELIVERY LVL 3: CPT | Performed by: RADIOLOGY

## 2017-11-29 PROCEDURE — 77417 THER RADIOLOGY PORT IMAGE(S): CPT | Performed by: RADIOLOGY

## 2017-11-29 RX ADMIN — RAMELTEON 8 MG: 8 TABLET, FILM COATED ORAL at 12:11

## 2017-11-29 RX ADMIN — LEVOTHYROXINE SODIUM 50 MCG: 50 TABLET ORAL at 05:11

## 2017-11-29 RX ADMIN — METOPROLOL SUCCINATE 25 MG: 25 TABLET, EXTENDED RELEASE ORAL at 09:11

## 2017-11-29 RX ADMIN — TIMOLOL MALEATE 1 DROP: 5 SOLUTION OPHTHALMIC at 09:11

## 2017-11-29 NOTE — PLAN OF CARE
11/29/17 1348   Final Note   Assessment Type Final Discharge Note   Discharge Disposition Home-Health  ([facility] )   Hospital Follow Up  Appt(s) scheduled? Yes   Discharge plans and expectations educations in teach back method with documentation complete? Yes   Right Care Referral Info   Post Acute Recommendation Home-care  ([facility] )     Future Appointments  Date Time Provider Department Center   12/6/2017 10:00 AM Sundeep Gibbs MD [facility]   12/15/2017 9:15 AM Diana Beaulieu MD [facility] S[facility]

## 2017-11-29 NOTE — PLAN OF CARE
CM in to see pt AAO with friend at bedside.  Pt states she is ready to go home and has her transportation here to bring her home with her sister coming back in today from [place], [place] to continue to take care of her along with home health and providing her transportation to/from radiation therapy daily until completed.  CM informed pt of [facility] should be calling her today with time to evaluate and see her tomorrow.  NASRIN, Olga Lidia, following with HH referral.    13:05pm:  CM received call from PCC, Raiza Goodwin NP, stating pt PCC appt is scheduled for tomorrow will be moved to next week due to pt discharging today and appt too soon.

## 2017-11-29 NOTE — DISCHARGE SUMMARY
[facility] Hospital Medicine  Discharge Summary      Patient Name: Monique Lew  MRN: [id number]  Admission Date: 11/11/2017  Hospital Length of Stay: 17 days  Discharge Date and Time: 11/29/2017  1:57 PM  Attending Physician: No att. providers found   Discharging Provider: José Luis Jeffrey MD  Primary Care Provider: Aguila Hsieh MD  Hospital Medicine Team: [facility] 5 José Luis Jeffrey MD    HPI:   Ms. 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 States she first noticed blood in her urine 2 days before presentation. This temporarily resolved, with hematuria occuring again the day before presentation. Hematuria is associated with increased urge to urinate and incontinence. Prior to this episode, patient states last episode of gross hematuria was in 2012, which was found to be 2/2 cystitis. Patient has had multiple UTIs and microhematuria on urinalysis since that time. She follows with a urologist, Dr. Enriquez at [facility], and has had 2 cystoscopies - most recently in 2014 which no evidence of tumor. Denies fever/chills/nausea/vomiting. Does states she has had decreased appetite, increased fatigue, and night sweats which cause her to soak through her clothes.     In the ED, was found to have Hg of 11 which trended down to 10 in 6 hours (baseline 14). Type and screen obtained. CT urogram performed showed 6.3x4.3x4.0 cm mass in bladder causing severe left hydronephrosis and hydroureter with evidence of hemorrhage in bladder. Also evaluated by urology in ED, who began CBI and recommended she be NPO for possible procedure.     Procedure(s) (LRB):  EXCISION-BLADDER TUMOR-TRANSURETHRAL (TURBT) (N/A)  CYSTOSCOPY (N/A)  VAGINOSCOPY (N/A)      Hospital Course:   Patient admitted to hospital medicine. Anticoagulation held in setting hemorrhage. CT Urogram on 11/11 demonstrated a large  bladder mass. Urology consulted. 11/12 CT Chest also demonstrates bilateral pulmonary nodules that could be due to metastases. H/H stabilized. Patient taken to OR 11/13 for cystoscopy, TURBT, pyelogram; stent unable to be placed as urethral orifice could not be visualized. Path pending. 11/14: started hep gtt. Pt still thinking if she wants nephrostomy tube or not. Urology to keep wilson in until Friday 11/17. Patient began to produce significant hematuria requiring 1u pRBC on 11/16 and the decision was made to stop heparin ggt due to risk outweighing benefit. 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 Results discussed with family and the poor prognosis associated given nodules found in lungs on CT previously. Patient decided that she would like to speak with oncology over her treatment options before deciding whether to pursue cancer treatment versus palliative care. Onc and urology following. No surgeries planned by urology. Wilson was removed on 11/20 by urology. Patient had to be straight cathed once overnight after wilson was removed due to retention but was able to void spontaneously thereafter. Per oncology, patient is not a chemo candidate with active bleeding - also mentioned that patient will need a nephrostomy tube prior to hormonal/chemo therapy. Radiation oncology was consulted for palliative radiation to help with hematuria. Radiation was started on 11/24 which improved hematuria. Total of 14 doses of radiation were planned, of which patient received 5 doses while inpatient. Trial of anti-coaluation was attempted on 11/27 which caused hematuria to return, so all anticoagulation was stopped. Arrangements were initially made for patient to go to SNF (per PT recs), but patient would not be able to receive radiation therapy. PT re-evaluated patient and cleared her to go home with home health with 24-hr supervision (patient's sister). She was discharged on 11/29 with  follow-up with radiation oncology, urology, and hem-onc. She will follow-up with PCP or cardiologist in future about when to re-start anti-coagulation.     Vitals:    11/29/17 0600 11/29/17 0841 11/29/17 0847 11/29/17 0850   BP:   (!) 161/95 (!) 159/88   BP Location:   Left arm Right arm   Patient Position:   Lying Lying   Pulse: 68 75 84    Resp:   20    Temp:   97.5 °F (36.4 °C)    TempSrc:   Oral    SpO2:   97%    Weight:       Height:         Physical Exam on 11/29/17  Physical Exam   Constitutional: She is oriented to person, place, and time. No distress.   Thin, frail appearing elderly lady.   HENT:   Head: Normocephalic and atraumatic.   Mouth/Throat: Oropharynx is clear and moist.   Eyes: No scleral icterus.   Neck: Normal range of motion. Neck supple.   Cardiovascular: Normal rate, regular rhythm, normal heart sounds and intact distal pulses.    Pulmonary/Chest: Effort normal and breath sounds normal.   Abdominal: Soft. Bowel sounds are normal. She exhibits no distension. There is no tenderness. There is no guarding.   Neurological: She is alert and oriented to person, place, and time.   Skin: Skin is warm and dry. She is not diaphoretic. No pallor.   Psychiatric: She has a normal mood and affect. Her behavior is normal.   Vitals reviewed.      Consults:   Consults         Status Ordering Provider     Inpatient consult to Hematology/Oncology  Once     Provider:  (Not yet assigned)    Completed ЕЛЕНА TRIPATHI     Inpatient consult to Interventional Radiology  Once     Provider:  (Not yet assigned)    Completed CHELSEA NIX     Inpatient consult to Radiation Oncology  Once     Provider:  (Not yet assigned)    Completed HEMA ZAVALA     Inpatient consult to Urology  Once     Provider:  (Not yet assigned)    Completed KALIE TERRY          * Malignant neoplasm of urinary bladder    CT urogram with evidence of large, obstructing mass and hemorrhage in the bladder.  - Home coumadin and ASA held,  per urology recs subq heparin for DVT prophylaxis  - Hg baseline 14, 11 at presentation.   - Type and screen ordered  - CBC q12h, will transfuse for Hg <7  - 11/13 OR for cystoscopy, TURBT, and pyelogram; stent could not be placed as urethral orifice could not be visualized.   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 Bilateral nodules found on CT Chest have high likelihood of being metastases.   - Oncology consulted to discuss treatment options. Pt would like to pursue immunotherapy.       DISPO:  Pt not eligible for SNF placement while on radiation; in discussion with family on placement. Will likely go home with home health           Hydronephrosis of left kidney    -If patient wishes to pursue chemotherapy, will need IR consult for nephrostomy tube placement eventually.           Gross hematuria    -See Bladder cancer    - Complains of intermittent difficulty voiding overnight. Bladder scan early this AM was 260cc, patient was then catheterized with return of 600 cc light pink urine, no clots  - nurse reported that she noticed blood in her urine this morning.  - will not restart AC; monitor 24 hrs   - consult radiation oncology for intractable hematuria   - Started radiation tx on 11/24  Needs a total of 14 days (now 5 of 14)  - Started on lovenox+heparin yesterday causing patient to have hematuria. Will hold all anticoagulation         Depression (emotion)    - d/c'ed venlafaxine as pt was not taking it at home            Chronic hyponatremia    Sodium 125 at admission, had been normal in January 2017  - Trending up. D/c'ed venlafaxine as she was not taking this at home          Acquired hypothyroidism    - Continue synthroid 50 mcg  - TSH WNL          Chronic atrial fibrillation    - Patient with high CHADS-VASc score but holding anticoagulation till Monday   - Plan with re-start coumadin with lovenox or heparin bridge   - 11/27/17 restart ACed. Will stop due to hematuria   -  "continue with BB for rate control           Final Active Diagnoses:    Diagnosis Date Noted POA    PRINCIPAL PROBLEM:  Malignant neoplasm of urinary bladder [C67.9] 11/17/2017 Yes    Gross hematuria [R31.0] 11/12/2017 Yes    Hydronephrosis of left kidney [N13.30] 11/12/2017 Yes    Depression (emotion) [F32.9] 08/28/2017 Yes    Glaucoma [H40.9] 12/28/2015 Yes    Chronic hyponatremia [E87.1] 06/29/2015 Yes    Acquired hypothyroidism [E03.9] 12/29/2014 Yes    Essential hypertension [I10] 07/23/2014 Yes    Chronic atrial fibrillation [I48.2] 08/09/2012 Yes      Problems Resolved During this Admission:    Diagnosis Date Noted Date Resolved POA    Pseudoexfoliation glaucoma, 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 none    Length of need (1-99 months): 99    Vendor: Other (use comments) Pt has needed equipment   Expected Date of Delivery: 11/29/2017      TRANSFER TUB BENCH FOR HOME USE   Order Specific Question Answer Comments   Type of Transfer Tub Bench: Padded    Height: 5' (1.524 m)    Weight: 45.4 kg (100 lb)    Does patient have medical equipment at home? none    Length of need (1-99 months): 99    Vendor: Other (use comments) Pt has needed equipment   Expected Date of Delivery: 11/29/2017      WALKER FOR HOME USE   Order Specific Question Answer Comments   Type of Walker: Adult (5'4"-6'6")    With wheels? Yes    Height: 5' (1.524 m)    Weight: 45.4 kg (100 lb)    Length of need (1-99 months): 99    Platform attachment: Bilateral    Does patient have medical equipment at home? none    Please check all that " apply: Patient's condition impairs ambulation.    Vendor: Other (use comments) Pt has needed equipment    Expected Date of Delivery: 11/29/2017      Ambulatory Referral to Urology   Referral Priority: Routine Referral Type: Consultation   Referral Reason: Specialty Services Required    Requested Specialty: Urology    Number of Visits Requested: 1      Ambulatory Referral to Hematology / Oncology   Referral Priority: Routine Referral Type: Consultation   Referral Reason: Specialty Services Required    Requested Specialty: Hematology and Oncology    Number of Visits Requested: 1      Diet general     Activity as tolerated         Significant Diagnostic Studies: Labs:   BMP:   Recent Labs  Lab 11/28/17  0430   *   *   K 3.7   CL 99   CO2 24   BUN 18   CREATININE 1.0   CALCIUM 9.1       Pending Diagnostic Studies:     Procedure Component Value Units Date/Time    CBC auto differential [[id number]] Collected:  11/13/17 1605    Order Status:  Sent Lab Status:  In process Updated:  11/13/17 1605    Specimen:  Blood from Blood     X-Ray Abdomen AP 1 View [[id number]] Resulted:  11/13/17 0953    Order Status:  Sent Lab Status:  In process Updated:  11/13/17 0953         Medications:  Reconciled Home Medications:   Discharge Medication List as of 11/29/2017  1:03 PM      CONTINUE these medications which have NOT CHANGED    Details   metoprolol succinate (TOPROL-XL) 25 MG 24 hr tablet Take 1 tablet (25 mg total) by mouth 2 (two) times daily., Starting Wed 5/31/2017, Normal      PROPYLENE GLYCOL//PF (SYSTANE, PF, OPHT) Place 1 drop into both eyes daily as needed. for dry eyes, Historical Med      SYNTHROID 50 mcg tablet TAKE 1 TABLET BY MOUTH DAILY EVERY MORNING, Normal      timolol maleate 0.5% (TIMOPTIC) 0.5 % Drop Place 1 drop into both eyes 2 (two) times daily., Starting 12/2/2016, Until Discontinued, Normal         STOP taking these medications       aspirin (ECOTRIN) 81 MG EC tablet Comments:   Reason for  Stopping:         sertraline (ZOLOFT) 50 MG tablet Comments:   Reason for Stopping:         warfarin (COUMADIN) 1 MG tablet Comments:   Reason for Stopping:               Indwelling Lines/Drains at time of discharge:   Lines/Drains/Airways     Epidural Line                 Perineural Analgesia/Anesthesia Assessment (using dermatomes) Epidural 11/13/17 1500 16 days                Time spent on the discharge of patient: 20 minutes  Patient was seen and examined on the date of discharge and determined to be suitable for discharge.         José Luis Jeffrey MD  Department of Hospital Medicine  [facility]

## 2017-11-29 NOTE — PLAN OF CARE
Problem: Patient Care Overview  Goal: Plan of Care Review  Outcome: Ongoing (interventions implemented as appropriate)  POC reviewed with patient, she verbalized understanding and expressed hopes and expectations of going home today. WCTM.     Problem: Fall Risk (Adult)  Goal: Absence of Falls  Patient will demonstrate the desired outcomes by discharge/transition of care.   Outcome: Ongoing (interventions implemented as appropriate)  No falls this shift, fall reduction program in place. Replaced slip resisatnt socks.

## 2017-11-29 NOTE — PLAN OF CARE
[facility]    HOME HEALTH ORDERS  FACE TO FACE ENCOUNTER    Patient Name: Monique Lew  YOB: 1930    PCP: Aguila Hsieh MD   [address] [place] [address]  PCP Phone Number: [phone]  PCP Fax: [phone]    Encounter Date: 11/29/2017    Admit to Home Health    Diagnoses:  Active Hospital Problems    Diagnosis  POA    *Bladder cancer [C67.9]  Yes    Gross hematuria [R31.0]  Yes    Hydronephrosis of left kidney [N13.30]  Yes    Depression (emotion) [F32.9]  Yes    Glaucoma [H40.9]  Yes    Chronic hyponatremia [E87.1]  Yes    Acquired hypothyroidism [E03.9]  Yes    Essential hypertension [I10]  Yes    Chronic atrial fibrillation [I48.2]  Yes      Resolved Hospital Problems    Diagnosis Date Resolved POA    Pseudoexfoliation glaucoma, 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 IMPRICL [facility] PCW   12/15/2017 9:15 AM Diana Beaulieu MD [facility] OPHTHAL [facility]           I have seen and examined this patient face to face today. My clinical findings that support the need for the home health skilled services and home bound status are the following:  Weakness/numbness causing balance and gait disturbance due to Malignancy/Cancer making it taxing to leave home.    Allergies:  Review of patient's allergies indicates:   Allergen Reactions    Cosopt [dorzolamide-timolol] Swelling     Swelling of eyelids    Mevacor [lovastatin] Nausea And Vomiting    Prednisone Nausea And Vomiting    Vasotec [enalapril maleate] Nausea And Vomiting    Zetia [ezetimibe] Nausea And Vomiting    Furosemide Palpitations     Pt states her heart skips beats when she takes this medication.       Diet: regular diet    Activities: activity as tolerated    Nursing:   SN  to complete comprehensive assessment including routine vital signs. Instruct on disease process and s/s of complications to report to MD. Review/verify medication list sent home with the patient at time of discharge  and instruct patient/caregiver as needed. Frequency may be adjusted depending on start of care date.    Notify MD if SBP > 160 or < 90; DBP > 90 or < 50; HR > 120 or < 50; Temp > 101      CONSULTS:    Physical Therapy to evaluate and treat. Evaluate for home safety and equipment needs; Establish/upgrade home exercise program. Perform / instruct on therapeutic exercises, gait training, transfer training, and Range of Motion.  Occupational Therapy to evaluate and treat. Evaluate home environment for safety and equipment needs. Perform/Instruct on transfers, ADL training, ROM, and therapeutic exercises.  Aide to provide assistance with personal care, ADLs, and vital signs.    MISCELLANEOUS CARE:  N/A    WOUND CARE ORDERS  n/a    Equipment needed:  bedside commode, bath bench, rolling walker     Medications: Review discharge medications with patient and family and provide education.      Current Discharge Medication List      CONTINUE these medications which have NOT CHANGED    Details   metoprolol succinate (TOPROL-XL) 25 MG 24 hr tablet Take 1 tablet (25 mg total) by mouth 2 (two) times daily.  Qty: 180 tablet, Refills: 1      PROPYLENE GLYCOL//PF (SYSTANE, PF, OPHT) Place 1 drop into both eyes daily as needed. for dry eyes      SYNTHROID 50 mcg tablet TAKE 1 TABLET BY MOUTH DAILY EVERY MORNING  Qty: 90 tablet, Refills: 0      timolol maleate 0.5% (TIMOPTIC) 0.5 % Drop Place 1 drop into both eyes 2 (two) times daily.  Qty: 10 mL, Refills: 12    Associated Diagnoses: Pseudoexfoliation glaucoma, severe stage         STOP taking these medications       aspirin (ECOTRIN) 81 MG EC tablet Comments:   Reason for Stopping: hematuria        sertraline (ZOLOFT) 50 MG tablet Comments:   Reason for Stopping:  patient not taking         warfarin (COUMADIN) 1 MG tablet Comments:   Reason for Stopping: hematuria               I certify that this patient is confined to her home and needs intermittent skilled nursing care, physical therapy and occupational therapy.

## 2017-11-29 NOTE — PLAN OF CARE
DC orders sent to [facility] via [facility].  Pt accepted on to their HH service as per [facility].        Olga Lidia Mcgovern LMSW

## 2017-11-30 PROCEDURE — G6002 STEREOSCOPIC X-RAY GUIDANCE: HCPCS | Mod: 26,,, | Performed by: RADIOLOGY

## 2017-11-30 PROCEDURE — 77387 GUIDANCE FOR RADJ TX DLVR: CPT | Mod: TC | Performed by: RADIOLOGY

## 2017-11-30 PROCEDURE — 77412 RADIATION TX DELIVERY LVL 3: CPT | Performed by: RADIOLOGY

## 2017-12-01 ENCOUNTER — HOSPITAL ENCOUNTER (OUTPATIENT)
Dept: RADIATION THERAPY | Facility: HOSPITAL | Age: 82
Discharge: HOME | End: 2017-12-01
Attending: RADIOLOGY
Payer: MEDICARE

## 2017-12-01 PROCEDURE — 77412 RADIATION TX DELIVERY LVL 3: CPT | Performed by: RADIOLOGY

## 2017-12-01 PROCEDURE — 77387 GUIDANCE FOR RADJ TX DLVR: CPT | Mod: TC | Performed by: RADIOLOGY

## 2017-12-01 PROCEDURE — G6002 STEREOSCOPIC X-RAY GUIDANCE: HCPCS | Mod: 26,,, | Performed by: RADIOLOGY

## 2017-12-04 PROCEDURE — G6002 STEREOSCOPIC X-RAY GUIDANCE: HCPCS | Mod: 26,,, | Performed by: RADIOLOGY

## 2017-12-04 PROCEDURE — 77387 GUIDANCE FOR RADJ TX DLVR: CPT | Mod: TC | Performed by: RADIOLOGY

## 2017-12-04 PROCEDURE — 77412 RADIATION TX DELIVERY LVL 3: CPT | Performed by: RADIOLOGY

## 2017-12-05 PROCEDURE — G6002 STEREOSCOPIC X-RAY GUIDANCE: HCPCS | Mod: 26,,, | Performed by: RADIOLOGY

## 2017-12-05 PROCEDURE — 77387 GUIDANCE FOR RADJ TX DLVR: CPT | Mod: TC | Performed by: RADIOLOGY

## 2017-12-05 PROCEDURE — 77412 RADIATION TX DELIVERY LVL 3: CPT | Performed by: RADIOLOGY

## 2017-12-06 ENCOUNTER — IMMUNIZATION (OUTPATIENT)
Dept: INTERNAL MEDICINE | Facility: CLINIC | Age: 82
End: 2017-12-06
Payer: MEDICARE

## 2017-12-06 ENCOUNTER — OFFICE VISIT (OUTPATIENT)
Dept: PRIMARY CARE CLINIC | Facility: CLINIC | Age: 82
End: 2017-12-06
Payer: MEDICARE

## 2017-12-06 VITALS
WEIGHT: 98.56 LBS | BODY MASS INDEX: 19.35 KG/M2 | HEART RATE: 67 BPM | OXYGEN SATURATION: 96 % | HEIGHT: 60 IN | SYSTOLIC BLOOD PRESSURE: 132 MMHG | DIASTOLIC BLOOD PRESSURE: 78 MMHG

## 2017-12-06 DIAGNOSIS — Z86.73 HISTORY OF CVA (CEREBROVASCULAR ACCIDENT): ICD-10-CM

## 2017-12-06 DIAGNOSIS — E03.9 ACQUIRED HYPOTHYROIDISM: ICD-10-CM

## 2017-12-06 DIAGNOSIS — H54.7 BLINDNESS/LOW VISION: ICD-10-CM

## 2017-12-06 DIAGNOSIS — Z85.3 HISTORY OF BREAST CANCER: ICD-10-CM

## 2017-12-06 DIAGNOSIS — E87.1 CHRONIC HYPONATREMIA: ICD-10-CM

## 2017-12-06 DIAGNOSIS — C67.9 MALIGNANT NEOPLASM OF URINARY BLADDER, UNSPECIFIED SITE: Primary | ICD-10-CM

## 2017-12-06 DIAGNOSIS — I10 ESSENTIAL HYPERTENSION: ICD-10-CM

## 2017-12-06 DIAGNOSIS — N13.30 HYDRONEPHROSIS OF LEFT KIDNEY: ICD-10-CM

## 2017-12-06 DIAGNOSIS — N32.89 BLADDER MASS: ICD-10-CM

## 2017-12-06 DIAGNOSIS — I48.20 CHRONIC ATRIAL FIBRILLATION: ICD-10-CM

## 2017-12-06 PROBLEM — F32.A DEPRESSION (EMOTION): Status: RESOLVED | Noted: 2017-08-28 | Resolved: 2017-12-06

## 2017-12-06 PROBLEM — R31.0 GROSS HEMATURIA: Status: RESOLVED | Noted: 2017-11-12 | Resolved: 2017-12-06

## 2017-12-06 PROCEDURE — 99499 UNLISTED E&M SERVICE: CPT | Mod: S$GLB,,, | Performed by: INTERNAL MEDICINE

## 2017-12-06 PROCEDURE — 99215 OFFICE O/P EST HI 40 MIN: CPT | Mod: S$GLB,,, | Performed by: INTERNAL MEDICINE

## 2017-12-06 PROCEDURE — 77412 RADIATION TX DELIVERY LVL 3: CPT | Performed by: RADIOLOGY

## 2017-12-06 PROCEDURE — 90662 IIV NO PRSV INCREASED AG IM: CPT | Mod: S$GLB,,, | Performed by: INTERNAL MEDICINE

## 2017-12-06 PROCEDURE — 99999 PR PBB SHADOW E&M-EST. PATIENT-LVL IV: CPT | Mod: PBBFAC,,, | Performed by: INTERNAL MEDICINE

## 2017-12-06 PROCEDURE — G0008 ADMIN INFLUENZA VIRUS VAC: HCPCS | Mod: S$GLB,,, | Performed by: INTERNAL MEDICINE

## 2017-12-06 PROCEDURE — 77387 GUIDANCE FOR RADJ TX DLVR: CPT | Mod: TC | Performed by: RADIOLOGY

## 2017-12-06 PROCEDURE — 77417 THER RADIOLOGY PORT IMAGE(S): CPT | Performed by: RADIOLOGY

## 2017-12-06 PROCEDURE — G6002 STEREOSCOPIC X-RAY GUIDANCE: HCPCS | Mod: 26,,, | Performed by: RADIOLOGY

## 2017-12-06 NOTE — Clinical Note
[facility] Visit (Post Discharge Follow-up) Today:  - Our clinic physicians and nurses plan to follow the patient up for any medical issues in the [facility] for 30 days post discharge.  Future Appointments: 12/6/2017  11:00 AM   LAB, APPOINTMENT [facility] INT* NOM LAB     [facility]  12/6/2017  5:00 PM    FLU, INTERNAL MEDICINE     [facility]        Jf Medley W  12/15/2017 9:15 AM    Diana Beaulieu MD    [facility]   [facility] 12/22/2017 11:00 AM   Jean Cantu MD         [facility] HEM ONC   Marin Cance  1/10/2018  10:20 AM   Aguila Hsieh MD            [facility]        [place] 1/16/2018  1:45 PM    Tiffanie Enriquez MD  [facility] UROLOGY   Jf nicolette

## 2017-12-06 NOTE — PROGRESS NOTES
[facility]  New Visit Progress Note   Recent Hospital Discharge     PRESENTING HISTORY     Chief Complaint/Reason for Visit:  Follow up Hospital Discharge   Chief Complaint   Patient presents with    Hospital Follow Up     PCP: Aguila Hsieh MD    History of Present Illness: Ms. Monique Lew is a 87 y.o. female who was recently admitted to the hospital.    Admission Date: 11/11/2017  Hospital Length of Stay: 17 days  Discharge Date and Time: 11/29/2017  1:57 PM  Discharging Provider: José Luis Jeffrey MD  Primary Care Provider: Aguila Hsieh MD  [facility] Medicine Team: [facility] 5 José Luis Jeffrey MD     HPI:   Ms. 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 States she first noticed blood in her urine 2 days before presentation. This temporarily resolved, with hematuria occuring again the day before presentation. Hematuria is associated with increased urge to urinate and incontinence. Prior to this episode, patient states last episode of gross hematuria was in 2012, which was found to be 2/2 cystitis. Patient has had multiple UTIs and microhematuria on urinalysis since that time. She follows with a urologist, Dr. Enriquez at [facility], and has had 2 cystoscopies - most recently in 2014 which no evidence of tumor. Denies fever/chills/nausea/vomiting. Does states she has had decreased appetite, increased fatigue, and night sweats which cause her to soak through her clothes.      In the ED, was found to have Hg of 11 which trended down to 10 in 6 hours (baseline 14). Type and screen obtained. CT urogram performed showed 6.3x4.3x4.0 cm mass in bladder causing severe left hydronephrosis and hydroureter with evidence of hemorrhage in bladder. Also evaluated by urology in ED, who began CBI and recommended she be NPO for possible procedure.      Procedure(s) (LRB):  EXCISION-BLADDER  TUMOR-TRANSURETHRAL (TURBT) (N/A)  CYSTOSCOPY (N/A)  VAGINOSCOPY (N/A)       Hospital Course:   Patient admitted to hospital medicine. Anticoagulation held in setting hemorrhage. CT Urogram on 11/11 demonstrated a large bladder mass. Urology consulted. 11/12 CT Chest also demonstrates bilateral pulmonary nodules that could be due to metastases. H/H stabilized. Patient taken to OR 11/13 for cystoscopy, TURBT, pyelogram; stent unable to be placed as urethral orifice could not be visualized. Path pending. 11/14: started hep gtt. Pt still thinking if she wants nephrostomy tube or not. Urology to keep wilson in until Friday 11/17. Patient began to produce significant hematuria requiring 1u pRBC on 11/16 and the decision was made to stop heparin ggt due to risk outweighing benefit. 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 Results discussed with family and the poor prognosis associated given nodules found in lungs on CT previously. Patient decided that she would like to speak with oncology over her treatment options before deciding whether to pursue cancer treatment versus palliative care. Onc and urology following. No surgeries planned by urology. Wilson was removed on 11/20 by urology. Patient had to be straight cathed once overnight after wilson was removed due to retention but was able to void spontaneously thereafter. Per oncology, patient is not a chemo candidate with active bleeding - also mentioned that patient will need a nephrostomy tube prior to hormonal/chemo therapy. Radiation oncology was consulted for palliative radiation to help with hematuria. Radiation was started on 11/24 which improved hematuria. Total of 14 doses of radiation were planned, of which patient received 5 doses while inpatient. Trial of anti-coaluation was attempted on 11/27 which caused hematuria to return, so all anticoagulation was stopped. Arrangements were initially made for patient to go to SNF  (per PT recs), but patient would not be able to receive radiation therapy. PT re-evaluated patient and cleared her to go home with home health with 24-hr supervision (patient's sister). She was discharged on 11/29 with follow-up with radiation oncology, urology, and hem-onc. She will follow-up with PCP or cardiologist in future about when to re-start anti-coagulation.     Physical Exam on 11/29/17  Physical Exam   Constitutional: She is oriented to person, place, and time. No distress.   Thin, frail appearing elderly lady.   HENT:   Head: Normocephalic and atraumatic.   Mouth/Throat: Oropharynx is clear and moist.   Eyes: No scleral icterus.   Neck: Normal range of motion. Neck supple.   Cardiovascular: Normal rate, regular rhythm, normal heart sounds and intact distal pulses.    Pulmonary/Chest: Effort normal and breath sounds normal.   Abdominal: Soft. Bowel sounds are normal. She exhibits no distension. There is no tenderness. There is no guarding.   Neurological: She is alert and oriented to person, place, and time.   Skin: Skin is warm and dry. She is not diaphoretic. No pallor.   Psychiatric: She has a normal mood and affect. Her behavior is normal.   Vitals reviewed.     * Malignant neoplasm of urinary bladder     CT urogram with evidence of large, obstructing mass and hemorrhage in the bladder.  - Home coumadin and ASA held, per urology recs subq heparin for DVT prophylaxis  - Hg baseline 14, 11 at presentation.   - Type and screen ordered  - CBC q12h, will transfuse for Hg <7  - 11/13 OR for cystoscopy, TURBT, and pyelogram; stent could not be placed as urethral orifice could not be visualized.   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 Bilateral nodules found on CT Chest have high likelihood of being metastases.   - Oncology consulted to discuss treatment options. Pt would like to pursue immunotherapy.      DISPO:  Pt not eligible for SNF placement while on radiation; in  discussion with family on placement. Will likely go home with home health           Hydronephrosis of left kidney     -If patient wishes to pursue chemotherapy, will need IR consult for nephrostomy tube placement eventually.           Gross hematuria     -See Bladder cancer     - Complains of intermittent difficulty voiding overnight. Bladder scan early this AM was 260cc, patient was then catheterized with return of 600 cc light pink urine, no clots  - nurse reported that she noticed blood in her urine this morning.  - will not restart AC; monitor 24 hrs   - consult radiation oncology for intractable hematuria   - Started radiation tx on 11/24  Needs a total of 14 days (now 5 of 14)  - Started on lovenox+heparin yesterday causing patient to have hematuria. Will hold all anticoagulation        Depression (emotion)     - d/c'ed venlafaxine as pt was not taking it at home       Chronic hyponatremia     Sodium 125 at admission, had been normal in January 2017  - Trending up. D/c'ed venlafaxine as she was not taking this at home       Acquired hypothyroidism     - Continue synthroid 50 mcg  - TSH WNL       Chronic atrial fibrillation     - Patient with high CHADS-VASc score but holding anticoagulation till Monday   - Plan with re-start coumadin with lovenox or heparin bridge   - 11/27/17 restart ACed. Will stop due to hematuria   - continue with BB for rate control                  Final Active Diagnoses:     Diagnosis Date Noted POA    PRINCIPAL PROBLEM:  Malignant neoplasm of urinary bladder [C67.9] 11/17/2017 Yes    Gross hematuria [R31.0] 11/12/2017 Yes    Hydronephrosis of left kidney [N13.30] 11/12/2017 Yes    Depression (emotion) [F32.9] 08/28/2017 Yes    Glaucoma [H40.9] 12/28/2015 Yes    Chronic hyponatremia [E87.1] 06/29/2015 Yes    Acquired hypothyroidism [E03.9] 12/29/2014 Yes    Essential hypertension [I10] 07/23/2014 Yes    Chronic atrial fibrillation [I48.2] 08/09/2012 Yes       Problems Resolved  During this Admission:     Diagnosis Date Noted Date Resolved POA           Current use of long term anticoagulation [Z79.01] 08/09/2012 11/28/2017 Not Applicable         Discharged Condition: fair  ___________________________________________________________________    Today:  No hematuria since discharge.  She has four more radiation therapy to go.    She has some arm pain.    Review of Systems:  Review of Systems   Constitutional: Positive for weight loss (Eating well.). Negative for chills and fever.   HENT: Positive for hearing loss (Chronic).    Respiratory: Positive for shortness of breath (with exertion).    Cardiovascular: Negative for chest pain.   Gastrointestinal: Negative for abdominal pain, constipation, diarrhea, heartburn, nausea and vomiting.   Genitourinary: Negative for dysuria, frequency and hematuria.   Musculoskeletal: Positive for joint pain (right arm pain).   Skin: Negative for rash.   Neurological: Negative for dizziness.       PAST HISTORY:     Past Medical History:   Diagnosis Date    Acquired hypothyroidism     Adrenal adenoma     Amenorrhea 12/28/2015    Anxiety 8/28/2017    Asymptomatic carotid artery stenosis without infarction 4/29/2015    Bladder mass 11/12/2017    6.3 x 4.3 x 4.0 cm left sided enhancing bladder mass.   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    Chronic atrial fibrillation 8/9/2012    Chronic hyponatremia 6/29/2015    Corneal thinning - Left Eye 9/17/2012    Depression (emotion) 8/28/2017    Dislocated IOL (intraocular lens), posterior - Right Eye 10/19/2012    DVT (deep venous thrombosis)     Essential hypertension 7/23/2014    Goiter     Gross hematuria 11/12/2017    History of cornea transplant - Left Eye 9/17/2012    History of CVA (cerebrovascular accident) 6/12/2013    Hydronephrosis of left kidney 11/12/2017    Hypercholesteremia  8/25/2016    Long-term (current) use of anticoagulants     Stopped due to gross hematuria from bladder CA     Macular hole of right eye     Malignant neoplasm of urinary bladder 11/17/2017    - 11/13 OR for cystoscopy, TURBT, and pyelogram; stent could not be placed as urethral orifice could not be visualized.  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 Bilateral nodules found on CT Chest have high likelihood of being metastases.  - Oncology consulted to discuss treatment options. Pt would like to pursue immunotherapy.    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 hines)    BREAST LUMPECTOMY      BUNIONECTOMY      CATARACT EXTRACTION W/  INTRAOCULAR LENS IMPLANT Right 01/26/2011    WITH TRAB ()    CATARACT EXTRACTION W/  INTRAOCULAR LENS IMPLANT Left 04/09/2009    ANT. VIITRECTOMY WITH SUTURED PCIOL (DR. RYAN)    CATARACT EXTRACTION W/ INTRAOCULAR LENS IMPLANTW/ TRABECULECTOMY Right 01/26/2011        COLON SURGERY      volvulus    CONJUNCTIVAL FLAP  Left 05/28/2009    DR. JARAMILLO    conjuunctival flap   2009    OS w/ dr. 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 emanuel    TRABECULECTOMY Left 01/11/2006        transcleral cyclophotocoagulation   2010    OS w/ dr. hines       Family History   Problem Relation Age of Onset    Emphysema Mother     Hypertension Mother     Hyperlipidemia Mother      Hypertension Father     Hyperlipidemia Father     No Known Problems Sister     No Known Problems Brother     No Known Problems Maternal Aunt     No Known Problems Maternal Uncle     No Known Problems Paternal Aunt     No Known Problems Paternal Uncle     No Known Problems Maternal Grandmother     No Known Problems Maternal Grandfather     No Known Problems Paternal Grandmother     No Known Problems Paternal Grandfather     Melanoma Neg Hx     Psoriasis Neg Hx     Lupus Neg Hx     Eczema Neg Hx     Acne Neg Hx     Amblyopia Neg Hx     Blindness Neg Hx     Cancer Neg Hx     Cataracts Neg Hx     Diabetes Neg Hx     Glaucoma Neg Hx     Macular degeneration Neg Hx     Retinal detachment Neg Hx     Strabismus Neg Hx     Stroke Neg Hx     Thyroid disease Neg Hx        Social History     Social History    Marital status: Single     Spouse name: N/A    Number of children: N/A    Years of education: N/A     Occupational History    Retired      Social History Main Topics    Smoking status: Former Smoker     Quit date: 9/25/1982    Smokeless tobacco: Never Used    Alcohol use No    Drug use: No    Sexual activity: No     Other Topics Concern    Are You Pregnant Or Think You May Be? No    Breast-Feeding No     Social History Narrative    None       MEDICATIONS & ALLERGIES:     Current Outpatient Prescriptions on File Prior to Visit   Medication Sig Dispense Refill    metoprolol succinate (TOPROL-XL) 25 MG 24 hr tablet Take 1 tablet (25 mg total) by mouth 2 (two) times daily. 180 tablet 1    PROPYLENE GLYCOL//PF (SYSTANE, PF, OPHT) Place 1 drop into both eyes daily as needed. for dry eyes      SYNTHROID 50 mcg tablet TAKE 1 TABLET BY MOUTH DAILY EVERY MORNING 90 tablet 0    timolol maleate 0.5% (TIMOPTIC) 0.5 % Drop Place 1 drop into both eyes 2 (two) times daily. 10 mL 12     No current facility-administered medications on file prior to visit.         Review of patient's  allergies indicates:   Allergen Reactions    Cosopt [dorzolamide-timolol] Swelling     Swelling of eyelids    Mevacor [lovastatin] Nausea And Vomiting    Prednisone Nausea And Vomiting    Vasotec [enalapril maleate] Nausea And Vomiting    Zetia [ezetimibe] Nausea And Vomiting    Furosemide Palpitations     Pt states her heart skips beats when she takes this medication.       OBJECTIVE:     Vital Signs:  Vitals:    12/06/17 0955   BP: 132/78   Pulse: 67     Wt Readings from Last 1 Encounters:   12/06/17 0955 44.7 kg (98 lb 8.7 oz)     Body mass index is 19.25 kg/m².     Physical Exam:  General: Well developed, well nourished. No distress.  HEENT: Head is normocephalic, atraumatic; ears are normal.    Eyes: Clear conjunctiva.  Neck: Supple, symmetrical neck; trachea midline.  Lungs: Clear to auscultation bilaterally and normal respiratory effort.  Cardiovascular: Heart with irreg irreg rhythm.  Rate controlled   Extremities: No LE edema.    Abdomen: Abdomen is soft, non-tender non-distended with normal bowel sounds.  Skin: Skin color, texture, turgor normal. No rashes.  Musculoskeletal: Normal gait.   Neurologic: Normal strength and tone. No focal numbness or weakness.   No pain swelling or lesion to arm or joints.    Psychiatric: Normal affect. Alert.    Laboratory  Lab Results   Component Value Date    WBC 10.78 11/29/2017    HGB 9.0 (L) 11/29/2017    HCT 27.4 (L) 11/29/2017    MCV 89 11/29/2017     (H) 11/29/2017     BMP  Lab Results   Component Value Date     (L) 11/28/2017    K 3.7 11/28/2017    CL 99 11/28/2017    CO2 24 11/28/2017    BUN 18 11/28/2017    CREATININE 1.0 11/28/2017    CALCIUM 9.1 11/28/2017    ANIONGAP 7 (L) 11/28/2017    ESTGFRAFRICA 58.5 (A) 11/28/2017    EGFRNONAA 50.8 (A) 11/28/2017     Lab Results   Component Value Date    ALT 9 (L) 11/21/2017    AST 22 11/21/2017    ALKPHOS 74 11/21/2017    BILITOT 0.5 11/21/2017     Lab Results   Component Value Date    INR 1.1  11/28/2017    INR 1.0 11/27/2017    INR 1.0 11/26/2017     Lab Results   Component Value Date    HGBA1C 5.6 02/17/2017     TRANSITION OF CARE:     Transition of Care Visit:     I have reviewed and updated the history and problem list.  I have reconciled the medication list.  I have discussed the hospitalization and current medical issues, prognosis and plans with the patient/family.  I  spent more than 50% of time discussing the care with the patient/family.  Total Encounter in the [facility]: 60 minutes.    Medications Reconciliation:   I have reconciled the patient's home medications and discharge medications with the patient/family. I have updated all changes.  Refer to After-Visit Medication List.    ASSESSMENT & PLAN:     Malignant neoplasm of urinary bladder, unspecified site  Hydronephrosis of left kidney (unable to be stented by Urology)  Bladder mass  - No recurrent hematuria since off anticoagulation.    Attempt reanticoagulate was tried with Lovenox but she had rebleeding.  - No anticoagulation for now.  Possible aspirin later after radiation treatment.    - Plan: She will complete XRT this Monday.              Will arrange follow up with Oncology.    -     Ambulatory consult to Urology  -     Ambulatory consult to Oncology  -     CBC auto differential; Future; Expected date: 12/06/2017  -     Comprehensive metabolic panel; Future; Expected date: 12/06/2017    Chronic atrial fibrillation  History of CVA (cerebrovascular accident)  Essential hypertension  - BP controlled. HR controlled.    No anticoagulation due to hematuria from cancer.    Chronic hyponatremia  - Improved during hospitalization.      Off SSRI.  - will monitor.    Acquired hypothyroidism  - On Synthroid replacement.    History of breast cancer  Blindness/low vision  -Baseline states.    Scheduled Follow-up :  Future Appointments  Date Time Provider Department Center   12/6/2017 11:00 AM LAB, APPOINTMENT [facility] [address]   12/6/2017 5:00 PM FLU, INTERNAL MEDICINE [facility]   12/15/2017 9:15 AM Diana Beaulieu MD [facility]   12/22/2017 11:00 AM Jean Cantu MD [facility] HEM ONC [facility]   1/10/2018 10:20 AM Aguila Hsieh MD [facility] [place]   1/16/2018 1:45 PM Tiffanie Enriquez MD [facility] UROLOGY [facility]        After Visit Medication List :     Medication List          Accurate as of 12/6/17 10:29 AM. If you have any questions, ask your nurse or doctor.               CONTINUE taking these medications    metoprolol succinate 25 MG 24 hr tablet  Commonly known as:  TOPROL-XL  Take 1 tablet (25 mg total) by mouth 2 (two) times daily.     SYNTHROID 50 MCG tablet  Generic drug:  levothyroxine  TAKE 1 TABLET BY MOUTH DAILY EVERY MORNING     SYSTANE (PF) OPHT     timolol maleate 0.5% 0.5 % Drop  Commonly known as:  TIMOPTIC  Place 1 drop into both eyes 2 (two) times daily.            Signing Physician:  Sundeep Gibbs MD

## 2017-12-07 ENCOUNTER — TELEPHONE (OUTPATIENT)
Dept: HEMATOLOGY/ONCOLOGY | Facility: CLINIC | Age: 82
End: 2017-12-07

## 2017-12-07 DIAGNOSIS — C67.9 MALIGNANT NEOPLASM OF URINARY BLADDER, UNSPECIFIED SITE: Primary | ICD-10-CM

## 2017-12-07 PROCEDURE — 77336 RADIATION PHYSICS CONSULT: CPT | Performed by: RADIOLOGY

## 2017-12-07 PROCEDURE — G6002 STEREOSCOPIC X-RAY GUIDANCE: HCPCS | Mod: 26,,, | Performed by: RADIOLOGY

## 2017-12-07 PROCEDURE — 77387 GUIDANCE FOR RADJ TX DLVR: CPT | Mod: TC | Performed by: RADIOLOGY

## 2017-12-07 PROCEDURE — 77412 RADIATION TX DELIVERY LVL 3: CPT | Performed by: RADIOLOGY

## 2017-12-07 NOTE — TELEPHONE ENCOUNTER
Per Dr Alfaro, schedule follow up with labs.  Relayed to sister, Aylin.  On 12/18, see Dr Alfaro 2 pm and labs at least an hour prior.   They use pt portal for reminders.    Arielle Francis, RN  Herberth Alfaro MD   Caller: Pt and sister             Dr Alfaro, this is not new consult as you saw pt in hospital. Pt is getting radiation 2x a day and this is fatiguing to her.  She is also said pt is being treated by uro to stop the bleeding.     Aylin [phone], sister if patient asking when follow up should be.   I told them your 1st availability is the 18th.  She is worried that is not soon enough.  They are confused and they do not know what the next step would be.     - Arielle    Previous Messages      ----- Message -----   From: Aby Ware   Sent: 12/4/2017   2:04 PM   To: [facility] Cancer Navigation     Pt calling to schedule a np appt with Oncology. Referral is in system for bladder cancer         Pt call back number  [phone]

## 2017-12-08 PROCEDURE — 77412 RADIATION TX DELIVERY LVL 3: CPT | Performed by: RADIOLOGY

## 2017-12-08 PROCEDURE — 77387 GUIDANCE FOR RADJ TX DLVR: CPT | Mod: TC | Performed by: RADIOLOGY

## 2017-12-08 PROCEDURE — G6002 STEREOSCOPIC X-RAY GUIDANCE: HCPCS | Mod: 26,,, | Performed by: RADIOLOGY

## 2017-12-11 ENCOUNTER — DOCUMENTATION ONLY (OUTPATIENT)
Dept: RADIATION ONCOLOGY | Facility: CLINIC | Age: 82
End: 2017-12-11

## 2017-12-11 ENCOUNTER — TELEPHONE (OUTPATIENT)
Dept: INTERNAL MEDICINE | Facility: CLINIC | Age: 82
End: 2017-12-11

## 2017-12-11 PROCEDURE — 77412 RADIATION TX DELIVERY LVL 3: CPT | Performed by: RADIOLOGY

## 2017-12-11 PROCEDURE — 77387 GUIDANCE FOR RADJ TX DLVR: CPT | Mod: TC | Performed by: RADIOLOGY

## 2017-12-11 PROCEDURE — G6002 STEREOSCOPIC X-RAY GUIDANCE: HCPCS | Mod: 26,,, | Performed by: RADIOLOGY

## 2017-12-11 NOTE — TELEPHONE ENCOUNTER
Pt's sister Lexie called stating that pt still has productive cough after going through 1 bottle of Robitussin. She would like to be advised on what other OTC meds or therapeutic interventions that should be done. Please advise.

## 2017-12-12 NOTE — TELEPHONE ENCOUNTER
Called and spoke with the pt's sister Lexie to inform her per Dr. Gibbs pt is to continue Robitussin. Lexie was also offered the option to be seen in urgent care due to Dr. Gibbs being out of office. Lexie requested urgent care's number in [place] so that she can schedule the appt herself. [facility] in [place] contact info was provided.

## 2017-12-13 NOTE — PLAN OF CARE
Problem: Patient Care Overview  Goal: Plan of Care Review  Outcome: Outcome(s) achieved Date Met: 12/13/17  Radiation completed on 12/11/17  F/u appt made

## 2017-12-14 PROCEDURE — 77336 RADIATION PHYSICS CONSULT: CPT | Performed by: RADIOLOGY

## 2017-12-18 ENCOUNTER — LAB VISIT (OUTPATIENT)
Dept: LAB | Facility: HOSPITAL | Age: 82
End: 2017-12-18
Payer: MEDICARE

## 2017-12-18 ENCOUNTER — OFFICE VISIT (OUTPATIENT)
Dept: HEMATOLOGY/ONCOLOGY | Facility: CLINIC | Age: 82
End: 2017-12-18
Payer: MEDICARE

## 2017-12-18 VITALS
DIASTOLIC BLOOD PRESSURE: 74 MMHG | WEIGHT: 95.69 LBS | HEART RATE: 81 BPM | OXYGEN SATURATION: 96 % | TEMPERATURE: 98 F | SYSTOLIC BLOOD PRESSURE: 138 MMHG | RESPIRATION RATE: 20 BRPM | BODY MASS INDEX: 18.69 KG/M2

## 2017-12-18 DIAGNOSIS — C79.11 METASTATIC ADENOCARCINOMA TO BLADDER: ICD-10-CM

## 2017-12-18 DIAGNOSIS — C67.9 MALIGNANT NEOPLASM OF URINARY BLADDER, UNSPECIFIED SITE: ICD-10-CM

## 2017-12-18 LAB
ALBUMIN SERPL BCP-MCNC: 3.2 G/DL
ALP SERPL-CCNC: 88 U/L
ALT SERPL W/O P-5'-P-CCNC: 13 U/L
ANION GAP SERPL CALC-SCNC: 6 MMOL/L
AST SERPL-CCNC: 21 U/L
BILIRUB SERPL-MCNC: 0.4 MG/DL
BUN SERPL-MCNC: 19 MG/DL
CALCIUM SERPL-MCNC: 9.2 MG/DL
CHLORIDE SERPL-SCNC: 100 MMOL/L
CO2 SERPL-SCNC: 29 MMOL/L
CREAT SERPL-MCNC: 1.1 MG/DL
ERYTHROCYTE [DISTWIDTH] IN BLOOD BY AUTOMATED COUNT: 15.7 %
EST. GFR  (AFRICAN AMERICAN): 52.2 ML/MIN/1.73 M^2
EST. GFR  (NON AFRICAN AMERICAN): 45.3 ML/MIN/1.73 M^2
GLUCOSE SERPL-MCNC: 145 MG/DL
HCT VFR BLD AUTO: 27.2 %
HGB BLD-MCNC: 8.2 G/DL
IMM GRANULOCYTES # BLD AUTO: 0.02 K/UL
MCH RBC QN AUTO: 26.6 PG
MCHC RBC AUTO-ENTMCNC: 30.1 G/DL
MCV RBC AUTO: 88 FL
NEUTROPHILS # BLD AUTO: 5.5 K/UL
PLATELET # BLD AUTO: 218 K/UL
PMV BLD AUTO: 9.4 FL
POTASSIUM SERPL-SCNC: 4.7 MMOL/L
PROT SERPL-MCNC: 6.6 G/DL
RBC # BLD AUTO: 3.08 M/UL
SODIUM SERPL-SCNC: 135 MMOL/L
WBC # BLD AUTO: 6.63 K/UL

## 2017-12-18 PROCEDURE — 85027 COMPLETE CBC AUTOMATED: CPT

## 2017-12-18 PROCEDURE — 99999 PR PBB SHADOW E&M-EST. PATIENT-LVL III: CPT | Mod: PBBFAC,GC,,

## 2017-12-18 PROCEDURE — 36415 COLL VENOUS BLD VENIPUNCTURE: CPT

## 2017-12-18 PROCEDURE — 80053 COMPREHEN METABOLIC PANEL: CPT

## 2017-12-18 PROCEDURE — 99214 OFFICE O/P EST MOD 30 MIN: CPT | Mod: GC,S$GLB,, | Performed by: INTERNAL MEDICINE

## 2017-12-18 NOTE — PROGRESS NOTES
PATIENT: Monique Lew  MRN: [id number]  DATE: 12/19/2017      Diagnosis:   1. 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 Upon admisson the patient underwent CT of the abdomen on 11/11/17 showing a left sided bladder mass measuring 6.3 x 4.3 x 4.0 cm. Ct chest performed on 11/12/17 showed bilateral pulmonary nodules with the largest nodular opacity in the right upper lobe measures approximately 1.6 cm. 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    Subjective:    Initial History: Ms. Lew is a 87 y.o. female who presents to the clinic for follow up of recently diagnosed bladder cancer.  The patient was recently admitted to the hospital with hematuria and found to have metastatic bladder cancer.  The patient was taken off of coumadin which the pt was taking for afib and h/o CVA and treated with radiation therapy.  The patient states that since being discharged, she has continued to live at home by herself.  She states the hematuria has stopped.  She states she is in bed or a chair most of the day.  The patient complains of a dry cough but denies CP, SOB, fever.  She also denies N/V, constipation, diarrhea.    Past Medical History:   Past Medical History:   Diagnosis Date     Acquired hypothyroidism     Adrenal adenoma     Amenorrhea 12/28/2015    Anxiety 8/28/2017    Asymptomatic carotid artery stenosis without infarction 4/29/2015    Bladder mass 11/12/2017    6.3 x 4.3 x 4.0 cm left sided enhancing bladder mass.   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    Chronic atrial fibrillation 8/9/2012    Chronic hyponatremia 6/29/2015    Corneal thinning - Left Eye 9/17/2012    Depression (emotion) 8/28/2017    Dislocated IOL (intraocular lens), posterior - Right Eye 10/19/2012    DVT (deep venous thrombosis)     Essential hypertension 7/23/2014    Goiter     Gross hematuria 11/12/2017    History of cornea transplant - Left Eye 9/17/2012    History of CVA (cerebrovascular accident) 6/12/2013    Hydronephrosis of left kidney 11/12/2017    Hypercholesteremia 8/25/2016    Long-term (current) use of anticoagulants     Stopped due to gross hematuria from bladder CA     Macular hole of right eye     Malignant neoplasm of urinary bladder 11/17/2017    - 11/13 OR for cystoscopy, TURBT, and pyelogram; stent could not be placed as urethral orifice could not be visualized.  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 Bilateral nodules found on CT Chest have high likelihood of being metastases.  - Oncology consulted to discuss treatment options. Pt would like to pursue immunotherapy.    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 hines)    BREAST LUMPECTOMY       BUNIONECTOMY      CATARACT EXTRACTION W/  INTRAOCULAR LENS IMPLANT Right 01/26/2011    WITH TRAB ()    CATARACT EXTRACTION W/  INTRAOCULAR LENS IMPLANT Left 04/09/2009    ANT. VIITRECTOMY WITH SUTURED PCIOL (DR. RYAN)    CATARACT EXTRACTION W/ INTRAOCULAR LENS IMPLANTW/ TRABECULECTOMY Right 01/26/2011        COLON SURGERY      volvulus    CONJUNCTIVAL FLAP  Left 05/28/2009    DR. JARAMILLO    conjuunctival flap   2009    OS w/ dr. 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 emanuel    TRABECULECTOMY Left 01/11/2006        transcleral cyclophotocoagulation   2010    OS w/ dr. hines       Family History:   Family History   Problem Relation Age of Onset    Emphysema Mother     Hypertension Mother     Hyperlipidemia Mother     Hypertension Father     Hyperlipidemia Father     No Known Problems Sister     No Known Problems Brother     No Known Problems Maternal Aunt     No Known Problems Maternal Uncle     No Known Problems Paternal Aunt     No Known Problems Paternal Uncle     No Known Problems Maternal Grandmother     No Known Problems Maternal Grandfather     No Known Problems Paternal Grandmother     No Known Problems Paternal Grandfather     Melanoma Neg Hx     Psoriasis Neg Hx     Lupus Neg Hx     Eczema Neg Hx     Acne Neg Hx     Amblyopia Neg Hx     Blindness Neg Hx     Cancer Neg Hx     Cataracts Neg Hx     Diabetes Neg Hx     Glaucoma Neg Hx     Macular degeneration Neg Hx     Retinal detachment Neg Hx     Strabismus Neg Hx     Stroke Neg Hx     Thyroid disease Neg Hx        Social History:  reports that she quit smoking about 35 years ago. She has never used smokeless tobacco. She reports that she does not drink alcohol or use drugs.    Allergies:  Review of patient's  allergies indicates:   Allergen Reactions    Cosopt [dorzolamide-timolol] Swelling     Swelling of eyelids    Mevacor [lovastatin] Nausea And Vomiting    Prednisone Nausea And Vomiting    Vasotec [enalapril maleate] Nausea And Vomiting    Zetia [ezetimibe] Nausea And Vomiting    Furosemide Palpitations     Pt states her heart skips beats when she takes this medication.       Medications:  Current Outpatient Prescriptions   Medication Sig Dispense Refill    metoprolol succinate (TOPROL-XL) 25 MG 24 hr tablet Take 1 tablet (25 mg total) by mouth 2 (two) times daily. 180 tablet 1    PROPYLENE GLYCOL//PF (SYSTANE, PF, OPHT) Place 1 drop into both eyes daily as needed. for dry eyes      SYNTHROID 50 mcg tablet TAKE 1 TABLET BY MOUTH DAILY EVERY MORNING 90 tablet 0    timolol maleate 0.5% (TIMOPTIC) 0.5 % Drop Place 1 drop into both eyes 2 (two) times daily. 10 mL 12     No current facility-administered medications for this visit.        Review of Systems   Constitutional: Negative for appetite change, chills, fever and unexpected weight change.   Eyes: Positive for visual disturbance.   Respiratory: Positive for cough and shortness of breath.    Cardiovascular: Negative for chest pain and palpitations.   Gastrointestinal: Negative for abdominal pain, constipation, diarrhea, nausea and vomiting.   Genitourinary: Positive for frequency.   Musculoskeletal: Negative for back pain.   Skin: Negative for rash.   Neurological: Positive for headaches.   Hematological: Negative for adenopathy.   Psychiatric/Behavioral: The patient is nervous/anxious.        ECOG Performance Status: 3   Objective:      Vitals:   Vitals:    12/18/17 1356   BP: 138/74   BP Location: Left arm   Patient Position: Sitting   Pulse: 81   Resp: 20   Temp: 97.6 °F (36.4 °C)   TempSrc: Oral   SpO2: 96%   Weight: 43.4 kg (95 lb 10.9 oz)     BMI: Body mass index is 18.69 kg/m².    Physical Exam   Constitutional: She is oriented to person,  place, and time. No distress.   Thin, cachectic   HENT:   Head: Normocephalic and atraumatic.   Mouth/Throat: No oropharyngeal exudate.   Eyes: EOM are normal. Right eye exhibits no discharge. Left eye exhibits no discharge. No scleral icterus.   Cardiovascular: Normal rate, regular rhythm, normal heart sounds and intact distal pulses.  Exam reveals no gallop and no friction rub.    No murmur heard.  Pulmonary/Chest: Effort normal and breath sounds normal. No respiratory distress. She has no wheezes. She has no rales. She exhibits no tenderness.   Abdominal: Soft. Bowel sounds are normal. She exhibits no distension and no mass. There is no tenderness. There is no rebound and no guarding.   Musculoskeletal: Normal range of motion. She exhibits no edema or tenderness.   Lymphadenopathy:        Head (right side): No submental and no submandibular adenopathy present.        Head (left side): No submental and no submandibular adenopathy present.     She has no cervical adenopathy.     She has no axillary adenopathy.        Right: No inguinal and no supraclavicular adenopathy present.        Left: No inguinal and no supraclavicular adenopathy present.   Neurological: She is alert and oriented to person, place, and time.   Skin: No rash noted. She is not diaphoretic. No erythema.   Psychiatric: She has a normal mood and affect. Her behavior is normal.       Laboratory Data:  Lab Visit on 12/18/2017   Component Date Value Ref Range Status    WBC 12/18/2017 6.63  3.90 - 12.70 K/uL Final    RBC 12/18/2017 3.08* 4.00 - 5.40 M/uL Final    Hemoglobin 12/18/2017 8.2* 12.0 - 16.0 g/dL Final    Hematocrit 12/18/2017 27.2* 37.0 - 48.5 % Final    MCV 12/18/2017 88  82 - 98 fL Final    MCH 12/18/2017 26.6* 27.0 - 31.0 pg Final    MCHC 12/18/2017 30.1* 32.0 - 36.0 g/dL Final    RDW 12/18/2017 15.7* 11.5 - 14.5 % Final    Platelets 12/18/2017 218  150 - 350 K/uL Final    MPV 12/18/2017 9.4  9.2 - 12.9 fL Final    Gran #  12/18/2017 5.5  1.8 - 7.7 K/uL Final    Comment: The ANC is based on a white cell differential from an   automated cell counter. It has not been microscopically   reviewed for the presence of abnormal cells. Clinical   correlation is required.      Immature Grans (Abs) 12/18/2017 0.02  0.00 - 0.04 K/uL Final    Sodium 12/18/2017 135* 136 - 145 mmol/L Final    Potassium 12/18/2017 4.7  3.5 - 5.1 mmol/L Final    Chloride 12/18/2017 100  95 - 110 mmol/L Final    CO2 12/18/2017 29  23 - 29 mmol/L Final    Glucose 12/18/2017 145* 70 - 110 mg/dL Final    BUN, Bld 12/18/2017 19  8 - 23 mg/dL Final    Creatinine 12/18/2017 1.1  0.5 - 1.4 mg/dL Final    Calcium 12/18/2017 9.2  8.7 - 10.5 mg/dL Final    Total Protein 12/18/2017 6.6  6.0 - 8.4 g/dL Final    Albumin 12/18/2017 3.2* 3.5 - 5.2 g/dL Final    Total Bilirubin 12/18/2017 0.4  0.1 - 1.0 mg/dL Final    Comment: For infants and newborns, interpretation of results should be based  on gestational age, weight and in agreement with clinical  observations.  Premature Infant recommended reference ranges:  Up to 24 hours.............<8.0 mg/dL  Up to 48 hours............<12.0 mg/dL  3-5 days..................<15.0 mg/dL  6-29 days.................<15.0 mg/dL      Alkaline Phosphatase 12/18/2017 88  55 - 135 U/L Final    AST 12/18/2017 21  10 - 40 U/L Final    ALT 12/18/2017 13  10 - 44 U/L Final    Anion Gap 12/18/2017 6* 8 - 16 mmol/L Final    eGFR if  12/18/2017 52.2* >60 mL/min/1.73 m^2 Final    eGFR if non  12/18/2017 45.3* >60 mL/min/1.73 m^2 Final    Comment: Calculation used to obtain the estimated glomerular filtration  rate (eGFR) is the CKD-EPI equation.            Imaging: Imaging Reviewed    Assessment:       1. 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         Plan:     Metastatic Bladder Cancer - The patient has an extremely poor performance status and multiple medical cormorbidities  -It was discussed with the patient  that she would not be a candidate for systemic chemotherapy and that she could potentially consider immunotherapy with Pembrolizumab or consider hospice care  -The patient sated she could not make a decision at this time with respect to pursuing treatment or hospice  -Will have our SW reach out to the patient to discuss hospice options with patient as well as potential NH placement as the patient states she lives alone and given her frailty, there is great concern for her safety.    Herberth Alfaro MD PGY-IV  Hematology and Oncology  Pager:[phone]      Distress Screening Results: Psychosocial Distress screening score of Distress Score: 0 noted and reviewed. No intervention indicated.     Attending Note  I have personally taken the history and examined this patient and agree with the fellow's note as stated above.

## 2017-12-18 NOTE — Clinical Note
Roberto Lanier.   CAne you tentatively schedule the patient for a follow up visit in several weeks.She will need a CBC and MCP prior to the visit.  Thanks.

## 2017-12-19 PROBLEM — C79.11: Status: ACTIVE | Noted: 2017-12-19

## 2017-12-20 ENCOUNTER — DOCUMENTATION ONLY (OUTPATIENT)
Dept: HEMATOLOGY/ONCOLOGY | Facility: CLINIC | Age: 82
End: 2017-12-20

## 2017-12-20 NOTE — PROGRESS NOTES
"Spoke with patient in attempts to discuss hospice care services. She is unsure about their services sine " I am functioning just fine." Explained about services and how one does not have to wait until they experience a decline. At this time she is agreeable to have a company come and provide her an informational visit. Reached out to Shelli with [employer] who will reach out to her.   "

## 2017-12-22 DIAGNOSIS — C67.9 MALIGNANT NEOPLASM OF URINARY BLADDER, UNSPECIFIED SITE: Primary | ICD-10-CM

## 2017-12-22 NOTE — PROGRESS NOTES
I was contacted by NASRIN Alexis whom stated the patient had decided to pursue hospice.  Hopsice orders placed.  Will have Valencia reach out to the patient to establish hospice care.    Herberth Alfaro MD PGY-V  Hematology and Oncology  Pager:[phone]

## 2018-01-02 DIAGNOSIS — C67.9 MALIGNANT NEOPLASM OF URINARY BLADDER, UNSPECIFIED SITE: Primary | ICD-10-CM

## 2018-01-08 ENCOUNTER — OFFICE VISIT (OUTPATIENT)
Dept: OPHTHALMOLOGY | Facility: CLINIC | Age: 83
End: 2018-01-08
Payer: MEDICARE

## 2018-01-08 DIAGNOSIS — H17.12 CENTRAL CORNEAL OPACITY OF LEFT EYE: ICD-10-CM

## 2018-01-08 DIAGNOSIS — H44.522 PHTHISIS BULBI OF LEFT EYE: ICD-10-CM

## 2018-01-08 DIAGNOSIS — T85.22XA DISLOCATED IOL (INTRAOCULAR LENS), POSTERIOR, RIGHT: ICD-10-CM

## 2018-01-08 DIAGNOSIS — H40.1493 PSEUDOEXFOLIATION GLAUCOMA, SEVERE STAGE: Primary | ICD-10-CM

## 2018-01-08 DIAGNOSIS — Z94.7 HISTORY OF CORNEA TRANSPLANT: ICD-10-CM

## 2018-01-08 PROCEDURE — 99499 UNLISTED E&M SERVICE: CPT | Mod: S$GLB,,, | Performed by: OPHTHALMOLOGY

## 2018-01-08 PROCEDURE — 92012 INTRM OPH EXAM EST PATIENT: CPT | Mod: S$GLB,,, | Performed by: OPHTHALMOLOGY

## 2018-01-08 PROCEDURE — 99999 PR PBB SHADOW E&M-EST. PATIENT-LVL I: CPT | Mod: PBBFAC,,, | Performed by: OPHTHALMOLOGY

## 2018-01-08 RX ORDER — TIMOLOL MALEATE 5 MG/ML
1 SOLUTION/ DROPS OPHTHALMIC 2 TIMES DAILY
Qty: 10 ML | Refills: 12 | Status: SHIPPED | OUTPATIENT
Start: 2018-01-08

## 2018-01-08 NOTE — PROGRESS NOTES
"HPI     DLS: 9/11/17    Pt here for 4 month check;  Pt states she's been noticing eye pain in OU for the past couple of days.     Pt has had a recent diagnosis of bladder cancer - has had some radiation -   she states she already has metastasis     Meds: Timolol bid ou    1) PSEx   2) Dislocated IOL OD   3) Mac Hole OD   4) Phthisis OS   5) PCIOL OU   6) Yag Cap OD   7) Floater OD       Last edited by Diana Beaulieu MD on 1/8/2018  4:09 PM. (History)            Assessment /Plan     For exam results, see Encounter Report.    Phthisis bulbi of left eye    Pseudoexfoliation glaucoma, severe stage - Both Eyes  -     timolol maleate 0.5% (TIMOPTIC) 0.5 % Drop; Place 1 drop into both eyes 2 (two) times daily.  Dispense: 10 mL; Refill: 12    Central corneal opacity of left eye    Dislocated IOL (intraocular lens), posterior, right    History of cornea transplant - Left Eye         1 - Dislocated / subluxed IOL OD- in pt with pseudoexfoliation glaucoma        - had a similar event OS years ago        - occurred about 10/1/2012        - Seen by Deisi 5/8/13 w/ plans for conservative management    2. Pseudoexfoliation Glaucoma OD severe stage OD /Absolute Glaucoma OS   S/p SLT OS (10/13/05)   S/p Trab OS (1/11/06)   S/p SLT OD (1/24/08)   Ant Vit w/ IOL exchange with sutured PCIOL (Deisi) 4/9/09 OS   DSAEK 4/30/09 OS   Conj flap for dellen OS (5/28/09)   S/p Phaco Trab with mini shunt OD 1/26/11 w/ hx of siobhan po coarse w/ hypotony s/p AC reformation and then elevated IOP with all sutures cut and then encapsulation   Dislocated PC IOL OD 10/2012 -Evaluated by Deisi and Mal for possible combined. Pt not interested.    Family history none   Glaucoma meds T1/2 BID OU .  H/O adverse rxn to glaucoma drops    -  D500 intolerant     -  intolerant to cosopt / azopt - swollen eye lids;    Xalatan "blurrs vision", travatan Z cuellar;    did not tolerate Jose 2/2 photophobia?,    timoptic causes bradycardia - tolerating ok 3/2015 " "//   - brimonidine - " loss of color perception - could no longer see the colors of the caps  LASERS SLT OU (10/13/05 OD and 1/24/08 OS)   GLAUCOMA SURGERIES    -Phaco/Trab 1/26/11 OD     -S/p Trab OS (1/11/06)    - BV tube shunt OD 10/30/2013  OTHER EYE SURGERIES    -PCIOL OS dislocated (elsewhere)    -Ant Vit w/ IOL exchange with sutured PCIOL ([place]) 4/9/09 OS    -DSEAK 4/30/09 OS    -Conj flap for dellen OS (5/28/09)   CDR 0.8/0.9   Tbase 32-40 OD pre Trab   Tmax 40/-   Ttarget 16/pain free   HVF 19 VF '96 - 04/2017 OD: SAD/IAD w/ split fix (stable) ; OS: NA  Gonio +4/-   /585   OCT 8 test 2004 to 04/2017 -RNFL - OD:dec. I // OS:xx  HRT 11 test 2005 to 2016 -MR -  Off-center // xx os  Disc photos [id number] - mult slides //   2011, 2014, 2016 - OIS     Ttoday  10/20  Test done today - iop check    3. PCIOL OU - both have dislocated OS years ago, OD recently 10/1/2012    4. H/O PCO OD - S/P yag cap od - IOL dislocated shortly there after 2/2 weak zonules assoc with PsEx    5. Hx Mac Hole OD   - S/P sx (partial PPV) with Arend years ago, did extremely well    6. Pre phthisis OS,   -in past was blind hypertenssive but now with nl IOP on timolol ,   -absent AC, and vascularized opacified cornea,   -S/P DESEK, and corneal melt    7. Large floater  OD 1/2/2014 - saw Arend 1/16/2014 - stable    8. Ulices Bonnet Syndrome   Sees green foliage with roses   Sees groups of people out side    9. Bruise around Right eye  9/17/2012, and Hx of Hyphema OD (4/12) - resolved   -associated with coumadin use  -bruising resolved   -Pt is on coumadin - for atrial fib    10. H/O CVA - 2008// atrial fib // ? Mult mini strokes    On coumadin     11. Dx with bladder CA (1/2018)    S/p radiation    Says she has mets already           Plan  Monocular PsEx glaucoma w/ dislocated IOL OD  - had increased IOP OD with cont progression of VF defect OD  - has had a trab with express mini shunt OD 2011 as well as Baerveldt 10/30/2013  Pt does " "not tolerate, travatan z, jazzmine, or diamox  Tolerates timolol - but If affecting her hert rate - bradycardia     - Pt happy w/ aphakic Glasses only. Does not wish to have PPV w/ Scleral Fixated IOL. Does not use CL.  - states her vision is clearer with her dislocated PC IOL and aphakic glasses then before the dislocation - most likely because now there is no PCO she is looking through.  - Also states that she is seeing better now after getting off of her Norvasc.    Glaucoma - IOP better with addition of timolol ou. IOP higher OS today.   Cont  t 1/2 ou - tolerating ok - ? H/o bradycardia in past   Intolerant to ALL other gtts     HVF progression OD  Patient states she has been having multiple "ministrokes"    IOP doing well today on timolol  Cont with vf loss 2/2 mult mini strokes - despite good IOP's - pt is on coumadin     Cont follow up with retina    Glasses - Miguel -happy with new glasses - 12/2016     rtc as scheduled   F/U 4 months with -- in future change to the HVF -  24-2 stim V od program // DFE // photos   "

## 2018-01-10 ENCOUNTER — OFFICE VISIT (OUTPATIENT)
Dept: INTERNAL MEDICINE | Facility: CLINIC | Age: 83
End: 2018-01-10
Payer: MEDICARE

## 2018-01-10 VITALS
WEIGHT: 94.38 LBS | HEIGHT: 59 IN | SYSTOLIC BLOOD PRESSURE: 160 MMHG | HEART RATE: 84 BPM | OXYGEN SATURATION: 96 % | BODY MASS INDEX: 19.03 KG/M2 | DIASTOLIC BLOOD PRESSURE: 92 MMHG

## 2018-01-10 DIAGNOSIS — R31.9 HEMATURIA, UNSPECIFIED TYPE: ICD-10-CM

## 2018-01-10 DIAGNOSIS — I48.20 CHRONIC ATRIAL FIBRILLATION: ICD-10-CM

## 2018-01-10 DIAGNOSIS — C79.11 METASTATIC ADENOCARCINOMA TO BLADDER: Primary | ICD-10-CM

## 2018-01-10 DIAGNOSIS — D64.9 ANEMIA, UNSPECIFIED TYPE: ICD-10-CM

## 2018-01-10 DIAGNOSIS — J06.9 UPPER RESPIRATORY TRACT INFECTION, UNSPECIFIED TYPE: ICD-10-CM

## 2018-01-10 DIAGNOSIS — I10 ESSENTIAL HYPERTENSION: ICD-10-CM

## 2018-01-10 PROCEDURE — 99499 UNLISTED E&M SERVICE: CPT | Mod: S$GLB,,, | Performed by: INTERNAL MEDICINE

## 2018-01-10 PROCEDURE — 99999 PR PBB SHADOW E&M-EST. PATIENT-LVL III: CPT | Mod: PBBFAC,,, | Performed by: INTERNAL MEDICINE

## 2018-01-10 PROCEDURE — 99214 OFFICE O/P EST MOD 30 MIN: CPT | Mod: S$GLB,,, | Performed by: INTERNAL MEDICINE

## 2018-01-10 NOTE — PROGRESS NOTES
Pt. ID: Monique Lew is a 87 y.o. female      Chief complaint:   Chief Complaint   Patient presents with    Follow-up     follow up visit from [facility] ED 11/20017 DX: Bladder Mass        HPI: Pt. Here for ; pt. Neighbor, Modesta, is with the pt.; of note, she has metastatic adenocarcinoma of bladder; she is seeing oncology and urology; she has had radiation and hematuria is improving; she states she will begin hospice; she is compliant with meds and was taken off coumadin by cardiology; she is getting over a cold has mild cough which she does not want anything for it; she is seeing opthamology for advanced glaucoma; BP is elevated and pt. Has on multiple occassions refused management and would prefer cardiology managing; she denies dysuria; anemia dated 12/18/17 was low but stable; repeat CBC is ordered; pt. Would prefer oncology and urology manage her condition and requests 4-6 month f/u     Answers for HPI/ROS submitted by the patient on 1/5/2018   activity change: Yes  unexpected weight change: Yes  rhinorrhea: No  trouble swallowing: No  visual disturbance: Yes  chest tightness: No  polyuria: Yes  difficulty urinating: No  menstrual problem: No  arthralgias: No  confusion: No  dysphoric mood: No        Review of Systems   HENT: Negative for hearing loss.    Eyes: Negative for discharge.   Respiratory: Positive for cough. Negative for wheezing.    Cardiovascular: Negative for chest pain and palpitations.   Gastrointestinal: Negative for blood in stool, constipation, diarrhea and vomiting.   Genitourinary: Positive for hematuria. Negative for dysuria.        Hematuria improving after radiation   Musculoskeletal: Negative for neck pain.   Neurological: Negative for weakness and headaches.   Endo/Heme/Allergies: Negative for polydipsia.         Objective:    Physical Exam   Constitutional: She is oriented to person, place, and time.   frail   Eyes: EOM are normal.   Neck: Normal range of motion.    Cardiovascular: Normal rate, regular rhythm and normal heart sounds.    Pulmonary/Chest: Effort normal and breath sounds normal.   Abdominal: Soft. There is no tenderness. There is no rebound and no guarding.   Musculoskeletal: Normal range of motion.   Neurological: She is alert and oriented to person, place, and time.   Skin: No rash noted.         Health Maintenance   Topic Date Due    Pneumococcal (65+) (2 of 2 - PPSV23) 07/26/2018    Lipid Panel  01/26/2022    TETANUS VACCINE  07/26/2027    Zoster Vaccine  Addressed    Influenza Vaccine  Completed         Assessment:     1. 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 Active   2. Essential hypertension Sub-optimally controlled   3. Upper respiratory tract infection, unspecified type Active   4. Chronic atrial fibrillation Active   5. Hematuria, unspecified type Active   6. Anemia, unspecified type Active         Plan: Monique was seen today for follow-up.    Diagnoses and all orders for this visit:    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  Comments:  f/u oncology and urology who are managing; pt. is proceeding to hospice    Essential hypertension  Comments:  continue current regimen and encouraged low Na diet; pt. would like cardiology to manage    Upper respiratory tract infection, unspecified type  Comments:  minimal cough; OTC cough med    Chronic atrial fibrillation  Comments:  off coumadin due to hematuria; f/u cardiology who is managing; explained risks and benefits of coumadin    Hematuria, unspecified type  Comments:  improving after radiation; f/u urology who is managing     Anemia, unspecified type  Comments:  low but stable; she would prefer oncology manage and I advised her to repeat CBC as scheduled for close monitoring         Problem List Items Addressed This Visit        ENT    Upper respiratory tract infection       Cardiac/Vascular    Essential hypertension    Chronic atrial fibrillation       Renal/    Hematuria       Oncology    Metastatic  adenocarcinoma to bladder - Primary    Anemia

## 2018-01-17 ENCOUNTER — LAB VISIT (OUTPATIENT)
Dept: LAB | Facility: HOSPITAL | Age: 83
End: 2018-01-17
Payer: MEDICARE

## 2018-01-17 ENCOUNTER — OFFICE VISIT (OUTPATIENT)
Dept: UROLOGY | Facility: CLINIC | Age: 83
End: 2018-01-17
Payer: MEDICARE

## 2018-01-17 VITALS
HEIGHT: 59 IN | BODY MASS INDEX: 19.03 KG/M2 | SYSTOLIC BLOOD PRESSURE: 179 MMHG | HEART RATE: 84 BPM | DIASTOLIC BLOOD PRESSURE: 107 MMHG | WEIGHT: 94.38 LBS

## 2018-01-17 DIAGNOSIS — R82.81 PYURIA: ICD-10-CM

## 2018-01-17 DIAGNOSIS — H54.7 BLINDNESS/LOW VISION: ICD-10-CM

## 2018-01-17 DIAGNOSIS — C79.11 METASTATIC ADENOCARCINOMA TO BLADDER: ICD-10-CM

## 2018-01-17 DIAGNOSIS — N13.30 HYDRONEPHROSIS OF LEFT KIDNEY: ICD-10-CM

## 2018-01-17 DIAGNOSIS — R31.9 HEMATURIA, UNSPECIFIED TYPE: Primary | ICD-10-CM

## 2018-01-17 DIAGNOSIS — N32.89 BLADDER MASS: ICD-10-CM

## 2018-01-17 DIAGNOSIS — C67.9 MALIGNANT NEOPLASM OF URINARY BLADDER, UNSPECIFIED SITE: ICD-10-CM

## 2018-01-17 LAB
ALBUMIN SERPL BCP-MCNC: 3.5 G/DL
ALP SERPL-CCNC: 98 U/L
ALT SERPL W/O P-5'-P-CCNC: 12 U/L
ANION GAP SERPL CALC-SCNC: 7 MMOL/L
AST SERPL-CCNC: 21 U/L
BILIRUB SERPL-MCNC: 0.5 MG/DL
BUN SERPL-MCNC: 26 MG/DL
CALCIUM SERPL-MCNC: 9.9 MG/DL
CHLORIDE SERPL-SCNC: 101 MMOL/L
CO2 SERPL-SCNC: 29 MMOL/L
CREAT SERPL-MCNC: 1.2 MG/DL
ERYTHROCYTE [DISTWIDTH] IN BLOOD BY AUTOMATED COUNT: 17.5 %
EST. GFR  (AFRICAN AMERICAN): 47 ML/MIN/1.73 M^2
EST. GFR  (NON AFRICAN AMERICAN): 40.7 ML/MIN/1.73 M^2
GLUCOSE SERPL-MCNC: 133 MG/DL
HCT VFR BLD AUTO: 31.6 %
HGB BLD-MCNC: 9.6 G/DL
IMM GRANULOCYTES # BLD AUTO: 0.03 K/UL
MCH RBC QN AUTO: 24.5 PG
MCHC RBC AUTO-ENTMCNC: 30.4 G/DL
MCV RBC AUTO: 81 FL
NEUTROPHILS # BLD AUTO: 6.5 K/UL
PLATELET # BLD AUTO: 408 K/UL
PMV BLD AUTO: 9.2 FL
POTASSIUM SERPL-SCNC: 4.5 MMOL/L
PROT SERPL-MCNC: 7.6 G/DL
RBC # BLD AUTO: 3.92 M/UL
SODIUM SERPL-SCNC: 137 MMOL/L
WBC # BLD AUTO: 7.49 K/UL

## 2018-01-17 PROCEDURE — 87077 CULTURE AEROBIC IDENTIFY: CPT

## 2018-01-17 PROCEDURE — 99214 OFFICE O/P EST MOD 30 MIN: CPT | Mod: S$GLB,,, | Performed by: UROLOGY

## 2018-01-17 PROCEDURE — 87186 SC STD MICRODIL/AGAR DIL: CPT

## 2018-01-17 PROCEDURE — 99999 PR PBB SHADOW E&M-EST. PATIENT-LVL III: CPT | Mod: PBBFAC,,, | Performed by: UROLOGY

## 2018-01-17 PROCEDURE — 36415 COLL VENOUS BLD VENIPUNCTURE: CPT

## 2018-01-17 PROCEDURE — 80053 COMPREHEN METABOLIC PANEL: CPT

## 2018-01-17 PROCEDURE — 99499 UNLISTED E&M SERVICE: CPT | Mod: S$GLB,,, | Performed by: UROLOGY

## 2018-01-17 PROCEDURE — 87088 URINE BACTERIA CULTURE: CPT

## 2018-01-17 PROCEDURE — 85027 COMPLETE CBC AUTOMATED: CPT

## 2018-01-17 PROCEDURE — 87086 URINE CULTURE/COLONY COUNT: CPT

## 2018-01-17 NOTE — LETTER
January 17, 2018      Sundeep Gibbs MD  [address]  [facility] [address]           [facility] 4th Floor  [address]  [facility] [address]  Phone: [phone]          Patient: Monique Lew   MR Number: [id number]   YOB: 1930   Date of Visit: 1/17/2018       Dear Dr. Sundeep Gibbs:    Thank you for referring Monique Lew to me for evaluation. Attached you will find relevant portions of my assessment and plan of care.    If you have questions, please do not hesitate to call me. I look forward to following Monique Lew along with you.    Sincerely,    Tiffanie Enriquez MD    Enclosure  CC:  No Recipients    If you would like to receive this communication electronically, please contact [email or web address] or [phone] to request more information on [employer] access.    For providers and/or their staff who would like to refer a patient to [facility], please contact us through our one-stop-shop provider referral line, [facility], at [phone].    If you feel you have received this communication in error or would no longer like to receive these types of communications, please e-mail [email or web address]

## 2018-01-17 NOTE — PROGRESS NOTES
Subjective:       Patient ID: Monique Lew is a 87 y.o. female.    Chief Complaint: Follow-up    Monique Lew is a 87 y.o. female with suspected metastatic bladder cancer who had bladder hemorrhage. Sent here for follow up.     Ct chest has pulmonary nodules.   She was treated with palliative radiation to help with bleeding 12/17. She reports she is doing well.   She states since radiation she has been able to urinate more. She thinks the tumor has shrunk. She is interested in chemotherapy. She is not on hospice at this time.    She has a 20 pack year history, quit 30 years ago.  History of UTI, but not recurrent.   2014 workup showed no cancer.     H/H 8/27 12/17 to 9/31 today  GFR 45 12/17    She is losing weight. Lost about 6 pounds. She has a good appetite. She does have SOB with stairs. No further hematuria.     Doing ok. Some eye problems and heart issues. Having mini strokes.     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 hines)  No date: BREAST LUMPECTOMY  No date: BUNIONECTOMY  01/26/2011: CATARACT EXTRACTION W/  INTRAOCULAR LENS IMPLA* Right      Comment: WITH TRAB ()  n/a: CATARACT EXTRACTION W/  INTRAOCULAR LENS IMPLA* Left  01/26/2011: CATARACT EXTRACTION W/ INTRAOCULAR LENS IMPLAN* Right      Comment:   No date: COLON SURGERY       Comment: volvulus  05/28/2009: CONJUNCTIVAL FLAP  Left      Comment: DR. RAMOS  2009: conjuunctival flap       Comment: OS w/ dr. 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 emanuel  01/11/2006: TRABECULECTOMY Left      Comment:   2010: transcleral cyclophotocoagulation       Comment: OS w/ dr. hines    Review of patient's family history indicates:    Emphysema                      Mother                    Hypertension                   Mother                    Hyperlipidemia                 Mother                    Hypertension                   Father                    Hyperlipidemia                 Father                    No Known Problems              Sister                    No Known Problems              Brother                   No Known Problems              Maternal Aunt             No Known Problems              Maternal Uncle            No Known Problems              Paternal Aunt             No Known Problems              Paternal Uncle            No Known Problems              Maternal Grandmother      No Known Problems              Maternal Grandfather      No Known Problems              Paternal Grandmother      No Known Problems              Paternal Grandfather      Melanoma                       Neg Hx                    Psoriasis                      Neg Hx                    Lupus                          Neg Hx                    Eczema                         Neg Hx                    Acne                           Neg Hx                    Amblyopia                      Neg Hx                    Blindness                      Neg Hx                    Cancer                         Neg Hx                    Cataracts                      Neg Hx                    Diabetes                       Neg  Hx                    Glaucoma                       Neg Hx                    Macular degeneration           Neg Hx                    Retinal detachment             Neg Hx                    Strabismus                     Neg Hx                    Stroke                         Neg Hx                    Thyroid disease                Neg Hx                      Social History    Marital status: Single              Spouse name:                       Years of education:                 Number of children:               Occupational History  Occupation          Employer            Comment               Retired                                     Social History Main Topics    Smoking status: Former Smoker                                                                Packs/day: 0.00      Years: 0.00           Quit date: 9/25/1982       Smokeless tobacco: Not on file                       Alcohol use: No              Drug use: No              Sexual activity: No                   Other Topics            Concern  Are you pregnant or th* No  Breast-feeding          No    Social History Narrative    None on file        Allergies:  Cosopt (dorzolamide-timolol); Mevacor (lovastatin); Prednisone; Vasotec (enalapril maleate); Zetia (ezetimibe); and Furosemide    Medications:  Current Outpatient Prescriptions:   amlodipine (NORVASC) 10 MG tablet, Take 1 tablet (10 mg total) by mouth every morning., Disp: 90 tablet, Rfl: 3  aspirin (ECOTRIN) 81 MG EC tablet, Take 81 mg by mouth. 1 Tablet, Delayed Release (E.C.) Oral Every day, Disp: , Rfl:   metoprolol succinate (TOPROL-XL) 25 MG 24 hr tablet, Take 1 tablet (25 mg total) by mouth 2 (two) times daily., Disp: 180 tablet, Rfl: 1  SYNTHROID 50 mcg tablet, TAKE 1 TABLET BY MOUTH DAILY EVERY MORNING, Disp: 90 tablet, Rfl: 1  timolol maleate 0.5% (TIMOPTIC) 0.5 % Drop, Place 1 drop into both eyes 2 (two) times daily., Disp: 10 mL, Rfl: 12  warfarin (COUMADIN) 1 MG tablet, Take  2-3 tablets by mouth Daily., Disp: 192 tablet, Rfl: 7        Hematuria   Irritative symptoms do not include frequency or urgency. Pertinent negatives include no chills, dysuria or fever.     Review of Systems   Constitutional: Positive for unexpected weight change. Negative for chills and fever.   HENT: Negative for nosebleeds.    Eyes: Positive for visual disturbance.   Respiratory: Positive for shortness of breath. Negative for chest tightness.    Cardiovascular: Negative for chest pain.   Gastrointestinal: Negative for diarrhea.   Genitourinary: Negative for dysuria, frequency, hematuria and urgency.   Musculoskeletal: Negative for myalgias.   Skin: Negative for rash.   Neurological: Negative for seizures.        Having mini stroke.   Hematological: Bruises/bleeds easily.   Psychiatric/Behavioral: Negative for behavioral problems.       Objective:      Physical Exam   Constitutional: She is oriented to person, place, and time. She appears well-developed and well-nourished.   HENT:   Head: Normocephalic and atraumatic.   Eyes: No scleral icterus.   Cardiovascular: Normal rate and regular rhythm.    Pulmonary/Chest: Effort normal. No respiratory distress.   Abdominal: Soft. She exhibits no distension and no mass.   Musculoskeletal: She exhibits no edema or tenderness.   Lymphadenopathy:     She has no cervical adenopathy.   Neurological: She is alert and oriented to person, place, and time.   Skin: Skin is warm and dry. No rash noted.     Psychiatric: She has a normal mood and affect.    urine 1+ leuk, 50 blood  Assessment:       1. Hematuria, unspecified type    2. Blindness/low vision    3. Hydronephrosis of left kidney    4. Bladder mass    5. Malignant neoplasm of urinary bladder, unspecified site        Plan:    Get a CT RSS study to evaluate for hydro and size of mass.   New cmp today for liver function and kidney function.   Urine culture.   Has follow up with oncology later this week to discuss plan going  forward.     I spent 25 minutes with the patient of which more than half was spent in direct consultation with the patient in regards to our treatment and plan.

## 2018-01-19 ENCOUNTER — HOSPITAL ENCOUNTER (OUTPATIENT)
Dept: RADIOLOGY | Facility: HOSPITAL | Age: 83
Discharge: HOME | End: 2018-01-19
Attending: UROLOGY
Payer: MEDICARE

## 2018-01-19 DIAGNOSIS — N13.30 HYDRONEPHROSIS OF LEFT KIDNEY: ICD-10-CM

## 2018-01-19 DIAGNOSIS — C67.9 MALIGNANT NEOPLASM OF URINARY BLADDER, UNSPECIFIED SITE: ICD-10-CM

## 2018-01-19 LAB — BACTERIA UR CULT: NORMAL

## 2018-01-19 PROCEDURE — 74176 CT ABD & PELVIS W/O CONTRAST: CPT | Mod: TC

## 2018-01-19 PROCEDURE — 74176 CT ABD & PELVIS W/O CONTRAST: CPT | Mod: 26,,, | Performed by: RADIOLOGY

## 2018-01-22 ENCOUNTER — OFFICE VISIT (OUTPATIENT)
Dept: HEMATOLOGY/ONCOLOGY | Facility: CLINIC | Age: 83
End: 2018-01-22
Payer: MEDICARE

## 2018-01-22 ENCOUNTER — PATIENT MESSAGE (OUTPATIENT)
Dept: UROLOGY | Facility: CLINIC | Age: 83
End: 2018-01-22

## 2018-01-22 VITALS
WEIGHT: 97.69 LBS | RESPIRATION RATE: 20 BRPM | SYSTOLIC BLOOD PRESSURE: 216 MMHG | BODY MASS INDEX: 19.73 KG/M2 | OXYGEN SATURATION: 96 % | HEART RATE: 82 BPM | DIASTOLIC BLOOD PRESSURE: 111 MMHG | TEMPERATURE: 97 F

## 2018-01-22 DIAGNOSIS — R31.9 HEMATURIA, UNSPECIFIED TYPE: ICD-10-CM

## 2018-01-22 DIAGNOSIS — I48.20 CHRONIC ATRIAL FIBRILLATION: ICD-10-CM

## 2018-01-22 DIAGNOSIS — C67.9 MALIGNANT NEOPLASM OF URINARY BLADDER, UNSPECIFIED SITE: Primary | ICD-10-CM

## 2018-01-22 DIAGNOSIS — D89.89 OTHER SPECIFIED DISORDERS INVOLVING THE IMMUNE MECHANISM, NOT ELSEWHERE CLASSIFIED: ICD-10-CM

## 2018-01-22 PROBLEM — I48.91 ATRIAL FIBRILLATION: Status: ACTIVE | Noted: 2018-01-22

## 2018-01-22 PROCEDURE — 99999 PR PBB SHADOW E&M-EST. PATIENT-LVL III: CPT | Mod: PBBFAC,GC,,

## 2018-01-22 PROCEDURE — 99214 OFFICE O/P EST MOD 30 MIN: CPT | Mod: GC,S$GLB,, | Performed by: INTERNAL MEDICINE

## 2018-01-22 RX ORDER — AMOXICILLIN AND CLAVULANATE POTASSIUM 875; 125 MG/1; MG/1
1 TABLET, FILM COATED ORAL 2 TIMES DAILY
Qty: 10 TABLET | Refills: 0 | Status: SHIPPED | OUTPATIENT
Start: 2018-01-22 | End: 2018-01-27

## 2018-01-22 RX ORDER — LEVOTHYROXINE SODIUM 50 UG/1
TABLET ORAL
Qty: 90 TABLET | Refills: 0 | Status: SHIPPED | OUTPATIENT
Start: 2018-01-22

## 2018-01-22 NOTE — Clinical Note
Roberto Lanier.  The patient will need a CT of the chest and CT of the abdomen scheduled ASAP. The patient has been consented for pembrolizumab and will need to be started on treatment 1/29/18. The patient will need a CBC, CMP, TSH on  on 1/29/18 prior to starting pembrolizumab. The patient will need a return clinic visit on 2/19/18 with CBC, and CMP prior to the visit. She will need to be scheduled for pembrolizumab that day as well.  Thanks.

## 2018-01-22 NOTE — PROGRESS NOTES
PATIENT: Monique Lew  MRN: [id number]  DATE: 1/22/2018      Diagnosis:   1. Malignant neoplasm of urinary bladder, unspecified site    2. Hematuria, unspecified type    3. 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 Upon admisson the patient underwent CT of the abdomen on 11/11/17 showing a left sided bladder mass measuring 6.3 x 4.3 x 4.0 cm. Ct chest performed on 11/12/17 showed bilateral pulmonary nodules with the largest nodular opacity in the right upper lobe measures approximately 1.6 cm. 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    Subjective:    Initial History: Ms. Lew is a 87 y.o. female who presents to the clinic for follow up of bladder cancer.  Since the last clinic visit the patient has acquired home health with [facility].  She states she continues to be able to complete all of her ADL's without difficulty.  She states that she would like to undergo treatment for her bladder cancer and is not interested in hospice at this time.  She denies hematuria.  She complains of some persistent SOB which has persisted since recent hospitalization.  She denies N/V, constipation , diarrhea.    Past Medical History:   Past Medical History:   Diagnosis Date    Acquired hypothyroidism      Adrenal adenoma     Amenorrhea 12/28/2015    Anxiety 8/28/2017    Asymptomatic carotid artery stenosis without infarction 4/29/2015    Bladder mass 11/12/2017    6.3 x 4.3 x 4.0 cm left sided enhancing bladder mass.   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    Chronic atrial fibrillation 8/9/2012    Chronic hyponatremia 6/29/2015    Corneal thinning - Left Eye 9/17/2012    Depression (emotion) 8/28/2017    Dislocated IOL (intraocular lens), posterior - Right Eye 10/19/2012    DVT (deep venous thrombosis)     Essential hypertension 7/23/2014    Goiter     Gross hematuria 11/12/2017    History of cornea transplant - Left Eye 9/17/2012    History of CVA (cerebrovascular accident) 6/12/2013    Hydronephrosis of left kidney 11/12/2017    Hypercholesteremia 8/25/2016    Long-term (current) use of anticoagulants     Stopped due to gross hematuria from bladder CA     Macular hole of right eye     Malignant neoplasm of urinary bladder 11/17/2017    - 11/13 OR for cystoscopy, TURBT, and pyelogram; stent could not be placed as urethral orifice could not be visualized.  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 Bilateral nodules found on CT Chest have high likelihood of being metastases.  - Oncology consulted to discuss treatment options. Pt would like to pursue immunotherapy.    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 hines)    BREAST LUMPECTOMY      BUNIONECTOMY       CATARACT EXTRACTION W/  INTRAOCULAR LENS IMPLANT Right 01/26/2011    WITH TRAB ()    CATARACT EXTRACTION W/  INTRAOCULAR LENS IMPLANT Left 04/09/2009    ANT. VIITRECTOMY WITH SUTURED PCIOL (DR. RYAN)    CATARACT EXTRACTION W/ INTRAOCULAR LENS IMPLANTW/ TRABECULECTOMY Right 01/26/2011        COLON SURGERY      volvulus    CONJUNCTIVAL FLAP  Left 05/28/2009    DR. JARAMILLO    conjuunctival flap   2009    OS w/ dr. 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 emanuel    TRABECULECTOMY Left 01/11/2006        transcleral cyclophotocoagulation   2010    OS w/ dr. hines       Family History:   Family History   Problem Relation Age of Onset    Emphysema Mother     Hypertension Mother     Hyperlipidemia Mother     Hypertension Father     Hyperlipidemia Father     No Known Problems Sister     No Known Problems Brother     No Known Problems Maternal Aunt     No Known Problems Maternal Uncle     No Known Problems Paternal Aunt     No Known Problems Paternal Uncle     No Known Problems Maternal Grandmother     No Known Problems Maternal Grandfather     No Known Problems Paternal Grandmother     No Known Problems Paternal Grandfather     Melanoma Neg Hx     Psoriasis Neg Hx     Lupus Neg Hx     Eczema Neg Hx     Acne Neg Hx     Amblyopia Neg Hx     Blindness Neg Hx     Cancer Neg Hx     Cataracts Neg Hx     Diabetes Neg Hx     Glaucoma Neg Hx     Macular degeneration Neg Hx     Retinal detachment Neg Hx     Strabismus Neg Hx     Stroke Neg Hx     Thyroid disease Neg Hx        Social History:  reports that she quit smoking about 35 years ago. She has never used smokeless tobacco. She reports that she does not drink alcohol or use drugs.    Allergies:  Review of patient's allergies indicates:    Allergen Reactions    Cosopt [dorzolamide-timolol] Swelling     Swelling of eyelids    Mevacor [lovastatin] Nausea And Vomiting    Prednisone Nausea And Vomiting    Vasotec [enalapril maleate] Nausea And Vomiting    Zetia [ezetimibe] Nausea And Vomiting    Furosemide Palpitations     Pt states her heart skips beats when she takes this medication.       Medications:  Current Outpatient Prescriptions   Medication Sig Dispense Refill    amoxicillin-clavulanate 875-125mg (AUGMENTIN) 875-125 mg per tablet Take 1 tablet by mouth 2 (two) times daily. 10 tablet 0    metoprolol succinate (TOPROL-XL) 25 MG 24 hr tablet Take 1 tablet (25 mg total) by mouth 2 (two) times daily. 180 tablet 1    PROPYLENE GLYCOL//PF (SYSTANE, PF, OPHT) Place 1 drop into both eyes daily as needed. for dry eyes      timolol maleate 0.5% (TIMOPTIC) 0.5 % Drop Place 1 drop into both eyes 2 (two) times daily. 10 mL 12    SYNTHROID 50 mcg tablet TAKE 1 TABLET BY MOUTH DAILY EVERY MORNING 90 tablet 0     No current facility-administered medications for this visit.      Review of Systems   Constitutional: Negative for chills, fever and weight loss.   Respiratory: Positive for shortness of breath. Negative for cough and sputum production.    Cardiovascular: Negative for chest pain and leg swelling.   Gastrointestinal: Negative for abdominal pain, constipation, diarrhea, nausea and vomiting.   Genitourinary: Negative for dysuria, hematuria and urgency.   Neurological: Negative for weakness and headaches.     ECOG Performance Status: 3   Objective:      Vitals:   Vitals:    01/22/18 1622   BP: (!) 216/111   BP Location: Right arm   Patient Position: Sitting   Pulse: 82   Resp: 20   Temp: 97.4 °F (36.3 °C)   TempSrc: Oral   SpO2: 96%   Weight: 44.3 kg (97 lb 10.6 oz)     BMI: Body mass index is 19.73 kg/m².    Physical Exam   Constitutional: She is oriented to person, place, and time. No distress.   Thin, cachectic   HENT:   Head:  Normocephalic and atraumatic.   Mouth/Throat: No oropharyngeal exudate.   Eyes: EOM are normal. Right eye exhibits no discharge. Left eye exhibits no discharge. No scleral icterus.   Cardiovascular: Normal rate, regular rhythm, normal heart sounds and intact distal pulses.  Exam reveals no gallop and no friction rub.    No murmur heard.  Pulmonary/Chest: Effort normal and breath sounds normal. No respiratory distress. She has no wheezes. She has no rales. She exhibits no tenderness.   Abdominal: Soft. Bowel sounds are normal. She exhibits no distension and no mass. There is no tenderness. There is no rebound and no guarding.   Musculoskeletal: Normal range of motion. She exhibits no edema or tenderness.   Lymphadenopathy:        Head (right side): No submental and no submandibular adenopathy present.        Head (left side): No submental and no submandibular adenopathy present.     She has no cervical adenopathy.     She has no axillary adenopathy.        Right: No inguinal and no supraclavicular adenopathy present.        Left: No inguinal and no supraclavicular adenopathy present.   Neurological: She is alert and oriented to person, place, and time.   Skin: No rash noted. She is not diaphoretic. No erythema.   Psychiatric: She has a normal mood and affect. Her behavior is normal.         Laboratory Data:  Lab Visit on 01/17/2018   Component Date Value Ref Range Status    WBC 01/17/2018 7.49  3.90 - 12.70 K/uL Final    RBC 01/17/2018 3.92* 4.00 - 5.40 M/uL Final    Hemoglobin 01/17/2018 9.6* 12.0 - 16.0 g/dL Final    Hematocrit 01/17/2018 31.6* 37.0 - 48.5 % Final    MCV 01/17/2018 81* 82 - 98 fL Final    MCH 01/17/2018 24.5* 27.0 - 31.0 pg Final    MCHC 01/17/2018 30.4* 32.0 - 36.0 g/dL Final    RDW 01/17/2018 17.5* 11.5 - 14.5 % Final    Platelets 01/17/2018 408* 150 - 350 K/uL Final    MPV 01/17/2018 9.2  9.2 - 12.9 fL Final    Gran # 01/17/2018 6.5  1.8 - 7.7 K/uL Final    Comment: The ANC is based  on a white cell differential from an   automated cell counter. It has not been microscopically   reviewed for the presence of abnormal cells. Clinical   correlation is required.      Immature Grans (Abs) 01/17/2018 0.03  0.00 - 0.04 K/uL Final    Sodium 01/17/2018 137  136 - 145 mmol/L Final    Potassium 01/17/2018 4.5  3.5 - 5.1 mmol/L Final    Chloride 01/17/2018 101  95 - 110 mmol/L Final    CO2 01/17/2018 29  23 - 29 mmol/L Final    Glucose 01/17/2018 133* 70 - 110 mg/dL Final    BUN, Bld 01/17/2018 26* 8 - 23 mg/dL Final    Creatinine 01/17/2018 1.2  0.5 - 1.4 mg/dL Final    Calcium 01/17/2018 9.9  8.7 - 10.5 mg/dL Final    Total Protein 01/17/2018 7.6  6.0 - 8.4 g/dL Final    Albumin 01/17/2018 3.5  3.5 - 5.2 g/dL Final    Total Bilirubin 01/17/2018 0.5  0.1 - 1.0 mg/dL Final    Comment: For infants and newborns, interpretation of results should be based  on gestational age, weight and in agreement with clinical  observations.  Premature Infant recommended reference ranges:  Up to 24 hours.............<8.0 mg/dL  Up to 48 hours............<12.0 mg/dL  3-5 days..................<15.0 mg/dL  6-29 days.................<15.0 mg/dL      Alkaline Phosphatase 01/17/2018 98  55 - 135 U/L Final    AST 01/17/2018 21  10 - 40 U/L Final    ALT 01/17/2018 12  10 - 44 U/L Final    Anion Gap 01/17/2018 7* 8 - 16 mmol/L Final    eGFR if  01/17/2018 47.0* >60 mL/min/1.73 m^2 Final    eGFR if non  01/17/2018 40.7* >60 mL/min/1.73 m^2 Final    Comment: Calculation used to obtain the estimated glomerular filtration  rate (eGFR) is the CKD-EPI equation.      Office Visit on 01/17/2018   Component Date Value Ref Range Status    Urine Culture, Routine 01/17/2018    Final                    Value:KLEBSIELLA PNEUMONIAE  >100,000 cfu/ml           Imaging: Imaging Reviewed    Assessment:       1. Malignant neoplasm of urinary bladder, unspecified site    2. Hematuria, unspecified type     3. Chronic atrial fibrillation         Plan:     Metastatic Bladder Cancer - The patient has an extremely poor performance status and multiple medical comorbidities  -The patient would like to pursue treatment with immunotherapy with pembrolizumab  -Patient was consented for chemotherapy today 1/22/2018 for pembrolizumab.   An extensive discussion was had which included a thorough discussion of the risk and benefits of treatment and alternatives.  Risks, including but not limited to, possible hair loss, bone marrow damage (anemia, thrombocytopenia, immune suppression, neutropenia), damage to body organs (brain, heart, liver, kidney, lungs, nervous system, skin, and others), allergic reactions, sterility, nausea/vomiting, constipation/diarrhea, sores in the mouth, secondary cancers, local damage at possible injection sites, and rarely death were all discussed.  The patient agrees with the plan, and all questions have been answered to their satisfaction.  Consent was signed the patient, provider, and a third party witness.    -The patient will need repeat imaging of the chest to assess full extent of disease.  -Will have the patient start treatment as soon as the imaging is completed.    Hematuria - will recheck UA for signs of blood     Afib - the patient is not currently on warfarin for a fib due to history of bleeding  -Will recheck UA for signs of hematuria and refer the patient back to cardiology.    Herberth Alfaro MD PGY-IV  Hematology and Oncology  Pager:[phone]    Distress Screening Results: Psychosocial Distress screening score of Distress Score: 5 noted and reviewed. No intervention indicated.    Attending Note  Case discussed and I agree with the fellow's note as stated above.

## 2018-01-23 ENCOUNTER — TELEPHONE (OUTPATIENT)
Dept: HEMATOLOGY/ONCOLOGY | Facility: CLINIC | Age: 83
End: 2018-01-23

## 2018-01-23 RX ORDER — METOPROLOL SUCCINATE 25 MG/1
25 TABLET, EXTENDED RELEASE ORAL 2 TIMES DAILY
Qty: 180 TABLET | Refills: 2 | Status: SHIPPED | OUTPATIENT
Start: 2018-01-23

## 2018-01-23 NOTE — TELEPHONE ENCOUNTER
----- Message from Hali Lopez sent at 1/23/2018  9:12 AM CST -----  Contact: patient  Please call pt at [phone] if any questions. Refill needed for Metoprolol 25 mg called into [facility] at [phone]. Only 5 day supply left. Dr Mills pt    Thank you

## 2018-01-23 NOTE — TELEPHONE ENCOUNTER
----- Message from Herberth Alfaro MD sent at 1/22/2018  8:38 PM CST -----  Roberto Lanier.    The patient will need a CT of the chest and CT of the abdomen scheduled ASAP.  The patient has been consented for pembrolizumab and will need to be started on treatment 1/29/18.  The patient will need a CBC, CMP, TSH on  on 1/29/18 prior to starting pembrolizumab.  The patient will need a return clinic visit on 2/19/18 with CBC, and CMP prior to the visit. She will need to be scheduled for pembrolizumab that day as well.    Thanks.

## 2018-01-24 ENCOUNTER — HOSPITAL ENCOUNTER (OUTPATIENT)
Dept: RADIOLOGY | Facility: HOSPITAL | Age: 83
Discharge: HOME | End: 2018-01-24
Attending: INTERNAL MEDICINE
Payer: MEDICARE

## 2018-01-24 DIAGNOSIS — C67.9 MALIGNANT NEOPLASM OF URINARY BLADDER, UNSPECIFIED SITE: ICD-10-CM

## 2018-01-24 PROCEDURE — 74178 CT ABD&PLV WO CNTR FLWD CNTR: CPT | Mod: 26,,, | Performed by: RADIOLOGY

## 2018-01-24 PROCEDURE — 74178 CT ABD&PLV WO CNTR FLWD CNTR: CPT | Mod: TC

## 2018-01-24 PROCEDURE — 71250 CT THORAX DX C-: CPT | Mod: TC

## 2018-01-24 PROCEDURE — 25500020 PHARM REV CODE 255: Performed by: INTERNAL MEDICINE

## 2018-01-24 PROCEDURE — 71250 CT THORAX DX C-: CPT | Mod: 26,,, | Performed by: RADIOLOGY

## 2018-01-24 RX ADMIN — IOHEXOL 30 ML: 350 INJECTION, SOLUTION INTRAVENOUS at 12:01

## 2018-01-24 RX ADMIN — IOHEXOL 75 ML: 350 INJECTION, SOLUTION INTRAVENOUS at 02:01

## 2018-01-25 ENCOUNTER — TELEPHONE (OUTPATIENT)
Dept: HEMATOLOGY/ONCOLOGY | Facility: CLINIC | Age: 83
End: 2018-01-25

## 2018-01-25 NOTE — TELEPHONE ENCOUNTER
I called the patient and let her know that there was blood in her urine and that we would recommend continuing to hold the coumadin.      Herberth Alfaro MD PGY-V  Hematology and Oncology  Pager:[phone]

## 2018-01-29 ENCOUNTER — INFUSION (OUTPATIENT)
Dept: INFUSION THERAPY | Facility: HOSPITAL | Age: 83
End: 2018-01-29
Attending: INTERNAL MEDICINE
Payer: MEDICARE

## 2018-01-29 VITALS
HEART RATE: 73 BPM | SYSTOLIC BLOOD PRESSURE: 171 MMHG | TEMPERATURE: 97 F | RESPIRATION RATE: 20 BRPM | DIASTOLIC BLOOD PRESSURE: 107 MMHG

## 2018-01-29 DIAGNOSIS — C67.9 MALIGNANT NEOPLASM OF URINARY BLADDER, UNSPECIFIED SITE: Primary | ICD-10-CM

## 2018-01-29 PROCEDURE — 63600175 PHARM REV CODE 636 W HCPCS: Mod: JG | Performed by: INTERNAL MEDICINE

## 2018-01-29 PROCEDURE — 25000003 PHARM REV CODE 250: Performed by: INTERNAL MEDICINE

## 2018-01-29 PROCEDURE — 96413 CHEMO IV INFUSION 1 HR: CPT

## 2018-01-29 RX ORDER — SODIUM CHLORIDE 0.9 % (FLUSH) 0.9 %
10 SYRINGE (ML) INJECTION
Status: CANCELLED | OUTPATIENT
Start: 2018-01-29

## 2018-01-29 RX ORDER — HEPARIN 100 UNIT/ML
500 SYRINGE INTRAVENOUS
Status: CANCELLED | OUTPATIENT
Start: 2018-01-29

## 2018-01-29 RX ADMIN — SODIUM CHLORIDE 200 MG: 9 INJECTION, SOLUTION INTRAVENOUS at 04:01

## 2018-01-29 RX ADMIN — SODIUM CHLORIDE: 900 INJECTION, SOLUTION INTRAVENOUS at 04:01

## 2018-01-30 NOTE — PLAN OF CARE
Problem: Patient Care Overview  Goal: Plan of Care Review  Outcome: Ongoing (interventions implemented as appropriate)  Pt tolerated Keytruda with no complications. Pt instructed to call MD with any problems. AVS given to patient and patient discharged home.

## 2018-02-01 ENCOUNTER — TELEPHONE (OUTPATIENT)
Dept: UROLOGY | Facility: CLINIC | Age: 83
End: 2018-02-01

## 2018-02-01 DIAGNOSIS — R30.0 DYSURIA: ICD-10-CM

## 2018-02-01 DIAGNOSIS — N30.00 ACUTE CYSTITIS WITHOUT HEMATURIA: Primary | ICD-10-CM

## 2018-02-01 RX ORDER — AMOXICILLIN AND CLAVULANATE POTASSIUM 875; 125 MG/1; MG/1
1 TABLET, FILM COATED ORAL 2 TIMES DAILY
Qty: 10 TABLET | Refills: 0 | Status: SHIPPED | OUTPATIENT
Start: 2018-02-01 | End: 2018-02-06

## 2018-02-01 NOTE — TELEPHONE ENCOUNTER
She completed Augmentin 10 days BID on Monday. She was doing well then yesterday she started to have dysuria and urgency. Explained to complete for 5 more days and f/u with me or Dr. Enriquez.       ----- Message from Arely Prealta LPN sent at 2/1/2018  1:35 PM CST -----  Contact: Self- [phone]      ----- Message -----  From: Toya Dailey  Sent: 2/1/2018   1:27 PM  To: Mina RANKIN Staff    Mina- pt called to speak with staff- still suffering with her infection- burning sensation- wanted to know if there's something else she can prescribed to help- please call pt back at [phone]

## 2018-02-06 ENCOUNTER — TELEPHONE (OUTPATIENT)
Dept: HEMATOLOGY/ONCOLOGY | Facility: CLINIC | Age: 83
End: 2018-02-06

## 2018-02-06 NOTE — TELEPHONE ENCOUNTER
----- Message from Aby Ware sent at 2/6/2018  9:45 AM CST -----  Contact: Pt  Pt calling regarding lab appt on 2/19. She would like to move it to [facility] on 2/16.        Pt call back number [phone]

## 2018-02-07 ENCOUNTER — ANTI-COAG VISIT (OUTPATIENT)
Dept: CARDIOLOGY | Facility: CLINIC | Age: 83
End: 2018-02-07

## 2018-02-07 ENCOUNTER — TELEPHONE (OUTPATIENT)
Dept: UROLOGY | Facility: CLINIC | Age: 83
End: 2018-02-07

## 2018-02-07 DIAGNOSIS — I48.20 CHRONIC ATRIAL FIBRILLATION: ICD-10-CM

## 2018-02-07 NOTE — TELEPHONE ENCOUNTER
----- Message from Dianna Amaya MA sent at 2/7/2018 11:20 AM CST -----  Contact: self  [phone]  States she completed the medication Tuesday, 2-6-18 and is calling as asked to let you know how it worked out.

## 2018-02-07 NOTE — PROGRESS NOTES
Per discharge note on 11/29/2017, the pt's warfarin has been stopped due to hematuria.  Warfarin has been removed from her med card.  Discharged from clinic.

## 2018-02-07 NOTE — TELEPHONE ENCOUNTER
Spoke with pt. She states she is ding better . Follow up given per Sissy Haro's phone note. Patient verbalized understanding.

## 2018-02-19 ENCOUNTER — TELEPHONE (OUTPATIENT)
Dept: ELECTROPHYSIOLOGY | Facility: CLINIC | Age: 83
End: 2018-02-19

## 2018-02-19 ENCOUNTER — HOSPITAL ENCOUNTER (INPATIENT)
Facility: HOSPITAL | Age: 83
LOS: 1 days | Discharge: HOSPICE | DRG: 064 | End: 2018-02-20
Attending: EMERGENCY MEDICINE | Admitting: PSYCHIATRY & NEUROLOGY
Payer: MEDICARE

## 2018-02-19 ENCOUNTER — OFFICE VISIT (OUTPATIENT)
Dept: HEMATOLOGY/ONCOLOGY | Facility: CLINIC | Age: 83
DRG: 064 | End: 2018-02-19
Payer: MEDICARE

## 2018-02-19 VITALS
BODY MASS INDEX: 19.37 KG/M2 | OXYGEN SATURATION: 95 % | HEART RATE: 84 BPM | WEIGHT: 95.88 LBS | RESPIRATION RATE: 20 BRPM | DIASTOLIC BLOOD PRESSURE: 115 MMHG | TEMPERATURE: 98 F | SYSTOLIC BLOOD PRESSURE: 201 MMHG

## 2018-02-19 DIAGNOSIS — I48.20 CHRONIC ATRIAL FIBRILLATION: ICD-10-CM

## 2018-02-19 DIAGNOSIS — I63.9 STROKE: ICD-10-CM

## 2018-02-19 DIAGNOSIS — R93.0 OPACIFICATION OF MAXILLARY SINUS: ICD-10-CM

## 2018-02-19 DIAGNOSIS — I63.412 CEREBROVASCULAR ACCIDENT (CVA) DUE TO EMBOLISM OF LEFT MIDDLE CEREBRAL ARTERY: ICD-10-CM

## 2018-02-19 DIAGNOSIS — Z86.73 HISTORY OF CVA (CEREBROVASCULAR ACCIDENT): ICD-10-CM

## 2018-02-19 DIAGNOSIS — C67.9 MALIGNANT NEOPLASM OF URINARY BLADDER, UNSPECIFIED SITE: ICD-10-CM

## 2018-02-19 DIAGNOSIS — R41.82 ALTERED MENTAL STATUS: ICD-10-CM

## 2018-02-19 DIAGNOSIS — H40.1493 PSEUDOEXFOLIATION (PXF) GLAUCOMA, SEVERE STAGE: ICD-10-CM

## 2018-02-19 DIAGNOSIS — C79.11 METASTATIC ADENOCARCINOMA TO BLADDER: Primary | ICD-10-CM

## 2018-02-19 PROBLEM — G93.6 CYTOTOXIC CEREBRAL EDEMA: Status: ACTIVE | Noted: 2018-02-19

## 2018-02-19 PROBLEM — H57.11 ACUTE RIGHT EYE PAIN: Status: ACTIVE | Noted: 2018-02-19

## 2018-02-19 LAB
ALBUMIN SERPL BCP-MCNC: 3.6 G/DL
ALP SERPL-CCNC: 91 U/L
ALT SERPL W/O P-5'-P-CCNC: 22 U/L
ANION GAP SERPL CALC-SCNC: 10 MMOL/L
AST SERPL-CCNC: 34 U/L
BACTERIA #/AREA URNS AUTO: ABNORMAL /HPF
BASOPHILS # BLD AUTO: 0.04 K/UL
BASOPHILS NFR BLD: 0.6 %
BILIRUB SERPL-MCNC: 0.5 MG/DL
BILIRUB UR QL STRIP: NEGATIVE
BUN SERPL-MCNC: 28 MG/DL
CALCIUM SERPL-MCNC: 9.9 MG/DL
CHLORIDE SERPL-SCNC: 104 MMOL/L
CHOLEST SERPL-MCNC: 162 MG/DL
CHOLEST/HDLC SERPL: 3 {RATIO}
CLARITY UR REFRACT.AUTO: ABNORMAL
CO2 SERPL-SCNC: 27 MMOL/L
COLOR UR AUTO: YELLOW
CREAT SERPL-MCNC: 1 MG/DL (ref 0.5–1.4)
CREAT SERPL-MCNC: 1.2 MG/DL
DIFFERENTIAL METHOD: ABNORMAL
EOSINOPHIL # BLD AUTO: 0.1 K/UL
EOSINOPHIL NFR BLD: 0.9 %
ERYTHROCYTE [DISTWIDTH] IN BLOOD BY AUTOMATED COUNT: 18.3 %
EST. GFR  (AFRICAN AMERICAN): 47 ML/MIN/1.73 M^2
EST. GFR  (NON AFRICAN AMERICAN): 40.7 ML/MIN/1.73 M^2
GLUCOSE SERPL-MCNC: 121 MG/DL
GLUCOSE UR QL STRIP: NEGATIVE
HCT VFR BLD AUTO: 38.2 %
HDLC SERPL-MCNC: 54 MG/DL
HDLC SERPL: 33.3 %
HGB BLD-MCNC: 11.7 G/DL
HGB UR QL STRIP: NEGATIVE
IMM GRANULOCYTES # BLD AUTO: 0.03 K/UL
IMM GRANULOCYTES NFR BLD AUTO: 0.5 %
INR PPP: 1.1
KETONES UR QL STRIP: NEGATIVE
LDLC SERPL CALC-MCNC: 89.4 MG/DL
LEUKOCYTE ESTERASE UR QL STRIP: ABNORMAL
LYMPHOCYTES # BLD AUTO: 0.5 K/UL
LYMPHOCYTES NFR BLD: 8.2 %
MCH RBC QN AUTO: 24.8 PG
MCHC RBC AUTO-ENTMCNC: 30.6 G/DL
MCV RBC AUTO: 81 FL
MICROSCOPIC COMMENT: ABNORMAL
MONOCYTES # BLD AUTO: 0.6 K/UL
MONOCYTES NFR BLD: 9.9 %
NEUTROPHILS # BLD AUTO: 5.2 K/UL
NEUTROPHILS NFR BLD: 79.9 %
NITRITE UR QL STRIP: NEGATIVE
NONHDLC SERPL-MCNC: 108 MG/DL
NRBC BLD-RTO: 0 /100 WBC
PH UR STRIP: 8 [PH] (ref 5–8)
PLATELET # BLD AUTO: 253 K/UL
PMV BLD AUTO: 10.7 FL
POC PTINR: 1.1 (ref 0.9–1.2)
POC PTWBT: 13.4 SEC (ref 9.7–14.3)
POCT GLUCOSE: 134 MG/DL (ref 70–110)
POTASSIUM SERPL-SCNC: 4.2 MMOL/L
PROT SERPL-MCNC: 7.3 G/DL
PROT UR QL STRIP: NEGATIVE
PROTHROMBIN TIME: 11.1 SEC
RBC # BLD AUTO: 4.71 M/UL
RBC #/AREA URNS AUTO: 11 /HPF (ref 0–4)
SAMPLE: NORMAL
SAMPLE: NORMAL
SODIUM SERPL-SCNC: 141 MMOL/L
SP GR UR STRIP: 1 (ref 1–1.03)
SQUAMOUS #/AREA URNS AUTO: 0 /HPF
TRIGL SERPL-MCNC: 93 MG/DL
TSH SERPL DL<=0.005 MIU/L-ACNC: 2.06 UIU/ML
URN SPEC COLLECT METH UR: ABNORMAL
UROBILINOGEN UR STRIP-ACNC: NEGATIVE EU/DL
WBC # BLD AUTO: 6.45 K/UL
WBC #/AREA URNS AUTO: 10 /HPF (ref 0–5)
YEAST UR QL AUTO: ABNORMAL

## 2018-02-19 PROCEDURE — 99223 1ST HOSP IP/OBS HIGH 75: CPT | Mod: AI,GC,, | Performed by: PSYCHIATRY & NEUROLOGY

## 2018-02-19 PROCEDURE — 25000003 PHARM REV CODE 250: Performed by: EMERGENCY MEDICINE

## 2018-02-19 PROCEDURE — 3008F BODY MASS INDEX DOCD: CPT | Mod: GC,S$GLB,, | Performed by: INTERNAL MEDICINE

## 2018-02-19 PROCEDURE — 1159F MED LIST DOCD IN RCRD: CPT | Mod: GC,S$GLB,, | Performed by: INTERNAL MEDICINE

## 2018-02-19 PROCEDURE — 80053 COMPREHEN METABOLIC PANEL: CPT

## 2018-02-19 PROCEDURE — 93005 ELECTROCARDIOGRAM TRACING: CPT

## 2018-02-19 PROCEDURE — 85610 PROTHROMBIN TIME: CPT

## 2018-02-19 PROCEDURE — 25000003 PHARM REV CODE 250: Performed by: PHYSICIAN ASSISTANT

## 2018-02-19 PROCEDURE — 80061 LIPID PANEL: CPT

## 2018-02-19 PROCEDURE — 96374 THER/PROPH/DIAG INJ IV PUSH: CPT

## 2018-02-19 PROCEDURE — 99285 EMERGENCY DEPT VISIT HI MDM: CPT | Mod: ,,, | Performed by: EMERGENCY MEDICINE

## 2018-02-19 PROCEDURE — 12000002 HC ACUTE/MED SURGE SEMI-PRIVATE ROOM

## 2018-02-19 PROCEDURE — 63600175 PHARM REV CODE 636 W HCPCS: Performed by: PHYSICIAN ASSISTANT

## 2018-02-19 PROCEDURE — 81001 URINALYSIS AUTO W/SCOPE: CPT

## 2018-02-19 PROCEDURE — 84443 ASSAY THYROID STIM HORMONE: CPT

## 2018-02-19 PROCEDURE — 96372 THER/PROPH/DIAG INJ SC/IM: CPT

## 2018-02-19 PROCEDURE — 99999 PR PBB SHADOW E&M-EST. PATIENT-LVL III: CPT | Mod: PBBFAC,GC,,

## 2018-02-19 PROCEDURE — 99214 OFFICE O/P EST MOD 30 MIN: CPT | Mod: GC,S$GLB,, | Performed by: INTERNAL MEDICINE

## 2018-02-19 PROCEDURE — A4216 STERILE WATER/SALINE, 10 ML: HCPCS | Performed by: PHYSICIAN ASSISTANT

## 2018-02-19 PROCEDURE — 99285 EMERGENCY DEPT VISIT HI MDM: CPT | Mod: 25

## 2018-02-19 PROCEDURE — 1125F AMNT PAIN NOTED PAIN PRSNT: CPT | Mod: GC,S$GLB,, | Performed by: INTERNAL MEDICINE

## 2018-02-19 PROCEDURE — 83036 HEMOGLOBIN GLYCOSYLATED A1C: CPT

## 2018-02-19 PROCEDURE — 85025 COMPLETE CBC W/AUTO DIFF WBC: CPT

## 2018-02-19 PROCEDURE — 82565 ASSAY OF CREATININE: CPT

## 2018-02-19 PROCEDURE — 93010 ELECTROCARDIOGRAM REPORT: CPT | Mod: ,,, | Performed by: INTERNAL MEDICINE

## 2018-02-19 PROCEDURE — 82962 GLUCOSE BLOOD TEST: CPT

## 2018-02-19 RX ORDER — RAMELTEON 8 MG/1
8 TABLET ORAL NIGHTLY PRN
Status: DISCONTINUED | OUTPATIENT
Start: 2018-02-19 | End: 2018-02-20 | Stop reason: HOSPADM

## 2018-02-19 RX ORDER — PROPARACAINE HYDROCHLORIDE 5 MG/ML
1 SOLUTION/ DROPS OPHTHALMIC
Status: COMPLETED | OUTPATIENT
Start: 2018-02-19 | End: 2018-02-19

## 2018-02-19 RX ORDER — METOPROLOL SUCCINATE 25 MG/1
25 TABLET, EXTENDED RELEASE ORAL DAILY
Status: DISCONTINUED | OUTPATIENT
Start: 2018-02-19 | End: 2018-02-19

## 2018-02-19 RX ORDER — HEPARIN SODIUM 5000 [USP'U]/ML
5000 INJECTION, SOLUTION INTRAVENOUS; SUBCUTANEOUS EVERY 8 HOURS
Status: DISCONTINUED | OUTPATIENT
Start: 2018-02-19 | End: 2018-02-20 | Stop reason: HOSPADM

## 2018-02-19 RX ORDER — ASPIRIN 81 MG/1
81 TABLET ORAL DAILY
Status: DISCONTINUED | OUTPATIENT
Start: 2018-02-19 | End: 2018-02-20 | Stop reason: HOSPADM

## 2018-02-19 RX ORDER — HYDRALAZINE HYDROCHLORIDE 20 MG/ML
10 INJECTION INTRAMUSCULAR; INTRAVENOUS ONCE
Status: DISCONTINUED | OUTPATIENT
Start: 2018-02-19 | End: 2018-02-19

## 2018-02-19 RX ORDER — SODIUM CHLORIDE 0.9 % (FLUSH) 0.9 %
3 SYRINGE (ML) INJECTION EVERY 8 HOURS
Status: DISCONTINUED | OUTPATIENT
Start: 2018-02-19 | End: 2018-02-20 | Stop reason: HOSPADM

## 2018-02-19 RX ORDER — LEVOTHYROXINE SODIUM 50 UG/1
50 TABLET ORAL
Status: DISCONTINUED | OUTPATIENT
Start: 2018-02-20 | End: 2018-02-20 | Stop reason: HOSPADM

## 2018-02-19 RX ORDER — METOPROLOL SUCCINATE 25 MG/1
25 TABLET, EXTENDED RELEASE ORAL 2 TIMES DAILY
Status: DISCONTINUED | OUTPATIENT
Start: 2018-02-19 | End: 2018-02-20 | Stop reason: HOSPADM

## 2018-02-19 RX ORDER — LABETALOL HYDROCHLORIDE 5 MG/ML
10 INJECTION, SOLUTION INTRAVENOUS
Status: DISCONTINUED | OUTPATIENT
Start: 2018-02-19 | End: 2018-02-20 | Stop reason: HOSPADM

## 2018-02-19 RX ORDER — LABETALOL HYDROCHLORIDE 5 MG/ML
10 INJECTION, SOLUTION INTRAVENOUS
Status: DISCONTINUED | OUTPATIENT
Start: 2018-02-19 | End: 2018-02-19

## 2018-02-19 RX ORDER — TIMOLOL MALEATE 5 MG/ML
1 SOLUTION/ DROPS OPHTHALMIC 2 TIMES DAILY
Status: DISCONTINUED | OUTPATIENT
Start: 2018-02-19 | End: 2018-02-20 | Stop reason: HOSPADM

## 2018-02-19 RX ADMIN — PROPARACAINE HYDROCHLORIDE 1 DROP: 5 SOLUTION/ DROPS OPHTHALMIC at 05:02

## 2018-02-19 RX ADMIN — HEPARIN SODIUM 5000 UNITS: 5000 INJECTION, SOLUTION INTRAVENOUS; SUBCUTANEOUS at 09:02

## 2018-02-19 RX ADMIN — METOPROLOL SUCCINATE 25 MG: 25 TABLET, EXTENDED RELEASE ORAL at 05:02

## 2018-02-19 RX ADMIN — METOPROLOL SUCCINATE 25 MG: 25 TABLET, EXTENDED RELEASE ORAL at 09:02

## 2018-02-19 RX ADMIN — TIMOLOL MALEATE 1 DROP: 5 SOLUTION OPHTHALMIC at 08:02

## 2018-02-19 RX ADMIN — LABETALOL HYDROCHLORIDE 10 MG: 5 INJECTION, SOLUTION INTRAVENOUS at 11:02

## 2018-02-19 RX ADMIN — SODIUM CHLORIDE, PRESERVATIVE FREE 3 ML: 5 INJECTION INTRAVENOUS at 11:02

## 2018-02-19 RX ADMIN — ASPIRIN 81 MG: 81 TABLET, COATED ORAL at 08:02

## 2018-02-19 NOTE — TELEPHONE ENCOUNTER
Attempted to call pt. Spoke to pts sister who is in the ER with the pt now. They stated they will call back to schedule appts after they are home from the hospital  ----- Message from Jose Luis العلي MA sent at 2/19/2018  2:03 PM CST -----  Pt called to schedule with Dr. Mills or Jessica.  The available dates I gave her do not work for her schedule due to transportation.  She can be reached @ [phone].  Thanks!!

## 2018-02-19 NOTE — ED NOTES
Cyanosis noted to all fingertips with delayed cap refill, 100% SpO2 on room air, pulses equal and strong, Dr. Garcia notified .  Dr. Garcia also notified of /116.

## 2018-02-19 NOTE — ED PROVIDER NOTES
"Encounter Date: 2/19/2018    SCRIBE #1 NOTE: I, Aislinn Akhtar, am scribing for, and in the presence of,  Dr. Garcia. I have scribed the entire note.       History     Chief Complaint   Patient presents with    Altered Mental Status     Oncologist sent to ED for stroke and hypertension workup. Sister reports patient not being herself since Wednesday. Patient currently oriented x 4. No facial drrop, equal hand grasps.      Time seen by provider: 2:14 PM    This is a 87 y.o. female with comorbidities including chronic AFIB, HTN, glaucoma of the right eye and history of strokes per pt who presents from oncologist for hypertension, confusion, and "stroke like" symptoms. Per pt's relative, pt was noticed to have slurred speech 5 days ago when speaking to her on the phone. She states the pt could speak but was not answering questions appropriately. Pt states she knows what she wants to say but cannot get the words out. She also reports right eye pain that began yesterday but no changes in vision. No weakness. Pt is compliant with her blood pressure medications. She notes she has chronic SOB secondary to cancer.       The history is provided by the patient, a relative and medical records.     Review of patient's allergies indicates:   Allergen Reactions    Cosopt [dorzolamide-timolol] Swelling     Swelling of eyelids    Mevacor [lovastatin] Nausea And Vomiting    Prednisone Nausea And Vomiting    Vasotec [enalapril maleate] Nausea And Vomiting    Zetia [ezetimibe] Nausea And Vomiting    Furosemide Palpitations     Pt states her heart skips beats when she takes this medication.     Past Medical History:   Diagnosis Date    Acquired hypothyroidism     Adrenal adenoma     Amenorrhea 12/28/2015    Anxiety 8/28/2017    Asymptomatic carotid artery stenosis without infarction 4/29/2015    Bladder mass 11/12/2017    6.3 x 4.3 x 4.0 cm left sided enhancing bladder mass.   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    Chronic atrial fibrillation 8/9/2012    Chronic hyponatremia 6/29/2015    Corneal thinning - Left Eye 9/17/2012    Depression (emotion) 8/28/2017    Dislocated IOL (intraocular lens), posterior - Right Eye 10/19/2012    DVT (deep venous thrombosis)     Essential hypertension 7/23/2014    Goiter     Gross hematuria 11/12/2017    History of cornea transplant - Left Eye 9/17/2012    History of CVA (cerebrovascular accident) 6/12/2013    Hydronephrosis of left kidney 11/12/2017    Hypercholesteremia 8/25/2016    Long-term (current) use of anticoagulants     Stopped due to gross hematuria from bladder CA     Macular hole of right eye     Malignant neoplasm of urinary bladder 11/17/2017    - 11/13 OR for cystoscopy, TURBT, and pyelogram; stent could not be placed as urethral orifice could not be visualized.  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 Bilateral nodules found on CT Chest have high likelihood of being metastases.  - Oncology consulted to discuss treatment options. Pt would like to pursue immunotherapy.    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 hines)    BREAST LUMPECTOMY      BUNIONECTOMY      CATARACT EXTRACTION W/  INTRAOCULAR LENS IMPLANT Right 01/26/2011    WITH TRAB ()    CATARACT EXTRACTION W/  INTRAOCULAR LENS IMPLANT Left 04/09/2009    ANT. VIITRECTOMY WITH SUTURED PCIOL (DR. RYAN)    CATARACT EXTRACTION W/ INTRAOCULAR LENS IMPLANTW/ TRABECULECTOMY Right 01/26/2011         COLON SURGERY      volvulus    CONJUNCTIVAL FLAP  Left 05/28/2009    DR. RAMOS    conjuunctival flap   2009    OS w/ dr. ramos for k-ulcer    CORNEAL TRANSPLANT Left 04/30/2009    [facility] ()    INTRAOCULAR LENS IMPLANT, SECONDARY W/ ANTERIOR VITRECTOMY Left 04/09/2009        pneumatic maculoplexy  2001    OD w/ dr. emanuel    TRABECULECTOMY Left 01/11/2006        transcleral cyclophotocoagulation   2010    OS w/ dr. hines     Family History   Problem Relation Age of Onset    Emphysema Mother     Hypertension Mother     Hyperlipidemia Mother     Hypertension Father     Hyperlipidemia Father     No Known Problems Sister     No Known Problems Brother     No Known Problems Maternal Aunt     No Known Problems Maternal Uncle     No Known Problems Paternal Aunt     No Known Problems Paternal Uncle     No Known Problems Maternal Grandmother     No Known Problems Maternal Grandfather     No Known Problems Paternal Grandmother     No Known Problems Paternal Grandfather     Melanoma Neg Hx     Psoriasis Neg Hx     Lupus Neg Hx     Eczema Neg Hx     Acne Neg Hx     Amblyopia Neg Hx     Blindness Neg Hx     Cancer Neg Hx     Cataracts Neg Hx     Diabetes Neg Hx     Glaucoma Neg Hx     Macular degeneration Neg Hx     Retinal detachment Neg Hx     Strabismus Neg Hx     Stroke Neg Hx     Thyroid disease Neg Hx      Social History   Substance Use Topics    Smoking status: Former Smoker     Quit date: 9/25/1982    Smokeless tobacco: Never Used    Alcohol use No     Review of Systems   Constitutional: Negative for fever.   HENT: Negative for nosebleeds.    Eyes: Positive for pain (Right). Negative for visual disturbance.   Respiratory: Positive for shortness of breath (Chronic).    Cardiovascular:        Positive for elevated blood pressure   Gastrointestinal: Negative for abdominal pain.   Genitourinary: Negative for dysuria.   Skin: Negative for  rash.   Neurological: Positive for speech difficulty. Negative for weakness.   Psychiatric/Behavioral: Positive for confusion.       Physical Exam     Initial Vitals [02/19/18 1359]   BP Pulse Resp Temp SpO2   (!) 236/125 78 18 97.3 °F (36.3 °C) 100 %      MAP       162         Physical Exam    Nursing note and vitals reviewed.  Constitutional: She appears well-developed and well-nourished. She is not diaphoretic. No distress.   HENT:   Head: Normocephalic and atraumatic.   Mouth/Throat: Oropharynx is clear and moist.   Neck: Normal range of motion. Neck supple. No JVD present.   Cardiovascular: Normal rate, normal heart sounds and intact distal pulses. An irregularly irregular rhythm present.   Pulmonary/Chest: Breath sounds normal. No respiratory distress. She has no wheezes. She has no rhonchi. She has no rales.   Abdominal: Soft. She exhibits no distension. There is no tenderness.   Musculoskeletal: Normal range of motion. She exhibits no edema.   Lymphadenopathy:     She has no cervical adenopathy.   Neurological: She is alert and oriented to person, place, and time. No cranial nerve deficit or sensory deficit.   5/5 strength in upper and lower extremities.   No facial droop.    Skin: Skin is warm and dry.         ED Course   Procedures  Labs Reviewed   CBC W/ AUTO DIFFERENTIAL - Abnormal; Notable for the following:        Result Value    Hemoglobin 11.7 (*)     MCV 81 (*)     MCH 24.8 (*)     MCHC 30.6 (*)     RDW 18.3 (*)     Lymph # 0.5 (*)     Gran% 79.9 (*)     Lymph% 8.2 (*)     All other components within normal limits   COMPREHENSIVE METABOLIC PANEL - Abnormal; Notable for the following:     Glucose 121 (*)     BUN, Bld 28 (*)     eGFR if  47.0 (*)     eGFR if non  40.7 (*)     All other components within normal limits   URINALYSIS - Abnormal; Notable for the following:     Appearance, UA Hazy (*)     Leukocytes, UA 2+ (*)     All other components within normal limits     Narrative:     If patient is unable to provide a clean catch specimen due to  impairments such as mobility or cognition, obtain straight  cath specimen.   URINALYSIS MICROSCOPIC - Abnormal; Notable for the following:     RBC, UA 11 (*)     WBC, UA 10 (*)     Bacteria, UA Moderate (*)     Yeast, UA Rare (*)     All other components within normal limits    Narrative:     If patient is unable to provide a clean catch specimen due to  impairments such as mobility or cognition, obtain straight  cath specimen.   POCT GLUCOSE - Abnormal; Notable for the following:     POCT Glucose 134 (*)     All other components within normal limits   PROTIME-INR   TSH   LIPID PANEL   HEMOGLOBIN A1C   POCT GLUCOSE   ISTAT PROCEDURE   ISTAT CREATININE     EKG Readings: (Independently Interpreted)   AFIB at 71 BPM with nonspecific ST wave changes.        X-Rays:   Independently Interpreted Readings:   Chest X-Ray: Bilateral pulmonary nodules. No infiltrate.    Other Readings:  MRI Brain: acute infarct in the left temporal lobe.     Medical Decision Making:   History:   Old Medical Records: I decided to obtain old medical records.  Initial Assessment:   Emergent evaluation of 87 y.o. female with altered mental status, confusion, and brief episode of expressive aphasia. Concern is for CVA, TIA, intracranial mass, intracranial bleed. Because pt's symptoms began several days ago and with her history of cancer, will proceed with MRI to further evaluate.     4:46 PM  Stroke team contacted. Case discussed. They will evaluate the pt.     5:00 PM  Stroke team evaluated patient, they will admit for further treatment and evaluation.  I discussed the case with oncology who is aware of patient's admission to the stroke service.  Family updated of results.  Independently Interpreted Test(s):   I have ordered and independently interpreted X-rays - see prior notes.  I have ordered and independently interpreted EKG Reading(s) - see prior notes  Clinical  Tests:   Lab Tests: Ordered and Reviewed  Radiological Study: Ordered and Reviewed  Medical Tests: Ordered and Reviewed  Other:   I have discussed this case with another health care provider.       <> Summary of the Discussion: Vascular neurology             Scribe Attestation:   Scribe #1: I performed the above scribed service and the documentation accurately describes the services I performed. I attest to the accuracy of the note.    Attending Attestation:           Physician Attestation for Scribe:      Comments: I, Dr. Sofia Garcia, personally performed the services described in this documentation. All medical record entries made by the scribe were at my direction and in my presence.  I have reviewed the chart and agree that the record reflects my personal performance and is accurate and complete. Sofia Garcia MD.                 Clinical Impression:   Diagnoses of Stroke and Altered mental status were pertinent to this visit.    Disposition:   Disposition: Admitted  Condition: Serious                        Sofia Garcia MD  02/20/18 0912

## 2018-02-19 NOTE — PROGRESS NOTES
PATIENT: Monique Lew  MRN: [id number]  DATE: 2/19/2018      Diagnosis:   No diagnosis found.    Chief Complaint: No chief complaint on file.      Oncologic History:      Oncologic History 11/11/17 CT abdomen  11/12/17 Ct Chest  11/13/17 bladder tumor biopsy    Oncologic Treatment 11/20/17- 12/11/17 35Gy Radiation to bladder tumor  1/29/18 Pembrolizumab s/p cycle 1    Pathology 11/13/17  high grade urothelial carcinoma with infiltration involving the muscularis layer      ONC: Pt is an 86 yo F with h/o CVA in 2008 with symptoms of speech difficulty and dysphagia now resolved, hypothyroidism, HTN, MI, breast cacner diagnosed in 1992 in the left breast s/p lumpectomy LN dissection, RT and tamoxifen [place] [facility] [place] with complaint of hematuria. Upon admisson the patient underwent CT of the abdomen on 11/11/17 showing a left sided bladder mass measuring 6.3 x 4.3 x 4.0 cm. Ct chest performed on 11/12/17 showed bilateral pulmonary nodules with the largest nodular opacity in the right upper lobe measures approximately 1.6 cm. 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 The patient was seen in clinic on 12/18/17 with discussion on whether to pursue hospice or treatment with pembrolizumab.  The patient ultimately decided to pursue treatment.    Subjective:    Initial History: Ms. Lew is a 87 y.o. female who presents to the clinic for follow up of bladder cancer.  The patient started treatment with pembrolizumab on 1/29/18.  The patient states that for the last 3 days she has suffered from a difficulty finding worse.  The patient is accompanied by her friend who concurred with the complaint.  The patient denied any weakness in her extremities.  The patient denies numbness.  The patient also complained about SOB and right lower quadrant pain.  She denies N/V, constipation  diarrhea, fever, chills, CP.    Past Medical History:   Past Medical History:   Diagnosis Date    Acquired hypothyroidism     Adrenal adenoma     Amenorrhea 12/28/2015    Anxiety 8/28/2017    Asymptomatic carotid artery stenosis without infarction 4/29/2015    Bladder mass 11/12/2017    6.3 x 4.3 x 4.0 cm left sided enhancing bladder mass.   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    Chronic atrial fibrillation 8/9/2012    Chronic hyponatremia 6/29/2015    Corneal thinning - Left Eye 9/17/2012    Depression (emotion) 8/28/2017    Dislocated IOL (intraocular lens), posterior - Right Eye 10/19/2012    DVT (deep venous thrombosis)     Essential hypertension 7/23/2014    Goiter     Gross hematuria 11/12/2017    History of cornea transplant - Left Eye 9/17/2012    History of CVA (cerebrovascular accident) 6/12/2013    Hydronephrosis of left kidney 11/12/2017    Hypercholesteremia 8/25/2016    Long-term (current) use of anticoagulants     Stopped due to gross hematuria from bladder CA     Macular hole of right eye     Malignant neoplasm of urinary bladder 11/17/2017    - 11/13 OR for cystoscopy, TURBT, and pyelogram; stent could not be placed as urethral orifice could not be visualized.  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 Bilateral nodules found on CT Chest have high likelihood of being metastases.  - Oncology consulted to discuss treatment options. Pt would like to pursue immunotherapy.    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 hines)    BREAST LUMPECTOMY      BUNIONECTOMY      CATARACT EXTRACTION W/  INTRAOCULAR LENS IMPLANT Right 01/26/2011    WITH TRAB ()    CATARACT EXTRACTION W/  INTRAOCULAR LENS IMPLANT Left 04/09/2009    ANT. VIITRECTOMY WITH SUTURED PCIOL (DR. RYAN)    CATARACT EXTRACTION W/ INTRAOCULAR LENS IMPLANTW/ TRABECULECTOMY Right 01/26/2011        COLON SURGERY      volvulus    CONJUNCTIVAL FLAP  Left 05/28/2009    DR. JARAMILLO    conjuunctival flap   2009    OS w/ dr. 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 emanuel    TRABECULECTOMY Left 01/11/2006        transcleral cyclophotocoagulation   2010    OS w/ dr. hines       Family History:   Family History   Problem Relation Age of Onset    Emphysema Mother     Hypertension Mother     Hyperlipidemia Mother     Hypertension Father     Hyperlipidemia Father     No Known Problems Sister     No Known Problems Brother     No Known Problems Maternal Aunt     No Known Problems Maternal Uncle     No Known Problems Paternal Aunt     No Known Problems Paternal Uncle     No Known Problems Maternal Grandmother     No Known Problems Maternal Grandfather     No Known Problems Paternal Grandmother     No Known Problems Paternal Grandfather     Melanoma Neg Hx     Psoriasis Neg Hx     Lupus Neg Hx     Eczema Neg Hx     Acne Neg Hx     Amblyopia Neg Hx     Blindness Neg Hx     Cancer Neg Hx     Cataracts Neg Hx     Diabetes Neg Hx     Glaucoma Neg Hx     Macular degeneration Neg Hx     Retinal detachment Neg Hx     Strabismus Neg Hx     Stroke Neg Hx     Thyroid disease Neg Hx        Social History:  reports that she quit smoking about 35 years ago. She has never used smokeless  tobacco. She reports that she does not drink alcohol or use drugs.    Allergies:  Review of patient's allergies indicates:   Allergen Reactions    Cosopt [dorzolamide-timolol] Swelling     Swelling of eyelids    Mevacor [lovastatin] Nausea And Vomiting    Prednisone Nausea And Vomiting    Vasotec [enalapril maleate] Nausea And Vomiting    Zetia [ezetimibe] Nausea And Vomiting    Furosemide Palpitations     Pt states her heart skips beats when she takes this medication.       Medications:  Current Outpatient Prescriptions   Medication Sig Dispense Refill    metoprolol succinate (TOPROL-XL) 25 MG 24 hr tablet Take 1 tablet (25 mg total) by mouth 2 (two) times daily. 180 tablet 2    PROPYLENE GLYCOL//PF (SYSTANE, PF, OPHT) Place 1 drop into both eyes daily as needed. for dry eyes      SYNTHROID 50 mcg tablet TAKE 1 TABLET BY MOUTH DAILY EVERY MORNING 90 tablet 0    timolol maleate 0.5% (TIMOPTIC) 0.5 % Drop Place 1 drop into both eyes 2 (two) times daily. 10 mL 12     No current facility-administered medications for this visit.      Review of Systems   Constitutional: Negative for chills, diaphoresis and fever.   Respiratory: Positive for shortness of breath. Negative for cough and sputum production.    Cardiovascular: Negative for chest pain.   Genitourinary: Negative for dysuria and urgency.   Neurological: Positive for speech change (word finding difficulty). Negative for dizziness, tingling, sensory change, focal weakness, loss of consciousness, weakness and headaches.     ECOG Performance Status: 3   Objective:      Vitals:   There were no vitals filed for this visit.  BMI: There is no height or weight on file to calculate BMI.    Physical Exam   Constitutional: She is oriented to person, place, and time. No distress.   Thin, cachectic   HENT:   Head: Normocephalic and atraumatic.   Mouth/Throat: No oropharyngeal exudate.   Eyes: EOM are normal. Right eye exhibits no discharge. Left eye exhibits  no discharge. No scleral icterus.   Cardiovascular: Normal rate, regular rhythm, normal heart sounds and intact distal pulses.  Exam reveals no gallop and no friction rub.    No murmur heard.  Pulmonary/Chest: Effort normal and breath sounds normal. No respiratory distress. She has no wheezes. She has no rales. She exhibits no tenderness.   Abdominal: Soft. Bowel sounds are normal. She exhibits no distension and no mass. There is no tenderness. There is no rebound and no guarding.   Musculoskeletal: Normal range of motion. She exhibits no edema or tenderness.   Lymphadenopathy:        Head (right side): No submental and no submandibular adenopathy present.        Head (left side): No submental and no submandibular adenopathy present.     She has no cervical adenopathy.     She has no axillary adenopathy.        Right: No inguinal and no supraclavicular adenopathy present.        Left: No inguinal and no supraclavicular adenopathy present.   Neurological: She is alert and oriented to person, place, and time.   Patient with difficulty finding words during her interview often pausing for extended periods of time.   Skin: No rash noted. She is not diaphoretic. No erythema.   Psychiatric: She has a normal mood and affect. Her behavior is normal.         Laboratory Data:  Lab Visit on 02/16/2018   Component Date Value Ref Range Status    WBC 02/16/2018 7.45  3.90 - 12.70 K/uL Final    RBC 02/16/2018 4.30  4.00 - 5.40 M/uL Final    Hemoglobin 02/16/2018 10.5* 12.0 - 16.0 g/dL Final    Hematocrit 02/16/2018 35.4* 37.0 - 48.5 % Final    MCV 02/16/2018 82  82 - 98 fL Final    MCH 02/16/2018 24.4* 27.0 - 31.0 pg Final    MCHC 02/16/2018 29.7* 32.0 - 36.0 g/dL Final    RDW 02/16/2018 18.1* 11.5 - 14.5 % Final    Platelets 02/16/2018 249  150 - 350 K/uL Final    MPV 02/16/2018 10.3  9.2 - 12.9 fL Final    Gran # (ANC) 02/16/2018 6.1  1.8 - 7.7 K/uL Final    Comment: The ANC is based on a white cell differential from  an   automated cell counter. It has not been microscopically   reviewed for the presence of abnormal cells. Clinical   correlation is required.      Sodium 02/16/2018 139  136 - 145 mmol/L Final    Potassium 02/16/2018 4.3  3.5 - 5.1 mmol/L Final    Chloride 02/16/2018 102  95 - 110 mmol/L Final    CO2 02/16/2018 29  23 - 29 mmol/L Final    Glucose 02/16/2018 145* 70 - 110 mg/dL Final    BUN, Bld 02/16/2018 30* 7 - 17 mg/dL Final    Creatinine 02/16/2018 1.09  0.50 - 1.40 mg/dL Final    Calcium 02/16/2018 9.6  8.7 - 10.5 mg/dL Final    Total Protein 02/16/2018 7.0  6.0 - 8.4 g/dL Final    Albumin 02/16/2018 3.9  3.5 - 5.2 g/dL Final    Total Bilirubin 02/16/2018 0.4  0.1 - 1.0 mg/dL Final    Comment: For infants and newborns, interpretation of results should be based  on gestational age, weight and in agreement with clinical  observations.  Premature Infant recommended reference ranges:  Up to 24 hours.............<8.0 mg/dL  Up to 48 hours............<12.0 mg/dL  3-5 days..................<15.0 mg/dL  6-29 days.................<15.0 mg/dL      Alkaline Phosphatase 02/16/2018 84  38 - 126 U/L Final    AST 02/16/2018 34  15 - 46 U/L Final    ALT 02/16/2018 28  10 - 44 U/L Final    Anion Gap 02/16/2018 8  8 - 16 mmol/L Final    eGFR if  02/16/2018 52.7* >60 mL/min/1.73 m^2 Final    eGFR if non African American 02/16/2018 45.8* >60 mL/min/1.73 m^2 Final    Comment: Calculation used to obtain the estimated glomerular filtration  rate (eGFR) is the CKD-EPI equation.            Imaging: Imaging Reviewed    Assessment:       No diagnosis found.     Plan:     Word Finding Difficulty - The patient has been having word finding difficulty for the past three days.  Given her history of CVA, the concern is that the patient may be having an acute stroke.  The patient was sent to the ER for evaluation    Metastatic Bladder Cancer - The patient has completed treatment with one dose of  pembrolizumab  -Will need to revisit goals of care once the patient has been evaluated in the ER    Herberth Alfaro MD PGY-IV  Hematology and Oncology  Pager:[phone]      Answers for HPI/ROS submitted by the patient on 2/17/2018   appetite change : Yes  unexpected weight change: Yes  visual disturbance: Yes  adenopathy: No      Distress Screening Results: Psychosocial Distress screening score of Distress Score: 6 noted and reviewed. No intervention indicated.     Attending Note  I have personally taken the history and examined this patient and agree with the fellow's note as stated above.  Sent to ER with above symptoms

## 2018-02-19 NOTE — ED TRIAGE NOTES
Patient arrives to ED with CC of aphasia, confusion and slurred speech since Wednesday, reports was seen by PCP this morning and told to come to ED s/t complaints and HTN.     Patient identifiers verified and correct for Monique Lew.    LOC: The patient is awake and alert. Speech is slurred.  APPEARANCE: Patient resting comfortably and in no acute distress. Pt is clean and well groomed. No JVD visible. Pt reports pain level of 0.  SKIN: Skin is warm dry and intact, and color is consistent with ethnicity. No tenting observed and capillary refill <3 seconds. No clubbing noted to nail beds. No breakdown or brusing visible and mucus membranes moist and acyanotic.  MUSCULOSKELETAL: Full range of motion present in all extremities. Hand  equal and leg strength strong +5 bilaterally.  RESPIRATORY: Airway is open and patent. Respirations-unlabored, regular rate, equal bilaterally on inspiration and expiration. No accessory muscle use noted. Lungs sounds diminished through out.    CARDIAC: Peripheral pulses strong and equal through out. No peripheral edema noted, and patient has no c/o chest pain.  ABDOMEN: Soft and non-tender to palpation with no distention noted. Pt reports decreased appetite.   NEUROLOGIC: See neuro assessment flow sheet.

## 2018-02-20 ENCOUNTER — TELEPHONE (OUTPATIENT)
Dept: NEUROLOGY | Facility: CLINIC | Age: 83
End: 2018-02-20

## 2018-02-20 VITALS
SYSTOLIC BLOOD PRESSURE: 165 MMHG | DIASTOLIC BLOOD PRESSURE: 100 MMHG | OXYGEN SATURATION: 95 % | RESPIRATION RATE: 18 BRPM | BODY MASS INDEX: 18.85 KG/M2 | HEART RATE: 81 BPM | TEMPERATURE: 98 F | WEIGHT: 93.5 LBS | HEIGHT: 59 IN

## 2018-02-20 LAB
ALBUMIN SERPL BCP-MCNC: 3.4 G/DL
ALP SERPL-CCNC: 83 U/L
ALT SERPL W/O P-5'-P-CCNC: 19 U/L
ANION GAP SERPL CALC-SCNC: 12 MMOL/L
APTT BLDCRRT: 22.2 SEC
AST SERPL-CCNC: 27 U/L
BASOPHILS # BLD AUTO: 0.03 K/UL
BASOPHILS NFR BLD: 0.4 %
BILIRUB SERPL-MCNC: 0.8 MG/DL
BUN SERPL-MCNC: 24 MG/DL
CALCIUM SERPL-MCNC: 9.4 MG/DL
CHLORIDE SERPL-SCNC: 103 MMOL/L
CK MB SERPL-MCNC: 1.8 NG/ML
CK MB SERPL-RTO: 6.2 %
CK SERPL-CCNC: 29 U/L
CO2 SERPL-SCNC: 23 MMOL/L
CREAT SERPL-MCNC: 1 MG/DL
DIFFERENTIAL METHOD: ABNORMAL
EOSINOPHIL # BLD AUTO: 0 K/UL
EOSINOPHIL NFR BLD: 0.3 %
ERYTHROCYTE [DISTWIDTH] IN BLOOD BY AUTOMATED COUNT: 18.4 %
EST. GFR  (AFRICAN AMERICAN): 58.5 ML/MIN/1.73 M^2
EST. GFR  (NON AFRICAN AMERICAN): 50.8 ML/MIN/1.73 M^2
ESTIMATED AVG GLUCOSE: 114 MG/DL
ESTIMATED PA SYSTOLIC PRESSURE: 26.62
GLUCOSE SERPL-MCNC: 84 MG/DL
HBA1C MFR BLD HPLC: 5.6 %
HCT VFR BLD AUTO: 36 %
HGB BLD-MCNC: 11 G/DL
IMM GRANULOCYTES # BLD AUTO: 0.02 K/UL
IMM GRANULOCYTES NFR BLD AUTO: 0.3 %
INR PPP: 1.1
LYMPHOCYTES # BLD AUTO: 0.5 K/UL
LYMPHOCYTES NFR BLD: 8 %
MAGNESIUM SERPL-MCNC: 2.2 MG/DL
MCH RBC QN AUTO: 24.3 PG
MCHC RBC AUTO-ENTMCNC: 30.6 G/DL
MCV RBC AUTO: 80 FL
MITRAL VALVE REGURGITATION: NORMAL
MONOCYTES # BLD AUTO: 0.7 K/UL
MONOCYTES NFR BLD: 10.8 %
NEUTROPHILS # BLD AUTO: 5.4 K/UL
NEUTROPHILS NFR BLD: 80.2 %
NRBC BLD-RTO: 0 /100 WBC
PHOSPHATE SERPL-MCNC: 3 MG/DL
PLATELET # BLD AUTO: 229 K/UL
PMV BLD AUTO: 10.6 FL
POTASSIUM SERPL-SCNC: 3.8 MMOL/L
PROT SERPL-MCNC: 6.8 G/DL
PROTHROMBIN TIME: 11.3 SEC
RBC # BLD AUTO: 4.52 M/UL
RETIRED EF AND QEF - SEE NOTES: 55 (ref 55–65)
SODIUM SERPL-SCNC: 138 MMOL/L
TRICUSPID VALVE REGURGITATION: NORMAL
TROPONIN I SERPL DL<=0.01 NG/ML-MCNC: 0.02 NG/ML
WBC # BLD AUTO: 6.77 K/UL

## 2018-02-20 PROCEDURE — G8998 SWALLOW D/C STATUS: HCPCS | Mod: CH

## 2018-02-20 PROCEDURE — 97165 OT EVAL LOW COMPLEX 30 MIN: CPT

## 2018-02-20 PROCEDURE — 83735 ASSAY OF MAGNESIUM: CPT

## 2018-02-20 PROCEDURE — G8978 MOBILITY CURRENT STATUS: HCPCS | Mod: CK

## 2018-02-20 PROCEDURE — 97535 SELF CARE MNGMENT TRAINING: CPT

## 2018-02-20 PROCEDURE — 80053 COMPREHEN METABOLIC PANEL: CPT

## 2018-02-20 PROCEDURE — 92610 EVALUATE SWALLOWING FUNCTION: CPT

## 2018-02-20 PROCEDURE — G8996 SWALLOW CURRENT STATUS: HCPCS | Mod: CH

## 2018-02-20 PROCEDURE — A4216 STERILE WATER/SALINE, 10 ML: HCPCS | Performed by: PHYSICIAN ASSISTANT

## 2018-02-20 PROCEDURE — 92523 SPEECH SOUND LANG COMPREHEN: CPT

## 2018-02-20 PROCEDURE — 97161 PT EVAL LOW COMPLEX 20 MIN: CPT

## 2018-02-20 PROCEDURE — G8987 SELF CARE CURRENT STATUS: HCPCS | Mod: CJ

## 2018-02-20 PROCEDURE — G8979 MOBILITY GOAL STATUS: HCPCS | Mod: CJ

## 2018-02-20 PROCEDURE — 97530 THERAPEUTIC ACTIVITIES: CPT

## 2018-02-20 PROCEDURE — 97802 MEDICAL NUTRITION INDIV IN: CPT

## 2018-02-20 PROCEDURE — 63600175 PHARM REV CODE 636 W HCPCS: Performed by: PHYSICIAN ASSISTANT

## 2018-02-20 PROCEDURE — 85610 PROTHROMBIN TIME: CPT

## 2018-02-20 PROCEDURE — 84100 ASSAY OF PHOSPHORUS: CPT

## 2018-02-20 PROCEDURE — 93306 TTE W/DOPPLER COMPLETE: CPT

## 2018-02-20 PROCEDURE — G8989 SELF CARE D/C STATUS: HCPCS | Mod: CJ

## 2018-02-20 PROCEDURE — 84484 ASSAY OF TROPONIN QUANT: CPT

## 2018-02-20 PROCEDURE — G8997 SWALLOW GOAL STATUS: HCPCS | Mod: CH

## 2018-02-20 PROCEDURE — G8988 SELF CARE GOAL STATUS: HCPCS | Mod: CI

## 2018-02-20 PROCEDURE — 25000003 PHARM REV CODE 250: Performed by: PHYSICIAN ASSISTANT

## 2018-02-20 PROCEDURE — 85730 THROMBOPLASTIN TIME PARTIAL: CPT

## 2018-02-20 PROCEDURE — 82553 CREATINE MB FRACTION: CPT

## 2018-02-20 PROCEDURE — 85025 COMPLETE CBC W/AUTO DIFF WBC: CPT

## 2018-02-20 PROCEDURE — 99233 SBSQ HOSP IP/OBS HIGH 50: CPT | Mod: ,,, | Performed by: PSYCHIATRY & NEUROLOGY

## 2018-02-20 PROCEDURE — 93306 TTE W/DOPPLER COMPLETE: CPT | Mod: 26,,, | Performed by: INTERNAL MEDICINE

## 2018-02-20 PROCEDURE — 36415 COLL VENOUS BLD VENIPUNCTURE: CPT

## 2018-02-20 PROCEDURE — 99222 1ST HOSP IP/OBS MODERATE 55: CPT | Mod: ,,, | Performed by: NURSE PRACTITIONER

## 2018-02-20 RX ORDER — ACETAMINOPHEN 325 MG/1
650 TABLET ORAL EVERY 6 HOURS PRN
Status: DISCONTINUED | OUTPATIENT
Start: 2018-02-20 | End: 2018-02-20 | Stop reason: HOSPADM

## 2018-02-20 RX ORDER — NIACIN 500 MG
1000 CAPSULE, EXTENDED RELEASE ORAL NIGHTLY
Status: DISCONTINUED | OUTPATIENT
Start: 2018-02-20 | End: 2018-02-20 | Stop reason: HOSPADM

## 2018-02-20 RX ORDER — ASPIRIN 81 MG/1
81 TABLET ORAL DAILY
Refills: 0 | COMMUNITY
Start: 2018-02-21 | End: 2019-02-21

## 2018-02-20 RX ORDER — NIACIN 500 MG
1000 CAPSULE, EXTENDED RELEASE ORAL NIGHTLY
Refills: 0 | COMMUNITY
Start: 2018-02-20 | End: 2019-02-20

## 2018-02-20 RX ORDER — LIDOCAINE 50 MG/G
OINTMENT TOPICAL
Status: DISCONTINUED | OUTPATIENT
Start: 2018-02-20 | End: 2018-02-20 | Stop reason: HOSPADM

## 2018-02-20 RX ADMIN — HEPARIN SODIUM 5000 UNITS: 5000 INJECTION, SOLUTION INTRAVENOUS; SUBCUTANEOUS at 06:02

## 2018-02-20 RX ADMIN — SODIUM CHLORIDE, PRESERVATIVE FREE 3 ML: 5 INJECTION INTRAVENOUS at 06:02

## 2018-02-20 RX ADMIN — TIMOLOL MALEATE 1 DROP: 5 SOLUTION OPHTHALMIC at 02:02

## 2018-02-20 RX ADMIN — ASPIRIN 81 MG: 81 TABLET, COATED ORAL at 08:02

## 2018-02-20 RX ADMIN — LEVOTHYROXINE SODIUM 50 MCG: 50 TABLET ORAL at 06:02

## 2018-02-20 RX ADMIN — METOPROLOL SUCCINATE 25 MG: 25 TABLET, EXTENDED RELEASE ORAL at 08:02

## 2018-02-20 NOTE — ASSESSMENT & PLAN NOTE
-patient with hx of pseudoexfoliation glaucoma bilaterally, presents with acute stroke of L posterior inferior temporal lobe  -ophthalmology consulted for 2 episodes of R eye pain each lasting 3-5 minutes, now resolved  -IOP with tonopen shows stable intraocular pressures, VA unchanged  -bedside exam unchanged from last clinic vision with Dr. Beaulieu   -continue timolol BID in both eyes   -follow up with Dr. Beaulieu in clinic as scheduled

## 2018-02-20 NOTE — ASSESSMENT & PLAN NOTE
Noted on MRI - discussed with dr foreman   Recommending  Follow up with Dr Varela  (sinus specialist)   Large differential but similar scan to prior MRI   Could consider CT medtronic sinus order if wanting to further classify inpatient.

## 2018-02-20 NOTE — SUBJECTIVE & OBJECTIVE
Past Medical History:   Diagnosis Date    Acquired hypothyroidism     Adrenal adenoma     Amenorrhea 12/28/2015    Anxiety 8/28/2017    Asymptomatic carotid artery stenosis without infarction 4/29/2015    Bladder mass 11/12/2017    6.3 x 4.3 x 4.0 cm left sided enhancing bladder mass.   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    Chronic atrial fibrillation 8/9/2012    Chronic hyponatremia 6/29/2015    Corneal thinning - Left Eye 9/17/2012    Depression (emotion) 8/28/2017    Dislocated IOL (intraocular lens), posterior - Right Eye 10/19/2012    DVT (deep venous thrombosis)     Essential hypertension 7/23/2014    Goiter     Gross hematuria 11/12/2017    History of cornea transplant - Left Eye 9/17/2012    History of CVA (cerebrovascular accident) 6/12/2013    Hydronephrosis of left kidney 11/12/2017    Hypercholesteremia 8/25/2016    Long-term (current) use of anticoagulants     Stopped due to gross hematuria from bladder CA     Macular hole of right eye     Malignant neoplasm of urinary bladder 11/17/2017    - 11/13 OR for cystoscopy, TURBT, and pyelogram; stent could not be placed as urethral orifice could not be visualized.  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 Bilateral nodules found on CT Chest have high likelihood of being metastases.  - Oncology consulted to discuss treatment options. Pt would like to pursue immunotherapy.    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 hines)     BREAST LUMPECTOMY      BUNIONECTOMY      CATARACT EXTRACTION W/  INTRAOCULAR LENS IMPLANT Right 01/26/2011    WITH TRAB ()    CATARACT EXTRACTION W/  INTRAOCULAR LENS IMPLANT Left 04/09/2009    ANT. VIITRECTOMY WITH SUTURED PCIOL (DR. RYAN)    CATARACT EXTRACTION W/ INTRAOCULAR LENS IMPLANTW/ TRABECULECTOMY Right 01/26/2011        COLON SURGERY      volvulus    CONJUNCTIVAL FLAP  Left 05/28/2009    DR. JARAMILLO    conjuunctival flap   2009    OS w/ dr. 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 emanuel    TRABECULECTOMY Left 01/11/2006        transcleral cyclophotocoagulation   2010    OS w/ dr. hines     Review of patient's allergies indicates:   Allergen Reactions    Cosopt [dorzolamide-timolol] Swelling     Swelling of eyelids    Mevacor [lovastatin] Nausea And Vomiting    Prednisone Nausea And Vomiting    Vasotec [enalapril maleate] Nausea And Vomiting    Zetia [ezetimibe] Nausea And Vomiting    Furosemide Palpitations     Pt states her heart skips beats when she takes this medication.       Scheduled Medications:    aspirin  81 mg Oral Daily    heparin (porcine)  5,000 Units Subcutaneous Q8H    levothyroxine  50 mcg Oral Before breakfast    metoprolol succinate  25 mg Oral BID    sodium chloride 0.9%  3 mL Intravenous Q8H    timolol maleate 0.5%  1 drop Both Eyes BID       PRN Medications: labetalol, ramelteon, sodium chloride 0.9%    Family History     Problem Relation (Age of Onset)    Emphysema Mother    Hyperlipidemia Mother, Father    Hypertension Mother, Father    No Known Problems Sister, Brother, Maternal Aunt, Maternal Uncle, Paternal Aunt, Paternal Uncle, Maternal Grandmother, Maternal Grandfather, Paternal Grandmother, Paternal Grandfather        Social History Main Topics    Smoking  status: Former Smoker     Quit date: 1982    Smokeless tobacco: Never Used    Alcohol use No    Drug use: No    Sexual activity: No     Review of Systems   Constitutional: Negative for chills, fatigue and fever.   HENT: Positive for hearing loss. Negative for trouble swallowing and voice change.    Eyes: Positive for visual disturbance. Negative for photophobia.   Respiratory: Negative for cough, shortness of breath and wheezing.    Cardiovascular: Negative for chest pain and palpitations.   Gastrointestinal: Negative for abdominal distention, nausea and vomiting.   Genitourinary: Negative for difficulty urinating and flank pain.   Musculoskeletal: Negative for arthralgias and gait problem.   Skin: Negative for color change and rash.   Neurological: Negative for facial asymmetry, speech difficulty and weakness.   Psychiatric/Behavioral: Positive for confusion. Negative for agitation.     Objective:     Vital Signs (Most Recent):  Temp: 97.4 °F (36.3 °C) (18 0433)  Pulse: 78 (18 0659)  Resp: 16 (18)  BP: 106/74 (18)  SpO2: 95 % (18)    Vital Signs (24h Range):  Temp:  [97.3 °F (36.3 °C)-98 °F (36.7 °C)] 97.4 °F (36.3 °C)  Pulse:  [68-85] 78  Resp:  [16-30] 16  SpO2:  [95 %-100 %] 95 %  BP: (106-236)/() 106/74     Body mass index is 18.88 kg/m².    Physical Exam   Constitutional: She appears well-developed and well-nourished.   HENT:   Head: Normocephalic and atraumatic.   Eyes: EOM are normal. Pupils are equal, round, and reactive to light.   Neck: Normal range of motion.   Cardiovascular: Normal rate and regular rhythm.    Pulmonary/Chest: No respiratory distress.   Abdominal: Soft. There is no tenderness.   Musculoskeletal: Normal range of motion. She exhibits no deformity.   Neurological:   -  Mental Status:  AAOx3.  Follows commands.  Answers correct age and .  Recent and remote memory intact.     -  Speech and language:  + aphasia + dysarthria.    -   Vision:  Baseline visual deficits    -  Motor:  RUE: 5/5, 5/5 .  LUE: 5/5, 5/5 .  RLE: 5/5.  LLE: 5/5   -  Tone:  normal  -  Sensory:  Intact to light touch and pin prick.       Skin: Skin is warm and dry.   Psychiatric: She has a normal mood and affect. Her behavior is normal. Cognition and memory are impaired.   Vitals reviewed.    NEUROLOGICAL EXAMINATION:     CRANIAL NERVES     CN III, IV, VI   Pupils are equal, round, and reactive to light.  Extraocular motions are normal.       Diagnostic Results:   Labs: Reviewed  ECG: Reviewed  X-Ray: Reviewed  US: Reviewed  MRI: Reviewed

## 2018-02-20 NOTE — DISCHARGE SUMMARY
DISCHARGE SUMMARY  Hospital Medicine    Team: Networked reference to record PCT     Patient Name: Monique Lew  YOB: 1930    Admit Date: 2/19/2018    Discharge Date: 02/20/2018    Discharge Attending Physician: Gomez Jones MD     Admitting Resident: Nayana Joiner MD    Diagnoses:  Active Hospital Problems    Diagnosis  POA    *Cerebrovascular accident (CVA) due to embolism of left middle cerebral artery [I63.412]  Yes    Acute right eye pain [H57.11]  Unknown    Cytotoxic cerebral edema [G93.6]  Unknown    Malignant neoplasm of urinary bladder [C67.9]  Yes     - 11/13 OR for cystoscopy, TURBT, and pyelogram; stent could not be placed as urethral orifice could not be visualized.   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 Bilateral nodules found on CT Chest have high likelihood of being metastases.   - Oncology consulted to discuss treatment options. Pt would like to pursue immunotherapy.       Opacification of maxillary sinus [R93.0]  Yes    Pseudoexfoliation (PXF) glaucoma, severe stage [H40.1493]  Yes    Acquired hypothyroidism [E03.9]  Yes    Essential hypertension [I10]  Yes    Chronic atrial fibrillation [I48.2]  Yes      Resolved Hospital Problems    Diagnosis Date Resolved POA   No resolved problems to display.       Discharged Condition: admit problems have stabilized       HOSPITAL COURSE:      Initial Presentation:    87 year old female with pmh of metastatic bladder cancer (status post radiation), and afib (off coumadin due to loss of two pints via hematuria in 11/2017 - requiring transfusion. The patient was seen in heme onc clinic today and described that she, for the past 5 days, had had difficulty with words. Some improvement since then but is not back to baseline. Out of concern for stroke, she was sent to the ER from heme onc clinic. MRI demonstrated left temporal stroke.      Also reported sudden sharp eye pain in right eye x1 day.  Patient was followed by ophthalmology here, is blind in left eye and can distinguish movement and some figures but has had progressive vision impariment in the R eye. Ophthalmology consulted by ED (please see below)    Course of Principle Problem for Admission:     Patient was foiund to have a CVA due to embolism of left middle cerebral artery.  As a result, the vascular neurology team made the following recommendations:  Antithrombotics for secondary stroke prevention: Antiplatelets: Aspirin: 81 mg daily - discussion regarding risk v benefit of anticoagulation in this patient with malignancy and known afib. Patient and family do no want to pursue further anticoagulation.   Pt has has in the past used coumadin and pradaxa but reports she's had strokes in the past on pradaxa.      Statins for secondary stroke prevention and hyperlipidemia, if present:   Statins: None: Reason: allergy - will consider low dose statin if indicated when LDL results   Unable to start ezetamide because patient is allergic (rxn includes N/V)  Will start patient on Niacin 1g at night      Aggressive risk factor modification: HTN, A-Fib, malignancy      Rehab efforts: PT/OT/SLP to evaluate and treat     Diagnostics ordered/pending: HgbA1C to assess blood glucose levels, Lipid Profile to assess cholesterol levels, TTE to assess cardiac function/status    OF note- can consider CTA however US was ordered as patient has increased SOB with laying flat.      VTE prophylaxis: Heparin 5000 units SQ every 8 hours     BP parameters: Infarct: can keep <180 systolic, and slowly lower to normotensive, stroke approximately 5 days old          Other Medical Problems Addressed in the Hospital:    Cytotoxic cerebral edema     Due to acute stroke   See work up as above, noted on imaging           Acute right eye pain     Seen by ophthalmology - recommending continuing home eye drops   Pressures not concerning for acute angle glaucoma           Malignant  neoplasm of urinary bladder     Heme onc following in clinic             Opacification of maxillary sinus     Noted on MRI - discussed with dr foreman   Recommending  Follow up with Dr Varela  (sinus specialist)   Large differential but similar scan to prior MRI           Pseudoexfoliation (PXF) glaucoma, severe stage     Ophthalmology consulted - recommending consider using drops as prescribed.          Acquired hypothyroidism     Continue home synthroid           Essential hypertension     Stroke risk factor  On toprol, - continue and can treat with prn meds as needed           Chronic atrial fibrillation     -Likely etiology of stroke   -Pt does not wish to have anticoagulation at this time (discussed the pros and cons of using anticoagulation and patient understands the risk of stroke associated with having afib but not being optimally anticoagulated)  -Will use asa at this time only   -Continue toprol          CONSULTS: NA    Last CBC/BMP/HgbA1c (if applicable):  Recent Results (from the past 336 hour(s))   CBC auto differential    Collection Time: 02/20/18  4:08 AM   Result Value Ref Range    WBC 6.77 3.90 - 12.70 K/uL    Hemoglobin 11.0 (L) 12.0 - 16.0 g/dL    Hematocrit 36.0 (L) 37.0 - 48.5 %    Platelets 229 150 - 350 K/uL   CBC W/ AUTO DIFFERENTIAL    Collection Time: 02/19/18  2:41 PM   Result Value Ref Range    WBC 6.45 3.90 - 12.70 K/uL    Hemoglobin 11.7 (L) 12.0 - 16.0 g/dL    Hematocrit 38.2 37.0 - 48.5 %    Platelets 253 150 - 350 K/uL     No results found for this or any previous visit (from the past 336 hour(s)).  Lab Results   Component Value Date    HGBA1C 5.6 02/19/2018       Other Pertinent Lab Findings:      Recent Labs  Lab 02/20/18  0408   WBC 6.77   RBC 4.52   HGB 11.0*   HCT 36.0*      MCV 80*   MCH 24.3*   MCHC 30.6*       Pertinent/Significant Diagnostic Studies:    US carotids   1.  60-79% stenosis of the right internal carotid artery with mild homogeneous plaque.    2.  Less  than 50% stenosis of the left internal carotid artery with mild homogeneous plaque.     MRI brain 2/19/18    Moderate size focus of acute infarction centered in the posterior inferior left temporal lobe.    Moderate chronic ischemic change as above.    Special Treatments/Procedures: NA    Disposition:  Home      Future Scheduled Appointments:  Future Appointments  Date Time Provider Department Center   3/7/2018 2:20 PM Aguila Hsieh MD [facility] [place]   3/13/2018 8:00 AM Raisa Haro NP [facility] UROLOGY [facility]   5/14/2018 9:15 AM PERIMETRY, HUMPH [facility] OPHTHAL [facility]   5/14/2018 9:45 AM Diana Beaulieu MD [facility] OPHTHAL [facility]   7/17/2018 8:20 AM Aguila Hsieh MD [facility] [place]       Follow-up Plans from This Hospitalization:  -potential move to Assisted Living    Discharge Medication List:       Monique Lew   Home Medication Instructions MAHI:[id number]    Printed on:02/20/18 1128   Medication Information                      aspirin (ECOTRIN) 81 MG EC tablet  Take 1 tablet (81 mg total) by mouth once daily.             metoprolol succinate (TOPROL-XL) 25 MG 24 hr tablet  Take 1 tablet (25 mg total) by mouth 2 (two) times daily.             niacin 500 MG CpSR  Take 2 capsules (1,000 mg total) by mouth every evening.             PROPYLENE GLYCOL//PF (SYSTANE, PF, OPHT)  Place 1 drop into both eyes daily as needed. for dry eyes             SYNTHROID 50 mcg tablet  TAKE 1 TABLET BY MOUTH DAILY EVERY MORNING             timolol maleate 0.5% (TIMOPTIC) 0.5 % Drop  Place 1 drop into both eyes 2 (two) times daily.                 Patient Instructions:    Discharge Procedure Orders  Activity as tolerated         Signing Physician:  Nayana Ennis MD

## 2018-02-20 NOTE — HOSPITAL COURSE
2/20/18: Evaluated by therapy.  PT evaluation pending. Bed mobility SV.  Sit to stand and transfers SBA.  UBD and LBD SBA.

## 2018-02-20 NOTE — ASSESSMENT & PLAN NOTE
Seen by ophthalmology - recommending continuing home eye drops   Pressures not concerning for acute angle glaucoma

## 2018-02-20 NOTE — PLAN OF CARE
02/20/18 1420   Final Note   Assessment Type Final Discharge Note   Discharge Disposition Home     Future Appointments  Date Time Provider Department Center   3[facility]/2018 2:20 PM [facility] [place]   3/13/2018 8:00 AM Raisa Haro NP [facility] UROLOGY [facility]   3/26/2018 10:40 AM Gomez Jones MD [facility] STROKE [facility]   5/14/2018 9:15 AM PERIMETRY, HUMPH [facility] OPHTHAL [facility]   5/14/2018 9:45 AM Diana Beaulieu MD [facility] OPHTHAL [facility]   7/17/2018 8:20 AM Aguila Hsieh MD [facility] [place]     Patient is discharged to home with [facility] today. Patient's family will provide transportation home. Sw to arrange Hospice. PCP appt is as stated above with Dr. Hsieh. In basket message sent to Vascular Neurology for hospital follow up in 4-6 weeks.

## 2018-02-20 NOTE — SUBJECTIVE & OBJECTIVE
Past Medical History:   Diagnosis Date    Acquired hypothyroidism     Adrenal adenoma     Amenorrhea 12/28/2015    Anxiety 8/28/2017    Asymptomatic carotid artery stenosis without infarction 4/29/2015    Bladder mass 11/12/2017    6.3 x 4.3 x 4.0 cm left sided enhancing bladder mass.   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    Chronic atrial fibrillation 8/9/2012    Chronic hyponatremia 6/29/2015    Corneal thinning - Left Eye 9/17/2012    Depression (emotion) 8/28/2017    Dislocated IOL (intraocular lens), posterior - Right Eye 10/19/2012    DVT (deep venous thrombosis)     Essential hypertension 7/23/2014    Goiter     Gross hematuria 11/12/2017    History of cornea transplant - Left Eye 9/17/2012    History of CVA (cerebrovascular accident) 6/12/2013    Hydronephrosis of left kidney 11/12/2017    Hypercholesteremia 8/25/2016    Long-term (current) use of anticoagulants     Stopped due to gross hematuria from bladder CA     Macular hole of right eye     Malignant neoplasm of urinary bladder 11/17/2017    - 11/13 OR for cystoscopy, TURBT, and pyelogram; stent could not be placed as urethral orifice could not be visualized.  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 Bilateral nodules found on CT Chest have high likelihood of being metastases.  - Oncology consulted to discuss treatment options. Pt would like to pursue immunotherapy.    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 hines)     BREAST LUMPECTOMY      BUNIONECTOMY      CATARACT EXTRACTION W/  INTRAOCULAR LENS IMPLANT Right 01/26/2011    WITH TRAB ()    CATARACT EXTRACTION W/  INTRAOCULAR LENS IMPLANT Left 04/09/2009    ANT. VIITRECTOMY WITH SUTURED PCIOL (DR. RYAN)    CATARACT EXTRACTION W/ INTRAOCULAR LENS IMPLANTW/ TRABECULECTOMY Right 01/26/2011        COLON SURGERY      volvulus    CONJUNCTIVAL FLAP  Left 05/28/2009    DR. JARAMILLO    conjuunctival flap   2009    OS w/ dr. 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 emanuel    TRABECULECTOMY Left 01/11/2006        transcleral cyclophotocoagulation   2010    OS w/ dr. hines     Family History   Problem Relation Age of Onset    Emphysema Mother     Hypertension Mother     Hyperlipidemia Mother     Hypertension Father     Hyperlipidemia Father     No Known Problems Sister     No Known Problems Brother     No Known Problems Maternal Aunt     No Known Problems Maternal Uncle     No Known Problems Paternal Aunt     No Known Problems Paternal Uncle     No Known Problems Maternal Grandmother     No Known Problems Maternal Grandfather     No Known Problems Paternal Grandmother     No Known Problems Paternal Grandfather     Melanoma Neg Hx     Psoriasis Neg Hx     Lupus Neg Hx     Eczema Neg Hx     Acne Neg Hx     Amblyopia Neg Hx     Blindness Neg Hx     Cancer Neg Hx     Cataracts Neg Hx     Diabetes Neg Hx     Glaucoma Neg Hx     Macular degeneration Neg Hx     Retinal detachment Neg Hx     Strabismus Neg Hx     Stroke Neg Hx     Thyroid disease Neg Hx      Social History   Substance Use Topics    Smoking status: Former Smoker     Quit date: 9/25/1982    Smokeless tobacco: Never Used    Alcohol use No     Review of patient's allergies indicates:   Allergen  Reactions    Cosopt [dorzolamide-timolol] Swelling     Swelling of eyelids    Mevacor [lovastatin] Nausea And Vomiting    Prednisone Nausea And Vomiting    Vasotec [enalapril maleate] Nausea And Vomiting    Zetia [ezetimibe] Nausea And Vomiting    Furosemide Palpitations     Pt states her heart skips beats when she takes this medication.       Medications: I have reviewed the current medication administration record.      (Not in a hospital admission)    Review of Systems   Constitutional: Negative for fever.   HENT: Negative for trouble swallowing.    Eyes: Positive for pain and visual disturbance.   Respiratory: Positive for shortness of breath (only with laying - history of lung nodules).    Cardiovascular: Negative for chest pain.   Genitourinary: Positive for hematuria.   Skin: Negative for color change.   Neurological: Positive for speech difficulty. Negative for dizziness and weakness.   Psychiatric/Behavioral: Positive for sleep disturbance. Negative for agitation.     Objective:     Vital Signs (Most Recent):  Temp: 97.3 °F (36.3 °C) (02/19/18 1359)  Pulse: 75 (02/19/18 1830)  Resp: 20 (02/19/18 1830)  BP: (!) 171/86 (02/19/18 1830)  SpO2: 98 % (02/19/18 1830)    Vital Signs Range (Last 24H):  Temp:  [97.3 °F (36.3 °C)-97.7 °F (36.5 °C)]   Pulse:  [70-85]   Resp:  [18-30]   BP: (171-236)/()   SpO2:  [95 %-100 %]     Physical Exam   Constitutional: She is oriented to person, place, and time. She appears well-developed.   Thin, elderly    HENT:   Head: Normocephalic and atraumatic.   Eyes:   Right eye - pupil equal and reactive, left eye opacified lens    Cardiovascular: Normal rate.    Pulmonary/Chest: Effort normal.   Abdominal: She exhibits no distension.   Musculoskeletal: She exhibits no edema.   Neurological: She is alert and oriented to person, place, and time.   Skin: Skin is warm and dry.   Psychiatric: She has a normal mood and affect.   Nursing note and vitals  "reviewed.      Neurological Exam:   LOC: alert  Attention Span: Good   Language: mild aphasia - cannot read cards due to vision loss   Articulation: Dysarthria  Orientation: Person, Place, Time   Visual Fields: can see movement with right eye, cannot see from left eye  EOM (CN III, IV, VI): Full/intact  Pupils (CN II, III): see "physical exam"  Facial Sensation (CN V): Normal  Facial Movement (CN VII): Symmetric facial expression    Motor: Arm left  Normal 5/5  Leg left  Normal 5/5  Arm right  Normal 5/5  Leg right Normal 5/5  Cebellar: No evidence of appendicular or axial ataxia  Sensation: Intact to light touch, temperature and vibration  Tone: Normal tone throughout      Laboratory:  CMP:   Recent Labs  Lab 02/19/18  1441   CALCIUM 9.9   ALBUMIN 3.6   PROT 7.3      K 4.2   CO2 27      BUN 28*   CREATININE 1.2   ALKPHOS 91   ALT 22   AST 34   BILITOT 0.5     CBC:   Recent Labs  Lab 02/19/18  1441   WBC 6.45   RBC 4.71   HGB 11.7*   HCT 38.2      MCV 81*   MCH 24.8*   MCHC 30.6*     Lipid Panel:   Recent Labs  Lab 02/19/18  1441   CHOL 162   LDLCALC 89.4   HDL 54   TRIG 93     Coagulation:   Recent Labs  Lab 02/19/18  1441   INR 1.1     Hgb A1C: No results for input(s): HGBA1C in the last 168 hours.  TSH:   Recent Labs  Lab 02/19/18  1441   TSH 2.060       Diagnostic Results:    US carotids   1.  60-79% stenosis of the right internal carotid artery with mild homogeneous plaque.    2.  Less than 50% stenosis of the left internal carotid artery with mild homogeneous plaque.    MRI brain 2/19/18    Moderate size focus of acute infarction centered in the posterior inferior left temporal lobe.    Moderate chronic ischemic change as above.    Chronic opacification of the left maxilla sinus with obstructing lesion at the level of the ostiomeatal unit protruding into the nasal cavity. ENT consultation advised.    "

## 2018-02-20 NOTE — PT/OT/SLP DISCHARGE
Occupational Therapy Discharge Summary    Monique Lew  MRN: [id number]   Principal Problem: Cerebrovascular accident (CVA) due to embolism of left middle cerebral artery      Patient Discharged from acute Occupational Therapy on 2/20  Please refer to prior OT note dated 2/20 for functional status.    Assessment:      Patient appropriate for care in another setting.    Objective:     GOALS:    Occupational Therapy Goals        Problem: Occupational Therapy Goal    Goal Priority Disciplines Outcome Interventions   Occupational Therapy Goal     OT, PT/OT     Description:  Goals set 2/20 to be addressed for 7 days with expiration date, 2/27:  Patient will increase functional independence with ADLs by performing:    Patient will demonstrate rolling to the right with modified independence.  Not met   Patient will demonstrate rolling to the left with modified independence.   Not met  Patient will demonstrate supine -sit with modified independence.   Not met  Patient will demonstrate stand pivot transfers with modified independence.   Not met  Patient will demonstrate grooming while standing with modified independence.   Not met  Patient will demonstrate upper body dressing with modified independence.   Not met  Patient will demonstrate lower body dressing with modified independence.   Not met  Patient will demonstrate toileting with modified independence.   Not met  Patient's family / caregiver will demonstrate independence and safety with assisting patient with self-care skills and functional mobility.     Not met  Patient and/or patient's family will verbalize understanding of stroke prevention guidelines, personal risk factors and stroke warning signs via teachback method.  Not met                           Reasons for Discontinuation of Therapy Services  Transfer to alternate level of care.      Plan:     Patient Discharged to: Home with Home Health Service    ERASMO Lyon  2/20/2018

## 2018-02-20 NOTE — ASSESSMENT & PLAN NOTE
Antithrombotics for secondary stroke prevention: Antiplatelets: Aspirin: 81 mg daily - discussion regarding risk v benefit of anticoagulation in this patient with malignancy and known afib.   Patient and family are planning to discuss goals of care and if they are willing to accept the risk of bleeding (prior large amount of bleeding requiring transfusion for bladder mass in 11/2017)  Has in the past used coumadin and pradaxa but reports she's had strokes in the past on pradaxa.     Statins for secondary stroke prevention and hyperlipidemia, if present:   Statins: None: Reason: allergy - will consider low dose statin if indicated when LDL results    Aggressive risk factor modification: HTN, A-Fib, malignancy      Rehab efforts: PT/OT/SLP to evaluate and treat    Diagnostics ordered/pending: HgbA1C to assess blood glucose levels, Lipid Profile to assess cholesterol levels, TTE to assess cardiac function/status    OF note- can consider CTA however US was ordered as patient has increased SOB with laying flat.     VTE prophylaxis: Heparin 5000 units SQ every 8 hours    BP parameters: Infarct: can keep <180 systolic, and slowly lower to normotensive, stroke approximately 5 days old

## 2018-02-20 NOTE — ED NOTES
Pt. Resting in bed in NAD. RR e/u. Continuous BP, cardiac, and O2 monitoring in progress. VS being monitoring continuously per MD orders. Pt. Assisted to bathroom to void.  Explanation of care/wait provided. Bed in low, locked position with rails up and call bell in reach. Will continue to monitor.

## 2018-02-20 NOTE — PLAN OF CARE
Problem: Physical Therapy Goal  Goal: Physical Therapy Goal  Goals to be met by: 2/28/2018    Patient will increase functional independence with mobility by performing:    Supine to sit with Supervision.  Sit to supine with Supervision.   Sit to stand transfer with Supervision using No Assistive Device.  Bed to chair transfer via Stand Pivot with Supervision using No Assistive Device.  Gait  x 150 feet with Supervision using No Assistive Device.    Dynamic standing for 10 minutes with Supervision using No Assistive Device.  Able to tolerate exercise for 15-20 reps with independence.  Patient and family able to teachback stroke & positioning education independently.  Ascend/descend 14 stairs with right Handrails Supervision using No Assistive Device.    Outcome: Ongoing (interventions implemented as appropriate)    Pt would benefit from HHPT upon discharge. PT eval completed  Appropriate to transfer with: RN/PCT CGA of 1 person  Lauren Garcia PT, DPT  2/20/2018  Pager: [phone]

## 2018-02-20 NOTE — CONSULTS
Food & Nutrition  Education      Consult received for stroke pathway s/p CVA with risk factors including HTN, A-fib & hx stroke. Do not feel it is appropriate to enforce diet restrictions given the patient's age and diet hx. Pt currently on Cardiac diet per MD. All questions and concerns answered,   please re-consult as needed.     Follow-up: 1x week     Thanks!

## 2018-02-20 NOTE — PLAN OF CARE
[facility]    HOME HEALTH ORDERS  FACE TO FACE ENCOUNTER    Patient Name: Monique Lew  YOB: 1930    PCP: Aguila Hsieh MD   PCP Address: [address] [place] [address]  PCP Phone Number: [phone]  PCP Fax: [phone]    Encounter Date: 02/20/2018    Admit to Home Health    Diagnoses:  Active Hospital Problems    Diagnosis  POA    *Cerebrovascular accident (CVA) due to embolism of left middle cerebral artery [I63.412]  Yes    Acute right eye pain [H57.11]  Unknown    Cytotoxic cerebral edema [G93.6]  Unknown    Malignant neoplasm of urinary bladder [C67.9]  Yes     - 11/13 OR for cystoscopy, TURBT, and pyelogram; stent could not be placed as urethral orifice could not be visualized.   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 Bilateral nodules found on CT Chest have high likelihood of being metastases.   - Oncology consulted to discuss treatment options. Pt would like to pursue immunotherapy.       Opacification of maxillary sinus [R93.0]  Yes    Pseudoexfoliation (PXF) glaucoma, severe stage [H40.1493]  Yes    Acquired hypothyroidism [E03.9]  Yes    Essential hypertension [I10]  Yes    Chronic atrial fibrillation [I48.2]  Yes      Resolved Hospital Problems    Diagnosis Date Resolved POA   No resolved problems to display.       Future Appointments  Date Time Provider Department Center   3/[facility]/2018 2:20 PM [facility] [place]   3/13/2018 8:00 AM Raisa Haro NP [facility] UROLOGY [facility]   5/14/2018 9:15 AM PERIMETRY, HUMPH [facility] OPHTHAL [facility]   5/14/2018 9:45 AM Diana Beaulieu MD [facility] OPHTHAL [facility]   7/17/2018 8:20 AM Aguila Hsieh MD [facility] [place]     Follow-up Information     [facility].    Specialty:  Neurology  Why:  The office will call you to schedule an appt in 4-6 weeks. If the office does not call you by 2/27/18 please call to schedule an appt.  Contact  information:  [address]  [facility] [phone]  Additional information:  7th Floor           Aguila Hsieh MD On 3/7/2018.    Specialty:  Internal Medicine  Why:  appt at 2:20 pm  Contact information:  [address] [address]  [phone]                     I have seen and examined this patient face to face today. My clinical findings that support the need for the home health skilled services and home bound status are the following:  Weakness/numbness causing balance and gait disturbance due to Stroke making it taxing to leave home.  Requiring assistive device to leave home due to unsteady gait caused by  Stroke.    Allergies:  Review of patient's allergies indicates:   Allergen Reactions    Cosopt [dorzolamide-timolol] Swelling     Swelling of eyelids    Mevacor [lovastatin] Nausea And Vomiting    Prednisone Nausea And Vomiting    Vasotec [enalapril maleate] Nausea And Vomiting    Zetia [ezetimibe] Nausea And Vomiting    Furosemide Palpitations     Pt states her heart skips beats when she takes this medication.       Diet: regular diet    Activities: activity as tolerated    Nursing:   SN to complete comprehensive assessment including routine vital signs. Instruct on disease process and s/s of complications to report to MD. Review/verify medication list sent home with the patient at time of discharge  and instruct patient/caregiver as needed. Frequency may be adjusted depending on start of care date.    Notify MD if SBP > 160 or < 90; DBP > 90 or < 50; HR > 120 or < 50; Temp > 101      CONSULTS:    Physical Therapy to evaluate and treat. Evaluate for home safety and equipment needs; Establish/upgrade home exercise program. Perform / instruct on therapeutic exercises, gait training, transfer training, and Range of Motion.    MISCELLANEOUS CARE:  N/A    WOUND CARE ORDERS  n/a      Medications: Review discharge medications with patient and family and provide  education.      Current Discharge Medication List      START taking these medications    Details   aspirin (ECOTRIN) 81 MG EC tablet Take 1 tablet (81 mg total) by mouth once daily.  Refills: 0      niacin 500 MG CpSR Take 2 capsules (1,000 mg total) by mouth every evening.  Refills: 0         CONTINUE these medications which have NOT CHANGED    Details   metoprolol succinate (TOPROL-XL) 25 MG 24 hr tablet Take 1 tablet (25 mg total) by mouth 2 (two) times daily.  Qty: 180 tablet, Refills: 2      PROPYLENE GLYCOL//PF (SYSTANE, PF, OPHT) Place 1 drop into both eyes daily as needed. for dry eyes      SYNTHROID 50 mcg tablet TAKE 1 TABLET BY MOUTH DAILY EVERY MORNING  Qty: 90 tablet, Refills: 0      timolol maleate 0.5% (TIMOPTIC) 0.5 % Drop Place 1 drop into both eyes 2 (two) times daily.  Qty: 10 mL, Refills: 12    Associated Diagnoses: Pseudoexfoliation glaucoma, severe stage             I certify that this patient is confined to her home and needs physical therapy.

## 2018-02-20 NOTE — PT/OT/SLP EVAL
Physical Therapy Evaluation    Patient Name:  Monique Lew   MRN:  [id number]  Admitting Diagnosis:  Cerebrovascular accident (CVA) due to embolism of left middle cerebral artery   Recent Surgery: * No surgery found *      Recommendations:     Discharge Recommendations:  home health PT   Discharge Equipment Recommendations: shower chair   Barriers to discharge: Decreased caregiver support    Plan:     During this hospitalization, patient to be seen 3 x/week to address the above listed problems via gait training, therapeutic activities, therapeutic exercises, neuromuscular re-education  · Plan of Care Expires:  03/19/18   Plan of Care Reviewed with: patient    This Plan of care has been discussed with the patient who was involved in its development and understands and is in agreement with the identified goals and treatment plan    History:     Patient lives alone in [place], [place]  in a townhome 14 steps to second floor, all living quarters are on second floor.     Bedroom:  second floor   Bathroom:  second floor    Patient reports being independent with ADLs.  Patient uses DME as follows: none  Patient owns following equipment but does not use: none  Activity level: sedentary.   Work: no.   Drive: no, friends provide assistance.   Managing Medicines/Managing Home: yes.   Hand dominance: right.   Wears glasses: yes  Upon discharge, patient will have assistance from sister is staying with her temporarily, close friends.    Past Medical History:   Diagnosis Date    Acquired hypothyroidism     Adrenal adenoma     Amenorrhea 12/28/2015    Anxiety 8/28/2017    Asymptomatic carotid artery stenosis without infarction 4/29/2015    Bladder mass 11/12/2017    6.3 x 4.3 x 4.0 cm left sided enhancing bladder mass.   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    Chronic atrial fibrillation 8/9/2012    Chronic  hyponatremia 6/29/2015    Corneal thinning - Left Eye 9/17/2012    Depression (emotion) 8/28/2017    Dislocated IOL (intraocular lens), posterior - Right Eye 10/19/2012    DVT (deep venous thrombosis)     Essential hypertension 7/23/2014    Goiter     Gross hematuria 11/12/2017    History of cornea transplant - Left Eye 9/17/2012    History of CVA (cerebrovascular accident) 6/12/2013    Hydronephrosis of left kidney 11/12/2017    Hypercholesteremia 8/25/2016    Long-term (current) use of anticoagulants     Stopped due to gross hematuria from bladder CA     Macular hole of right eye     Malignant neoplasm of urinary bladder 11/17/2017    - 11/13 OR for cystoscopy, TURBT, and pyelogram; stent could not be placed as urethral orifice could not be visualized.  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 Bilateral nodules found on CT Chest have high likelihood of being metastases.  - Oncology consulted to discuss treatment options. Pt would like to pursue immunotherapy.    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 hines)    BREAST LUMPECTOMY      BUNIONECTOMY      CATARACT EXTRACTION W/  INTRAOCULAR LENS IMPLANT Right 01/26/2011    WITH TRAB ()    CATARACT EXTRACTION W/  INTRAOCULAR LENS IMPLANT Left 04/09/2009    ANT. VIITRECTOMY WITH SUTURED PCIOL (DR. RYAN)    CATARACT EXTRACTION W/ INTRAOCULAR LENS IMPLANTW/ TRABECULECTOMY Right 01/26/2011        COLON SURGERY      volvulus    CONJUNCTIVAL FLAP  Left 05/28/2009    DR. RAMOS    conjuunctival flap   2009    OS w/ dr. ramos for  "k-ulcer    CORNEAL TRANSPLANT Left 04/30/2009    [facility] ()    INTRAOCULAR LENS IMPLANT, SECONDARY W/ ANTERIOR VITRECTOMY Left 04/09/2009        pneumatic maculoplexy  2001    OD w/ dr. emanuel    TRABECULECTOMY Left 01/11/2006        transcleral cyclophotocoagulation   2010    OS w/ dr. hines       Subjective     Communicated with RN prior to session.  Patient found supine upon PT entry to room, agreeable to evaluation.    "I know what I want to say, its just hard to get the words out."  Chief Complaint: communication difficulties  Patient comments/goals: to return home  Pain/Comfort:  · Pain Rating 1: 0/10  · Pain Rating Post-Intervention 1: 0/10    Objective:     Patient found with: SCD, telemetry     General Precautions: Standard, Cardiac aspiration, fall, NPO   Orthopedic Precautions:N/A   Braces: N/A     Exam:    · Mental Status: Patient is oriented to Person, Place, Time and Situation and follows 100% of verbal commands. Alert and Cooperative  · Skin: Visible skin intact  · Edema: none noted  · Sensation: Intact  · Posture: slouched posture  · Hearing: [place]  · Vision:  Impaired: L eye blind, pre-morbid  · Coordination:  required [place] for coordination  · Range of Motion:  · RUE: WFL  · LUE: WFL  · RLE: WFL  · LLE: WFL  · Strength Exam:  · Upper Extremity Strength  (R) UE  (L) UE    Shoulder flexion: 4/5 Shoulder flexion: 4/5   Elbow flexion: 4/5 Elbow flexion: 4/5   Elbow extension: 4/5 Elbow extension: 4/5   Wrist flexion: 4/5 Wrist flexion: 4/5   Wrist extension: 4/5 Wrist extension: 4/5    4/5 : 4/5     · Lower Extremity Strength  (R) LE  (L) LE    Hip Flexion: 4/5 Hip flexion: 4/5   Knee flexion: 4/5 Knee flexion: 4/5   Knee extension: 4/5 Knee extension 4/5   Ankle dorsiflexion: 4/5 Ankle dorsiflexion: 4/5   Ankle plantarflexion: 4/5 Ankle plantarflexion: 4/5     Functional Mobility:  Bed Mobility:   · Scooting to EOB to have both feet planted on floor: contact guard " assistance  · Supine to Sit: contact guard assistance; from right side of bed    Sitting Balance at Edge of Bed:  · Assistance Level Required: supervision  · Time: 8 minutes  · Postural deviations noted: rounded shoulders    Transfers:   · Sit <> Stand Transfer: contact guard assistance with HHA   · Stand <> Sit Transfer: contact guard assistance with HHA         Gait:  · Patient ambulated: 150ft   · Patient required: contact guard  · Patient used:  HHA  · Gait Pattern observed: reciprocal gait  · Gait Deviation(s): decreased ijeoma  · Impairments due to: lack of vision  · Comments: pt required verbal cues for safety    Stairs:  Pt ascended/descended 14 stair(s) with No Assistive Device with right handrail with Contact Guard Assistance (ascedning reciprocal, descending step-to)    Therapeutic Activities & Exercises:   Education:  Patient provided with daily orientation and goals of this PT session. Patient agreed to participate in session. Patient aware of patient's deficits and therapy progression. They were educated to transfer with RN/PCT only; CGA of 1 person.  Encouraged patient to perform following daily exercises & mobility to increase endurance and decrease effects of bedrest: UIC for all meals. Time provided for therapeutic counseling and discussion of health disposition. All questions answered to patient's satisfaction, within scope of PT practice; voiced no other concerns. White board updated in patient's room, RN notified of session.    Patient left up in chair, with head in midline, neutral pelvis & heels floated for skin protection with all lines intact, call button in reach and RN notified    AM-PAC 6 CLICK MOBILITY  Total Score:18     Assessment:     Monique Lew is a 87 y.o. female admitted with a medical diagnosis of Cerebrovascular accident (CVA) due to embolism of left middle cerebral artery.  She presents with the following impairments/functional limitations:  impaired functional mobilty,  impaired self care skills, gait instability, decreased safety awareness, visual deficits. Monique Lew's deficits effect their ability to safely and independently participate in self-care tasks and functional mobility which increases their caregiver burden. Due to her physical therapy diagnosis of debility and deconditioning, they continue to benefit from acute PT services to address the following limitations: high fall risk, weakness, instability, the need for supervised instruction of exercise. Monique Schusters deficits effect their roles and responsibilities in which they were able to complete prior to admit. Education was provided to patient regarding importance of continued participation in therapy, patient's progress and discharge disposition. Pt would benefit from HHPT upon discharge to improve quality of life and focus on recovery of impairments.     Rehab Prognosis: good; patient would benefit from acute skilled PT services to address these deficits and reach maximum level of function.      GOALS:    Physical Therapy Goals        Problem: Physical Therapy Goal    Goal Priority Disciplines Outcome Goal Variances Interventions   Physical Therapy Goal     PT/OT, PT Ongoing (interventions implemented as appropriate)     Description:  Goals to be met by: 2/28/2018    Patient will increase functional independence with mobility by performing:    Supine to sit with Supervision.  Sit to supine with Supervision.   Sit to stand transfer with Supervision using No Assistive Device.  Bed to chair transfer via Stand Pivot with Supervision using No Assistive Device.  Gait  x 150 feet with Supervision using No Assistive Device.    Dynamic standing for 10 minutes with Supervision using No Assistive Device.  Able to tolerate exercise for 15-20 reps with independence.  Patient and family able to teachback stroke & positioning education independently.  Ascend/descend 14 stairs with right Handrails Supervision using No  Assistive Device.                       Clinical Decision Making:   HISTORY: Co-morbidities and personal factors that may impact the plan of care  Co-morbidities:   [] Time since onset of injury / illness / exacerbation [] Status of current condition []Patient's cognitive status and safety concerns  [x] Multiple Medical Problems  Personal Factors:  [x] Patient's age [] Prior Level of function [x] Patient's home situation (environment and family support) [] Patient's level of motivation [] Expected progression of patient  EXAMINATION: Body Structures and Functions, activity limitations and participation restrictions that may impact the plan of care  Body Regions:  [] Head [] Neck  [] Trunk [] Upper Extremity [] Lower Extremity  Body Systems:  [x] For communication ability, affect, cognition, language, and learning style: the assessment of the ability to make needs known, consciousness, orientation, expected emotional /behavioral responses, and learning preferences  [] For the neuromuscular system: a general assessment of gross coordinated movement (eg, balance, gait, locomotion, transfers, and transitions) and motor function (motor control and motor learning)  [x] For the musculoskeletal system: the assessment of gross symmetry, gross range of motion, gross strength, height, and weight  [] For the integumentary system: the assessment of pliability(texture), presence of scar formation, skin color, and skin integrity  [] For cardiovascular/pulmonary system: the assessment of heart rate, respiratory rate, blood pressure, and edema   Activity limitations:   [x] Patient's cognitive status and safety concerns [] Status of current condition      [] Weight bearing restriction [] Cardiopulmunary Restriction  Participation Restrictions:   [] Goals and goal agreement with the patient  [] Rehab potential (prognosis) and probable outcome    CLINICAL PRESENTATION:  [x] stable [] evolving [] unstable    On examination of body  system using standardized tests and measures patient presents with 1-2 elements from any of the following: body structures and functions, activity limitations, and/or participation restrictions.  Leading to a clinical presentation that is considered stable and/or uncomplicated  Evaluation Complexity:  Low- 54489      Time Tracking:     PT Received On: 02/20/18  PT Start Time: 0817     PT Stop Time: 0840  PT Total Time (min): 23 min     Billable Minutes: Evaluation 23    Lauren Garcia PT, DPT  02/20/2018  Pager:[phone]

## 2018-02-20 NOTE — ASSESSMENT & PLAN NOTE
-Likely etiology of stroke   -Pt does not wish to have anticoagulation at this time (discussed the pros and cons of using anticoagulation and patient understands the risk of stroke associated with having afib but not being optimally anticoagulated)  -Will use asa at this time only   -Continue toprol

## 2018-02-20 NOTE — ED NOTES
Hourly rounding complete. Patient resting comfortably in stretcher. Pt. AA, respirations even and unlabored. No acute distress noted. Updated on plan of care. Bed in lowest position, locked, side rails up x 2, and call bell in reach.

## 2018-02-20 NOTE — PLAN OF CARE
Problem: Occupational Therapy Goal  Goal: Occupational Therapy Goal  Goals set 2/20 to be addressed for 7 days with expiration date, 2/27:  Patient will increase functional independence with ADLs by performing:    Patient will demonstrate rolling to the right with modified independence.  Not met   Patient will demonstrate rolling to the left with modified independence.   Not met  Patient will demonstrate supine -sit with modified independence.   Not met  Patient will demonstrate stand pivot transfers with modified independence.   Not met  Patient will demonstrate grooming while standing with modified independence.   Not met  Patient will demonstrate upper body dressing with modified independence.   Not met  Patient will demonstrate lower body dressing with modified independence.   Not met  Patient will demonstrate toileting with modified independence.   Not met  Patient's family / caregiver will demonstrate independence and safety with assisting patient with self-care skills and functional mobility.     Not met  Patient and/or patient's family will verbalize understanding of stroke prevention guidelines, personal risk factors and stroke warning signs via teachback method.  Not met         OT evaluation completed.  ERASMO Lyon  2/20/2018

## 2018-02-20 NOTE — PLAN OF CARE
[facility]     Department of Hospital Medicine     [address] [place] [address]     [phone] after hours  [phone] fax                                   HOSPICE  ORDERS     02/20/2018    Admit to Hospice:  Home Service     Diagnoses:  Active Hospital Problems    Diagnosis  POA    *Cerebrovascular accident (CVA) due to embolism of left middle cerebral artery [I63.412]  Yes    Acute right eye pain [H57.11]  Unknown    Cytotoxic cerebral edema [G93.6]  Unknown    Malignant neoplasm of urinary bladder [C67.9]  Yes     - 11/13 OR for cystoscopy, TURBT, and pyelogram; stent could not be placed as urethral orifice could not be visualized.   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 Bilateral nodules found on CT Chest have high likelihood of being metastases.   - Oncology consulted to discuss treatment options. Pt would like to pursue immunotherapy.       Opacification of maxillary sinus [R93.0]  Yes    Pseudoexfoliation (PXF) glaucoma, severe stage [H40.1493]  Yes    Acquired hypothyroidism [E03.9]  Yes    Essential hypertension [I10]  Yes    Chronic atrial fibrillation [I48.2]  Yes      Resolved Hospital Problems    Diagnosis Date Resolved POA   No resolved problems to display.       Hospice Qualifying Diagnoses:        Patient has a life expectancy < 6 months due to these conditions.    Vital Signs: Routine per Hospice Protocol.    Allergies:  Review of patient's allergies indicates:   Allergen Reactions    Cosopt [dorzolamide-timolol] Swelling     Swelling of eyelids    Mevacor [lovastatin] Nausea And Vomiting    Prednisone Nausea And Vomiting    Vasotec [enalapril maleate] Nausea And Vomiting    Zetia [ezetimibe] Nausea And Vomiting    Furosemide Palpitations     Pt states her heart skips beats when she takes this medication.       Diet: Regular Diet    Activities: As tolerated    Nursing: Per Hospice  Routine    Future Orders:  Hospice Medical Director may dictate new orders for comfortable care measures & sign death certificate.    Medications:      Monique Lew   Home Medication Instructions MAHI:[id number]    Printed on:02/20/18 1418   Medication Information                      aspirin (ECOTRIN) 81 MG EC tablet  Take 1 tablet (81 mg total) by mouth once daily.             metoprolol succinate (TOPROL-XL) 25 MG 24 hr tablet  Take 1 tablet (25 mg total) by mouth 2 (two) times daily.             niacin 500 MG CpSR  Take 2 capsules (1,000 mg total) by mouth every evening.             PROPYLENE GLYCOL//PF (SYSTANE, PF, OPHT)  Place 1 drop into both eyes daily as needed. for dry eyes             SYNTHROID 50 mcg tablet  TAKE 1 TABLET BY MOUTH DAILY EVERY MORNING             timolol maleate 0.5% (TIMOPTIC) 0.5 % Drop  Place 1 drop into both eyes 2 (two) times daily.                         _________________________________  Nayana Ennis MD  02/20/2018

## 2018-02-20 NOTE — PT/OT/SLP DISCHARGE
Physical Therapy Discharge Summary    Name: Monqiue Lew  MRN: [id number]   Principal Problem: Cerebrovascular accident (CVA) due to embolism of left middle cerebral artery     Patient Discharged from acute Physical Therapy on 2/20/18.  Please refer to prior PT noted date on 2/20/18 for functional status.     Assessment:     Patient appropriate for care in another setting.    Objective:     GOALS:    Physical Therapy Goals        Problem: Physical Therapy Goal    Goal Priority Disciplines Outcome Goal Variances Interventions   Physical Therapy Goal     PT/OT, PT Ongoing (interventions implemented as appropriate)     Description:  Goals to be met by: 2/28/2018    Patient will increase functional independence with mobility by performing:    Supine to sit with Supervision.  Sit to supine with Supervision.   Sit to stand transfer with Supervision using No Assistive Device.  Bed to chair transfer via Stand Pivot with Supervision using No Assistive Device.  Gait  x 150 feet with Supervision using No Assistive Device.    Dynamic standing for 10 minutes with Supervision using No Assistive Device.  Able to tolerate exercise for 15-20 reps with independence.  Patient and family able to teachback stroke & positioning education independently.  Ascend/descend 14 stairs with right Handrails Supervision using No Assistive Device.                      Reasons for Discontinuation of Therapy Services  Transfer to alternate level of care.      Plan:     Patient Discharged to: Home with Home Health Service.    Lauren Garcia, PT  2/20/2018

## 2018-02-20 NOTE — PT/OT/SLP EVAL
Occupational Therapy   Evaluation    Name: Monique Lew  MRN: [id number]  Admitting Diagnosis:  Cerebrovascular accident (CVA) due to embolism of left middle cerebral artery      Recommendations:     Discharge Recommendations: home health OT  Discharge Equipment Recommendations:  shower chair  Barriers to discharge:  Decreased caregiver support    History:     Occupational Profile:  [place] (bedroom and bathroom on the 2nd floor); one step to enter.  PTA patient independent with ADLs, not driving.  Currently owns on DME.  Patient is right handed.  Hobbies:  Feeds the neighborhood cat ([place] Boy). Retired:  Nurse.  Roles/Responsibilities:  Aunt, household chores.    Past Medical History:   Diagnosis Date    Acquired hypothyroidism     Adrenal adenoma     Amenorrhea 12/28/2015    Anxiety 8/28/2017    Asymptomatic carotid artery stenosis without infarction 4/29/2015    Bladder mass 11/12/2017    6.3 x 4.3 x 4.0 cm left sided enhancing bladder mass.   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    Chronic atrial fibrillation 8/9/2012    Chronic hyponatremia 6/29/2015    Corneal thinning - Left Eye 9/17/2012    Depression (emotion) 8/28/2017    Dislocated IOL (intraocular lens), posterior - Right Eye 10/19/2012    DVT (deep venous thrombosis)     Essential hypertension 7/23/2014    Goiter     Gross hematuria 11/12/2017    History of cornea transplant - Left Eye 9/17/2012    History of CVA (cerebrovascular accident) 6/12/2013    Hydronephrosis of left kidney 11/12/2017    Hypercholesteremia 8/25/2016    Long-term (current) use of anticoagulants     Stopped due to gross hematuria from bladder CA     Macular hole of right eye     Malignant neoplasm of urinary bladder 11/17/2017    - 11/13 OR for cystoscopy, TURBT, and pyelogram; stent could not be placed  "as urethral orifice could not be visualized.  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 Bilateral nodules found on CT Chest have high likelihood of being metastases.  - Oncology consulted to discuss treatment options. Pt would like to pursue immunotherapy.    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 hines)    BREAST LUMPECTOMY      BUNIONECTOMY      CATARACT EXTRACTION W/  INTRAOCULAR LENS IMPLANT Right 01/26/2011    WITH TRAB ()    CATARACT EXTRACTION W/  INTRAOCULAR LENS IMPLANT Left 04/09/2009    ANT. VIITRECTOMY WITH SUTURED PCIOL (DR. RYAN)    CATARACT EXTRACTION W/ INTRAOCULAR LENS IMPLANTW/ TRABECULECTOMY Right 01/26/2011        COLON SURGERY      volvulus    CONJUNCTIVAL FLAP  Left 05/28/2009    DR. JARAMILLO    conjuunctival flap   2009    OS w/ dr. 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 emanuel    TRABECULECTOMY Left 01/11/2006        transcleral cyclophotocoagulation   2010    OS w/ dr. hines       Subjective     Patient:  "I like people.  I like to talk about how to do things.  I'd like to talk about the stuff I did but I can't remember.  My left arm feels weaker but that has been going on for a while.  I had a stroke in 2008 and that's when my left arm got weak."  Communicated with: Nurse prior to session.  Pain/Comfort:  · Pain Rating 1: 0/10  · Pain Rating Post-Intervention 1: 0/10    Patients " cultural, spiritual, [employer] conflicts given the current situation: Michelle    Objective:     Patient found with: SCD, peripheral IV, bed alarm, telemetry  Family not present.  General Precautions: Standard, aspiration, fall, NPO   Orthopedic Precautions:N/A   Braces: N/A     Occupational Performance:    Bed Mobility:    · Patient completed Rolling/Turning to Left with  supervision  · Patient completed Rolling/Turning to Right with supervision  · Patient completed Scooting/Bridging with supervision  · Patient completed Supine to Sit with supervision  · Patient completed Sit to Supine with supervision    Functional Mobility/Transfers:  · Patient completed Sit <> Stand Transfer with stand by assistance  with  no assistive device   · Patient completed Bed <> Chair Transfer using Stand Pivot technique with stand by assistance with no assistive device    Activities of Daily Living:  · Feeding:  NPO    · Grooming: stand by assistance while standing  · UB Dressing: stand by assistance while seated EOB  · LB Dressing: stand by assistance while standing  · Toileting: stand by assistance      Cognitive/Visual Perceptual:  Cognitive/Psychosocial Skills:     -       Oriented to: Person, Place and Time   -       Follows Commands/attention:Follows one-step commands  -       Communication: dysarthria  -       Memory: impaired  -       Mood/Affect/Coping skills/emotional control: Appropriate to situation  Visual/Perceptual: blind left eye    Physical Exam:  Postural examination/scapula alignment:    -       Rounded shoulders  Skin integrity: Thin  Edema:  None noted  Sensation:    -       Intact  Upper Extremity Range of Motion:     -       Right Upper Extremity: WNL  -       Left Upper Extremity: WNL  Upper Extremity Strength:    -       Right Upper Extremity: WNL  -       Left Upper Extremity: 4/5    Patient left supine with all lines intact, call button in reach and bed alarm on    AMPAC 6 Click:  [facility] Total Score:  "18    Treatment & Education:  Patient education provided on role of OT and need for HH upon discharge with supervision at home.    Continued education, patient/ family training recommended.  Patient alert and oriented x 3; able to follow 4/4 one step commands.  Patient attentive and interactive throughout the session.  Patient able to identify 5/5 body parts.  Able to name 5/5 objects.  Able to sequence 7/7 days of the week and 7/12 months of the year.  Patient's functional status and disposition recommendation discussed with stroke team in daily rounds.  White board updated in patient's room.  OT asked if there were any other questions; patient/ family had no further questions.       Assessment:     Monique Lew is a 87 y.o. female with a medical diagnosis of Cerebrovascular accident (CVA) due to embolism of left middle cerebral artery.  She presents with performance deficits of physical skills including impaired balance, mobility, strength, and endurance; demonstrating performance deficits of cognitive skills including impaired problem solving, sequencing and memory all resulting in inability organizing occupational performance in a timely and safe manner.  These performance deficits have resulted in activity limitations including but not limited to:   transfers, ascending/ descending stairs, walking short and long distances, walking around obstacles, transitional movement patterns (kneeling, bending); upper body dressing, lower body dressing, brushing teeth, toileting, bathing, and carrying objects.   Patient's role as independent caretaker for self has been affected. Patient will benefit from skilled OT services to maximize level of independence with self-care skills and functional mobility.    Rehab Prognosis:  Good; patient would benefit from acute skilled OT services to address these deficits and reach maximum level of function.         Clinical Decision Makin.  OT Low:  "Pt evaluation falls " "under low complexity for evaluation coding due to performance deficits noted in 1-3 areas as stated above and 0 co-morbities affecting current functional status. Data obtained from problem focused assessments. No modifications or assistance was required for completion of evaluation. Only brief occupational profile and history review completed."     Plan:     Patient to be seen 4 x/week to address the above listed problems via self-care/home management, therapeutic activities, therapeutic exercises, cognitive retraining, neuromuscular re-education, sensory integration  · Plan of Care Expires: 03/22/18  · Plan of Care Reviewed with: patient    This Plan of care has been discussed with the patient who was involved in its development and understands and is in agreement with the identified goals and treatment plan    GOALS:    Occupational Therapy Goals        Problem: Occupational Therapy Goal    Goal Priority Disciplines Outcome Interventions   Occupational Therapy Goal     OT, PT/OT     Description:  Goals set 2/20 to be addressed for 7 days with expiration date, 2/27:  Patient will increase functional independence with ADLs by performing:    Patient will demonstrate rolling to the right with modified independence.  Not met   Patient will demonstrate rolling to the left with modified independence.   Not met  Patient will demonstrate supine -sit with modified independence.   Not met  Patient will demonstrate stand pivot transfers with modified independence.   Not met  Patient will demonstrate grooming while standing with modified independence.   Not met  Patient will demonstrate upper body dressing with modified independence.   Not met  Patient will demonstrate lower body dressing with modified independence.   Not met  Patient will demonstrate toileting with modified independence.   Not met  Patient's family / caregiver will demonstrate independence and safety with assisting patient with self-care skills and functional " mobility.     Not met  Patient and/or patient's family will verbalize understanding of stroke prevention guidelines, personal risk factors and stroke warning signs via teachback method.  Not met                           Time Tracking:     OT Date of Treatment: 02/20/18  OT Start Time: 0555  OT Stop Time: 0639  OT Total Time (min): 44 min    Billable Minutes:Evaluation 20  Self Care/Home Management 14  Therapeutic Activity 10    ERASMO Lyon  2/20/2018

## 2018-02-20 NOTE — HPI
87 year old female with pmh of metastatic bladder cancer (status post radiation), and afib (off coumadin due to loss of two pints via hematuria in 11/2017 - requiring transfusion. The patient was seen in heme onc clinic today and described that she, for the past 5 days, had had difficulty with words. Some improvement since then but is not back to baseline. Out of concern for stroke, she was sent to the ER from heme onc clinic. MRI demonstrated left temporal stroke.     Also reports sudden sharp eye pain in right eye x1 day. Patient is followed by ophthalmology here, is blind in left eye and can distinguish movement and some figures but has had progressive vision impariment in the R eye. Ophthalmology consulted by ED

## 2018-02-20 NOTE — PLAN OF CARE
PCP: Aguila Hsieh MD     Extended Emergency Contact Information  Primary Emergency Contact: Aylin Aguilera   [place]  Mobile Phone: [phone]  Relation: Sister  Secondary Emergency Contact: Opal Kwon  Address: [address]           [place] [address] [address]  Home Phone: [phone]  Mobile Phone: [phone]  Relation: Friend  Preferred language: English      [facility] [place] - [address]  [address]  [place] [address]  Phone: [phone] Fax: [phone]    [facility] [place] - [address]  [address]  [place] [address]  Phone: [phone] Fax: [phone]    Payor: HU[employer] MANAGED MEDICARE / Plan: HU[employer] MEDICARE HMO / Product Type: Capitation /     Patient was independent living in her house alone prior to hospitalization. Awaiting PT/OT recs. Cm will continue to follow. Patient's family will provide transportation home.     02/20/18 0800   Discharge Assessment   Assessment Type Discharge Planning Assessment   Confirmed/corrected address and phone number on facesheet? Yes   Assessment information obtained from? Patient   Expected Length of Stay (days) 1   Communicated expected length of stay with patient/caregiver yes   Prior to hospitilization cognitive status: Alert/Oriented   Prior to hospitalization functional status: Independent   Current cognitive status: Alert/Oriented   Current Functional Status: Independent   Facility Arrived From: Home   Lives With alone   Able to Return to Prior Arrangements yes   Is patient able to care for self after discharge? Yes   Who are your caregiver(s) and their phone number(s)? Karen Aguilar (sister) [phone], Opal Kwon (friend)[phone]   Patient's perception of discharge disposition home health   Readmission Within The Last 30 Days no previous admission in last 30 days   Patient currently being  followed by outpatient case management? No   Patient currently receives any other outside agency services? Yes   Name and contact number of agency or person providing outside services [facility]   Is it the patient/care giver preference to resume care with the current outside agency? Yes   Equipment Currently Used at Home none   Do you have any problems affording any of your prescribed medications? No   Is the patient taking medications as prescribed? yes   Does the patient have transportation home? Yes   Transportation Available car;family or friend will provide   Does the patient receive services at the Coumadin Clinic? No   Discharge Plan A Home Health;Home   Discharge Plan B Home   Patient/Family In Agreement With Plan yes

## 2018-02-20 NOTE — ASSESSMENT & PLAN NOTE
Noted on MRI - discussed with dr foreman   Recommending CT sinus medtronic scan with and without contrast as MRI is not the best test to evaluate   Can consult after scan if needed still

## 2018-02-20 NOTE — ASSESSMENT & PLAN NOTE
Antithrombotics for secondary stroke prevention: Antiplatelets: Aspirin: 81 mg daily - discussion regarding risk v benefit of anticoagulation in this patient with malignancy and known afib. Patient and family do no want to pursue further anticoagulation.   Pt has has in the past used coumadin and pradaxa but reports she's had strokes in the past on pradaxa.     Statins for secondary stroke prevention and hyperlipidemia, if present:   Statins: None: Reason: allergy - will consider low dose statin if indicated when LDL results   Unable to start ezetamide because patient is allergic (rxn includes N/V)  Will start patient on Niacin 1g at night     Aggressive risk factor modification: HTN, A-Fib, malignancy      Rehab efforts: PT/OT/SLP to evaluate and treat    Diagnostics ordered/pending: HgbA1C to assess blood glucose levels, Lipid Profile to assess cholesterol levels, TTE to assess cardiac function/status    OF note- can consider CTA however US was ordered as patient has increased SOB with laying flat.     VTE prophylaxis: Heparin 5000 units SQ every 8 hours    BP parameters: Infarct: can keep <180 systolic, and slowly lower to normotensive, stroke approximately 5 days old

## 2018-02-20 NOTE — PLAN OF CARE
NASRIN spoke with Chaparrita with [facility]. Chaparrita states that after speaking with pt and family, they have decided to go home with hospice. Chaparrita requested hospice orders. NASRIN informed team of this information.    NASRIN sent hospice orders to [facility] via [facility].     Kimberly Casanova LMSW  [facility]  Ext. [phone]

## 2018-02-20 NOTE — NURSING
Pt to room 725. Pt is up with minimal assist. Vss. Pt is very talkative. Doing well on room air. Pt is on telemetry

## 2018-02-20 NOTE — TELEPHONE ENCOUNTER
----- Message from Vanesas Wade RN sent at 2/20/2018 11:23 AM CST -----  Contact: Vanessa Kapoor  Please schedule hospital follow in 4-6 weeks. Dr. Altamirano was the patient's doctor while in the hospital. Please call the patient with date and time of appt.

## 2018-02-20 NOTE — HPI
"88 yo F with PMHx chronic AFIB, HTN, monocular PsEx glaucoma w/ dislocated IOL OD (follows with Dr. Beaulieu), phthsis bulbi OS, and history of strokes presents from oncologist for hypertension, confusion, and "stroke like" symptoms. CT head shows acute infarction of posterior inferior left temporal lobe.Ophthalmology consulted for R eye pain. Patient states she had an episode yesterday evening of 3-5 minutes of 5/10 right eye pain, which occurred today concurrent with headache. No changes in vision. Not currently in any pain. Has been using her timolol eyedrops in both eyes, including this morning. No floaters/flashes/dark spots in vision.   "

## 2018-02-20 NOTE — CONSULTS
"[facility]  Ophthalmology  Consult Note    Patient Name: Monique Lew  MRN: [id number]  Admission Date: 2/19/2018  Hospital Length of Stay: 0 days  Attending Provider: Sofia Ko MD   Primary Care Physician: Aguila Hsieh MD  Principal Problem:<principal problem not specified>    Inpatient consult to Ophthalmology  Consult performed by: DAI PETERSEN  Consult ordered by: SOFIA KO        Subjective:     Chief Complaint:  R eye pain     HPI:   86 yo F with PMHx chronic AFIB, HTN, monocular PsEx glaucoma w/ dislocated IOL OD (follows with Dr. Beaulieu), phthsis bulbi OS, and history of strokes presents from oncologist for hypertension, confusion, and "stroke like" symptoms. CT head shows acute infarction of posterior inferior left temporal lobe.Ophthalmology consulted for R eye pain. Patient states she had an episode yesterday evening of 3-5 minutes of 5/10 right eye pain, which occurred today concurrent with headache. No changes in vision. Not currently in any pain. Has been using her timolol eyedrops in both eyes, including this morning. No floaters/flashes/dark spots in vision.     No new subjective & objective note has been filed under this hospital service since the last note was generated.      Base Eye Exam     Visual Acuity (Snellen - Blocked)       Right Left    Near cc 20/200 LP    Correction:  Glasses          Tonometry (Tonopen, 5:55 PM)       Right Left    Pressure 15 20          Target and Max Pressure       Right Left    Target 16     Max 40           Pupils       Dark Light Shape React APD    Right 2 1 Round Brisk None    Left               Extraocular Movement       Right Left     Full, Ortho Full, Ortho          Neuro/Psych     Oriented x3:  Yes    Mood/Affect:  Normal          Dilation     Both eyes:  1% Mydriacyl, 2.5% Phenylephrine @ 5:55 PM            Slit Lamp and Fundus Exam     External Exam       Right Left    External Normal Normal          Pen Light Exam       Right " Left    Lids/Lashes Blepharitis Blepharitis    Conjunctiva/Sclera Bleb Bleb    Cornea PEEs Opaque    Anterior Chamber Deep and quiet, Mini Shunt No View    Iris Round and reactive No View    Lens Aphakia No View    Vitreous Clear no view          Fundus Exam       Right Left    Disc Thin rim, Peripapillary atrophy no view    C/D Ratio 0.9 no view    Macula no MH no view    Vessels Normal no view    Periphery displaced lens seen inferiorly no view              Assessment and Plan:     Pseudoexfoliation (PXF) glaucoma, severe stage    -patient with hx of pseudoexfoliation glaucoma bilaterally, presents with acute stroke of L posterior inferior temporal lobe  -ophthalmology consulted for 2 episodes of R eye pain each lasting 3-5 minutes, now resolved  -IOP with tonopen shows stable intraocular pressures, VA unchanged  -bedside exam unchanged from last clinic vision with Dr. Beaulieu   -continue timolol BID in both eyes   -follow up with Dr. Beaulieu in clinic as scheduled            Thank you for your consult. I will sign off. Please contact us if you have any additional questions.    Elizabeth Gibbs MD  Ophthalmology  [facility]

## 2018-02-20 NOTE — PROGRESS NOTES
Discharge instructions reviewed with patient and family. Denies questions or concerns. Telemetry discontinued. Discharged home per WC.

## 2018-02-20 NOTE — HOSPITAL COURSE
87 year old female admitted for stroke found on MRI. The patient with recent history (approximately 5 days) of word finding difficulty without specific weakness. Etiology likely afib as off anticoagulation due to bleeding complications with bladder cancer.

## 2018-02-20 NOTE — CONSULTS
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 The patient was seen in [facility] clinic today and reported word finding difficulty x 5days.  She was directly admitted to ER.  MRI revealed acute infarction of posterior inferior left temporal lobe. VN was consulted.  Etiology of stroke likely cardioembolic.  Hospital course was further complicated by opacification of maxillary sinus (noted on MRI), & R eye pain (Opthamology consulted and IOP pressures stable. Rec using eye gtts as prescribed).  VN to discuss possibility of AC with family.     Functional History: Patient lives in [place] a [place] with 1 steps to enter an 14 steps within home to reach 2nd floor.  Prior to admission, (I) with ADLs and mobility.  DME: none.    Hospital Course: 2/20/18: Evaluated by therapy.  PT evaluation pending. Bed mobility SV.  Sit to stand and transfers SBA.  UBD and LBD SBA.    Past Medical History:   Diagnosis Date    Acquired hypothyroidism     Adrenal adenoma     Amenorrhea 12/28/2015    Anxiety 8/28/2017    Asymptomatic carotid artery stenosis without infarction 4/29/2015     Bladder mass 11/12/2017    6.3 x 4.3 x 4.0 cm left sided enhancing bladder mass.   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    Chronic atrial fibrillation 8/9/2012    Chronic hyponatremia 6/29/2015    Corneal thinning - Left Eye 9/17/2012    Depression (emotion) 8/28/2017    Dislocated IOL (intraocular lens), posterior - Right Eye 10/19/2012    DVT (deep venous thrombosis)     Essential hypertension 7/23/2014    Goiter     Gross hematuria 11/12/2017    History of cornea transplant - Left Eye 9/17/2012    History of CVA (cerebrovascular accident) 6/12/2013    Hydronephrosis of left kidney 11/12/2017    Hypercholesteremia 8/25/2016    Long-term (current) use of anticoagulants     Stopped due to gross hematuria from bladder CA     Macular hole of right eye     Malignant neoplasm of urinary bladder 11/17/2017    - 11/13 OR for cystoscopy, TURBT, and pyelogram; stent could not be placed as urethral orifice could not be visualized.  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 Bilateral nodules found on CT Chest have high likelihood of being metastases.  - Oncology consulted to discuss treatment options. Pt would like to pursue immunotherapy.    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 hines)    BREAST LUMPECTOMY      BUNIONECTOMY      CATARACT EXTRACTION W/  INTRAOCULAR LENS IMPLANT Right 01/26/2011    WITH TRAB ()    CATARACT EXTRACTION W/  INTRAOCULAR LENS IMPLANT Left 04/09/2009    ANT.  VIITRECTOMY WITH SUTURED PCIOL (DR. RYAN)    CATARACT EXTRACTION W/ INTRAOCULAR LENS IMPLANTW/ TRABECULECTOMY Right 01/26/2011        COLON SURGERY      volvulus    CONJUNCTIVAL FLAP  Left 05/28/2009    DR. JARAMILLO    conjuunctival flap   2009    OS w/ dr. 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 emanuel    TRABECULECTOMY Left 01/11/2006        transcleral cyclophotocoagulation   2010    OS w/ dr. hines     Review of patient's allergies indicates:   Allergen Reactions    Cosopt [dorzolamide-timolol] Swelling     Swelling of eyelids    Mevacor [lovastatin] Nausea And Vomiting    Prednisone Nausea And Vomiting    Vasotec [enalapril maleate] Nausea And Vomiting    Zetia [ezetimibe] Nausea And Vomiting    Furosemide Palpitations     Pt states her heart skips beats when she takes this medication.       Scheduled Medications:    aspirin  81 mg Oral Daily    heparin (porcine)  5,000 Units Subcutaneous Q8H    levothyroxine  50 mcg Oral Before breakfast    metoprolol succinate  25 mg Oral BID    sodium chloride 0.9%  3 mL Intravenous Q8H    timolol maleate 0.5%  1 drop Both Eyes BID       PRN Medications: labetalol, ramelteon, sodium chloride 0.9%    Family History     Problem Relation (Age of Onset)    Emphysema Mother    Hyperlipidemia Mother, Father    Hypertension Mother, Father    No Known Problems Sister, Brother, Maternal Aunt, Maternal Uncle, Paternal Aunt, Paternal Uncle, Maternal Grandmother, Maternal Grandfather, Paternal Grandmother, Paternal Grandfather        Social History Main Topics    Smoking status: Former Smoker     Quit date: 9/25/1982    Smokeless tobacco: Never Used    Alcohol use No    Drug use: No    Sexual activity: No     Review of Systems   Constitutional: Negative for chills, fatigue and  fever.   HENT: Positive for hearing loss. Negative for trouble swallowing and voice change.    Eyes: Positive for visual disturbance. Negative for photophobia.   Respiratory: Negative for cough, shortness of breath and wheezing.    Cardiovascular: Negative for chest pain and palpitations.   Gastrointestinal: Negative for abdominal distention, nausea and vomiting.   Genitourinary: Negative for difficulty urinating and flank pain.   Musculoskeletal: Negative for arthralgias and gait problem.   Skin: Negative for color change and rash.   Neurological: Negative for facial asymmetry, speech difficulty and weakness.   Psychiatric/Behavioral: Positive for confusion. Negative for agitation.     Objective:     Vital Signs (Most Recent):  Temp: 97.4 °F (36.3 °C) (18 0433)  Pulse: 78 (18 0659)  Resp: 16 (18)  BP: 106/74 (18)  SpO2: 95 % (18)    Vital Signs (24h Range):  Temp:  [97.3 °F (36.3 °C)-98 °F (36.7 °C)] 97.4 °F (36.3 °C)  Pulse:  [68-85] 78  Resp:  [16-30] 16  SpO2:  [95 %-100 %] 95 %  BP: (106-236)/() 106/74     Body mass index is 18.88 kg/m².    Physical Exam   Constitutional: She appears well-developed and well-nourished.   HENT:   Head: Normocephalic and atraumatic.   Eyes: EOM are normal. Pupils are equal, round, and reactive to light.   Neck: Normal range of motion.   Cardiovascular: Normal rate and regular rhythm.    Pulmonary/Chest: No respiratory distress.   Abdominal: Soft. There is no tenderness.   Musculoskeletal: Normal range of motion. She exhibits no deformity.   Neurological:   -  Mental Status:  AAOx3.  Follows commands.  Answers correct age and .  Recent and remote memory intact.     -  Speech and language:  + aphasia + dysarthria.    -  Vision:  Baseline visual deficits    -  Motor:  RUE: 5/5, 5/5 .  LUE: 5/5, 5/5 .  RLE: 5/5.  LLE: 5/5   -  Tone:  normal  -  Sensory:  Intact to light touch and pin prick.  Skin: Skin is warm and dry.    Psychiatric: She has a normal mood and affect. Her behavior is normal. Cognition and memory are impaired.   Vitals reviewed.      Diagnostic Results:   Labs: Reviewed  ECG: Reviewed  X-Ray: Reviewed  US: Reviewed  MRI: Reviewed    Assessment/Plan:     * Cerebrovascular accident (CVA) due to embolism of left middle cerebral artery    Functional status: see hospital course  Cognitive/Speech/Language status:  pending  Nutrition/Swallow Status:  Pending bedside swallow evaluation.     Recommendations  -  Encourage mobility, OOB in chair at least 3 hours per day, and early ambulation as appropriate   -  PT/OT evaluate and treat  -  SLP speech and cognitive evaluate and treat  -  Monitor sleep disturbances and establish consistent sleep-wake cycle  -  Monitor for bowel and bladder dysfunction  -  Monitor for shoulder pain and subluxation  -  Monitor for spasticity  -  Monitor for and prevent skin breakdown and pressure ulcers  · Early mobility, repositioning/weight shifting every 20-30 minutes when sitting, turn patient every 2 hours, proper mattress/overlay and chair cushioning, pressure relief/heel protector boots  -  DVT prophylaxis:  [facility]  -  Reviewed discharge options (IP rehab, SNF, HH therapy, and OP therapy)          Malignant neoplasm of urinary bladder    -HemeOnc following        Opacification of maxillary sinus    -noted on MRI          Pseudoexfoliation (PXF) glaucoma, severe stage    opthalmology consulted for episodes of eye pain  -IOP pressure stable per Opthalmology   -rec using eye gtts as prescribed         Chronic atrial fibrillation    -likely etiology of stroke  -on Toprol  -VN to discuss AC with family         Patient high level with OT and have signed off. PT/SLP evaluation pending. Will follow and discuss with rehab team for rehab recommendation.      Thank you for your consult.     Elizabeth Diego NP  Department of Physical Medicine & Rehab  [facility]

## 2018-02-20 NOTE — PLAN OF CARE
Problem: SLP Goal  Goal: SLP Goal  Speech Language Pathology Goals  Goals expected to be met by 2/27  1. Pt will complete high level word finding tasks with 80%  Acc provided min cues to improve expressive  Language.   2. Pt will complete convergent categorization tasks with 80% acc provided min cues to improve expressive language.   3. Pt will complete divergent categorization tasks with 80% acc provided min cues to improve expressive language.   4. Pt will solve simple problems with 90% acc provided min cues to improve cognition.   5. Pt will answer 3/4 orientation questions provided min cues to improve cognition.     Swallow and speech language cognitive evaluation completed with initiated POC.    Mariella Dorado M.A. CCC-SLP  Speech Language Pathologist  [phone]  2/20/2018

## 2018-02-20 NOTE — CONSULTS
Inpatient consult to Physical Medicine Rehab  Consult performed by: SANDOVAL MARQUEZ  Consult ordered by: EMILIA PEOPLES  Reason for consult: assess rehab needs        Reviewed patient history and current admission.  Rehab team following.  Full consult to follow.    TISH Roger, FNP-C  Physical Medicine & Rehabilitation   02/20/2018  Spectralink: [phone]

## 2018-02-20 NOTE — ASSESSMENT & PLAN NOTE
opthalmology consulted for episodes of eye pain  -IOP pressure stable per Opthalmology   -rec using eye gtts as prescribed

## 2018-02-20 NOTE — SUBJECTIVE & OBJECTIVE
Neurologic Chief Complaint: L temporal stroke    Subjective:     Interval History: Patient is seen for follow-up neurological assessment and treatment recommendations: NAEON. Pt is stable for discharge and is not reporting any concerns/pain/discomfort.  Conversation was had regarding the benefit and complications associated with anticoagulation for pts afib today.     HPI, Past Medical, Family, and Social History remains the same as documented in the initial encounter.     Review of Systems   Constitutional: Negative for fever.   HENT: Negative for trouble swallowing.    Eyes: Positive for pain and visual disturbance.   Respiratory: Positive for shortness of breath.    Cardiovascular: Negative for chest pain.   Genitourinary: Positive for hematuria.   Skin: Negative for color change.   Neurological: Positive for speech difficulty. Negative for dizziness and weakness.   Psychiatric/Behavioral: Positive for sleep disturbance. Negative for agitation.     Scheduled Meds:   aspirin  81 mg Oral Daily    heparin (porcine)  5,000 Units Subcutaneous Q8H    levothyroxine  50 mcg Oral Before breakfast    metoprolol succinate  25 mg Oral BID    niacin  1,000 mg Oral QHS    sodium chloride 0.9%  3 mL Intravenous Q8H    timolol maleate 0.5%  1 drop Both Eyes BID     Continuous Infusions:   sodium chloride 0.9%       PRN Meds:labetalol, ramelteon, sodium chloride 0.9%    Objective:     Vital Signs (Most Recent):  Temp: 97.4 °F (36.3 °C) (02/20/18 0433)  Pulse: 70 (02/20/18 1058)  Resp: 16 (02/20/18 0433)  BP: 106/74 (02/20/18 0433)  SpO2: 95 % (02/20/18 0433)  BP Location: Left arm    Vital Signs Range (Last 24H):  Temp:  [97.3 °F (36.3 °C)-98 °F (36.7 °C)]   Pulse:  [68-85]   Resp:  [16-30]   BP: (106-236)/()   SpO2:  [95 %-100 %]   BP Location: Left arm    Physical Exam   Constitutional: She is oriented to person, place, and time. She appears well-developed.   Thin, elderly    HENT:   Head: Normocephalic and  "atraumatic.   Eyes:   Right eye - pupil equal and reactive, left eye opacified lens    Cardiovascular: Normal rate.    Pulmonary/Chest: Effort normal.   Abdominal: She exhibits no distension.   Musculoskeletal: She exhibits no edema.   Neurological: She is alert and oriented to person, place, and time.   Skin: Skin is warm and dry.   Psychiatric: She has a normal mood and affect.   Nursing note and vitals reviewed.      Neurological Exam:   LOC: alert  Attention Span: Good   Language: mild aphasia - cannot read cards due to vision loss   Articulation: Dysarthria  Orientation: Person, Place, Time   Visual Fields: can see movement with right eye, cannot see from left eye  EOM (CN III, IV, VI): Full/intact  Pupils (CN II, III): see "physical exam"  Facial Sensation (CN V): Normal  Facial Movement (CN VII): Symmetric facial expression    Motor: Arm left  Normal 5/5  Leg left  Normal 5/5  Arm right  Normal 5/5  Leg right Normal 5/5  Cebellar: No evidence of appendicular or axial ataxia  Sensation: Intact to light touch, temperature and vibration  Tone: Normal tone throughout     Laboratory:  CMP:   Recent Labs  Lab 02/20/18  0408   CALCIUM 9.4   ALBUMIN 3.4*   PROT 6.8      K 3.8   CO2 23      BUN 24*   CREATININE 1.0   ALKPHOS 83   ALT 19   AST 27   BILITOT 0.8     CBC:   Recent Labs  Lab 02/20/18  0408   WBC 6.77   RBC 4.52   HGB 11.0*   HCT 36.0*      MCV 80*   MCH 24.3*   MCHC 30.6*     Lipid Panel:   Recent Labs  Lab 02/19/18  1441   CHOL 162   LDLCALC 89.4   HDL 54   TRIG 93     Coagulation:   Recent Labs  Lab 02/20/18  0408   INR 1.1   APTT 22.2     Platelet Aggregation Study: No results for input(s): PLTAGG, PLTAGINTERP, PLTAGREGLACO, ADPPLTAGGREG in the last 168 hours.  Hgb A1C:   Recent Labs  Lab 02/19/18  1441   HGBA1C 5.6     TSH:   Recent Labs  Lab 02/19/18  1441   TSH 2.060       Diagnostic Results        US carotids   1.  60-79% stenosis of the right internal carotid artery with mild " homogeneous plaque.    2.  Less than 50% stenosis of the left internal carotid artery with mild homogeneous plaque.     MRI brain 2/19/18    Moderate size focus of acute infarction centered in the posterior inferior left temporal lobe.    Moderate chronic ischemic change as above.

## 2018-02-20 NOTE — PROGRESS NOTES
[facility]  Vascular Neurology  Comprehensive Stroke Center  Progress Note    Assessment/Plan:     * Cerebrovascular accident (CVA) due to embolism of left middle cerebral artery      Antithrombotics for secondary stroke prevention: Antiplatelets: Aspirin: 81 mg daily - discussion regarding risk v benefit of anticoagulation in this patient with malignancy and known afib. Patient and family do no want to pursue further anticoagulation.   Pt has has in the past used coumadin and pradaxa but reports she's had strokes in the past on pradaxa.     Statins for secondary stroke prevention and hyperlipidemia, if present:   Statins: None: Reason: allergy - will consider low dose statin if indicated when LDL results   Unable to start ezetamide because patient is allergic (rxn includes N/V)  Will start patient on Niacin 1g at night     Aggressive risk factor modification: HTN, A-Fib, malignancy      Rehab efforts: PT/OT/SLP to evaluate and treat    Diagnostics ordered/pending: HgbA1C to assess blood glucose levels, Lipid Profile to assess cholesterol levels, TTE to assess cardiac function/status    OF note- can consider CTA however US was ordered as patient has increased SOB with laying flat.     VTE prophylaxis: Heparin 5000 units SQ every 8 hours    BP parameters: Infarct: can keep <180 systolic, and slowly lower to normotensive, stroke approximately 5 days old             Cytotoxic cerebral edema    Due to acute stroke   See work up as above, noted on imaging         Acute right eye pain    Seen by ophthalmology - recommending continuing home eye drops   Pressures not concerning for acute angle glaucoma         Malignant neoplasm of urinary bladder    Heme onc following.           Opacification of maxillary sinus    Noted on MRI - discussed with dr foreman   Recommending  Follow up with Dr Varela  (sinus specialist)   Large differential but similar scan to prior MRI   Could consider CT Thryve sinus  order if wanting to further classify inpatient.         Pseudoexfoliation (PXF) glaucoma, severe stage    Ophthalmology consulted - recommending consider using drops as prescribed.        Acquired hypothyroidism    Continue home synthroid         Essential hypertension    Stroke risk factor  On toprol, - continue and can treat with prn meds as needed         Chronic atrial fibrillation    -Likely etiology of stroke   -Pt does not wish to have anticoagulation at this time (discussed the pros and cons of using anticoagulation and patient understands the risk of stroke associated with having afib but not being optimally anticoagulated)  -Will use asa at this time only   -Continue toprol              87 year old female admitted for stroke found on MRI. The patient with recent history (approximately 5 days) of word finding difficulty without specific weakness. Etiology likely afib as off anticoagulation due to bleeding complications with bladder cancer.         STROKE DOCUMENTATION        NIH Scale:          Modified Vicente Score: 2  Iaden Coma Scale:    ABCD2 Score:    [id number] Score:   HAS -BLED Score:   ICH Score:   Hunt & Heath Classification:      Hemorrhagic change of an Ischemic Stroke: Does this patient have an ischemic stroke with hemorrhagic changes? No     Neurologic Chief Complaint: L temporal stroke    Subjective:     Interval History: Patient is seen for follow-up neurological assessment and treatment recommendations: NAEON. Pt is stable for discharge and is not reporting any concerns/pain/discomfort.  Conversation was had regarding the benefit and complications associated with anticoagulation for pts afib today.     HPI, Past Medical, Family, and Social History remains the same as documented in the initial encounter.     Review of Systems   Constitutional: Negative for fever.   HENT: Negative for trouble swallowing.    Eyes: Positive for pain and visual disturbance.   Respiratory: Positive for  shortness of breath.    Cardiovascular: Negative for chest pain.   Genitourinary: Positive for hematuria.   Skin: Negative for color change.   Neurological: Positive for speech difficulty. Negative for dizziness and weakness.   Psychiatric/Behavioral: Positive for sleep disturbance. Negative for agitation.     Scheduled Meds:   aspirin  81 mg Oral Daily    heparin (porcine)  5,000 Units Subcutaneous Q8H    levothyroxine  50 mcg Oral Before breakfast    metoprolol succinate  25 mg Oral BID    niacin  1,000 mg Oral QHS    sodium chloride 0.9%  3 mL Intravenous Q8H    timolol maleate 0.5%  1 drop Both Eyes BID     Continuous Infusions:   sodium chloride 0.9%       PRN Meds:labetalol, ramelteon, sodium chloride 0.9%    Objective:     Vital Signs (Most Recent):  Temp: 97.4 °F (36.3 °C) (02/20/18 0433)  Pulse: 70 (02/20/18 1058)  Resp: 16 (02/20/18 0433)  BP: 106/74 (02/20/18 0433)  SpO2: 95 % (02/20/18 0433)  BP Location: Left arm    Vital Signs Range (Last 24H):  Temp:  [97.3 °F (36.3 °C)-98 °F (36.7 °C)]   Pulse:  [68-85]   Resp:  [16-30]   BP: (106-236)/()   SpO2:  [95 %-100 %]   BP Location: Left arm    Physical Exam   Constitutional: She is oriented to person, place, and time. She appears well-developed.   Thin, elderly    HENT:   Head: Normocephalic and atraumatic.   Eyes:   Right eye - pupil equal and reactive, left eye opacified lens    Cardiovascular: Normal rate.    Pulmonary/Chest: Effort normal.   Abdominal: She exhibits no distension.   Musculoskeletal: She exhibits no edema.   Neurological: She is alert and oriented to person, place, and time.   Skin: Skin is warm and dry.   Psychiatric: She has a normal mood and affect.   Nursing note and vitals reviewed.      Neurological Exam:   LOC: alert  Attention Span: Good   Language: mild aphasia - cannot read cards due to vision loss   Articulation: Dysarthria  Orientation: Person, Place, Time   Visual Fields: can see movement with right eye, cannot  "see from left eye  EOM (CN III, IV, VI): Full/intact  Pupils (CN II, III): see "physical exam"  Facial Sensation (CN V): Normal  Facial Movement (CN VII): Symmetric facial expression    Motor: Arm left  Normal 5/5  Leg left  Normal 5/5  Arm right  Normal 5/5  Leg right Normal 5/5  Cebellar: No evidence of appendicular or axial ataxia  Sensation: Intact to light touch, temperature and vibration  Tone: Normal tone throughout     Laboratory:  CMP:   Recent Labs  Lab 02/20/18  0408   CALCIUM 9.4   ALBUMIN 3.4*   PROT 6.8      K 3.8   CO2 23      BUN 24*   CREATININE 1.0   ALKPHOS 83   ALT 19   AST 27   BILITOT 0.8     CBC:   Recent Labs  Lab 02/20/18  0408   WBC 6.77   RBC 4.52   HGB 11.0*   HCT 36.0*      MCV 80*   MCH 24.3*   MCHC 30.6*     Lipid Panel:   Recent Labs  Lab 02/19/18  1441   CHOL 162   LDLCALC 89.4   HDL 54   TRIG 93     Coagulation:   Recent Labs  Lab 02/20/18  0408   INR 1.1   APTT 22.2     Platelet Aggregation Study: No results for input(s): PLTAGG, PLTAGINTERP, PLTAGREGLACO, ADPPLTAGGREG in the last 168 hours.  Hgb A1C:   Recent Labs  Lab 02/19/18  1441   HGBA1C 5.6     TSH:   Recent Labs  Lab 02/19/18  1441   TSH 2.060       Diagnostic Results        US carotids   1.  60-79% stenosis of the right internal carotid artery with mild homogeneous plaque.    2.  Less than 50% stenosis of the left internal carotid artery with mild homogeneous plaque.     MRI brain 2/19/18    Moderate size focus of acute infarction centered in the posterior inferior left temporal lobe.    Moderate chronic ischemic change as above.      Nayana Ennis MD  [facility]  Department of Vascular Neurology   [facility]      "

## 2018-02-20 NOTE — ASSESSMENT & PLAN NOTE
Functional status: see hospital course  Cognitive/Speech/Language status:  pending  Nutrition/Swallow Status:  Pending bedside swallow evaluation.     Recommendations  -  Encourage mobility, OOB in chair at least 3 hours per day, and early ambulation as appropriate   -  PT/OT evaluate and treat  -  SLP speech and cognitive evaluate and treat  -  Monitor sleep disturbances and establish consistent sleep-wake cycle  -  Monitor for bowel and bladder dysfunction  -  Monitor for shoulder pain and subluxation  -  Monitor for spasticity  -  Monitor for and prevent skin breakdown and pressure ulcers  · Early mobility, repositioning/weight shifting every 20-30 minutes when sitting, turn patient every 2 hours, proper mattress/overlay and chair cushioning, pressure relief/heel protector boots  -  DVT prophylaxis:  [facility]  -  Reviewed discharge options (IP rehab, SNF, HH therapy, and OP therapy)

## 2018-02-20 NOTE — HPI
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 The patient was seen in [facility] clinic today and reported word finding difficulty x 5days.  She was directly admitted to ER.  MRI revealed acute infarction of posterior inferior left temporal lobe. VN was consulted.  Etiology of stroke likely cardioembolic.  Hospital course was further complicated by opacification of maxillary sinus (noted on MRI), R eye pain (Opthamology consulted and IOP pressures stable).      Functional History: Patient lives in [facility] [place] with 1 steps to enter an 14 steps within home to reach 2nd floor.  Prior to admission, (I) with ADLs and mobility.  DME: none.

## 2018-02-20 NOTE — H&P
[facility]  Vascular Neurology  Comprehensive Stroke Center  History & Physical    Inpatient consult to Vascular (Stroke) Neurology  Consult performed by: EMILIA PEOPLES  Consult ordered by: JESSICA KO  Reason for consult: stroke - left temporal - embolic         Assessment/Plan:     Patient is a 87 y.o. year old female with:    * Cerebrovascular accident (CVA) due to embolism of left middle cerebral artery      Antithrombotics for secondary stroke prevention: Antiplatelets: Aspirin: 81 mg daily - discussion regarding risk v benefit of anticoagulation in this patient with malignancy and known afib.   Patient and family are planning to discuss goals of care and if they are willing to accept the risk of bleeding (prior large amount of bleeding requiring transfusion for bladder mass in 11/2017)  Has in the past used coumadin and pradaxa but reports she's had strokes in the past on pradaxa.     Statins for secondary stroke prevention and hyperlipidemia, if present:   Statins: None: Reason: allergy - will consider low dose statin if indicated when LDL results    Aggressive risk factor modification: HTN, A-Fib, malignancy      Rehab efforts: PT/OT/SLP to evaluate and treat    Diagnostics ordered/pending: HgbA1C to assess blood glucose levels, Lipid Profile to assess cholesterol levels, TTE to assess cardiac function/status    OF note- can consider CTA however US was ordered as patient has increased SOB with laying flat.     VTE prophylaxis: Heparin 5000 units SQ every 8 hours    BP parameters: Infarct: can keep <180 systolic, and slowly lower to normotensive, stroke approximately 5 days old             Cytotoxic cerebral edema    Due to acute stroke   See work up as above, noted on imaging         Acute right eye pain    Seen by ophthalmology - recommending continuing home eye drops   Pressures not concerning for acute angle glaucoma         Malignant neoplasm of urinary bladder    Heme onc  following.           Opacification of maxillary sinus    Noted on MRI - discussed with dr foreman   Recommending  Follow up with Dr Varela  (sinus specialist)   Large differential but similar scan to prior MRI   Could consider CT medtronic sinus order if wanting to further classify inpatient.         Pseudoexfoliation (PXF) glaucoma, severe stage    Ophthalmology consulted - recommending consider using drops as prescribed.        Acquired hypothyroidism    Continue home synthroid         Essential hypertension    Stroke risk factor  On toprol, - continue and can treat with prn meds as needed         Chronic atrial fibrillation    Likely etiology of stroke   Would like to recommend anticoagulation but family and patient considering if they are willing to accept the risks   Feel as though they will be discussing goals of care and that may help lead our treatment   Will use asa at this time only   Continue toprol             STROKE DOCUMENTATION          NIH Scale:  1a. Level Of Consciousness: 0-->Alert: keenly responsive  1b. LOC Questions: 0-->Answers both questions correctly  1c. LOC Commands: 0-->Performs both tasks correctly  2. Best Gaze: 0-->Normal  3. Visual: 0-->No visual loss  4. Facial Palsy: 0-->Normal symmetrical movements  5a. Motor Arm, Left: 0-->No drift: limb holds 90 (or 45) degrees for full 10 secs  5b. Motor Arm, Right: 0-->No drift: limb holds 90 (or 45) degrees for full 10 secs  6a. Motor Leg, Left: 0-->No drift: leg holds 30 degree position for full 5 secs  6b. Motor Leg, Right: 0-->No drift: leg holds 30 degree position for full 5 secs  7. Limb Ataxia: 0-->Absent  8. Sensory: 0-->Normal: no sensory loss  9. Best Language: 1-->Mild-to-moderate aphasia: some obvious loss of fluency or facility of comprehension, without significant limitation on ideas expressed or form of expression. Reduction of speech and/or comprehension, however, makes conversation. . . (see row details)  10. Dysarthria:  1-->Mild-to-moderate dysarthria: patient slurs at least some words and, at worst, can be understood with some difficulty  11. Extinction and Inattention (formerly Neglect): 0-->No abnormality  Total (NIH Stroke Scale): 2     Modified [place] Score: 2  Aiden Coma Scale:    ABCD2 Score:    [id number] Score:   HAS -BLED Score:   ICH Score:   Hunt & Heath Classification:      Thrombolysis Candidate? No, Out of window       Interventional Revascularization Candidate?   Is the patient eligible for mechanical endovascular reperfusion (LIBBY)?  No; No large vessel occlusion    Hemorrhagic change of an Ischemic Stroke: Does this patient have an ischemic stroke with hemorrhagic changes? No         Subjective:     History of Present Illness:  87 year old female with pmh of metastatic bladder cancer (status post radiation), and afib (off coumadin due to loss of two pints via hematuria in 11/2017 - requiring transfusion. The patient was seen in heme onc clinic today and described that she, for the past 5 days, had had difficulty with words. Some improvement since then but is not back to baseline. Out of concern for stroke, she was sent to the ER from heme onc clinic. MRI demonstrated left temporal stroke.     Also reports sudden sharp eye pain in right eye x1 day. Patient is followed by ophthalmology here, is blind in left eye and can distinguish movement and some figures but has had progressive vision impariment in the R eye. Ophthalmology consulted by ED           Past Medical History:   Diagnosis Date    Acquired hypothyroidism     Adrenal adenoma     Amenorrhea 12/28/2015    Anxiety 8/28/2017    Asymptomatic carotid artery stenosis without infarction 4/29/2015    Bladder mass 11/12/2017    6.3 x 4.3 x 4.0 cm left sided enhancing bladder mass.   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    Chronic atrial fibrillation  8/9/2012    Chronic hyponatremia 6/29/2015    Corneal thinning - Left Eye 9/17/2012    Depression (emotion) 8/28/2017    Dislocated IOL (intraocular lens), posterior - Right Eye 10/19/2012    DVT (deep venous thrombosis)     Essential hypertension 7/23/2014    Goiter     Gross hematuria 11/12/2017    History of cornea transplant - Left Eye 9/17/2012    History of CVA (cerebrovascular accident) 6/12/2013    Hydronephrosis of left kidney 11/12/2017    Hypercholesteremia 8/25/2016    Long-term (current) use of anticoagulants     Stopped due to gross hematuria from bladder CA     Macular hole of right eye     Malignant neoplasm of urinary bladder 11/17/2017    - 11/13 OR for cystoscopy, TURBT, and pyelogram; stent could not be placed as urethral orifice could not be visualized.  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 Bilateral nodules found on CT Chest have high likelihood of being metastases.  - Oncology consulted to discuss treatment options. Pt would like to pursue immunotherapy.    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 hines)    BREAST LUMPECTOMY      BUNIONECTOMY      CATARACT EXTRACTION W/  INTRAOCULAR LENS IMPLANT Right 01/26/2011    WITH TRAB ()    CATARACT EXTRACTION W/  INTRAOCULAR LENS IMPLANT Left 04/09/2009    ANT. VIITRECTOMY WITH SUTURED PCIOL (DR. RYAN)    CATARACT EXTRACTION W/ INTRAOCULAR LENS IMPLANTW/ TRABECULECTOMY Right 01/26/2011        COLON SURGERY      volvulus    CONJUNCTIVAL FLAP  Left 05/28/2009    DR. JARAMILLO    conjuunctival flap   2009    OS  w/ dr. ramos for k-ulcer    CORNEAL TRANSPLANT Left 04/30/2009    [facility] ()    INTRAOCULAR LENS IMPLANT, SECONDARY W/ ANTERIOR VITRECTOMY Left 04/09/2009        pneumatic maculoplexy  2001    OD w/ dr. emanuel    TRABECULECTOMY Left 01/11/2006        transcleral cyclophotocoagulation   2010    OS w/ dr. hines     Family History   Problem Relation Age of Onset    Emphysema Mother     Hypertension Mother     Hyperlipidemia Mother     Hypertension Father     Hyperlipidemia Father     No Known Problems Sister     No Known Problems Brother     No Known Problems Maternal Aunt     No Known Problems Maternal Uncle     No Known Problems Paternal Aunt     No Known Problems Paternal Uncle     No Known Problems Maternal Grandmother     No Known Problems Maternal Grandfather     No Known Problems Paternal Grandmother     No Known Problems Paternal Grandfather     Melanoma Neg Hx     Psoriasis Neg Hx     Lupus Neg Hx     Eczema Neg Hx     Acne Neg Hx     Amblyopia Neg Hx     Blindness Neg Hx     Cancer Neg Hx     Cataracts Neg Hx     Diabetes Neg Hx     Glaucoma Neg Hx     Macular degeneration Neg Hx     Retinal detachment Neg Hx     Strabismus Neg Hx     Stroke Neg Hx     Thyroid disease Neg Hx      Social History   Substance Use Topics    Smoking status: Former Smoker     Quit date: 9/25/1982    Smokeless tobacco: Never Used    Alcohol use No     Review of patient's allergies indicates:   Allergen Reactions    Cosopt [dorzolamide-timolol] Swelling     Swelling of eyelids    Mevacor [lovastatin] Nausea And Vomiting    Prednisone Nausea And Vomiting    Vasotec [enalapril maleate] Nausea And Vomiting    Zetia [ezetimibe] Nausea And Vomiting    Furosemide Palpitations     Pt states her heart skips beats when she takes this medication.       Medications: I have reviewed the current medication administration record.      (Not in a hospital admission)    Review of  "Systems   Constitutional: Negative for fever.   HENT: Negative for trouble swallowing.    Eyes: Positive for pain and visual disturbance.   Respiratory: Positive for shortness of breath (only with laying - history of lung nodules).    Cardiovascular: Negative for chest pain.   Genitourinary: Positive for hematuria.   Skin: Negative for color change.   Neurological: Positive for speech difficulty. Negative for dizziness and weakness.   Psychiatric/Behavioral: Positive for sleep disturbance. Negative for agitation.     Objective:     Vital Signs (Most Recent):  Temp: 97.3 °F (36.3 °C) (02/19/18 1359)  Pulse: 75 (02/19/18 1830)  Resp: 20 (02/19/18 1830)  BP: (!) 171/86 (02/19/18 1830)  SpO2: 98 % (02/19/18 1830)    Vital Signs Range (Last 24H):  Temp:  [97.3 °F (36.3 °C)-97.7 °F (36.5 °C)]   Pulse:  [70-85]   Resp:  [18-30]   BP: (171-236)/()   SpO2:  [95 %-100 %]     Physical Exam   Constitutional: She is oriented to person, place, and time. She appears well-developed.   Thin, elderly    HENT:   Head: Normocephalic and atraumatic.   Eyes:   Right eye - pupil equal and reactive, left eye opacified lens    Cardiovascular: Normal rate.    Pulmonary/Chest: Effort normal.   Abdominal: She exhibits no distension.   Musculoskeletal: She exhibits no edema.   Neurological: She is alert and oriented to person, place, and time.   Skin: Skin is warm and dry.   Psychiatric: She has a normal mood and affect.   Nursing note and vitals reviewed.      Neurological Exam:   LOC: alert  Attention Span: Good   Language: mild aphasia - cannot read cards due to vision loss   Articulation: Dysarthria  Orientation: Person, Place, Time   Visual Fields: can see movement with right eye, cannot see from left eye  EOM (CN III, IV, VI): Full/intact  Pupils (CN II, III): see "physical exam"  Facial Sensation (CN V): Normal  Facial Movement (CN VII): Symmetric facial expression    Motor: Arm left  Normal 5/5  Leg left  Normal 5/5  Arm right  " Normal 5/5  Leg right Normal 5/5  Cebellar: No evidence of appendicular or axial ataxia  Sensation: Intact to light touch, temperature and vibration  Tone: Normal tone throughout      Laboratory:  CMP:   Recent Labs  Lab 02/19/18  1441   CALCIUM 9.9   ALBUMIN 3.6   PROT 7.3      K 4.2   CO2 27      BUN 28*   CREATININE 1.2   ALKPHOS 91   ALT 22   AST 34   BILITOT 0.5     CBC:   Recent Labs  Lab 02/19/18  1441   WBC 6.45   RBC 4.71   HGB 11.7*   HCT 38.2      MCV 81*   MCH 24.8*   MCHC 30.6*     Lipid Panel:   Recent Labs  Lab 02/19/18  1441   CHOL 162   LDLCALC 89.4   HDL 54   TRIG 93     Coagulation:   Recent Labs  Lab 02/19/18  1441   INR 1.1     Hgb A1C: No results for input(s): HGBA1C in the last 168 hours.  TSH:   Recent Labs  Lab 02/19/18  1441   TSH 2.060       Diagnostic Results:    US carotids   1.  60-79% stenosis of the right internal carotid artery with mild homogeneous plaque.    2.  Less than 50% stenosis of the left internal carotid artery with mild homogeneous plaque.    MRI brain 2/19/18    Moderate size focus of acute infarction centered in the posterior inferior left temporal lobe.    Moderate chronic ischemic change as above.    Chronic opacification of the left maxilla sinus with obstructing lesion at the level of the ostiomeatal unit protruding into the nasal cavity. ENT consultation advised.          MAHENDRA Duncan  [facility]  Department of Vascular Neurology   [facility]

## 2018-02-20 NOTE — ASSESSMENT & PLAN NOTE
Likely etiology of stroke   Would like to recommend anticoagulation but family and patient considering if they are willing to accept the risks   Feel as though they will be discussing goals of care and that may help lead our treatment   Will use asa at this time only   Continue toprol

## 2018-02-20 NOTE — PT/OT/SLP EVAL
Speech Language Pathology Evaluation  Cognitive/Bedside Swallow    Patient Name:  Monique Lew   MRN:  [id number]  Admitting Diagnosis: Cerebrovascular accident (CVA) due to embolism of left middle cerebral artery    Recommendations:                  General Recommendations:  Speech/language therapy and Cognitive-linguistic therapy  Diet recommendations:  Regular, Thin   Aspiration Precautions: Standard aspiration precautions   General Precautions: Standard, aspiration, blind, fall  Communication strategies:  pt blind and [place]    History:     Past Medical History:   Diagnosis Date    Acquired hypothyroidism     Adrenal adenoma     Amenorrhea 12/28/2015    Anxiety 8/28/2017    Asymptomatic carotid artery stenosis without infarction 4/29/2015    Bladder mass 11/12/2017    6.3 x 4.3 x 4.0 cm left sided enhancing bladder mass.   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    Chronic atrial fibrillation 8/9/2012    Chronic hyponatremia 6/29/2015    Corneal thinning - Left Eye 9/17/2012    Depression (emotion) 8/28/2017    Dislocated IOL (intraocular lens), posterior - Right Eye 10/19/2012    DVT (deep venous thrombosis)     Essential hypertension 7/23/2014    Goiter     Gross hematuria 11/12/2017    History of cornea transplant - Left Eye 9/17/2012    History of CVA (cerebrovascular accident) 6/12/2013    Hydronephrosis of left kidney 11/12/2017    Hypercholesteremia 8/25/2016    Long-term (current) use of anticoagulants     Stopped due to gross hematuria from bladder CA     Macular hole of right eye     Malignant neoplasm of urinary bladder 11/17/2017    - 11/13 OR for cystoscopy, TURBT, and pyelogram; stent could not be placed as urethral orifice could not be visualized.  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 Bilateral nodules found on CT Chest have high  likelihood of being metastases.  - Oncology consulted to discuss treatment options. Pt would like to pursue immunotherapy.    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 hines)    BREAST LUMPECTOMY      BUNIONECTOMY      CATARACT EXTRACTION W/  INTRAOCULAR LENS IMPLANT Right 01/26/2011    WITH TRAB ()    CATARACT EXTRACTION W/  INTRAOCULAR LENS IMPLANT Left 04/09/2009    ANT. VIITRECTOMY WITH SUTURED PCIOL (DR. RYAN)    CATARACT EXTRACTION W/ INTRAOCULAR LENS IMPLANTW/ TRABECULECTOMY Right 01/26/2011        COLON SURGERY      volvulus    CONJUNCTIVAL FLAP  Left 05/28/2009    DR. JARAMILLO    conjuunctival flap   2009    OS w/ dr. 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 emanuel    TRABECULECTOMY Left 01/11/2006        transcleral cyclophotocoagulation   2010    OS w/ dr. hines       Social History: Patient lives alone     Prior Intubation HX:  .None this visit      Modified Barium Swallow: None this visit      Chest X-Rays: 2/19: Heart size is unchanged.  Scattered bilateral pulmonary nodules are noted.  No infiltrate or cardiac failure is noted.    Prior diet: No reported restrictions; no SLP notes in Epic.  .    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 She has been blind in L eye, but was indep pta. .    Subjective     Pt awake; sitting at BS  Patient goals: did not state     Pain/Comfort:  · Pain  Rating 1: 0/10  · Pain Rating Post-Intervention 1: 0/10    Objective:     Linguistic errors and [place] influencing performance on tasks.    Cognitive Status:  Behavioral Observations: Awake; pleasant  Orientation: 75% acc indep  Sequencin% acc indep  Simple problem solvin% acc indep  convergent categorization: 50% acc indep  Attention: WFL to assessment stimuli    Language:   Receptive Language:  LR discrimination: 100% acc indep   complex YN questions: 66% acc indep  identified objects FC3: 100% acc indep     Expressive Language:  Basic conversation: present with mild hesitations; mild circumlocutions   rote greetings: 100% acc indep  naming objects: 100% acc indep     Motor Speech: mild apraxia; mildly decrease in intelligiblity    Voice: No evidence of impairment    Visual Spatial:  Blind on L eye; new visual impairment on R    Oral Musculature Evaluation  · Oral Musculature: WFL  · Dentition: upper and lower dentures  · Secretion Management: adequate  · Mucosal Quality: good  · Mandibular Strength and Mobility: WFL  · Oral Labial Strength and Mobility: WNL  · Lingual Strength and Mobility: WNL  · Buccal Strength and Mobility: WNL  · Volitional Cough: able to demonstrate  · Volitional Swallow: able to demonstrate  · Voice Prior to PO Intake: clear    Bedside Swallow Eval:   Consistencies Assessed:  · Thin liquids 3 oz via straw and cup  · Puree 3 full spoonfuls  · Solids 1/4 kathie cracker     Oral Phase:   · WFL    Pharyngeal Phase:   · no overt clinical signs/symptoms of aspiration    Compensatory Strategies  · small bites/sips    Pt reported dysphagia sp stroke ; denies recent difficulty. Pt admitting to new onset visual impairment. Skilled education provided on aspiration precautions and diet recommendations. Whiteboard updated with diet recommendations/aspiration precautions.  No further questions.      Assessment:     Monique Lew is a 87 y.o. female with an SLP diagnosis of mild Aphasia,  Dysarthria, Apraxia and Cognitive-Linguistic Impairment; acute on chronic visual impairment      Goals:    SLP Goals        Problem: SLP Goal    Goal Priority Disciplines Outcome   SLP Goal     SLP    Description:  Speech Language Pathology Goals  Goals expected to be met by 2/27  1. Pt will complete high level word finding tasks with 80%  Acc provided min cues to improve expressive  Language.   2. Pt will complete convergent categorization tasks with 80% acc provided min cues to improve expressive language.   3. Pt will complete divergent categorization tasks with 80% acc provided min cues to improve expressive language.   4. Pt will solve simple problems with 90% acc provided min cues to improve cognition.   5. Pt will answer 3/4 orientation questions provided min cues to improve cognition.                     Plan:     · Patient to be seen:  5 x/week   · Plan of Care expires:  03/21/18  · Plan of Care reviewed with:  patient   · SLP Follow-Up:  Yes       Discharge recommendations:  Discharge Facility/Level Of Care Needs: home health speech therapy   Barriers to Discharge:  Lives alone; see PT OT note    Time Tracking:     SLP Treatment Date:   02/20/18  Speech Start Time:  0850  Speech Stop Time:  0906     Speech Total Time (min):  16 min    Billable Minutes: Eval 8  and Eval Swallow and Oral Function 8      Mariella Dorado M.A. CCC-SLP  Speech Language Pathologist  [phone]  2/20/2018

## 2018-02-21 ENCOUNTER — TELEPHONE (OUTPATIENT)
Dept: INTERNAL MEDICINE | Facility: CLINIC | Age: 83
End: 2018-02-21

## 2018-02-21 NOTE — TELEPHONE ENCOUNTER
----- Message from Paula Muhammad sent at 2/21/2018  9:01 AM CST -----  Contact: Radha [facility]  [phone]  Radha is requesting to speak with you concerning the pt being admitted into hospice.  Please call and advise

## 2018-02-21 NOTE — TELEPHONE ENCOUNTER
Spoke with Radha with [facility]. Patient was discharged from hospital yesterday and will be admitted to Hospice. Radha reports they need initial paperwork signed off  by PCP and medical director will manage pt care after admission. Instructed to fax paperwork to office.

## 2018-02-28 ENCOUNTER — TELEPHONE (OUTPATIENT)
Dept: INTERNAL MEDICINE | Facility: CLINIC | Age: 83
End: 2018-02-28

## 2018-03-19 ENCOUNTER — TELEPHONE (OUTPATIENT)
Dept: NEUROSURGERY | Facility: HOSPITAL | Age: 83
End: 2018-03-19

## 2018-03-26 ENCOUNTER — TELEPHONE (OUTPATIENT)
Dept: NEUROSURGERY | Facility: HOSPITAL | Age: 83
End: 2018-03-26

## 2018-03-26 NOTE — TELEPHONE ENCOUNTER
Spoke with patient and sister Aylin. Risk factors to patient for stroke discussed with teach back implemented. Patient and sister verbalized understanding of discharge instructions and medications. Patient and sister was asked about discharge appoints and follow up care. Follow appointment was cancelled as of today per sister d/t hospice care. Warning signs discussed with teach back discussion method implemented. Notified to seek immediate medical help via 911 if new or worsening stroke symptoms occur. Patient relayed no new symptoms.

## 2018-04-10 ENCOUNTER — TELEPHONE (OUTPATIENT)
Dept: OPHTHALMOLOGY | Facility: CLINIC | Age: 83
End: 2018-04-10

## 2018-04-10 DIAGNOSIS — H01.00A BLEPHARITIS OF UPPER AND LOWER EYELIDS OF BOTH EYES, UNSPECIFIED TYPE: Primary | ICD-10-CM

## 2018-04-10 DIAGNOSIS — H01.00B BLEPHARITIS OF UPPER AND LOWER EYELIDS OF BOTH EYES, UNSPECIFIED TYPE: Primary | ICD-10-CM

## 2018-04-10 RX ORDER — ERYTHROMYCIN 5 MG/G
OINTMENT OPHTHALMIC
Qty: 3.4 G | Refills: 5 | Status: SHIPPED | OUTPATIENT
Start: 2018-04-10

## 2018-04-10 RX ORDER — ERYTHROMYCIN 5 MG/G
OINTMENT OPHTHALMIC NIGHTLY
COMMUNITY
End: 2018-04-10 | Stop reason: SDUPTHER

## 2018-04-10 NOTE — TELEPHONE ENCOUNTER
"Pt states she is seeing "green flowers//floaters" and also feels like her lids are bit swollen. Pt was concerned since it is her only seeing eye. Pt is on hospice now and so her friend was advised that it was her Ulices Bonnet Syndrome. Pt can use EES vika for her eyelids and Rx sent to pharmacy.  "

## 2018-04-10 NOTE — TELEPHONE ENCOUNTER
----- Message from Denita Ennis sent at 4/10/2018  2:10 PM CDT -----  Contact: Aylin Aguilera  Pt complains of seeing flower like floaters out of her OD and states the eye lid appears to be a bit swollen and would like to know what she should do.  She can be reached at [phone]

## 2019-04-29 ENCOUNTER — PES CALL (OUTPATIENT)
Dept: ADMINISTRATIVE | Facility: CLINIC | Age: 84
End: 2019-04-29

## 2019-08-12 NOTE — HPI
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    OTHER MEDICAL HISTORY: Patient has had bowel resection for volvulus, corneal transplant, glaucoma shunt, several other eye surgeries, thyroid surgery. She is treated or followed for history of CVA, MI, DVT, poor vision, atrial fibrillation, hypothyroidism and hypertension. PS is ECOG 3.     PRIOR CANCER HISTORY: Patient underwent lumpectomy for an early cancer of the left breast in 1992. She was treated with radiation to the breast and had no other treatment.     PRESENT ILLNESS: Patient was seen in the ED on 11/11/17 because of hematuria. CT urogram showed a 6 cm left sided bladder mass and left hydrourteronephrosis, liver and lung lesions that appear to be metastasis. On 11/12/17 she underwent transurethral resection of the bladder mass which proved to be a high grade urothelial carcinoma invading muscularis propria. The Hb was 14.5 on 9/3/17 and was 10.8 after admission on 11/11/17.     RADIOLOGIC STUDIES: In addition to that noted above, CT of the chest on 11/12/17 confirms the presence of lung metastasis.     LABORATORY STUDIES: In addition to that noted above, on 11/21/17 the Hb is 8.9 with a wbc of 9,930 and a platelet count of 289,000. Comprehensive metabolic panel on 11/11/17 is remarkable for sodium of 125 and glucose of 133.     IMPRESSION: Treatment of the bladder mass with radiation is recommended to stop bleeding and possibly clear ureter obstruction.     PLAN: Patient to come to radiation oncology for treatment planning simulation at 10 am tomorrow 11/22/17. A course of radiation to a dose of about 40 Gy in fractions of 2.5 Gy each is expected to begin later in the day on 11/22/17. We will treat patient on Friday 11/24.   Statement Selected

## 2021-05-07 NOTE — ASSESSMENT & PLAN NOTE
-See Bladder cancer    - Complains of intermittent difficulty voiding overnight. Bladder scan early this AM was 260cc, patient was then catheterized with return of 600 cc light pink urine, no clots  - nurse reported that she noticed blood in her urine this morning.  - will not restart AC; monitor 24 hrs   - consult radiation oncology for intractable hematuria   - received 1/14 round of radiatio; scheduled for next one Friday    Discussed with radiation oncologist regarding re-starting AC; patient has had multiple episodes of urination without blood. Per rad-onc it is safer to re-start AC at a latter date; possibly Monday.    Patient arrived with mother for outpatient consult for traumatic finger wound. Mother instructed on how to care for wound. No follow up needed.

## 2021-11-15 NOTE — ASSESSMENT & PLAN NOTE
- d/c'ed venlafaxine as pt was not taking it at home       Notified by patient's daughter, Jacque, that patient had syncopal episode while in bathroom today.  Her daughter noted seizure-like activity with blood in emesis.  Her daughter called 911.  Patient will be taken to [facility] for evaluation.

## 2024-09-25 NOTE — ED NOTES
ICU arrived to move patient. Report given and belongings sent with patient including cell phone, glasses, , deodorant and back scratcher. Attempted to call both brothers on contact list with no answer, message left with new room number.    Pt. Resting in bed in NAD. RR e/u. Continuous BP, cardiac, and O2 monitoring in progress. VS being monitoring continuously per MD orders.Pt assisted to bathroom to void. Explanation of care/wait provided. Bed in low, locked position with rails up and call bell in reach. Will continue to monitor.

## (undated) DEVICE — SYR 10CC LUER LOCK

## (undated) DEVICE — BAG URINARY DRAINAGE 2000ML

## (undated) DEVICE — PACK CYSTO

## (undated) DEVICE — TRAY CYSTO BASIN

## (undated) DEVICE — SET TUR BLADDER IRRIG Y TYPE

## (undated) DEVICE — SOL IRR NACL .9% 3000ML

## (undated) DEVICE — SEE L#95700

## (undated) DEVICE — GOWN SMARTGOWN LVL4 X-LONG XL

## (undated) DEVICE — Device

## (undated) DEVICE — WIRE GD LUB ANG 3CM .038 150CM

## (undated) DEVICE — CATH 5FR OPEN END URETERAL

## (undated) DEVICE — ELECTRODE LOOP CUTTING BIPOLAR

## (undated) DEVICE — SYR 50ML CATH TIP